# Patient Record
Sex: FEMALE | Race: WHITE | NOT HISPANIC OR LATINO | Employment: UNEMPLOYED | URBAN - METROPOLITAN AREA
[De-identification: names, ages, dates, MRNs, and addresses within clinical notes are randomized per-mention and may not be internally consistent; named-entity substitution may affect disease eponyms.]

---

## 2017-01-01 DIAGNOSIS — E03.9 ACQUIRED HYPOTHYROIDISM: Primary | ICD-10-CM

## 2017-01-01 RX ORDER — LEVOTHYROXINE SODIUM 75 UG/1
TABLET ORAL
Qty: 12 TABLET | Refills: 0 | Status: SHIPPED | OUTPATIENT
Start: 2017-01-01 | End: 2017-01-01 | Stop reason: SDUPTHER

## 2017-01-01 RX ORDER — LEVOTHYROXINE SODIUM 75 UG/1
TABLET ORAL
Qty: 12 TABLET | Refills: 0 | Status: ON HOLD | OUTPATIENT
Start: 2017-01-01 | End: 2018-01-01 | Stop reason: HOSPADM

## 2017-02-13 ENCOUNTER — OFFICE VISIT (OUTPATIENT)
Dept: URGENT CARE | Facility: CLINIC | Age: 57
End: 2017-02-13
Payer: MEDICARE

## 2017-02-13 VITALS
HEART RATE: 82 BPM | HEIGHT: 63 IN | SYSTOLIC BLOOD PRESSURE: 148 MMHG | OXYGEN SATURATION: 98 % | WEIGHT: 239.88 LBS | DIASTOLIC BLOOD PRESSURE: 100 MMHG | TEMPERATURE: 98 F | BODY MASS INDEX: 42.5 KG/M2

## 2017-02-13 DIAGNOSIS — R11.0 NAUSEA: ICD-10-CM

## 2017-02-13 DIAGNOSIS — B02.9 HERPES ZOSTER WITHOUT COMPLICATION: Primary | ICD-10-CM

## 2017-02-13 DIAGNOSIS — M54.89 OTHER BACK PAIN, UNSPECIFIED CHRONICITY: ICD-10-CM

## 2017-02-13 PROCEDURE — 96372 THER/PROPH/DIAG INJ SC/IM: CPT | Mod: PBBFAC,PO

## 2017-02-13 PROCEDURE — 99213 OFFICE O/P EST LOW 20 MIN: CPT | Mod: 25,S$PBB,, | Performed by: NURSE PRACTITIONER

## 2017-02-13 PROCEDURE — S0119 ONDANSETRON 4 MG: HCPCS | Mod: PBBFAC,PO

## 2017-02-13 PROCEDURE — 99214 OFFICE O/P EST MOD 30 MIN: CPT | Mod: PBBFAC,PO | Performed by: NURSE PRACTITIONER

## 2017-02-13 PROCEDURE — 99999 PR PBB SHADOW E&M-EST. PATIENT-LVL IV: CPT | Mod: PBBFAC,,, | Performed by: NURSE PRACTITIONER

## 2017-02-13 RX ORDER — ONDANSETRON 4 MG/1
8 TABLET, FILM COATED ORAL
Status: COMPLETED | OUTPATIENT
Start: 2017-02-13 | End: 2017-02-13

## 2017-02-13 RX ORDER — KETOROLAC TROMETHAMINE 30 MG/ML
60 INJECTION, SOLUTION INTRAMUSCULAR; INTRAVENOUS ONCE
Status: DISCONTINUED | OUTPATIENT
Start: 2017-02-13 | End: 2017-02-13

## 2017-02-13 RX ORDER — ACYCLOVIR 800 MG/1
800 TABLET ORAL
Qty: 35 TABLET | Refills: 0 | Status: ON HOLD | OUTPATIENT
Start: 2017-02-13 | End: 2018-01-01 | Stop reason: HOSPADM

## 2017-02-13 RX ORDER — KETOROLAC TROMETHAMINE 30 MG/ML
30 INJECTION, SOLUTION INTRAMUSCULAR; INTRAVENOUS
Status: COMPLETED | OUTPATIENT
Start: 2017-02-13 | End: 2017-02-13

## 2017-02-13 RX ORDER — ONDANSETRON 4 MG/1
4 TABLET, FILM COATED ORAL EVERY 6 HOURS PRN
Qty: 15 TABLET | Refills: 0 | Status: ON HOLD | OUTPATIENT
Start: 2017-02-13 | End: 2018-01-01 | Stop reason: HOSPADM

## 2017-02-13 RX ORDER — GABAPENTIN 300 MG/1
300 CAPSULE ORAL NIGHTLY
Qty: 30 CAPSULE | Refills: 0 | Status: ON HOLD | OUTPATIENT
Start: 2017-02-13 | End: 2018-01-01 | Stop reason: HOSPADM

## 2017-02-13 RX ADMIN — ONDANSETRON HYDROCHLORIDE 8 MG: 4 TABLET, FILM COATED ORAL at 04:02

## 2017-02-13 RX ADMIN — KETOROLAC TROMETHAMINE 30 MG: 30 INJECTION, SOLUTION INTRAMUSCULAR at 04:02

## 2017-02-13 NOTE — PATIENT INSTRUCTIONS
PLAN:   Toradol 30 mg IM now  Advise increase p.o. fluids--at least 64 ounces of water/juice & rest  Meds: Zofran, Neurontin, Acyclovir.  Cool compresses.  Practice good handwashing.  Tylenol or Ibuprofen for fever, headache and body aches.  See PCP or go to ER if symptoms worsen or fail to improve with treatment.

## 2017-02-13 NOTE — MR AVS SNAPSHOT
[facility]  [address]  [place] [address]  Phone: [phone]  Fax: [phone]                  Milli Montez   2017 12:00 PM   Office Visit    Description:  Female : 1960   Provider:  Bianca Rees NP   Department:  [facility]           Reason for Visit     Herpes Zoster           Diagnoses this Visit        Comments    Herpes zoster without complication    -  Primary     Other back pain, unspecified chronicity         Nausea                To Do List           Future Appointments        Provider Department Dept Phone    2017 10:00 AM Usha Chang MD [facility] [phone]      Goals (5 Years of Data)     None       These Medications        Disp Refills Start End    ondansetron (ZOFRAN) 4 MG tablet 15 tablet 0 2017     Take 1 tablet (4 mg total) by mouth every 6 (six) hours as needed for Nausea. - Oral    Pharmacy: [place], [place] - [address] Ph #: [phone]       gabapentin (NEURONTIN) 300 MG capsule 30 capsule 0 2017 3/15/2017    Take 1 capsule (300 mg total) by mouth every evening. - Oral    Pharmacy: [place], [place] - [id number] Ph #: [phone]       acyclovir (ZOVIRAX) 800 MG Tab 35 tablet 0 2017    Take 1 tablet (800 mg total) by mouth 5 (five) times daily. - Oral    Pharmacy: [place], [place] - [id number] Ph #: [phone]         [facility]     [facility] -  Assistance  Registered nurses in the [facility] provide clinical advisement, health education, appointment booking, and other advisory services.  Call for this free service at [phone].             Medications           Message regarding Medications     Verify the changes and/or additions to your medication regime listed below are the same as discussed with your clinician  today.  If any of these changes or additions are incorrect, please notify your healthcare provider.        START taking these NEW medications        Refills    ondansetron (ZOFRAN) 4 MG tablet 0    Sig: Take 1 tablet (4 mg total) by mouth every 6 (six) hours as needed for Nausea.    Class: Normal    Route: Oral    gabapentin (NEURONTIN) 300 MG capsule 0    Sig: Take 1 capsule (300 mg total) by mouth every evening.    Class: Normal    Route: Oral    acyclovir (ZOVIRAX) 800 MG Tab 0    Sig: Take 1 tablet (800 mg total) by mouth 5 (five) times daily.    Class: Normal    Route: Oral      These medications were administered today        Dose Freq    ketorolac injection 60 mg 60 mg Once    Sig: Inject 2 mLs (60 mg total) into the muscle once.    Class: Normal    Route: Intramuscular      STOP taking these medications     ondansetron (ZOFRAN-ODT) 8 MG TbDL Take 1 tablet (8 mg total) by mouth 3 (three) times daily as needed.           Verify that the below list of medications is an accurate representation of the medications you are currently taking.  If none reported, the list may be blank. If incorrect, please contact your healthcare provider. Carry this list with you in case of emergency.           Current Medications     aspirin (BUFFERIN) 325 MG Tab Take 325 mg by mouth once daily.    blood sugar diagnostic Strp check BS fastin and  2hr after biggest meal    blood-glucose meter (FREESTYLE SYSTEM KIT) kit Use as instructed    clopidogrel (PLAVIX) 75 mg tablet Take 75 mg by mouth.    enalapril (VASOTEC) 2.5 MG tablet Take 2.5 mg by mouth 2 (two) times daily.    furosemide (LASIX) 20 MG tablet Take 20 mg by mouth daily as needed.    glipiZIDE (GLUCOTROL) 5 MG tablet Take 1 tablet (5 mg total) by mouth 2 (two) times daily before meals.    lancets-blood glucose strips 30 gauge Cmpk 2 Devices by Misc.(Non-Drug; Combo Route) route 2 (two) times daily. check BS fastin and  2hr after biggest meal    levothyroxine (SYNTHROID) 75  "MCG tablet 1 tab po daily    nebivolol (BYSTOLIC) 5 MG Tab Take 5 mg by mouth.    ranolazine (RANEXA) 500 MG Tb12 Take 500 mg by mouth.    rosuvastatin (CRESTOR) 20 MG tablet Take 40 mg by mouth once daily.    acyclovir (ZOVIRAX) 800 MG Tab Take 1 tablet (800 mg total) by mouth 5 (five) times daily.    gabapentin (NEURONTIN) 300 MG capsule Take 1 capsule (300 mg total) by mouth every evening.    ondansetron (ZOFRAN) 4 MG tablet Take 1 tablet (4 mg total) by mouth every 6 (six) hours as needed for Nausea.           Clinical Reference Information           Your Vitals Were     BP Pulse Temp Height    148/100 (BP Location: Left arm, Patient Position: Sitting, BP Method: Manual) 82 97.8 °F (36.6 °C) (Tympanic) 5' 3" (1.6 m)    Weight SpO2 BMI    108.8 kg (239 lb 13.8 oz) 98% 42.49 kg/m2      Blood Pressure          Most Recent Value    BP  (!)  148/100      Allergies as of 2/13/2017     Penicillins      Immunizations Administered on Date of Encounter - 2/13/2017     None      Instructions    PLAN:   Toradol 60 mg IM now  Advise increase p.o. fluids--at least 64 ounces of water/juice & rest  Meds: Zofran, Neurontin, Acyclovir.  Cool compresses.  Practice good handwashing.  Tylenol or Ibuprofen for fever, headache and body aches.  See PCP or go to ER if symptoms worsen or fail to improve with treatment.           Language Assistance Services     ATTENTION: Language assistance services are available, free of charge. Please call [phone].      ATENCIÓN: Si lauren nichols, tiene a obregon disposición servicios gratuitos de asistencia lingüística. Llame al [phone].     JACQUIE Ý: N?u b?n nói Ti?ng Vi?t, có các d?ch v? h? tr? ngôn ng? mi?n phí dành cho b?n. G?i s? [phone].         [facility] complies with applicable Federal civil rights laws and does not discriminate on the basis of race, color, national origin, age, disability, or sex.        "

## 2017-02-13 NOTE — PROGRESS NOTES
CHIEF COMPLAINT/REASON FOR VISIT:  painful red rash on left back, flank & abdomen .    HISTORY OF PRESENT ILLNESS:  56  year-old female complains of painful red rash on left back, flank & abdomen  onset 2-3 days ago. Patient admits very painful to touch. Complains of nausea with pain medications. Patient requesting Zofran. Denies seeking emergency treatment.  Patient denies shortness of breath, congestion, fever, cough, urinary discomfort.       Past Medical History   Diagnosis Date    Allergy     Arthritis     Blood transfusion     CHF (congestive heart failure)     Heart murmur     History of loop recorder     Hyperlipidemia     Hypertension     Hypothyroidism 9/17/2013    Myocardial infarction     Prediabetes     Thyroid disease     TIA (transient ischemic attack)     Ulcer        Social History     Social History    Marital status:      Spouse name: N/A    Number of children: N/A    Years of education: N/A     Occupational History    Not on file.     Social History Main Topics    Smoking status: Never Smoker    Smokeless tobacco: Never Used    Alcohol use No    Drug use: No    Sexual activity: Yes     Partners: Male     Other Topics Concern    Not on file     Social History Narrative          Family History   Problem Relation Age of Onset    Heart disease Mother     Cancer Father     Heart disease Father        ROS:  GENERAL: No fever, chills, fatigability or weight loss.  SKIN: painful red rash on left back, flank & abdomen .  CHEST: Denies cyanosis, wheezing, cough and sputum production.  CARDIOVASCULAR: Denies chest pain, PND, orthopnea or reduced exercise tolerance.  ABDOMEN: Appetite fine. No weight loss. Denies diarrhea, abdominal pain,   MUSCULOSKELETAL: painful red rash on left back, flank & abdomen .  NEUROLOGIC: No history of seizures, paralysis, alteration of gait or coordination.  PSYCHIATRIC: Denies mood swings, depression or suicidal thoughts.    PE:   APPEARANCE:  Well nourished, well developed, in moderate distress.   SKIN: left flank, back & abdominal region with red grouped vesicles lesion  CHEST: no respiratory symptoms .  CARDIOVASCULAR: Heart rate regular    MUSCULOSKELETAL: Motor: 5/5 strength major flexors/extensors.  NEUROLOGIC: No sensory deficits. Gait & Posture: Normal gait and fine motion. No cerebellar signs.  MENTAL STATUS: Patient alert, oriented x 3 & conversant.    PLAN:   Toradol 30 mg IM now  Advise increase p.o. fluids--at least 64 ounces of water/juice & rest  Meds: Zofran, Neurontin, Acyclovir.  Cool compresses.  Practice good handwashing.  Tylenol or Ibuprofen for fever, headache and body aches.  See PCP or go to ER if symptoms worsen or fail to improve with treatment.      DIAGNOSIS:   Nausea  Back pain  Morbid Obesity  Hypertension  Herpes zoster/ shingles

## 2017-04-06 DIAGNOSIS — E03.9 ACQUIRED HYPOTHYROIDISM: Primary | ICD-10-CM

## 2017-04-06 RX ORDER — LEVOTHYROXINE SODIUM 75 UG/1
TABLET ORAL
Qty: 12 TABLET | Refills: 0 | Status: SHIPPED | OUTPATIENT
Start: 2017-04-06 | End: 2017-01-01 | Stop reason: SDUPTHER

## 2018-01-01 ENCOUNTER — TELEPHONE (OUTPATIENT)
Dept: FAMILY MEDICINE | Facility: CLINIC | Age: 58
End: 2018-01-01

## 2018-01-01 ENCOUNTER — HOSPITAL ENCOUNTER (INPATIENT)
Facility: HOSPITAL | Age: 58
LOS: 7 days | Discharge: ANOTHER HOSPITAL | DRG: 270 | End: 2018-05-26
Attending: EMERGENCY MEDICINE | Admitting: INTERNAL MEDICINE
Payer: MEDICARE

## 2018-01-01 ENCOUNTER — HOSPITAL ENCOUNTER (INPATIENT)
Facility: HOSPITAL | Age: 58
LOS: 2 days | DRG: 871 | End: 2018-05-28
Attending: INTERNAL MEDICINE | Admitting: INTERNAL MEDICINE
Payer: MEDICARE

## 2018-01-01 ENCOUNTER — HOSPITAL ENCOUNTER (INPATIENT)
Facility: HOSPITAL | Age: 58
LOS: 1 days | Discharge: HOME | DRG: 292 | End: 2018-01-15
Attending: EMERGENCY MEDICINE | Admitting: FAMILY MEDICINE
Payer: MEDICARE

## 2018-01-01 ENCOUNTER — HOSPITAL ENCOUNTER (INPATIENT)
Facility: HOSPITAL | Age: 58
LOS: 2 days | Discharge: HOME | DRG: 189 | End: 2018-04-21
Attending: EMERGENCY MEDICINE | Admitting: INTERNAL MEDICINE
Payer: MEDICARE

## 2018-01-01 VITALS
DIASTOLIC BLOOD PRESSURE: 72 MMHG | RESPIRATION RATE: 18 BRPM | SYSTOLIC BLOOD PRESSURE: 110 MMHG | HEART RATE: 80 BPM | OXYGEN SATURATION: 94 % | TEMPERATURE: 97 F | HEIGHT: 63 IN | WEIGHT: 227.31 LBS | BODY MASS INDEX: 40.28 KG/M2

## 2018-01-01 VITALS
HEART RATE: 68 BPM | DIASTOLIC BLOOD PRESSURE: 57 MMHG | HEIGHT: 63 IN | OXYGEN SATURATION: 95 % | SYSTOLIC BLOOD PRESSURE: 101 MMHG | WEIGHT: 229.94 LBS | BODY MASS INDEX: 40.74 KG/M2 | TEMPERATURE: 98 F | RESPIRATION RATE: 18 BRPM

## 2018-01-01 VITALS
HEART RATE: 87 BPM | HEIGHT: 66 IN | WEIGHT: 211.88 LBS | SYSTOLIC BLOOD PRESSURE: 110 MMHG | OXYGEN SATURATION: 98 % | TEMPERATURE: 98 F | DIASTOLIC BLOOD PRESSURE: 49 MMHG | BODY MASS INDEX: 34.05 KG/M2 | RESPIRATION RATE: 21 BRPM

## 2018-01-01 VITALS
TEMPERATURE: 97 F | WEIGHT: 216.5 LBS | SYSTOLIC BLOOD PRESSURE: 71 MMHG | OXYGEN SATURATION: 97 % | DIASTOLIC BLOOD PRESSURE: 51 MMHG | HEIGHT: 66 IN | BODY MASS INDEX: 34.79 KG/M2

## 2018-01-01 DIAGNOSIS — N18.3 ACUTE RENAL FAILURE SUPERIMPOSED ON STAGE 3 CHRONIC KIDNEY DISEASE, UNSPECIFIED ACUTE RENAL FAILURE TYPE: ICD-10-CM

## 2018-01-01 DIAGNOSIS — R94.30 EJECTION FRACTION < 50%: ICD-10-CM

## 2018-01-01 DIAGNOSIS — N18.30 STAGE 3 CHRONIC KIDNEY DISEASE DUE TO TYPE 1 DIABETES MELLITUS: ICD-10-CM

## 2018-01-01 DIAGNOSIS — E03.9 HYPOTHYROIDISM, UNSPECIFIED TYPE: Chronic | ICD-10-CM

## 2018-01-01 DIAGNOSIS — N17.2 ACUTE RENAL FAILURE WITH RENAL MEDULLARY NECROSIS SUPERIMPOSED ON STAGE 3 CHRONIC KIDNEY DISEASE: ICD-10-CM

## 2018-01-01 DIAGNOSIS — R57.0 CARDIOGENIC SHOCK: ICD-10-CM

## 2018-01-01 DIAGNOSIS — I25.10 CORONARY ARTERY DISEASE, ANGINA PRESENCE UNSPECIFIED, UNSPECIFIED VESSEL OR LESION TYPE, UNSPECIFIED WHETHER NATIVE OR TRANSPLANTED HEART: ICD-10-CM

## 2018-01-01 DIAGNOSIS — R94.31 ABNORMAL EKG: ICD-10-CM

## 2018-01-01 DIAGNOSIS — I50.9 ACUTE ON CHRONIC CONGESTIVE HEART FAILURE, UNSPECIFIED HEART FAILURE TYPE: Primary | ICD-10-CM

## 2018-01-01 DIAGNOSIS — E11.21 DIABETIC NEPHROPATHY ASSOCIATED WITH TYPE 2 DIABETES MELLITUS: ICD-10-CM

## 2018-01-01 DIAGNOSIS — R06.00 DYSPNEA: ICD-10-CM

## 2018-01-01 DIAGNOSIS — I50.9 CHF (CONGESTIVE HEART FAILURE): ICD-10-CM

## 2018-01-01 DIAGNOSIS — N18.30 ACUTE RENAL FAILURE WITH RENAL MEDULLARY NECROSIS SUPERIMPOSED ON STAGE 3 CHRONIC KIDNEY DISEASE: ICD-10-CM

## 2018-01-01 DIAGNOSIS — N17.9 AKI (ACUTE KIDNEY INJURY): ICD-10-CM

## 2018-01-01 DIAGNOSIS — I50.1 ACUTE PULMONARY EDEMA WITH CONGESTIVE HEART FAILURE: ICD-10-CM

## 2018-01-01 DIAGNOSIS — J96.00 ACUTE RESPIRATORY FAILURE: ICD-10-CM

## 2018-01-01 DIAGNOSIS — I10 ESSENTIAL HYPERTENSION: Chronic | ICD-10-CM

## 2018-01-01 DIAGNOSIS — E66.01 MORBID OBESITY WITH BMI OF 40.0-44.9, ADULT: Chronic | ICD-10-CM

## 2018-01-01 DIAGNOSIS — R07.9 CHEST PAIN: ICD-10-CM

## 2018-01-01 DIAGNOSIS — R06.02 SOB (SHORTNESS OF BREATH): ICD-10-CM

## 2018-01-01 DIAGNOSIS — I50.9 CONGESTIVE HEART FAILURE, UNSPECIFIED CONGESTIVE HEART FAILURE CHRONICITY, UNSPECIFIED CONGESTIVE HEART FAILURE TYPE: Primary | ICD-10-CM

## 2018-01-01 DIAGNOSIS — E11.65 TYPE 2 DIABETES MELLITUS WITH HYPERGLYCEMIA, WITHOUT LONG-TERM CURRENT USE OF INSULIN: Chronic | ICD-10-CM

## 2018-01-01 DIAGNOSIS — N17.9 ACUTE RENAL FAILURE SUPERIMPOSED ON STAGE 3 CHRONIC KIDNEY DISEASE, UNSPECIFIED ACUTE RENAL FAILURE TYPE: ICD-10-CM

## 2018-01-01 DIAGNOSIS — E87.1 HYPONATREMIA: ICD-10-CM

## 2018-01-01 DIAGNOSIS — I25.10 CORONARY ARTERY DISEASE INVOLVING NATIVE CORONARY ARTERY OF NATIVE HEART WITHOUT ANGINA PECTORIS: Chronic | ICD-10-CM

## 2018-01-01 DIAGNOSIS — I16.0 HYPERTENSIVE URGENCY: ICD-10-CM

## 2018-01-01 DIAGNOSIS — J96.02 ACUTE RESPIRATORY FAILURE WITH HYPERCAPNIA: Primary | ICD-10-CM

## 2018-01-01 DIAGNOSIS — J81.0 FLASH PULMONARY EDEMA: ICD-10-CM

## 2018-01-01 DIAGNOSIS — G45.3 AMAUROSIS FUGAX: ICD-10-CM

## 2018-01-01 DIAGNOSIS — E87.79 OTHER HYPERVOLEMIA: ICD-10-CM

## 2018-01-01 DIAGNOSIS — E78.5 HYPERLIPIDEMIA, UNSPECIFIED HYPERLIPIDEMIA TYPE: ICD-10-CM

## 2018-01-01 DIAGNOSIS — R06.00 DYSPNEA, UNSPECIFIED TYPE: ICD-10-CM

## 2018-01-01 DIAGNOSIS — I21.4 NSTEMI (NON-ST ELEVATED MYOCARDIAL INFARCTION): ICD-10-CM

## 2018-01-01 DIAGNOSIS — N17.9 ACUTE RENAL FAILURE SUPERIMPOSED ON STAGE 3 CHRONIC KIDNEY DISEASE, UNSPECIFIED ACUTE RENAL FAILURE TYPE: Primary | ICD-10-CM

## 2018-01-01 DIAGNOSIS — E87.20 LACTIC ACIDOSIS: ICD-10-CM

## 2018-01-01 DIAGNOSIS — I50.31 ACUTE CONGESTIVE HEART FAILURE WITH LEFT VENTRICULAR DIASTOLIC DYSFUNCTION: ICD-10-CM

## 2018-01-01 DIAGNOSIS — J96.01 ACUTE RESPIRATORY FAILURE WITH HYPOXIA: ICD-10-CM

## 2018-01-01 DIAGNOSIS — E10.22 STAGE 3 CHRONIC KIDNEY DISEASE DUE TO TYPE 1 DIABETES MELLITUS: ICD-10-CM

## 2018-01-01 DIAGNOSIS — R06.03 RESPIRATORY DISTRESS: ICD-10-CM

## 2018-01-01 DIAGNOSIS — I42.9 CARDIOMYOPATHY: ICD-10-CM

## 2018-01-01 DIAGNOSIS — I50.9 ACUTE ON CHRONIC CONGESTIVE HEART FAILURE, UNSPECIFIED CONGESTIVE HEART FAILURE TYPE: ICD-10-CM

## 2018-01-01 DIAGNOSIS — I50.9 CONGESTIVE HEART FAILURE: ICD-10-CM

## 2018-01-01 DIAGNOSIS — N18.3 ACUTE RENAL FAILURE SUPERIMPOSED ON STAGE 3 CHRONIC KIDNEY DISEASE, UNSPECIFIED ACUTE RENAL FAILURE TYPE: Primary | ICD-10-CM

## 2018-01-01 DIAGNOSIS — I48.91 ATRIAL FIBRILLATION: ICD-10-CM

## 2018-01-01 DIAGNOSIS — I50.43 ACUTE ON CHRONIC COMBINED SYSTOLIC AND DIASTOLIC HEART FAILURE: ICD-10-CM

## 2018-01-01 DIAGNOSIS — D72.829 LEUKOCYTOSIS, UNSPECIFIED TYPE: ICD-10-CM

## 2018-01-01 DIAGNOSIS — R06.02 SHORTNESS OF BREATH: ICD-10-CM

## 2018-01-01 DIAGNOSIS — E87.8 ELECTROLYTE IMBALANCE: ICD-10-CM

## 2018-01-01 LAB
ALBUMIN SERPL BCP-MCNC: 1.7 G/DL
ALBUMIN SERPL BCP-MCNC: 1.8 G/DL
ALBUMIN SERPL BCP-MCNC: 1.8 G/DL
ALBUMIN SERPL BCP-MCNC: 1.9 G/DL
ALBUMIN SERPL BCP-MCNC: 2 G/DL
ALBUMIN SERPL BCP-MCNC: 2.1 G/DL
ALBUMIN SERPL BCP-MCNC: 2.1 G/DL
ALBUMIN SERPL BCP-MCNC: 2.2 G/DL
ALBUMIN SERPL BCP-MCNC: 2.3 G/DL
ALBUMIN SERPL BCP-MCNC: 2.3 G/DL
ALBUMIN SERPL BCP-MCNC: 2.4 G/DL
ALBUMIN SERPL BCP-MCNC: 2.7 G/DL
ALBUMIN SERPL BCP-MCNC: 2.7 G/DL
ALBUMIN SERPL BCP-MCNC: 2.9 G/DL
ALBUMIN SERPL BCP-MCNC: 2.9 G/DL
ALBUMIN SERPL BCP-MCNC: 3 G/DL
ALBUMIN SERPL BCP-MCNC: 3.2 G/DL
ALBUMIN SERPL BCP-MCNC: 3.2 G/DL
ALBUMIN SERPL BCP-MCNC: 3.3 G/DL
ALLENS TEST: ABNORMAL
ALLENS TEST: NORMAL
ALP SERPL-CCNC: 101 U/L
ALP SERPL-CCNC: 104 U/L
ALP SERPL-CCNC: 112 U/L
ALP SERPL-CCNC: 119 U/L
ALP SERPL-CCNC: 124 U/L
ALP SERPL-CCNC: 129 U/L
ALP SERPL-CCNC: 143 U/L
ALP SERPL-CCNC: 146 U/L
ALP SERPL-CCNC: 147 U/L
ALP SERPL-CCNC: 149 U/L
ALP SERPL-CCNC: 162 U/L
ALP SERPL-CCNC: 164 U/L
ALP SERPL-CCNC: 87 U/L
ALP SERPL-CCNC: 87 U/L
ALP SERPL-CCNC: 98 U/L
ALT SERPL W/O P-5'-P-CCNC: 15 U/L
ALT SERPL W/O P-5'-P-CCNC: 17 U/L
ALT SERPL W/O P-5'-P-CCNC: 23 U/L
ALT SERPL W/O P-5'-P-CCNC: 25 U/L
ALT SERPL W/O P-5'-P-CCNC: 26 U/L
ALT SERPL W/O P-5'-P-CCNC: 26 U/L
ALT SERPL W/O P-5'-P-CCNC: 28 U/L
ALT SERPL W/O P-5'-P-CCNC: 30 U/L
ALT SERPL W/O P-5'-P-CCNC: 32 U/L
ALT SERPL W/O P-5'-P-CCNC: 43 U/L
ALT SERPL W/O P-5'-P-CCNC: 58 U/L
ALT SERPL W/O P-5'-P-CCNC: 62 U/L
ALT SERPL W/O P-5'-P-CCNC: 75 U/L
ALT SERPL W/O P-5'-P-CCNC: 81 U/L
ALT SERPL W/O P-5'-P-CCNC: 81 U/L
ANION GAP SERPL CALC-SCNC: 10 MMOL/L
ANION GAP SERPL CALC-SCNC: 11 MMOL/L
ANION GAP SERPL CALC-SCNC: 12 MMOL/L
ANION GAP SERPL CALC-SCNC: 12 MMOL/L
ANION GAP SERPL CALC-SCNC: 13 MMOL/L
ANION GAP SERPL CALC-SCNC: 14 MMOL/L
ANION GAP SERPL CALC-SCNC: 15 MMOL/L
ANION GAP SERPL CALC-SCNC: 16 MMOL/L
ANION GAP SERPL CALC-SCNC: 18 MMOL/L
ANION GAP SERPL CALC-SCNC: 18 MMOL/L
ANISOCYTOSIS BLD QL SMEAR: SLIGHT
AORTIC VALVE REGURGITATION: ABNORMAL
APTT BLDCRRT: 22.3 SEC
APTT BLDCRRT: 23.3 SEC
APTT BLDCRRT: 36.3 SEC
APTT BLDCRRT: 36.3 SEC
APTT BLDCRRT: 37.7 SEC
APTT BLDCRRT: 40 SEC
APTT BLDCRRT: 43.8 SEC
APTT BLDCRRT: 43.9 SEC
APTT BLDCRRT: 48.5 SEC
APTT BLDCRRT: 48.5 SEC
APTT BLDCRRT: 57.1 SEC
APTT BLDCRRT: 57.1 SEC
APTT BLDCRRT: 57.2 SEC
APTT BLDCRRT: 63 SEC
APTT BLDCRRT: 67.4 SEC
APTT BLDCRRT: 73.7 SEC
AST SERPL-CCNC: 109 U/L
AST SERPL-CCNC: 148 U/L
AST SERPL-CCNC: 26 U/L
AST SERPL-CCNC: 31 U/L
AST SERPL-CCNC: 317 U/L
AST SERPL-CCNC: 32 U/L
AST SERPL-CCNC: 35 U/L
AST SERPL-CCNC: 37 U/L
AST SERPL-CCNC: 44 U/L
AST SERPL-CCNC: 44 U/L
AST SERPL-CCNC: 45 U/L
AST SERPL-CCNC: 51 U/L
AST SERPL-CCNC: 517 U/L
AST SERPL-CCNC: 517 U/L
AST SERPL-CCNC: 67 U/L
BACTERIA #/AREA URNS HPF: ABNORMAL /HPF
BACTERIA #/AREA URNS HPF: NORMAL /HPF
BACTERIA BLD CULT: NORMAL
BACTERIA SPEC AEROBE CULT: NORMAL
BASOPHILS # BLD AUTO: 0.01 K/UL
BASOPHILS # BLD AUTO: 0.02 K/UL
BASOPHILS # BLD AUTO: 0.03 K/UL
BASOPHILS # BLD AUTO: 0.06 K/UL
BASOPHILS # BLD AUTO: 0.06 K/UL
BASOPHILS # BLD AUTO: ABNORMAL K/UL
BASOPHILS NFR BLD: 0 %
BASOPHILS NFR BLD: 0.1 %
BASOPHILS NFR BLD: 0.2 %
BASOPHILS NFR BLD: 0.3 %
BASOPHILS NFR BLD: 0.4 %
BASOPHILS NFR BLD: 0.4 %
BASOPHILS NFR BLD: 0.5 %
BASOPHILS NFR BLD: 0.5 %
BILIRUB SERPL-MCNC: 0.5 MG/DL
BILIRUB SERPL-MCNC: 0.5 MG/DL
BILIRUB SERPL-MCNC: 0.7 MG/DL
BILIRUB SERPL-MCNC: 0.8 MG/DL
BILIRUB SERPL-MCNC: 0.8 MG/DL
BILIRUB SERPL-MCNC: 1.4 MG/DL
BILIRUB SERPL-MCNC: 1.4 MG/DL
BILIRUB SERPL-MCNC: 1.6 MG/DL
BILIRUB SERPL-MCNC: 1.7 MG/DL
BILIRUB SERPL-MCNC: 1.9 MG/DL
BILIRUB SERPL-MCNC: 1.9 MG/DL
BILIRUB SERPL-MCNC: 2.1 MG/DL
BILIRUB SERPL-MCNC: 2.5 MG/DL
BILIRUB SERPL-MCNC: 2.6 MG/DL
BILIRUB UR QL STRIP: NEGATIVE
BNP SERPL-MCNC: 1841 PG/ML
BNP SERPL-MCNC: 200 PG/ML
BNP SERPL-MCNC: 2305 PG/ML
BNP SERPL-MCNC: 3240 PG/ML
BNP SERPL-MCNC: 510 PG/ML
BNP SERPL-MCNC: 828 PG/ML
BUN SERPL-MCNC: 15 MG/DL
BUN SERPL-MCNC: 17 MG/DL
BUN SERPL-MCNC: 20 MG/DL
BUN SERPL-MCNC: 23 MG/DL
BUN SERPL-MCNC: 24 MG/DL
BUN SERPL-MCNC: 25 MG/DL
BUN SERPL-MCNC: 25 MG/DL
BUN SERPL-MCNC: 28 MG/DL
BUN SERPL-MCNC: 31 MG/DL
BUN SERPL-MCNC: 35 MG/DL
BUN SERPL-MCNC: 35 MG/DL
BUN SERPL-MCNC: 37 MG/DL
BUN SERPL-MCNC: 40 MG/DL
BUN SERPL-MCNC: 53 MG/DL
BUN SERPL-MCNC: 60 MG/DL
BUN SERPL-MCNC: 63 MG/DL
BUN SERPL-MCNC: 63 MG/DL
BUN SERPL-MCNC: 64 MG/DL
BUN SERPL-MCNC: 68 MG/DL
BUN SERPL-MCNC: 69 MG/DL
BUN SERPL-MCNC: 69 MG/DL
BUN SERPL-MCNC: 76 MG/DL
BUN SERPL-MCNC: 76 MG/DL
BUN SERPL-MCNC: 77 MG/DL
BUN SERPL-MCNC: 79 MG/DL
BUN SERPL-MCNC: 79 MG/DL
BUN SERPL-MCNC: 81 MG/DL
BURR CELLS BLD QL SMEAR: ABNORMAL
CALCIUM SERPL-MCNC: 7.9 MG/DL
CALCIUM SERPL-MCNC: 7.9 MG/DL
CALCIUM SERPL-MCNC: 8 MG/DL
CALCIUM SERPL-MCNC: 8.1 MG/DL
CALCIUM SERPL-MCNC: 8.2 MG/DL
CALCIUM SERPL-MCNC: 8.3 MG/DL
CALCIUM SERPL-MCNC: 8.3 MG/DL
CALCIUM SERPL-MCNC: 8.4 MG/DL
CALCIUM SERPL-MCNC: 8.5 MG/DL
CALCIUM SERPL-MCNC: 8.6 MG/DL
CALCIUM SERPL-MCNC: 8.8 MG/DL
CALCIUM SERPL-MCNC: 8.9 MG/DL
CALCIUM SERPL-MCNC: 8.9 MG/DL
CALCIUM SERPL-MCNC: 9 MG/DL
CALCIUM SERPL-MCNC: 9 MG/DL
CALCIUM SERPL-MCNC: 9.1 MG/DL
CALCIUM SERPL-MCNC: 9.2 MG/DL
CALCIUM SERPL-MCNC: 9.5 MG/DL
CALCIUM SERPL-MCNC: 9.6 MG/DL
CALCIUM SERPL-MCNC: 9.9 MG/DL
CHLORIDE SERPL-SCNC: 102 MMOL/L
CHLORIDE SERPL-SCNC: 103 MMOL/L
CHLORIDE SERPL-SCNC: 104 MMOL/L
CHLORIDE SERPL-SCNC: 106 MMOL/L
CHLORIDE SERPL-SCNC: 107 MMOL/L
CHLORIDE SERPL-SCNC: 107 MMOL/L
CHLORIDE SERPL-SCNC: 80 MMOL/L
CHLORIDE SERPL-SCNC: 81 MMOL/L
CHLORIDE SERPL-SCNC: 81 MMOL/L
CHLORIDE SERPL-SCNC: 82 MMOL/L
CHLORIDE SERPL-SCNC: 83 MMOL/L
CHLORIDE SERPL-SCNC: 84 MMOL/L
CHLORIDE SERPL-SCNC: 84 MMOL/L
CHLORIDE SERPL-SCNC: 85 MMOL/L
CHLORIDE SERPL-SCNC: 86 MMOL/L
CHLORIDE SERPL-SCNC: 87 MMOL/L
CHLORIDE SERPL-SCNC: 87 MMOL/L
CHLORIDE SERPL-SCNC: 88 MMOL/L
CHLORIDE SERPL-SCNC: 89 MMOL/L
CHLORIDE SERPL-SCNC: 90 MMOL/L
CHLORIDE SERPL-SCNC: 98 MMOL/L
CHLORIDE SERPL-SCNC: 99 MMOL/L
CHOLEST SERPL-MCNC: 245 MG/DL
CHOLEST/HDLC SERPL: 6.8 {RATIO}
CK MB SERPL-MCNC: 0.6 NG/ML
CK MB SERPL-MCNC: 1.2 NG/ML
CK MB SERPL-MCNC: 3.9 NG/ML
CK MB SERPL-RTO: 1.1 %
CK MB SERPL-RTO: 1.6 %
CK MB SERPL-RTO: 2.4 %
CK SERPL-CCNC: 162 U/L
CK SERPL-CCNC: 162 U/L
CK SERPL-CCNC: 56 U/L
CK SERPL-CCNC: 56 U/L
CK SERPL-CCNC: 76 U/L
CK SERPL-CCNC: 76 U/L
CLARITY UR: CLEAR
CO2 SERPL-SCNC: 16 MMOL/L
CO2 SERPL-SCNC: 17 MMOL/L
CO2 SERPL-SCNC: 18 MMOL/L
CO2 SERPL-SCNC: 20 MMOL/L
CO2 SERPL-SCNC: 21 MMOL/L
CO2 SERPL-SCNC: 23 MMOL/L
CO2 SERPL-SCNC: 24 MMOL/L
CO2 SERPL-SCNC: 25 MMOL/L
CO2 SERPL-SCNC: 26 MMOL/L
CO2 SERPL-SCNC: 27 MMOL/L
CO2 SERPL-SCNC: 28 MMOL/L
CO2 SERPL-SCNC: 29 MMOL/L
CO2 SERPL-SCNC: 30 MMOL/L
CO2 SERPL-SCNC: 31 MMOL/L
CO2 SERPL-SCNC: 32 MMOL/L
CO2 SERPL-SCNC: 33 MMOL/L
CO2 SERPL-SCNC: 35 MMOL/L
COLOR UR: YELLOW
CORTIS SERPL-MCNC: 29.4 UG/DL
CREAT SERPL-MCNC: 1 MG/DL
CREAT SERPL-MCNC: 1.2 MG/DL
CREAT SERPL-MCNC: 1.4 MG/DL
CREAT SERPL-MCNC: 1.5 MG/DL
CREAT SERPL-MCNC: 1.6 MG/DL
CREAT SERPL-MCNC: 1.7 MG/DL
CREAT SERPL-MCNC: 1.8 MG/DL
CREAT SERPL-MCNC: 1.9 MG/DL
CREAT SERPL-MCNC: 1.9 MG/DL
CREAT SERPL-MCNC: 2 MG/DL
CREAT SERPL-MCNC: 2.1 MG/DL
CREAT SERPL-MCNC: 2.2 MG/DL
CREAT SERPL-MCNC: 2.3 MG/DL
CREAT SERPL-MCNC: 2.8 MG/DL
CREAT SERPL-MCNC: 2.8 MG/DL
CREAT SERPL-MCNC: 2.9 MG/DL
CREAT SERPL-MCNC: 3 MG/DL
CREAT SERPL-MCNC: 3.5 MG/DL
CREAT SERPL-MCNC: 3.9 MG/DL
CREAT SERPL-MCNC: 3.9 MG/DL
CREAT UR-MCNC: 156.2 MG/DL
D DIMER PPP IA.FEU-MCNC: 2.08 MG/L FEU
D DIMER PPP IA.FEU-MCNC: 3.17 MG/L FEU
DELSYS: ABNORMAL
DELSYS: NORMAL
DIASTOLIC DYSFUNCTION: YES
DIFFERENTIAL METHOD: ABNORMAL
EOSINOPHIL # BLD AUTO: 0 K/UL
EOSINOPHIL # BLD AUTO: 0.1 K/UL
EOSINOPHIL # BLD AUTO: 0.2 K/UL
EOSINOPHIL # BLD AUTO: 0.2 K/UL
EOSINOPHIL # BLD AUTO: 0.3 K/UL
EOSINOPHIL # BLD AUTO: 0.5 K/UL
EOSINOPHIL # BLD AUTO: ABNORMAL K/UL
EOSINOPHIL NFR BLD: 0.1 %
EOSINOPHIL NFR BLD: 0.2 %
EOSINOPHIL NFR BLD: 0.3 %
EOSINOPHIL NFR BLD: 0.4 %
EOSINOPHIL NFR BLD: 0.7 %
EOSINOPHIL NFR BLD: 1 %
EOSINOPHIL NFR BLD: 1.2 %
EOSINOPHIL NFR BLD: 1.4 %
EOSINOPHIL NFR BLD: 2 %
EOSINOPHIL NFR BLD: 3 %
EOSINOPHIL NFR BLD: 4.4 %
EP: 6
EP: 8
ERYTHROCYTE [DISTWIDTH] IN BLOOD BY AUTOMATED COUNT: 14.2 %
ERYTHROCYTE [DISTWIDTH] IN BLOOD BY AUTOMATED COUNT: 14.3 %
ERYTHROCYTE [DISTWIDTH] IN BLOOD BY AUTOMATED COUNT: 14.4 %
ERYTHROCYTE [DISTWIDTH] IN BLOOD BY AUTOMATED COUNT: 14.6 %
ERYTHROCYTE [DISTWIDTH] IN BLOOD BY AUTOMATED COUNT: 14.6 %
ERYTHROCYTE [DISTWIDTH] IN BLOOD BY AUTOMATED COUNT: 14.7 %
ERYTHROCYTE [DISTWIDTH] IN BLOOD BY AUTOMATED COUNT: 14.8 %
ERYTHROCYTE [DISTWIDTH] IN BLOOD BY AUTOMATED COUNT: 14.9 %
ERYTHROCYTE [DISTWIDTH] IN BLOOD BY AUTOMATED COUNT: 15 %
ERYTHROCYTE [DISTWIDTH] IN BLOOD BY AUTOMATED COUNT: 15 %
ERYTHROCYTE [DISTWIDTH] IN BLOOD BY AUTOMATED COUNT: 15.1 %
ERYTHROCYTE [DISTWIDTH] IN BLOOD BY AUTOMATED COUNT: 15.1 %
ERYTHROCYTE [DISTWIDTH] IN BLOOD BY AUTOMATED COUNT: 15.2 %
ERYTHROCYTE [DISTWIDTH] IN BLOOD BY AUTOMATED COUNT: 15.5 %
ERYTHROCYTE [DISTWIDTH] IN BLOOD BY AUTOMATED COUNT: 15.6 %
ERYTHROCYTE [SEDIMENTATION RATE] IN BLOOD BY WESTERGREN METHOD: 16 MM/H
ERYTHROCYTE [SEDIMENTATION RATE] IN BLOOD BY WESTERGREN METHOD: 20 MM/H
ERYTHROCYTE [SEDIMENTATION RATE] IN BLOOD BY WESTERGREN METHOD: 25 MM/H
ERYTHROCYTE [SEDIMENTATION RATE] IN BLOOD BY WESTERGREN METHOD: 26 MM/H
EST. GFR  (AFRICAN AMERICAN): 14.1 ML/MIN/1.73 M^2
EST. GFR  (AFRICAN AMERICAN): 14.1 ML/MIN/1.73 M^2
EST. GFR  (AFRICAN AMERICAN): 16.1 ML/MIN/1.73 M^2
EST. GFR  (AFRICAN AMERICAN): 19.4 ML/MIN/1.73 M^2
EST. GFR  (AFRICAN AMERICAN): 20 ML/MIN/1.73 M^2
EST. GFR  (AFRICAN AMERICAN): 20.2 ML/MIN/1.73 M^2
EST. GFR  (AFRICAN AMERICAN): 21 ML/MIN/1.73 M^2
EST. GFR  (AFRICAN AMERICAN): 21 ML/MIN/1.73 M^2
EST. GFR  (AFRICAN AMERICAN): 27 ML/MIN/1.73 M^2
EST. GFR  (AFRICAN AMERICAN): 28 ML/MIN/1.73 M^2
EST. GFR  (AFRICAN AMERICAN): 30 ML/MIN/1.73 M^2
EST. GFR  (AFRICAN AMERICAN): 32 ML/MIN/1.73 M^2
EST. GFR  (AFRICAN AMERICAN): 34 ML/MIN/1.73 M^2
EST. GFR  (AFRICAN AMERICAN): 34 ML/MIN/1.73 M^2
EST. GFR  (AFRICAN AMERICAN): 36 ML/MIN/1.73 M^2
EST. GFR  (AFRICAN AMERICAN): 39 ML/MIN/1.73 M^2
EST. GFR  (AFRICAN AMERICAN): 42 ML/MIN/1.73 M^2
EST. GFR  (AFRICAN AMERICAN): 45 ML/MIN/1.73 M^2
EST. GFR  (AFRICAN AMERICAN): 49 ML/MIN/1.73 M^2
EST. GFR  (AFRICAN AMERICAN): 59 ML/MIN/1.73 M^2
EST. GFR  (AFRICAN AMERICAN): >60 ML/MIN/1.73 M^2
EST. GFR  (NON AFRICAN AMERICAN): 12.3 ML/MIN/1.73 M^2
EST. GFR  (NON AFRICAN AMERICAN): 12.3 ML/MIN/1.73 M^2
EST. GFR  (NON AFRICAN AMERICAN): 14 ML/MIN/1.73 M^2
EST. GFR  (NON AFRICAN AMERICAN): 16.8 ML/MIN/1.73 M^2
EST. GFR  (NON AFRICAN AMERICAN): 17.5 ML/MIN/1.73 M^2
EST. GFR  (NON AFRICAN AMERICAN): 18 ML/MIN/1.73 M^2
EST. GFR  (NON AFRICAN AMERICAN): 23 ML/MIN/1.73 M^2
EST. GFR  (NON AFRICAN AMERICAN): 25 ML/MIN/1.73 M^2
EST. GFR  (NON AFRICAN AMERICAN): 26 ML/MIN/1.73 M^2
EST. GFR  (NON AFRICAN AMERICAN): 28 ML/MIN/1.73 M^2
EST. GFR  (NON AFRICAN AMERICAN): 29 ML/MIN/1.73 M^2
EST. GFR  (NON AFRICAN AMERICAN): 29 ML/MIN/1.73 M^2
EST. GFR  (NON AFRICAN AMERICAN): 31 ML/MIN/1.73 M^2
EST. GFR  (NON AFRICAN AMERICAN): 33 ML/MIN/1.73 M^2
EST. GFR  (NON AFRICAN AMERICAN): 36 ML/MIN/1.73 M^2
EST. GFR  (NON AFRICAN AMERICAN): 39 ML/MIN/1.73 M^2
EST. GFR  (NON AFRICAN AMERICAN): 42 ML/MIN/1.73 M^2
EST. GFR  (NON AFRICAN AMERICAN): 51 ML/MIN/1.73 M^2
EST. GFR  (NON AFRICAN AMERICAN): >60 ML/MIN/1.73 M^2
ESTIMATED AVG GLUCOSE: 134 MG/DL
ESTIMATED AVG GLUCOSE: 137 MG/DL
ESTIMATED AVG GLUCOSE: 137 MG/DL
ESTIMATED PA SYSTOLIC PRESSURE: 29.34
ESTIMATED PA SYSTOLIC PRESSURE: 33.18
ESTIMATED PA SYSTOLIC PRESSURE: 44.99
FACT X PPP CHRO-ACNC: 0.17 IU/ML
FACT X PPP CHRO-ACNC: 0.2 IU/ML
FACT X PPP CHRO-ACNC: 0.31 IU/ML
FACT X PPP CHRO-ACNC: 0.34 IU/ML
FACT X PPP CHRO-ACNC: 0.45 IU/ML
FACT X PPP CHRO-ACNC: 0.47 IU/ML
FACT X PPP CHRO-ACNC: 0.57 IU/ML
FACT X PPP CHRO-ACNC: 0.58 IU/ML
FACT X PPP CHRO-ACNC: 0.6 IU/ML
FACT X PPP CHRO-ACNC: 0.6 IU/ML
FACT X PPP CHRO-ACNC: 0.71 IU/ML
FIO2: 100
FIO2: 100
FIO2: 21
FIO2: 21
FIO2: 30
FIO2: 32
FIO2: 40
FIO2: 45
FIO2: 45
FIO2: 50
FIO2: 60
FIO2: 80
FLOW: 3
GIANT PLATELETS BLD QL SMEAR: PRESENT
GLUCOSE SERPL-MCNC: 123 MG/DL
GLUCOSE SERPL-MCNC: 131 MG/DL
GLUCOSE SERPL-MCNC: 163 MG/DL
GLUCOSE SERPL-MCNC: 170 MG/DL
GLUCOSE SERPL-MCNC: 179 MG/DL
GLUCOSE SERPL-MCNC: 180 MG/DL
GLUCOSE SERPL-MCNC: 180 MG/DL
GLUCOSE SERPL-MCNC: 194 MG/DL
GLUCOSE SERPL-MCNC: 197 MG/DL
GLUCOSE SERPL-MCNC: 197 MG/DL
GLUCOSE SERPL-MCNC: 202 MG/DL
GLUCOSE SERPL-MCNC: 204 MG/DL
GLUCOSE SERPL-MCNC: 204 MG/DL
GLUCOSE SERPL-MCNC: 206 MG/DL
GLUCOSE SERPL-MCNC: 210 MG/DL
GLUCOSE SERPL-MCNC: 210 MG/DL
GLUCOSE SERPL-MCNC: 219 MG/DL
GLUCOSE SERPL-MCNC: 219 MG/DL
GLUCOSE SERPL-MCNC: 222 MG/DL
GLUCOSE SERPL-MCNC: 225 MG/DL
GLUCOSE SERPL-MCNC: 231 MG/DL
GLUCOSE SERPL-MCNC: 238 MG/DL
GLUCOSE SERPL-MCNC: 246 MG/DL
GLUCOSE SERPL-MCNC: 248 MG/DL
GLUCOSE SERPL-MCNC: 311 MG/DL
GLUCOSE SERPL-MCNC: 311 MG/DL
GLUCOSE SERPL-MCNC: 328 MG/DL
GLUCOSE SERPL-MCNC: 356 MG/DL
GLUCOSE SERPL-MCNC: 419 MG/DL (ref 70–110)
GLUCOSE SERPL-MCNC: 433 MG/DL
GLUCOSE UR QL STRIP: ABNORMAL
GLUCOSE UR QL STRIP: NEGATIVE
GLUCOSE UR QL STRIP: NEGATIVE
GRAM STN SPEC: NORMAL
HBA1C MFR BLD HPLC: 6.3 %
HBA1C MFR BLD HPLC: 6.4 %
HBA1C MFR BLD HPLC: 6.4 %
HBV CORE AB SERPL QL IA: NEGATIVE
HBV SURFACE AG SERPL QL IA: NEGATIVE
HCO3 UR-SCNC: 17.9 MMOL/L (ref 24–28)
HCO3 UR-SCNC: 19.6 MMOL/L (ref 24–28)
HCO3 UR-SCNC: 20.1 MMOL/L (ref 24–28)
HCO3 UR-SCNC: 20.3 MMOL/L (ref 24–28)
HCO3 UR-SCNC: 20.3 MMOL/L (ref 24–28)
HCO3 UR-SCNC: 20.5 MMOL/L (ref 24–28)
HCO3 UR-SCNC: 21.3 MMOL/L (ref 24–28)
HCO3 UR-SCNC: 22.9 MMOL/L (ref 24–28)
HCO3 UR-SCNC: 23.5 MMOL/L (ref 24–28)
HCO3 UR-SCNC: 24.1 MMOL/L (ref 24–28)
HCO3 UR-SCNC: 24.3 MMOL/L (ref 24–28)
HCO3 UR-SCNC: 24.3 MMOL/L (ref 24–28)
HCO3 UR-SCNC: 25 MMOL/L (ref 24–28)
HCO3 UR-SCNC: 25.4 MMOL/L (ref 24–28)
HCO3 UR-SCNC: 25.4 MMOL/L (ref 24–28)
HCO3 UR-SCNC: 25.5 MMOL/L (ref 24–28)
HCO3 UR-SCNC: 26.1 MMOL/L (ref 24–28)
HCO3 UR-SCNC: 26.1 MMOL/L (ref 24–28)
HCO3 UR-SCNC: 26.2 MMOL/L (ref 24–28)
HCO3 UR-SCNC: 27.6 MMOL/L (ref 24–28)
HCO3 UR-SCNC: 28.3 MMOL/L (ref 24–28)
HCO3 UR-SCNC: 29.9 MMOL/L (ref 24–28)
HCO3 UR-SCNC: 31 MMOL/L (ref 24–28)
HCO3 UR-SCNC: 31.5 MMOL/L (ref 24–28)
HCO3 UR-SCNC: 33.1 MMOL/L (ref 24–28)
HCO3 UR-SCNC: 34 MMOL/L (ref 24–28)
HCO3 UR-SCNC: 37.9 MMOL/L (ref 24–28)
HCO3 UR-SCNC: 38.3 MMOL/L (ref 24–28)
HCT VFR BLD AUTO: 22.8 %
HCT VFR BLD AUTO: 23.5 %
HCT VFR BLD AUTO: 23.7 %
HCT VFR BLD AUTO: 24 %
HCT VFR BLD AUTO: 27.7 %
HCT VFR BLD AUTO: 29.4 %
HCT VFR BLD AUTO: 32.6 %
HCT VFR BLD AUTO: 32.9 %
HCT VFR BLD AUTO: 35.5 %
HCT VFR BLD AUTO: 35.9 %
HCT VFR BLD AUTO: 36.1 %
HCT VFR BLD AUTO: 37.3 %
HCT VFR BLD AUTO: 38 %
HCT VFR BLD AUTO: 38 %
HCT VFR BLD AUTO: 40.8 %
HCT VFR BLD AUTO: 40.8 %
HCT VFR BLD AUTO: 41 %
HCT VFR BLD AUTO: 42.6 %
HCT VFR BLD AUTO: 44.7 %
HCT VFR BLD CALC: 38 %PCV (ref 36–54)
HDLC SERPL-MCNC: 36 MG/DL
HDLC SERPL: 14.7 %
HGB BLD-MCNC: 11.1 G/DL
HGB BLD-MCNC: 11.5 G/DL
HGB BLD-MCNC: 11.7 G/DL
HGB BLD-MCNC: 12 G/DL
HGB BLD-MCNC: 12.2 G/DL
HGB BLD-MCNC: 12.4 G/DL
HGB BLD-MCNC: 13.1 G/DL
HGB BLD-MCNC: 13.5 G/DL
HGB BLD-MCNC: 13.8 G/DL
HGB BLD-MCNC: 13.9 G/DL
HGB BLD-MCNC: 14.9 G/DL
HGB BLD-MCNC: 7.5 G/DL
HGB BLD-MCNC: 7.7 G/DL
HGB BLD-MCNC: 7.8 G/DL
HGB BLD-MCNC: 7.9 G/DL
HGB BLD-MCNC: 9.4 G/DL
HGB BLD-MCNC: 9.9 G/DL
HGB UR QL STRIP: ABNORMAL
HGB UR QL STRIP: NEGATIVE
HGB UR QL STRIP: NEGATIVE
HYALINE CASTS #/AREA URNS LPF: 0 /LPF
HYALINE CASTS #/AREA URNS LPF: 20 /LPF
HYPOCHROMIA BLD QL SMEAR: ABNORMAL
IMM GRANULOCYTES # BLD AUTO: ABNORMAL K/UL
IMM GRANULOCYTES NFR BLD AUTO: ABNORMAL %
INR PPP: 1
INR PPP: 1.1
INR PPP: 1.1
IP: 12
IP: 24
KETONES UR QL STRIP: NEGATIVE
LACTATE SERPL-SCNC: 1.4 MMOL/L
LACTATE SERPL-SCNC: 1.5 MMOL/L
LACTATE SERPL-SCNC: 1.6 MMOL/L
LACTATE SERPL-SCNC: 1.9 MMOL/L
LACTATE SERPL-SCNC: 2 MMOL/L
LACTATE SERPL-SCNC: 2.3 MMOL/L
LACTATE SERPL-SCNC: 2.4 MMOL/L
LACTATE SERPL-SCNC: 2.5 MMOL/L
LACTATE SERPL-SCNC: 3.9 MMOL/L
LACTATE SERPL-SCNC: 5.1 MMOL/L
LACTATE SERPL-SCNC: 5.3 MMOL/L
LACTATE SERPL-SCNC: 5.9 MMOL/L
LDLC SERPL CALC-MCNC: 174.6 MG/DL
LEUKOCYTE ESTERASE UR QL STRIP: NEGATIVE
LYMPHOCYTES # BLD AUTO: 0.9 K/UL
LYMPHOCYTES # BLD AUTO: 1.2 K/UL
LYMPHOCYTES # BLD AUTO: 1.3 K/UL
LYMPHOCYTES # BLD AUTO: 1.5 K/UL
LYMPHOCYTES # BLD AUTO: 1.6 K/UL
LYMPHOCYTES # BLD AUTO: 1.8 K/UL
LYMPHOCYTES # BLD AUTO: 2 K/UL
LYMPHOCYTES # BLD AUTO: 2 K/UL
LYMPHOCYTES # BLD AUTO: 2.1 K/UL
LYMPHOCYTES # BLD AUTO: 2.2 K/UL
LYMPHOCYTES # BLD AUTO: 2.6 K/UL
LYMPHOCYTES # BLD AUTO: 2.6 K/UL
LYMPHOCYTES # BLD AUTO: 3.9 K/UL
LYMPHOCYTES # BLD AUTO: 5.6 K/UL
LYMPHOCYTES # BLD AUTO: 5.9 K/UL
LYMPHOCYTES # BLD AUTO: ABNORMAL K/UL
LYMPHOCYTES NFR BLD: 10.7 %
LYMPHOCYTES NFR BLD: 12 %
LYMPHOCYTES NFR BLD: 12.7 %
LYMPHOCYTES NFR BLD: 12.7 %
LYMPHOCYTES NFR BLD: 12.9 %
LYMPHOCYTES NFR BLD: 16.3 %
LYMPHOCYTES NFR BLD: 16.3 %
LYMPHOCYTES NFR BLD: 2 %
LYMPHOCYTES NFR BLD: 28.2 %
LYMPHOCYTES NFR BLD: 3 %
LYMPHOCYTES NFR BLD: 33.9 %
LYMPHOCYTES NFR BLD: 36 %
LYMPHOCYTES NFR BLD: 4 %
LYMPHOCYTES NFR BLD: 45.3 %
LYMPHOCYTES NFR BLD: 48.3 %
LYMPHOCYTES NFR BLD: 6.6 %
LYMPHOCYTES NFR BLD: 9.3 %
LYMPHOCYTES NFR BLD: 9.3 %
LYMPHOCYTES NFR BLD: 9.6 %
MAGNESIUM SERPL-MCNC: 1.8 MG/DL
MAGNESIUM SERPL-MCNC: 1.9 MG/DL
MAGNESIUM SERPL-MCNC: 1.9 MG/DL
MAGNESIUM SERPL-MCNC: 2 MG/DL
MAGNESIUM SERPL-MCNC: 2 MG/DL
MAGNESIUM SERPL-MCNC: 2.1 MG/DL
MAGNESIUM SERPL-MCNC: 2.2 MG/DL
MAGNESIUM SERPL-MCNC: 2.3 MG/DL
MAGNESIUM SERPL-MCNC: 2.3 MG/DL
MAGNESIUM SERPL-MCNC: 2.5 MG/DL
MAGNESIUM SERPL-MCNC: 2.6 MG/DL
MAGNESIUM SERPL-MCNC: 2.8 MG/DL
MCH RBC QN AUTO: 30.5 PG
MCH RBC QN AUTO: 30.6 PG
MCH RBC QN AUTO: 30.7 PG
MCH RBC QN AUTO: 30.7 PG
MCH RBC QN AUTO: 30.8 PG
MCH RBC QN AUTO: 30.9 PG
MCH RBC QN AUTO: 31 PG
MCH RBC QN AUTO: 31.1 PG
MCH RBC QN AUTO: 31.1 PG
MCH RBC QN AUTO: 31.2 PG
MCH RBC QN AUTO: 31.3 PG
MCH RBC QN AUTO: 31.3 PG
MCH RBC QN AUTO: 31.4 PG
MCH RBC QN AUTO: 31.5 PG
MCH RBC QN AUTO: 31.7 PG
MCH RBC QN AUTO: 31.9 PG
MCH RBC QN AUTO: 32.3 PG
MCHC RBC AUTO-ENTMCNC: 31.6 G/DL
MCHC RBC AUTO-ENTMCNC: 32.1 G/DL
MCHC RBC AUTO-ENTMCNC: 32.4 G/DL
MCHC RBC AUTO-ENTMCNC: 32.4 G/DL
MCHC RBC AUTO-ENTMCNC: 32.6 G/DL
MCHC RBC AUTO-ENTMCNC: 32.6 G/DL
MCHC RBC AUTO-ENTMCNC: 32.9 G/DL
MCHC RBC AUTO-ENTMCNC: 33.1 G/DL
MCHC RBC AUTO-ENTMCNC: 33.2 G/DL
MCHC RBC AUTO-ENTMCNC: 33.2 G/DL
MCHC RBC AUTO-ENTMCNC: 33.3 G/DL
MCHC RBC AUTO-ENTMCNC: 33.7 G/DL
MCHC RBC AUTO-ENTMCNC: 33.8 G/DL
MCHC RBC AUTO-ENTMCNC: 33.8 G/DL
MCHC RBC AUTO-ENTMCNC: 33.9 G/DL
MCHC RBC AUTO-ENTMCNC: 33.9 G/DL
MCHC RBC AUTO-ENTMCNC: 34 G/DL
MCHC RBC AUTO-ENTMCNC: 34 G/DL
MCHC RBC AUTO-ENTMCNC: 35 G/DL
MCV RBC AUTO: 89 FL
MCV RBC AUTO: 90 FL
MCV RBC AUTO: 91 FL
MCV RBC AUTO: 92 FL
MCV RBC AUTO: 93 FL
MCV RBC AUTO: 93 FL
MCV RBC AUTO: 94 FL
MCV RBC AUTO: 94 FL
MCV RBC AUTO: 95 FL
MCV RBC AUTO: 96 FL
MCV RBC AUTO: 96 FL
MCV RBC AUTO: 97 FL
MCV RBC AUTO: 98 FL
METAMYELOCYTES NFR BLD MANUAL: 1 %
METAMYELOCYTES NFR BLD MANUAL: 1 %
METAMYELOCYTES NFR BLD MANUAL: 2 %
MICROSCOPIC COMMENT: ABNORMAL
MICROSCOPIC COMMENT: NORMAL
MIN VOL: 10.6
MIN VOL: 11.4
MIN VOL: 11.8
MIN VOL: 13.6
MIN VOL: 8.2
MIN VOL: 8.2
MIN VOL: 9.46
MIN VOL: 9.61
MITRAL VALVE MOBILITY: ABNORMAL
MITRAL VALVE REGURGITATION: ABNORMAL
MODE: ABNORMAL
MODE: NORMAL
MONOCYTES # BLD AUTO: 0.3 K/UL
MONOCYTES # BLD AUTO: 0.3 K/UL
MONOCYTES # BLD AUTO: 0.4 K/UL
MONOCYTES # BLD AUTO: 0.5 K/UL
MONOCYTES # BLD AUTO: 0.6 K/UL
MONOCYTES # BLD AUTO: 0.7 K/UL
MONOCYTES # BLD AUTO: 0.8 K/UL
MONOCYTES # BLD AUTO: 0.9 K/UL
MONOCYTES # BLD AUTO: ABNORMAL K/UL
MONOCYTES NFR BLD: 0 %
MONOCYTES NFR BLD: 0 %
MONOCYTES NFR BLD: 1 %
MONOCYTES NFR BLD: 2.4 %
MONOCYTES NFR BLD: 2.7 %
MONOCYTES NFR BLD: 3.4 %
MONOCYTES NFR BLD: 3.7 %
MONOCYTES NFR BLD: 3.8 %
MONOCYTES NFR BLD: 4.1 %
MONOCYTES NFR BLD: 4.2 %
MONOCYTES NFR BLD: 4.4 %
MONOCYTES NFR BLD: 4.6 %
MONOCYTES NFR BLD: 5 %
MONOCYTES NFR BLD: 5.2 %
MONOCYTES NFR BLD: 5.4 %
MONOCYTES NFR BLD: 5.7 %
MONOCYTES NFR BLD: 6.1 %
MYELOCYTES NFR BLD MANUAL: 1 %
NEUTROPHILS # BLD AUTO: 10.5 K/UL
NEUTROPHILS # BLD AUTO: 11.3 K/UL
NEUTROPHILS # BLD AUTO: 11.5 K/UL
NEUTROPHILS # BLD AUTO: 11.7 K/UL
NEUTROPHILS # BLD AUTO: 12.3 K/UL
NEUTROPHILS # BLD AUTO: 12.3 K/UL
NEUTROPHILS # BLD AUTO: 12.7 K/UL
NEUTROPHILS # BLD AUTO: 13.9 K/UL
NEUTROPHILS # BLD AUTO: 14.2 K/UL
NEUTROPHILS # BLD AUTO: 14.2 K/UL
NEUTROPHILS # BLD AUTO: 3.1 K/UL
NEUTROPHILS # BLD AUTO: 4.9 K/UL
NEUTROPHILS # BLD AUTO: 5.6 K/UL
NEUTROPHILS # BLD AUTO: 5.7 K/UL
NEUTROPHILS # BLD AUTO: 7.1 K/UL
NEUTROPHILS NFR BLD: 45.7 %
NEUTROPHILS NFR BLD: 46.8 %
NEUTROPHILS NFR BLD: 54.9 %
NEUTROPHILS NFR BLD: 61 %
NEUTROPHILS NFR BLD: 65 %
NEUTROPHILS NFR BLD: 78 %
NEUTROPHILS NFR BLD: 78 %
NEUTROPHILS NFR BLD: 79 %
NEUTROPHILS NFR BLD: 80.6 %
NEUTROPHILS NFR BLD: 81 %
NEUTROPHILS NFR BLD: 81.4 %
NEUTROPHILS NFR BLD: 84 %
NEUTROPHILS NFR BLD: 85.4 %
NEUTROPHILS NFR BLD: 85.8 %
NEUTROPHILS NFR BLD: 86.6 %
NEUTROPHILS NFR BLD: 88.4 %
NEUTROPHILS NFR BLD: 89.2 %
NEUTROPHILS NFR BLD: 91 %
NEUTROPHILS NFR BLD: 92 %
NEUTS BAND NFR BLD MANUAL: 2 %
NEUTS BAND NFR BLD MANUAL: 3 %
NEUTS BAND NFR BLD MANUAL: 5 %
NEUTS BAND NFR BLD MANUAL: 5 %
NITRITE UR QL STRIP: NEGATIVE
NONHDLC SERPL-MCNC: 209 MG/DL
NRBC BLD-RTO: 0 /100 WBC
NRBC BLD-RTO: 1 /100 WBC
NRBC BLD-RTO: 1 /100 WBC
NRBC BLD-RTO: 2 /100 WBC
OVALOCYTES BLD QL SMEAR: ABNORMAL
PCO2 BLDA: 30.7 MMHG (ref 35–45)
PCO2 BLDA: 30.8 MMHG (ref 35–45)
PCO2 BLDA: 33.5 MMHG (ref 35–45)
PCO2 BLDA: 35.8 MMHG (ref 35–45)
PCO2 BLDA: 36.7 MMHG (ref 35–45)
PCO2 BLDA: 36.9 MMHG (ref 35–45)
PCO2 BLDA: 37 MMHG (ref 35–45)
PCO2 BLDA: 37.8 MMHG (ref 35–45)
PCO2 BLDA: 38.5 MMHG (ref 35–45)
PCO2 BLDA: 39.7 MMHG (ref 35–45)
PCO2 BLDA: 41.4 MMHG (ref 35–45)
PCO2 BLDA: 42.2 MMHG (ref 35–45)
PCO2 BLDA: 42.5 MMHG (ref 35–45)
PCO2 BLDA: 44.5 MMHG (ref 35–45)
PCO2 BLDA: 45.2 MMHG (ref 35–45)
PCO2 BLDA: 45.6 MMHG (ref 35–45)
PCO2 BLDA: 45.9 MMHG (ref 35–45)
PCO2 BLDA: 47 MMHG (ref 35–45)
PCO2 BLDA: 48.2 MMHG (ref 35–45)
PCO2 BLDA: 48.7 MMHG (ref 35–45)
PCO2 BLDA: 51.5 MMHG (ref 35–45)
PCO2 BLDA: 51.6 MMHG (ref 35–45)
PCO2 BLDA: 51.8 MMHG (ref 35–45)
PCO2 BLDA: 51.9 MMHG (ref 35–45)
PCO2 BLDA: 52.9 MMHG (ref 35–45)
PCO2 BLDA: 54.2 MMHG (ref 35–45)
PCO2 BLDA: 60.9 MMHG (ref 35–45)
PCO2 BLDA: 70.5 MMHG (ref 35–45)
PEEP: 10
PEEP: 22
PEEP: 5
PH SMN: 7.13 [PH] (ref 7.35–7.45)
PH SMN: 7.14 [PH] (ref 7.35–7.45)
PH SMN: 7.15 [PH] (ref 7.35–7.45)
PH SMN: 7.18 [PH] (ref 7.35–7.45)
PH SMN: 7.23 [PH] (ref 7.35–7.45)
PH SMN: 7.29 [PH] (ref 7.35–7.45)
PH SMN: 7.3 [PH] (ref 7.35–7.45)
PH SMN: 7.3 [PH] (ref 7.35–7.45)
PH SMN: 7.31 [PH] (ref 7.35–7.45)
PH SMN: 7.33 [PH] (ref 7.35–7.45)
PH SMN: 7.35 [PH] (ref 7.35–7.45)
PH SMN: 7.36 [PH] (ref 7.35–7.45)
PH SMN: 7.38 [PH] (ref 7.35–7.45)
PH SMN: 7.39 [PH] (ref 7.35–7.45)
PH SMN: 7.4 [PH] (ref 7.35–7.45)
PH SMN: 7.41 [PH] (ref 7.35–7.45)
PH SMN: 7.45 [PH] (ref 7.35–7.45)
PH SMN: 7.45 [PH] (ref 7.35–7.45)
PH SMN: 7.46 [PH] (ref 7.35–7.45)
PH SMN: 7.47 [PH] (ref 7.35–7.45)
PH SMN: 7.5 [PH] (ref 7.35–7.45)
PH SMN: 7.5 [PH] (ref 7.35–7.45)
PH SMN: 7.54 [PH] (ref 7.35–7.45)
PH SMN: 7.54 [PH] (ref 7.35–7.45)
PH SMN: 7.61 [PH] (ref 7.35–7.45)
PH SMN: 7.62 [PH] (ref 7.35–7.45)
PH UR STRIP: 6 [PH] (ref 5–8)
PH UR STRIP: 6 [PH] (ref 5–8)
PH UR STRIP: 7 [PH] (ref 5–8)
PHOSPHATE SERPL-MCNC: 3 MG/DL
PHOSPHATE SERPL-MCNC: 3.9 MG/DL
PHOSPHATE SERPL-MCNC: 4.4 MG/DL
PHOSPHATE SERPL-MCNC: 5 MG/DL
PHOSPHATE SERPL-MCNC: 5.3 MG/DL
PHOSPHATE SERPL-MCNC: 5.6 MG/DL
PHOSPHATE SERPL-MCNC: 5.8 MG/DL
PHOSPHATE SERPL-MCNC: 6 MG/DL
PHOSPHATE SERPL-MCNC: 6.2 MG/DL
PHOSPHATE SERPL-MCNC: 6.5 MG/DL
PHOSPHATE SERPL-MCNC: 6.8 MG/DL
PHOSPHATE SERPL-MCNC: 7.8 MG/DL
PHOSPHATE SERPL-MCNC: 8.3 MG/DL
PHOSPHATE SERPL-MCNC: 8.3 MG/DL
PIP: 19
PIP: 20
PIP: 20
PIP: 22
PIP: 34
PIP: 38
PIP: 42
PIP: 45
PLATELET # BLD AUTO: 224 K/UL
PLATELET # BLD AUTO: 235 K/UL
PLATELET # BLD AUTO: 264 K/UL
PLATELET # BLD AUTO: 267 K/UL
PLATELET # BLD AUTO: 267 K/UL
PLATELET # BLD AUTO: 272 K/UL
PLATELET # BLD AUTO: 273 K/UL
PLATELET # BLD AUTO: 280 K/UL
PLATELET # BLD AUTO: 280 K/UL
PLATELET # BLD AUTO: 286 K/UL
PLATELET # BLD AUTO: 290 K/UL
PLATELET # BLD AUTO: 304 K/UL
PLATELET # BLD AUTO: 310 K/UL
PLATELET # BLD AUTO: 311 K/UL
PLATELET # BLD AUTO: 312 K/UL
PLATELET # BLD AUTO: 312 K/UL
PLATELET # BLD AUTO: 318 K/UL
PLATELET # BLD AUTO: 325 K/UL
PLATELET # BLD AUTO: 329 K/UL
PLATELET # BLD AUTO: 334 K/UL
PLATELET BLD QL SMEAR: ABNORMAL
PMV BLD AUTO: 10 FL
PMV BLD AUTO: 10.1 FL
PMV BLD AUTO: 10.2 FL
PMV BLD AUTO: 10.6 FL
PMV BLD AUTO: 10.7 FL
PMV BLD AUTO: 10.7 FL
PMV BLD AUTO: 10.8 FL
PMV BLD AUTO: 11.2 FL
PMV BLD AUTO: 9.7 FL
PMV BLD AUTO: 9.8 FL
PMV BLD AUTO: 9.8 FL
PMV BLD AUTO: 9.9 FL
PO2 BLDA: 109 MMHG (ref 80–100)
PO2 BLDA: 116 MMHG (ref 80–100)
PO2 BLDA: 119 MMHG (ref 80–100)
PO2 BLDA: 125 MMHG (ref 80–100)
PO2 BLDA: 127 MMHG (ref 80–100)
PO2 BLDA: 134 MMHG (ref 80–100)
PO2 BLDA: 161 MMHG (ref 80–100)
PO2 BLDA: 27 MMHG (ref 40–60)
PO2 BLDA: 28 MMHG (ref 40–60)
PO2 BLDA: 29 MMHG (ref 40–60)
PO2 BLDA: 30 MMHG (ref 40–60)
PO2 BLDA: 31 MMHG (ref 40–60)
PO2 BLDA: 33 MMHG (ref 80–100)
PO2 BLDA: 34 MMHG (ref 40–60)
PO2 BLDA: 347 MMHG (ref 80–100)
PO2 BLDA: 35 MMHG (ref 40–60)
PO2 BLDA: 36 MMHG (ref 40–60)
PO2 BLDA: 392 MMHG (ref 80–100)
PO2 BLDA: 525 MMHG (ref 80–100)
PO2 BLDA: 57 MMHG (ref 80–100)
PO2 BLDA: 67 MMHG (ref 80–100)
PO2 BLDA: 69 MMHG (ref 80–100)
PO2 BLDA: 69 MMHG (ref 80–100)
PO2 BLDA: 72 MMHG (ref 80–100)
PO2 BLDA: 72 MMHG (ref 80–100)
PO2 BLDA: 81 MMHG (ref 80–100)
PO2 BLDA: 82 MMHG (ref 80–100)
PO2 BLDA: 83 MMHG (ref 80–100)
POC BE: -1 MMOL/L
POC BE: -11 MMOL/L
POC BE: -2 MMOL/L
POC BE: -5 MMOL/L
POC BE: -6 MMOL/L
POC BE: -6 MMOL/L
POC BE: -9 MMOL/L
POC BE: -9 MMOL/L
POC BE: 0 MMOL/L
POC BE: 1 MMOL/L
POC BE: 1 MMOL/L
POC BE: 10 MMOL/L
POC BE: 10 MMOL/L
POC BE: 12 MMOL/L
POC BE: 14 MMOL/L
POC BE: 16 MMOL/L
POC BE: 2 MMOL/L
POC BE: 4 MMOL/L
POC BE: 5 MMOL/L
POC BE: 5 MMOL/L
POC BE: 9 MMOL/L
POC IONIZED CALCIUM: 1.18 MMOL/L (ref 1.06–1.42)
POC SATURATED O2: 100 % (ref 95–100)
POC SATURATED O2: 45 % (ref 95–100)
POC SATURATED O2: 46 % (ref 95–100)
POC SATURATED O2: 51 % (ref 95–100)
POC SATURATED O2: 53 % (ref 95–100)
POC SATURATED O2: 53 % (ref 95–100)
POC SATURATED O2: 56 % (ref 95–100)
POC SATURATED O2: 56 % (ref 95–100)
POC SATURATED O2: 57 % (ref 95–100)
POC SATURATED O2: 58 % (ref 95–100)
POC SATURATED O2: 92 % (ref 95–100)
POC SATURATED O2: 92 % (ref 95–100)
POC SATURATED O2: 93 % (ref 95–100)
POC SATURATED O2: 93 % (ref 95–100)
POC SATURATED O2: 94 % (ref 95–100)
POC SATURATED O2: 96 % (ref 95–100)
POC SATURATED O2: 97 % (ref 95–100)
POC SATURATED O2: 98 % (ref 95–100)
POC SATURATED O2: 98 % (ref 95–100)
POC SATURATED O2: 99 % (ref 95–100)
POC TCO2: 20 MMOL/L (ref 24–29)
POC TCO2: 21 MMOL/L (ref 23–27)
POC TCO2: 22 MMOL/L (ref 24–29)
POC TCO2: 24 MMOL/L (ref 24–29)
POC TCO2: 26 MMOL/L (ref 23–27)
POC TCO2: 26 MMOL/L (ref 24–29)
POC TCO2: 27 MMOL/L (ref 23–27)
POC TCO2: 27 MMOL/L (ref 23–27)
POC TCO2: 27 MMOL/L (ref 24–29)
POC TCO2: 28 MMOL/L (ref 24–29)
POCT GLUCOSE: 106 MG/DL (ref 70–110)
POCT GLUCOSE: 126 MG/DL (ref 70–110)
POCT GLUCOSE: 127 MG/DL (ref 70–110)
POCT GLUCOSE: 134 MG/DL (ref 70–110)
POCT GLUCOSE: 141 MG/DL (ref 70–110)
POCT GLUCOSE: 154 MG/DL (ref 70–110)
POCT GLUCOSE: 158 MG/DL (ref 70–110)
POCT GLUCOSE: 161 MG/DL (ref 70–110)
POCT GLUCOSE: 171 MG/DL (ref 70–110)
POCT GLUCOSE: 171 MG/DL (ref 70–110)
POCT GLUCOSE: 173 MG/DL (ref 70–110)
POCT GLUCOSE: 173 MG/DL (ref 70–110)
POCT GLUCOSE: 175 MG/DL (ref 70–110)
POCT GLUCOSE: 178 MG/DL (ref 70–110)
POCT GLUCOSE: 180 MG/DL (ref 70–110)
POCT GLUCOSE: 181 MG/DL (ref 70–110)
POCT GLUCOSE: 182 MG/DL (ref 70–110)
POCT GLUCOSE: 183 MG/DL (ref 70–110)
POCT GLUCOSE: 184 MG/DL (ref 70–110)
POCT GLUCOSE: 184 MG/DL (ref 70–110)
POCT GLUCOSE: 187 MG/DL (ref 70–110)
POCT GLUCOSE: 188 MG/DL (ref 70–110)
POCT GLUCOSE: 188 MG/DL (ref 70–110)
POCT GLUCOSE: 189 MG/DL (ref 70–110)
POCT GLUCOSE: 190 MG/DL (ref 70–110)
POCT GLUCOSE: 192 MG/DL (ref 70–110)
POCT GLUCOSE: 193 MG/DL (ref 70–110)
POCT GLUCOSE: 193 MG/DL (ref 70–110)
POCT GLUCOSE: 194 MG/DL (ref 70–110)
POCT GLUCOSE: 194 MG/DL (ref 70–110)
POCT GLUCOSE: 195 MG/DL (ref 70–110)
POCT GLUCOSE: 198 MG/DL (ref 70–110)
POCT GLUCOSE: 201 MG/DL (ref 70–110)
POCT GLUCOSE: 202 MG/DL (ref 70–110)
POCT GLUCOSE: 203 MG/DL (ref 70–110)
POCT GLUCOSE: 204 MG/DL (ref 70–110)
POCT GLUCOSE: 207 MG/DL (ref 70–110)
POCT GLUCOSE: 210 MG/DL (ref 70–110)
POCT GLUCOSE: 214 MG/DL (ref 70–110)
POCT GLUCOSE: 215 MG/DL (ref 70–110)
POCT GLUCOSE: 215 MG/DL (ref 70–110)
POCT GLUCOSE: 216 MG/DL (ref 70–110)
POCT GLUCOSE: 216 MG/DL (ref 70–110)
POCT GLUCOSE: 217 MG/DL (ref 70–110)
POCT GLUCOSE: 218 MG/DL (ref 70–110)
POCT GLUCOSE: 221 MG/DL (ref 70–110)
POCT GLUCOSE: 237 MG/DL (ref 70–110)
POCT GLUCOSE: 237 MG/DL (ref 70–110)
POCT GLUCOSE: 241 MG/DL (ref 70–110)
POCT GLUCOSE: 245 MG/DL (ref 70–110)
POCT GLUCOSE: 253 MG/DL (ref 70–110)
POCT GLUCOSE: 253 MG/DL (ref 70–110)
POCT GLUCOSE: 257 MG/DL (ref 70–110)
POCT GLUCOSE: 287 MG/DL (ref 70–110)
POCT GLUCOSE: 310 MG/DL (ref 70–110)
POCT GLUCOSE: 330 MG/DL (ref 70–110)
POIKILOCYTOSIS BLD QL SMEAR: SLIGHT
POLYCHROMASIA BLD QL SMEAR: ABNORMAL
POTASSIUM BLD-SCNC: 4.3 MMOL/L (ref 3.5–5.1)
POTASSIUM SERPL-SCNC: 3.2 MMOL/L
POTASSIUM SERPL-SCNC: 3.2 MMOL/L
POTASSIUM SERPL-SCNC: 3.3 MMOL/L
POTASSIUM SERPL-SCNC: 3.3 MMOL/L
POTASSIUM SERPL-SCNC: 3.4 MMOL/L
POTASSIUM SERPL-SCNC: 3.5 MMOL/L
POTASSIUM SERPL-SCNC: 3.5 MMOL/L
POTASSIUM SERPL-SCNC: 3.7 MMOL/L
POTASSIUM SERPL-SCNC: 3.7 MMOL/L
POTASSIUM SERPL-SCNC: 3.8 MMOL/L
POTASSIUM SERPL-SCNC: 4 MMOL/L
POTASSIUM SERPL-SCNC: 4.1 MMOL/L
POTASSIUM SERPL-SCNC: 4.1 MMOL/L
POTASSIUM SERPL-SCNC: 4.2 MMOL/L
POTASSIUM SERPL-SCNC: 4.3 MMOL/L
POTASSIUM SERPL-SCNC: 4.5 MMOL/L
POTASSIUM SERPL-SCNC: 5.1 MMOL/L
PROCALCITONIN SERPL IA-MCNC: 0.02 NG/ML
PROCALCITONIN SERPL IA-MCNC: 0.04 NG/ML
PROCALCITONIN SERPL IA-MCNC: 1.96 NG/ML
PROT SERPL-MCNC: 6.7 G/DL
PROT SERPL-MCNC: 6.7 G/DL
PROT SERPL-MCNC: 6.9 G/DL
PROT SERPL-MCNC: 7.1 G/DL
PROT SERPL-MCNC: 7.5 G/DL
PROT SERPL-MCNC: 7.6 G/DL
PROT SERPL-MCNC: 7.7 G/DL
PROT SERPL-MCNC: 7.8 G/DL
PROT SERPL-MCNC: 7.8 G/DL
PROT SERPL-MCNC: 7.9 G/DL
PROT SERPL-MCNC: 7.9 G/DL
PROT SERPL-MCNC: 8.1 G/DL
PROT SERPL-MCNC: 8.2 G/DL
PROT SERPL-MCNC: 8.5 G/DL
PROT UR QL STRIP: ABNORMAL
PROT UR QL STRIP: ABNORMAL
PROT UR QL STRIP: NEGATIVE
PROT UR-MCNC: 23 MG/DL
PROT/CREAT RATIO, UR: 0.15
PROTHROMBIN TIME: 10.3 SEC
PROTHROMBIN TIME: 10.4 SEC
PROTHROMBIN TIME: 10.5 SEC
PROTHROMBIN TIME: 10.6 SEC
PROTHROMBIN TIME: 11.4 SEC
PROTHROMBIN TIME: 11.7 SEC
RBC # BLD AUTO: 2.43 M/UL
RBC # BLD AUTO: 2.48 M/UL
RBC # BLD AUTO: 2.54 M/UL
RBC # BLD AUTO: 2.59 M/UL
RBC # BLD AUTO: 3.07 M/UL
RBC # BLD AUTO: 3.22 M/UL
RBC # BLD AUTO: 3.54 M/UL
RBC # BLD AUTO: 3.68 M/UL
RBC # BLD AUTO: 3.79 M/UL
RBC # BLD AUTO: 3.8 M/UL
RBC # BLD AUTO: 3.92 M/UL
RBC # BLD AUTO: 3.98 M/UL
RBC # BLD AUTO: 4 M/UL
RBC # BLD AUTO: 4 M/UL
RBC # BLD AUTO: 4.19 M/UL
RBC # BLD AUTO: 4.28 M/UL
RBC # BLD AUTO: 4.31 M/UL
RBC # BLD AUTO: 4.44 M/UL
RBC # BLD AUTO: 4.67 M/UL
RBC #/AREA URNS HPF: 0 /HPF (ref 0–4)
RBC #/AREA URNS HPF: 1 /HPF (ref 0–4)
RETIRED EF AND QEF - SEE NOTES: 40 (ref 55–65)
RETIRED EF AND QEF - SEE NOTES: 45 (ref 55–65)
RETIRED EF AND QEF - SEE NOTES: 60 (ref 55–65)
SAMPLE: ABNORMAL
SAMPLE: NORMAL
SITE: ABNORMAL
SITE: NORMAL
SODIUM BLD-SCNC: 140 MMOL/L (ref 136–145)
SODIUM SERPL-SCNC: 119 MMOL/L
SODIUM SERPL-SCNC: 120 MMOL/L
SODIUM SERPL-SCNC: 121 MMOL/L
SODIUM SERPL-SCNC: 122 MMOL/L
SODIUM SERPL-SCNC: 122 MMOL/L
SODIUM SERPL-SCNC: 123 MMOL/L
SODIUM SERPL-SCNC: 126 MMOL/L
SODIUM SERPL-SCNC: 126 MMOL/L
SODIUM SERPL-SCNC: 128 MMOL/L
SODIUM SERPL-SCNC: 129 MMOL/L
SODIUM SERPL-SCNC: 130 MMOL/L
SODIUM SERPL-SCNC: 132 MMOL/L
SODIUM SERPL-SCNC: 135 MMOL/L
SODIUM SERPL-SCNC: 137 MMOL/L
SODIUM SERPL-SCNC: 138 MMOL/L
SODIUM SERPL-SCNC: 139 MMOL/L
SODIUM SERPL-SCNC: 140 MMOL/L
SP GR UR STRIP: 1.01 (ref 1–1.03)
SP GR UR STRIP: 1.02 (ref 1–1.03)
SP GR UR STRIP: >=1.03 (ref 1–1.03)
SP02: 100
SP02: 92
SP02: 94
SP02: 94
SP02: 95
SP02: 98
SPHEROCYTES BLD QL SMEAR: ABNORMAL
SPHEROCYTES BLD QL SMEAR: ABNORMAL
SPONT RATE: 30
SQUAMOUS #/AREA URNS HPF: 1 /HPF
SQUAMOUS #/AREA URNS HPF: 8 /HPF
T4 FREE SERPL-MCNC: 0.83 NG/DL
T4 SERPL-MCNC: 3.3 UG/DL
TRICUSPID VALVE REGURGITATION: ABNORMAL
TRIGL SERPL-MCNC: 172 MG/DL
TROPONIN I SERPL DL<=0.01 NG/ML-MCNC: 0.01 NG/ML
TROPONIN I SERPL DL<=0.01 NG/ML-MCNC: 0.02 NG/ML
TROPONIN I SERPL DL<=0.01 NG/ML-MCNC: 0.32 NG/ML
TROPONIN I SERPL DL<=0.01 NG/ML-MCNC: 0.35 NG/ML
TROPONIN I SERPL DL<=0.01 NG/ML-MCNC: 0.65 NG/ML
TROPONIN I SERPL DL<=0.01 NG/ML-MCNC: 1.76 NG/ML
TROPONIN I SERPL DL<=0.01 NG/ML-MCNC: 20.1 NG/ML
TROPONIN I SERPL DL<=0.01 NG/ML-MCNC: 46.54 NG/ML
TROPONIN I SERPL DL<=0.01 NG/ML-MCNC: 49.01 NG/ML
TROPONIN I SERPL DL<=0.01 NG/ML-MCNC: <0.006 NG/ML
TROPONIN I SERPL DL<=0.01 NG/ML-MCNC: >50 NG/ML
TSH SERPL DL<=0.005 MIU/L-ACNC: 0.49 UIU/ML
TSH SERPL DL<=0.005 MIU/L-ACNC: 7.43 UIU/ML
URN SPEC COLLECT METH UR: ABNORMAL
URN SPEC COLLECT METH UR: ABNORMAL
URN SPEC COLLECT METH UR: NORMAL
UROBILINOGEN UR STRIP-ACNC: ABNORMAL EU/DL
UROBILINOGEN UR STRIP-ACNC: NEGATIVE EU/DL
UROBILINOGEN UR STRIP-ACNC: NEGATIVE EU/DL
VANCOMYCIN SERPL-MCNC: 15.1 UG/ML
VANCOMYCIN SERPL-MCNC: 16.7 UG/ML
VANCOMYCIN SERPL-MCNC: 24.7 UG/ML
VT: 370
VT: 380
VT: 400
VT: 420
VT: 420
VT: 450
VT: 480
VT: 480
VT: 500
VT: 520
WBC # BLD AUTO: 11.58 K/UL
WBC # BLD AUTO: 12.22 K/UL
WBC # BLD AUTO: 12.27 K/UL
WBC # BLD AUTO: 12.33 K/UL
WBC # BLD AUTO: 13.27 K/UL
WBC # BLD AUTO: 13.27 K/UL
WBC # BLD AUTO: 13.97 K/UL
WBC # BLD AUTO: 15.34 K/UL
WBC # BLD AUTO: 15.67 K/UL
WBC # BLD AUTO: 15.78 K/UL
WBC # BLD AUTO: 15.78 K/UL
WBC # BLD AUTO: 15.9 K/UL
WBC # BLD AUTO: 16.32 K/UL
WBC # BLD AUTO: 16.54 K/UL
WBC # BLD AUTO: 16.99 K/UL
WBC # BLD AUTO: 17.54 K/UL
WBC # BLD AUTO: 26.56 K/UL
WBC # BLD AUTO: 5.66 K/UL
WBC # BLD AUTO: 7.46 K/UL
WBC #/AREA URNS HPF: 2 /HPF (ref 0–5)
WBC #/AREA URNS HPF: 2 /HPF (ref 0–5)

## 2018-01-01 PROCEDURE — 80048 BASIC METABOLIC PNL TOTAL CA: CPT

## 2018-01-01 PROCEDURE — 85025 COMPLETE CBC W/AUTO DIFF WBC: CPT | Mod: 91

## 2018-01-01 PROCEDURE — 94003 VENT MGMT INPAT SUBQ DAY: CPT

## 2018-01-01 PROCEDURE — 25000003 PHARM REV CODE 250: Performed by: STUDENT IN AN ORGANIZED HEALTH CARE EDUCATION/TRAINING PROGRAM

## 2018-01-01 PROCEDURE — 99222 1ST HOSP IP/OBS MODERATE 55: CPT | Mod: ,,, | Performed by: INTERNAL MEDICINE

## 2018-01-01 PROCEDURE — 83880 ASSAY OF NATRIURETIC PEPTIDE: CPT

## 2018-01-01 PROCEDURE — 25000003 PHARM REV CODE 250

## 2018-01-01 PROCEDURE — 80053 COMPREHEN METABOLIC PANEL: CPT

## 2018-01-01 PROCEDURE — 90945 DIALYSIS ONE EVALUATION: CPT

## 2018-01-01 PROCEDURE — 99900035 HC TECH TIME PER 15 MIN (STAT)

## 2018-01-01 PROCEDURE — 36600 WITHDRAWAL OF ARTERIAL BLOOD: CPT

## 2018-01-01 PROCEDURE — 25000003 PHARM REV CODE 250: Performed by: INTERNAL MEDICINE

## 2018-01-01 PROCEDURE — 63600175 PHARM REV CODE 636 W HCPCS: Performed by: INTERNAL MEDICINE

## 2018-01-01 PROCEDURE — A4216 STERILE WATER/SALINE, 10 ML: HCPCS | Performed by: STUDENT IN AN ORGANIZED HEALTH CARE EDUCATION/TRAINING PROGRAM

## 2018-01-01 PROCEDURE — 82803 BLOOD GASES ANY COMBINATION: CPT

## 2018-01-01 PROCEDURE — 85610 PROTHROMBIN TIME: CPT

## 2018-01-01 PROCEDURE — 99291 CRITICAL CARE FIRST HOUR: CPT | Mod: ,,, | Performed by: NURSE PRACTITIONER

## 2018-01-01 PROCEDURE — 27000221 HC OXYGEN, UP TO 24 HOURS

## 2018-01-01 PROCEDURE — 84484 ASSAY OF TROPONIN QUANT: CPT | Mod: 91

## 2018-01-01 PROCEDURE — 84156 ASSAY OF PROTEIN URINE: CPT

## 2018-01-01 PROCEDURE — 80202 ASSAY OF VANCOMYCIN: CPT

## 2018-01-01 PROCEDURE — 93010 ELECTROCARDIOGRAM REPORT: CPT | Mod: ,,, | Performed by: INTERNAL MEDICINE

## 2018-01-01 PROCEDURE — 85025 COMPLETE CBC W/AUTO DIFF WBC: CPT

## 2018-01-01 PROCEDURE — 85730 THROMBOPLASTIN TIME PARTIAL: CPT | Mod: 91

## 2018-01-01 PROCEDURE — 25000003 PHARM REV CODE 250: Performed by: NURSE PRACTITIONER

## 2018-01-01 PROCEDURE — 20000000 HC ICU ROOM

## 2018-01-01 PROCEDURE — 97803 MED NUTRITION INDIV SUBSEQ: CPT

## 2018-01-01 PROCEDURE — 25000003 PHARM REV CODE 250: Performed by: FAMILY MEDICINE

## 2018-01-01 PROCEDURE — B548ZZA ULTRASONOGRAPHY OF SUPERIOR VENA CAVA, GUIDANCE: ICD-10-PCS | Performed by: INTERNAL MEDICINE

## 2018-01-01 PROCEDURE — 87340 HEPATITIS B SURFACE AG IA: CPT

## 2018-01-01 PROCEDURE — 87205 SMEAR GRAM STAIN: CPT

## 2018-01-01 PROCEDURE — 51702 INSERT TEMP BLADDER CATH: CPT

## 2018-01-01 PROCEDURE — C9113 INJ PANTOPRAZOLE SODIUM, VIA: HCPCS | Performed by: INTERNAL MEDICINE

## 2018-01-01 PROCEDURE — 94002 VENT MGMT INPAT INIT DAY: CPT

## 2018-01-01 PROCEDURE — 80053 COMPREHEN METABOLIC PANEL: CPT | Mod: 91

## 2018-01-01 PROCEDURE — 84132 ASSAY OF SERUM POTASSIUM: CPT

## 2018-01-01 PROCEDURE — 82553 CREATINE MB FRACTION: CPT

## 2018-01-01 PROCEDURE — 25000003 PHARM REV CODE 250: Performed by: EMERGENCY MEDICINE

## 2018-01-01 PROCEDURE — 63600175 PHARM REV CODE 636 W HCPCS

## 2018-01-01 PROCEDURE — 63600175 PHARM REV CODE 636 W HCPCS: Performed by: EMERGENCY MEDICINE

## 2018-01-01 PROCEDURE — 99291 CRITICAL CARE FIRST HOUR: CPT | Mod: ,,, | Performed by: INTERNAL MEDICINE

## 2018-01-01 PROCEDURE — 94760 N-INVAS EAR/PLS OXIMETRY 1: CPT

## 2018-01-01 PROCEDURE — 94660 CPAP INITIATION&MGMT: CPT

## 2018-01-01 PROCEDURE — 85520 HEPARIN ASSAY: CPT

## 2018-01-01 PROCEDURE — 5A1945Z RESPIRATORY VENTILATION, 24-96 CONSECUTIVE HOURS: ICD-10-PCS | Performed by: INTERNAL MEDICINE

## 2018-01-01 PROCEDURE — 89220 SPUTUM SPECIMEN COLLECTION: CPT

## 2018-01-01 PROCEDURE — 84145 PROCALCITONIN (PCT): CPT

## 2018-01-01 PROCEDURE — 99223 1ST HOSP IP/OBS HIGH 75: CPT | Mod: GC,,, | Performed by: INTERNAL MEDICINE

## 2018-01-01 PROCEDURE — 86706 HEP B SURFACE ANTIBODY: CPT

## 2018-01-01 PROCEDURE — 27000202 HC IABP, ADD'L DAY

## 2018-01-01 PROCEDURE — 83036 HEMOGLOBIN GLYCOSYLATED A1C: CPT

## 2018-01-01 PROCEDURE — 85014 HEMATOCRIT: CPT

## 2018-01-01 PROCEDURE — 36415 COLL VENOUS BLD VENIPUNCTURE: CPT

## 2018-01-01 PROCEDURE — 99223 1ST HOSP IP/OBS HIGH 75: CPT | Mod: ,,, | Performed by: NURSE PRACTITIONER

## 2018-01-01 PROCEDURE — 80069 RENAL FUNCTION PANEL: CPT | Mod: 91

## 2018-01-01 PROCEDURE — 99291 CRITICAL CARE FIRST HOUR: CPT | Mod: 25

## 2018-01-01 PROCEDURE — S0030 INJECTION, METRONIDAZOLE: HCPCS | Performed by: INTERNAL MEDICINE

## 2018-01-01 PROCEDURE — 83605 ASSAY OF LACTIC ACID: CPT

## 2018-01-01 PROCEDURE — 93005 ELECTROCARDIOGRAM TRACING: CPT

## 2018-01-01 PROCEDURE — 99233 SBSQ HOSP IP/OBS HIGH 50: CPT | Mod: ,,, | Performed by: INTERNAL MEDICINE

## 2018-01-01 PROCEDURE — 84165 PROTEIN E-PHORESIS SERUM: CPT

## 2018-01-01 PROCEDURE — 85730 THROMBOPLASTIN TIME PARTIAL: CPT

## 2018-01-01 PROCEDURE — 63600175 PHARM REV CODE 636 W HCPCS: Performed by: NURSE PRACTITIONER

## 2018-01-01 PROCEDURE — 27000190 HC CPAP FULL FACE MASK W/VALVE

## 2018-01-01 PROCEDURE — 83735 ASSAY OF MAGNESIUM: CPT

## 2018-01-01 PROCEDURE — 85027 COMPLETE CBC AUTOMATED: CPT

## 2018-01-01 PROCEDURE — 99291 CRITICAL CARE FIRST HOUR: CPT | Mod: GC,,, | Performed by: INTERNAL MEDICINE

## 2018-01-01 PROCEDURE — 87040 BLOOD CULTURE FOR BACTERIA: CPT | Mod: 59

## 2018-01-01 PROCEDURE — 84443 ASSAY THYROID STIM HORMONE: CPT

## 2018-01-01 PROCEDURE — 99291 CRITICAL CARE FIRST HOUR: CPT | Mod: 25,,, | Performed by: NURSE PRACTITIONER

## 2018-01-01 PROCEDURE — 97802 MEDICAL NUTRITION INDIV IN: CPT

## 2018-01-01 PROCEDURE — C8923 2D TTE W OR W/O FOL W/CON,CO: HCPCS

## 2018-01-01 PROCEDURE — S0073 INJECTION, AZTREONAM, 500 MG: HCPCS | Performed by: STUDENT IN AN ORGANIZED HEALTH CARE EDUCATION/TRAINING PROGRAM

## 2018-01-01 PROCEDURE — 96375 TX/PRO/DX INJ NEW DRUG ADDON: CPT

## 2018-01-01 PROCEDURE — 63600175 PHARM REV CODE 636 W HCPCS: Performed by: STUDENT IN AN ORGANIZED HEALTH CARE EDUCATION/TRAINING PROGRAM

## 2018-01-01 PROCEDURE — A4216 STERILE WATER/SALINE, 10 ML: HCPCS | Performed by: FAMILY MEDICINE

## 2018-01-01 PROCEDURE — 33967 INSERT I-AORT PERCUT DEVICE: CPT | Mod: ,,, | Performed by: INTERNAL MEDICINE

## 2018-01-01 PROCEDURE — 25000003 PHARM REV CODE 250: Performed by: PHYSICIAN ASSISTANT

## 2018-01-01 PROCEDURE — 84484 ASSAY OF TROPONIN QUANT: CPT

## 2018-01-01 PROCEDURE — 99291 CRITICAL CARE FIRST HOUR: CPT | Mod: 25,,, | Performed by: INTERNAL MEDICINE

## 2018-01-01 PROCEDURE — 80069 RENAL FUNCTION PANEL: CPT

## 2018-01-01 PROCEDURE — 96372 THER/PROPH/DIAG INJ SC/IM: CPT | Mod: RT

## 2018-01-01 PROCEDURE — 81000 URINALYSIS NONAUTO W/SCOPE: CPT

## 2018-01-01 PROCEDURE — 82550 ASSAY OF CK (CPK): CPT

## 2018-01-01 PROCEDURE — 93325 DOPPLER ECHO COLOR FLOW MAPG: CPT

## 2018-01-01 PROCEDURE — 83605 ASSAY OF LACTIC ACID: CPT | Mod: 91

## 2018-01-01 PROCEDURE — 84100 ASSAY OF PHOSPHORUS: CPT

## 2018-01-01 PROCEDURE — 85007 BL SMEAR W/DIFF WBC COUNT: CPT

## 2018-01-01 PROCEDURE — 93307 TTE W/O DOPPLER COMPLETE: CPT | Mod: 26,,, | Performed by: INTERNAL MEDICINE

## 2018-01-01 PROCEDURE — 36556 INSERT NON-TUNNEL CV CATH: CPT | Mod: 59,,, | Performed by: INTERNAL MEDICINE

## 2018-01-01 PROCEDURE — 93306 TTE W/DOPPLER COMPLETE: CPT

## 2018-01-01 PROCEDURE — 87070 CULTURE OTHR SPECIMN AEROBIC: CPT

## 2018-01-01 PROCEDURE — 25500020 PHARM REV CODE 255: Performed by: EMERGENCY MEDICINE

## 2018-01-01 PROCEDURE — 99285 EMERGENCY DEPT VISIT HI MDM: CPT

## 2018-01-01 PROCEDURE — 87147 CULTURE TYPE IMMUNOLOGIC: CPT

## 2018-01-01 PROCEDURE — 96365 THER/PROPH/DIAG IV INF INIT: CPT

## 2018-01-01 PROCEDURE — 84165 PROTEIN E-PHORESIS SERUM: CPT | Mod: 26,,, | Performed by: PATHOLOGY

## 2018-01-01 PROCEDURE — 37799 UNLISTED PX VASCULAR SURGERY: CPT

## 2018-01-01 PROCEDURE — 63600175 PHARM REV CODE 636 W HCPCS: Performed by: SURGERY

## 2018-01-01 PROCEDURE — S0073 INJECTION, AZTREONAM, 500 MG: HCPCS | Performed by: INTERNAL MEDICINE

## 2018-01-01 PROCEDURE — 21400001 HC TELEMETRY ROOM

## 2018-01-01 PROCEDURE — 63600175 PHARM REV CODE 636 W HCPCS: Performed by: FAMILY MEDICINE

## 2018-01-01 PROCEDURE — 83735 ASSAY OF MAGNESIUM: CPT | Mod: 91

## 2018-01-01 PROCEDURE — 90935 HEMODIALYSIS ONE EVALUATION: CPT

## 2018-01-01 PROCEDURE — 82330 ASSAY OF CALCIUM: CPT

## 2018-01-01 PROCEDURE — 02HV33Z INSERTION OF INFUSION DEVICE INTO SUPERIOR VENA CAVA, PERCUTANEOUS APPROACH: ICD-10-PCS | Performed by: INTERNAL MEDICINE

## 2018-01-01 PROCEDURE — 96374 THER/PROPH/DIAG INJ IV PUSH: CPT

## 2018-01-01 PROCEDURE — 31500 INSERT EMERGENCY AIRWAY: CPT | Mod: ,,, | Performed by: INTERNAL MEDICINE

## 2018-01-01 PROCEDURE — 84436 ASSAY OF TOTAL THYROXINE: CPT

## 2018-01-01 PROCEDURE — 85520 HEPARIN ASSAY: CPT | Mod: 91

## 2018-01-01 PROCEDURE — 87040 BLOOD CULTURE FOR BACTERIA: CPT

## 2018-01-01 PROCEDURE — 99152 MOD SED SAME PHYS/QHP 5/>YRS: CPT

## 2018-01-01 PROCEDURE — 85610 PROTHROMBIN TIME: CPT | Mod: 91

## 2018-01-01 PROCEDURE — 81003 URINALYSIS AUTO W/O SCOPE: CPT

## 2018-01-01 PROCEDURE — 93306 TTE W/DOPPLER COMPLETE: CPT | Mod: 26,,, | Performed by: INTERNAL MEDICINE

## 2018-01-01 PROCEDURE — 93010 ELECTROCARDIOGRAM REPORT: CPT | Mod: 76,,, | Performed by: INTERNAL MEDICINE

## 2018-01-01 PROCEDURE — 93321 DOPPLER ECHO F-UP/LMTD STD: CPT | Mod: 26,,, | Performed by: INTERNAL MEDICINE

## 2018-01-01 PROCEDURE — 94640 AIRWAY INHALATION TREATMENT: CPT

## 2018-01-01 PROCEDURE — 94761 N-INVAS EAR/PLS OXIMETRY MLT: CPT

## 2018-01-01 PROCEDURE — 36620 INSERTION CATHETER ARTERY: CPT | Mod: ,,, | Performed by: INTERNAL MEDICINE

## 2018-01-01 PROCEDURE — 96376 TX/PRO/DX INJ SAME DRUG ADON: CPT

## 2018-01-01 PROCEDURE — 82533 TOTAL CORTISOL: CPT

## 2018-01-01 PROCEDURE — C8929 TTE W OR WO FOL WCON,DOPPLER: HCPCS

## 2018-01-01 PROCEDURE — A4216 STERILE WATER/SALINE, 10 ML: HCPCS

## 2018-01-01 PROCEDURE — 80048 BASIC METABOLIC PNL TOTAL CA: CPT | Mod: 91

## 2018-01-01 PROCEDURE — 96375 TX/PRO/DX INJ NEW DRUG ADDON: CPT | Mod: 59

## 2018-01-01 PROCEDURE — 85379 FIBRIN DEGRADATION QUANT: CPT

## 2018-01-01 PROCEDURE — 93308 TTE F-UP OR LMTD: CPT | Mod: 26,,, | Performed by: INTERNAL MEDICINE

## 2018-01-01 PROCEDURE — 33967 INSERT I-AORT PERCUT DEVICE: CPT

## 2018-01-01 PROCEDURE — 86038 ANTINUCLEAR ANTIBODIES: CPT

## 2018-01-01 PROCEDURE — 99223 1ST HOSP IP/OBS HIGH 75: CPT | Mod: ,,, | Performed by: INTERNAL MEDICINE

## 2018-01-01 PROCEDURE — 27200966 HC CLOSED SUCTION SYSTEM

## 2018-01-01 PROCEDURE — 25000242 PHARM REV CODE 250 ALT 637 W/ HCPCS: Performed by: EMERGENCY MEDICINE

## 2018-01-01 PROCEDURE — 27100094 HC IABP, SET-UP

## 2018-01-01 PROCEDURE — 36620 INSERTION CATHETER ARTERY: CPT

## 2018-01-01 PROCEDURE — 80061 LIPID PANEL: CPT

## 2018-01-01 PROCEDURE — 99285 EMERGENCY DEPT VISIT HI MDM: CPT | Mod: 25

## 2018-01-01 PROCEDURE — 36556 INSERT NON-TUNNEL CV CATH: CPT

## 2018-01-01 PROCEDURE — 99232 SBSQ HOSP IP/OBS MODERATE 35: CPT | Mod: ,,, | Performed by: INTERNAL MEDICINE

## 2018-01-01 PROCEDURE — 0BH17EZ INSERTION OF ENDOTRACHEAL AIRWAY INTO TRACHEA, VIA NATURAL OR ARTIFICIAL OPENING: ICD-10-PCS | Performed by: INTERNAL MEDICINE

## 2018-01-01 PROCEDURE — 99900026 HC AIRWAY MAINTENANCE (STAT)

## 2018-01-01 PROCEDURE — 86704 HEP B CORE ANTIBODY TOTAL: CPT

## 2018-01-01 PROCEDURE — 85007 BL SMEAR W/DIFF WBC COUNT: CPT | Mod: 91

## 2018-01-01 PROCEDURE — 84439 ASSAY OF FREE THYROXINE: CPT

## 2018-01-01 PROCEDURE — 99292 CRITICAL CARE ADDL 30 MIN: CPT | Mod: 25,,, | Performed by: INTERNAL MEDICINE

## 2018-01-01 PROCEDURE — 5A02210 ASSISTANCE WITH CARDIAC OUTPUT USING BALLOON PUMP, CONTINUOUS: ICD-10-PCS | Performed by: INTERNAL MEDICINE

## 2018-01-01 PROCEDURE — 99152 MOD SED SAME PHYS/QHP 5/>YRS: CPT | Mod: ,,, | Performed by: INTERNAL MEDICINE

## 2018-01-01 PROCEDURE — 82962 GLUCOSE BLOOD TEST: CPT

## 2018-01-01 PROCEDURE — 84295 ASSAY OF SERUM SODIUM: CPT

## 2018-01-01 RX ORDER — HEPARIN SODIUM 10000 [USP'U]/100ML
12 INJECTION, SOLUTION INTRAVENOUS CONTINUOUS
Status: DISCONTINUED | OUTPATIENT
Start: 2018-01-01 | End: 2018-01-01

## 2018-01-01 RX ORDER — FUROSEMIDE 40 MG/1
40 TABLET ORAL DAILY
Qty: 15 TABLET | Refills: 0 | Status: ON HOLD | OUTPATIENT
Start: 2018-01-01 | End: 2018-01-01 | Stop reason: HOSPADM

## 2018-01-01 RX ORDER — GLUCAGON 1 MG
1 KIT INJECTION
Status: DISCONTINUED | OUTPATIENT
Start: 2018-01-01 | End: 2018-01-01 | Stop reason: HOSPADM

## 2018-01-01 RX ORDER — POTASSIUM CHLORIDE 20 MEQ/15ML
40 SOLUTION ORAL ONCE
Status: COMPLETED | OUTPATIENT
Start: 2018-01-01 | End: 2018-01-01

## 2018-01-01 RX ORDER — FUROSEMIDE 10 MG/ML
60 INJECTION INTRAMUSCULAR; INTRAVENOUS 2 TIMES DAILY
Status: DISCONTINUED | OUTPATIENT
Start: 2018-01-01 | End: 2018-01-01 | Stop reason: HOSPADM

## 2018-01-01 RX ORDER — FAMOTIDINE 20 MG/1
20 TABLET, FILM COATED ORAL DAILY
Status: DISCONTINUED | OUTPATIENT
Start: 2018-01-01 | End: 2018-01-01

## 2018-01-01 RX ORDER — FUROSEMIDE 10 MG/ML
80 INJECTION INTRAMUSCULAR; INTRAVENOUS
Status: COMPLETED | OUTPATIENT
Start: 2018-01-01 | End: 2018-01-01

## 2018-01-01 RX ORDER — POTASSIUM CHLORIDE 20 MEQ/1
20 TABLET, EXTENDED RELEASE ORAL DAILY
Qty: 20 TABLET | Refills: 0 | Status: SHIPPED | OUTPATIENT
Start: 2018-01-01 | End: 2018-01-01

## 2018-01-01 RX ORDER — ENALAPRIL MALEATE 2.5 MG/1
2.5 TABLET ORAL 2 TIMES DAILY
Status: DISCONTINUED | OUTPATIENT
Start: 2018-01-01 | End: 2018-01-01

## 2018-01-01 RX ORDER — ONDANSETRON 2 MG/ML
4 INJECTION INTRAMUSCULAR; INTRAVENOUS EVERY 6 HOURS PRN
Status: DISCONTINUED | OUTPATIENT
Start: 2018-01-01 | End: 2018-01-01

## 2018-01-01 RX ORDER — SODIUM CHLORIDE 9 MG/ML
INJECTION, SOLUTION INTRAVENOUS CONTINUOUS
Status: DISCONTINUED | OUTPATIENT
Start: 2018-01-01 | End: 2018-01-01

## 2018-01-01 RX ORDER — NOREPINEPHRINE BITARTRATE/D5W 4MG/250ML
0.1 PLASTIC BAG, INJECTION (ML) INTRAVENOUS CONTINUOUS
Status: DISCONTINUED | OUTPATIENT
Start: 2018-01-01 | End: 2018-01-01

## 2018-01-01 RX ORDER — HEPARIN SODIUM 1000 [USP'U]/ML
4000 INJECTION, SOLUTION INTRAVENOUS; SUBCUTANEOUS ONCE
Status: DISCONTINUED | OUTPATIENT
Start: 2018-01-01 | End: 2018-01-01

## 2018-01-01 RX ORDER — INSULIN ASPART 100 [IU]/ML
0-5 INJECTION, SOLUTION INTRAVENOUS; SUBCUTANEOUS EVERY 6 HOURS PRN
Status: DISCONTINUED | OUTPATIENT
Start: 2018-01-01 | End: 2018-01-01

## 2018-01-01 RX ORDER — SODIUM CHLORIDE 9 MG/ML
INJECTION, SOLUTION INTRAVENOUS ONCE
Status: DISCONTINUED | OUTPATIENT
Start: 2018-01-01 | End: 2018-01-01 | Stop reason: HOSPADM

## 2018-01-01 RX ORDER — ETOMIDATE 2 MG/ML
20 INJECTION INTRAVENOUS ONCE
Status: DISCONTINUED | OUTPATIENT
Start: 2018-01-01 | End: 2018-01-01 | Stop reason: ALTCHOICE

## 2018-01-01 RX ORDER — AZTREONAM 1 G/1
1000 INJECTION, POWDER, LYOPHILIZED, FOR SOLUTION INTRAMUSCULAR; INTRAVENOUS
Status: DISCONTINUED | OUTPATIENT
Start: 2018-01-01 | End: 2018-01-01

## 2018-01-01 RX ORDER — ENOXAPARIN SODIUM 100 MG/ML
40 INJECTION SUBCUTANEOUS EVERY 24 HOURS
Status: DISCONTINUED | OUTPATIENT
Start: 2018-01-01 | End: 2018-01-01

## 2018-01-01 RX ORDER — FENTANYL CITRATE 50 UG/ML
INJECTION, SOLUTION INTRAMUSCULAR; INTRAVENOUS
Status: COMPLETED
Start: 2018-01-01 | End: 2018-01-01

## 2018-01-01 RX ORDER — CARVEDILOL 12.5 MG/1
12.5 TABLET ORAL 2 TIMES DAILY WITH MEALS
Status: ON HOLD | COMMUNITY
End: 2018-01-01 | Stop reason: HOSPADM

## 2018-01-01 RX ORDER — NEBIVOLOL 5 MG/1
5 TABLET ORAL DAILY
Status: DISCONTINUED | OUTPATIENT
Start: 2018-01-01 | End: 2018-01-01 | Stop reason: HOSPADM

## 2018-01-01 RX ORDER — NOREPINEPHRINE BITARTRATE/D5W 8 MG/250ML
0.1 PLASTIC BAG, INJECTION (ML) INTRAVENOUS CONTINUOUS
Status: DISCONTINUED | OUTPATIENT
Start: 2018-01-01 | End: 2018-01-01

## 2018-01-01 RX ORDER — PROPOFOL 10 MG/ML
5 INJECTION, EMULSION INTRAVENOUS CONTINUOUS
Status: DISCONTINUED | OUTPATIENT
Start: 2018-01-01 | End: 2018-01-01

## 2018-01-01 RX ORDER — IPRATROPIUM BROMIDE AND ALBUTEROL SULFATE 2.5; .5 MG/3ML; MG/3ML
3 SOLUTION RESPIRATORY (INHALATION)
Status: COMPLETED | OUTPATIENT
Start: 2018-01-01 | End: 2018-01-01

## 2018-01-01 RX ORDER — FUROSEMIDE 10 MG/ML
60 INJECTION INTRAMUSCULAR; INTRAVENOUS
Status: COMPLETED | OUTPATIENT
Start: 2018-01-01 | End: 2018-01-01

## 2018-01-01 RX ORDER — LORAZEPAM 2 MG/ML
INJECTION INTRAMUSCULAR
Status: COMPLETED
Start: 2018-01-01 | End: 2018-01-01

## 2018-01-01 RX ORDER — CLOPIDOGREL BISULFATE 75 MG/1
75 TABLET ORAL DAILY
Status: DISCONTINUED | OUTPATIENT
Start: 2018-01-01 | End: 2018-01-01 | Stop reason: HOSPADM

## 2018-01-01 RX ORDER — FUROSEMIDE 20 MG/1
40 TABLET ORAL DAILY
Qty: 30 TABLET | Refills: 1 | Status: ON HOLD | OUTPATIENT
Start: 2018-01-01 | End: 2018-01-01 | Stop reason: HOSPADM

## 2018-01-01 RX ORDER — HALOPERIDOL 5 MG/ML
1 INJECTION INTRAMUSCULAR EVERY 4 HOURS PRN
Status: DISCONTINUED | OUTPATIENT
Start: 2018-01-01 | End: 2018-01-01

## 2018-01-01 RX ORDER — POTASSIUM CHLORIDE 750 MG/1
10 TABLET, EXTENDED RELEASE ORAL DAILY
Status: DISCONTINUED | OUTPATIENT
Start: 2018-01-01 | End: 2018-01-01 | Stop reason: HOSPADM

## 2018-01-01 RX ORDER — FENTANYL CITRATE-0.9 % NACL/PF 10 MCG/ML
PLASTIC BAG, INJECTION (ML) INTRAVENOUS CONTINUOUS
Status: DISCONTINUED | OUTPATIENT
Start: 2018-01-01 | End: 2018-01-01

## 2018-01-01 RX ORDER — METOLAZONE 5 MG/1
5 TABLET ORAL ONCE
Status: COMPLETED | OUTPATIENT
Start: 2018-01-01 | End: 2018-01-01

## 2018-01-01 RX ORDER — ROCURONIUM BROMIDE 10 MG/ML
50 INJECTION, SOLUTION INTRAVENOUS ONCE
Status: COMPLETED | OUTPATIENT
Start: 2018-01-01 | End: 2018-01-01

## 2018-01-01 RX ORDER — SODIUM BICARBONATE 42 MG/ML
50 INJECTION, SOLUTION INTRAVENOUS ONCE
Status: DISCONTINUED | OUTPATIENT
Start: 2018-01-01 | End: 2018-01-01

## 2018-01-01 RX ORDER — MORPHINE SULFATE 2 MG/ML
1 INJECTION, SOLUTION INTRAMUSCULAR; INTRAVENOUS EVERY 10 MIN PRN
Status: DISCONTINUED | OUTPATIENT
Start: 2018-01-01 | End: 2018-01-01

## 2018-01-01 RX ORDER — POTASSIUM CHLORIDE 14.9 MG/ML
20 INJECTION INTRAVENOUS ONCE
Status: COMPLETED | OUTPATIENT
Start: 2018-01-01 | End: 2018-01-01

## 2018-01-01 RX ORDER — GLUCAGON 1 MG
1 KIT INJECTION
Status: DISCONTINUED | OUTPATIENT
Start: 2018-01-01 | End: 2018-01-01

## 2018-01-01 RX ORDER — DOBUTAMINE HYDROCHLORIDE 400 MG/100ML
5 INJECTION, SOLUTION INTRAVENOUS CONTINUOUS
Status: DISCONTINUED | OUTPATIENT
Start: 2018-01-01 | End: 2018-01-01

## 2018-01-01 RX ORDER — INSULIN ASPART 100 [IU]/ML
1-10 INJECTION, SOLUTION INTRAVENOUS; SUBCUTANEOUS EVERY 6 HOURS PRN
Status: DISCONTINUED | OUTPATIENT
Start: 2018-01-01 | End: 2018-01-01

## 2018-01-01 RX ORDER — CLOPIDOGREL BISULFATE 75 MG/1
75 TABLET ORAL DAILY
Status: DISCONTINUED | OUTPATIENT
Start: 2018-01-01 | End: 2018-01-01

## 2018-01-01 RX ORDER — FENTANYL CITRAT/DEXTROSE 5%/PF 100 MCG/10
PATIENT CONTROLLED ANALGESIA SYRINGE INTRAVENOUS CONTINUOUS
Status: DISCONTINUED | OUTPATIENT
Start: 2018-01-01 | End: 2018-01-01

## 2018-01-01 RX ORDER — ONDANSETRON 2 MG/ML
8 INJECTION INTRAMUSCULAR; INTRAVENOUS EVERY 8 HOURS PRN
Status: DISCONTINUED | OUTPATIENT
Start: 2018-01-01 | End: 2018-01-01

## 2018-01-01 RX ORDER — BISACODYL 10 MG
10 SUPPOSITORY, RECTAL RECTAL DAILY PRN
Status: DISCONTINUED | OUTPATIENT
Start: 2018-01-01 | End: 2018-01-01 | Stop reason: HOSPADM

## 2018-01-01 RX ORDER — POTASSIUM CHLORIDE 20 MEQ/15ML
40 SOLUTION ORAL 2 TIMES DAILY
Status: DISCONTINUED | OUTPATIENT
Start: 2018-01-01 | End: 2018-01-01

## 2018-01-01 RX ORDER — VASOPRESSIN 20 [USP'U]/ML
INJECTION, SOLUTION INTRAMUSCULAR; SUBCUTANEOUS
Status: COMPLETED
Start: 2018-01-01 | End: 2018-01-01

## 2018-01-01 RX ORDER — MAGNESIUM SULFATE 1 G/100ML
1 INJECTION INTRAVENOUS ONCE
Status: COMPLETED | OUTPATIENT
Start: 2018-01-01 | End: 2018-01-01

## 2018-01-01 RX ORDER — METRONIDAZOLE 500 MG/100ML
500 INJECTION, SOLUTION INTRAVENOUS
Status: DISCONTINUED | OUTPATIENT
Start: 2018-01-01 | End: 2018-01-01

## 2018-01-01 RX ORDER — POTASSIUM CHLORIDE 20 MEQ/1
40 TABLET, EXTENDED RELEASE ORAL 2 TIMES DAILY
Status: DISCONTINUED | OUTPATIENT
Start: 2018-01-01 | End: 2018-01-01

## 2018-01-01 RX ORDER — HEPARIN SODIUM 1000 [USP'U]/ML
2000 INJECTION, SOLUTION INTRAVENOUS; SUBCUTANEOUS
Status: DISCONTINUED | OUTPATIENT
Start: 2018-01-01 | End: 2018-01-01 | Stop reason: HOSPADM

## 2018-01-01 RX ORDER — 3% SODIUM CHLORIDE 3 G/100ML
100 INJECTION, SOLUTION INTRAVENOUS CONTINUOUS
Status: DISCONTINUED | OUTPATIENT
Start: 2018-01-01 | End: 2018-01-01

## 2018-01-01 RX ORDER — IBUPROFEN 200 MG
24 TABLET ORAL
Status: DISCONTINUED | OUTPATIENT
Start: 2018-01-01 | End: 2018-01-01 | Stop reason: HOSPADM

## 2018-01-01 RX ORDER — INSULIN ASPART 100 [IU]/ML
0-5 INJECTION, SOLUTION INTRAVENOUS; SUBCUTANEOUS EVERY 6 HOURS PRN
Status: DISCONTINUED | OUTPATIENT
Start: 2018-01-01 | End: 2018-01-01 | Stop reason: HOSPADM

## 2018-01-01 RX ORDER — INSULIN ASPART 100 [IU]/ML
1-10 INJECTION, SOLUTION INTRAVENOUS; SUBCUTANEOUS
Status: DISCONTINUED | OUTPATIENT
Start: 2018-01-01 | End: 2018-01-01 | Stop reason: HOSPADM

## 2018-01-01 RX ORDER — NITROGLYCERIN 0.4 MG/1
0.4 TABLET SUBLINGUAL EVERY 5 MIN PRN
Status: DISCONTINUED | OUTPATIENT
Start: 2018-01-01 | End: 2018-01-01 | Stop reason: HOSPADM

## 2018-01-01 RX ORDER — CHLORHEXIDINE GLUCONATE ORAL RINSE 1.2 MG/ML
15 SOLUTION DENTAL 2 TIMES DAILY
Status: DISCONTINUED | OUTPATIENT
Start: 2018-01-01 | End: 2018-01-01 | Stop reason: HOSPADM

## 2018-01-01 RX ORDER — ENALAPRIL MALEATE 2.5 MG/1
2.5 TABLET ORAL 2 TIMES DAILY
Status: DISCONTINUED | OUTPATIENT
Start: 2018-01-01 | End: 2018-01-01 | Stop reason: HOSPADM

## 2018-01-01 RX ORDER — ROSUVASTATIN CALCIUM 10 MG/1
40 TABLET, COATED ORAL DAILY
Status: DISCONTINUED | OUTPATIENT
Start: 2018-01-01 | End: 2018-01-01 | Stop reason: HOSPADM

## 2018-01-01 RX ORDER — SODIUM CHLORIDE 0.9 % (FLUSH) 0.9 %
3 SYRINGE (ML) INJECTION EVERY 8 HOURS
Status: DISCONTINUED | OUTPATIENT
Start: 2018-01-01 | End: 2018-01-01

## 2018-01-01 RX ORDER — CARVEDILOL 12.5 MG/1
12.5 TABLET ORAL 2 TIMES DAILY WITH MEALS
Status: DISCONTINUED | OUTPATIENT
Start: 2018-01-01 | End: 2018-01-01 | Stop reason: HOSPADM

## 2018-01-01 RX ORDER — HEPARIN SODIUM,PORCINE/D5W 25000/250
12 INTRAVENOUS SOLUTION INTRAVENOUS CONTINUOUS
Status: DISCONTINUED | OUTPATIENT
Start: 2018-01-01 | End: 2018-01-01 | Stop reason: HOSPADM

## 2018-01-01 RX ORDER — BACLOFEN 10 MG/1
10 TABLET ORAL ONCE
Status: COMPLETED | OUTPATIENT
Start: 2018-01-01 | End: 2018-01-01

## 2018-01-01 RX ORDER — NAPROXEN SODIUM 220 MG/1
81 TABLET, FILM COATED ORAL DAILY
Status: DISCONTINUED | OUTPATIENT
Start: 2018-01-01 | End: 2018-01-01 | Stop reason: HOSPADM

## 2018-01-01 RX ORDER — MAGNESIUM SULFATE HEPTAHYDRATE 40 MG/ML
2 INJECTION, SOLUTION INTRAVENOUS
Status: ACTIVE | OUTPATIENT
Start: 2018-01-01 | End: 2018-01-01

## 2018-01-01 RX ORDER — FUROSEMIDE 10 MG/ML
80 INJECTION INTRAMUSCULAR; INTRAVENOUS ONCE
Status: COMPLETED | OUTPATIENT
Start: 2018-01-01 | End: 2018-01-01

## 2018-01-01 RX ORDER — FAMOTIDINE 20 MG/1
20 TABLET, FILM COATED ORAL 2 TIMES DAILY
Status: DISCONTINUED | OUTPATIENT
Start: 2018-01-01 | End: 2018-01-01 | Stop reason: HOSPADM

## 2018-01-01 RX ORDER — ROSUVASTATIN CALCIUM 10 MG/1
40 TABLET, COATED ORAL DAILY
Status: DISCONTINUED | OUTPATIENT
Start: 2018-01-01 | End: 2018-01-01

## 2018-01-01 RX ORDER — VANCOMYCIN HYDROCHLORIDE
15 ONCE
Status: COMPLETED | OUTPATIENT
Start: 2018-01-01 | End: 2018-01-01

## 2018-01-01 RX ORDER — HEPARIN SODIUM,PORCINE/D5W 25000/250
16 INTRAVENOUS SOLUTION INTRAVENOUS CONTINUOUS
Status: DISCONTINUED | OUTPATIENT
Start: 2018-01-01 | End: 2018-01-01

## 2018-01-01 RX ORDER — MORPHINE SULFATE 4 MG/ML
2 INJECTION, SOLUTION INTRAMUSCULAR; INTRAVENOUS
Status: COMPLETED | OUTPATIENT
Start: 2018-01-01 | End: 2018-01-01

## 2018-01-01 RX ORDER — AMLODIPINE BESYLATE 5 MG/1
5 TABLET ORAL DAILY
Status: ON HOLD | COMMUNITY
End: 2018-01-01 | Stop reason: HOSPADM

## 2018-01-01 RX ORDER — ASPIRIN 81 MG/1
81 TABLET ORAL DAILY
Status: DISCONTINUED | OUTPATIENT
Start: 2018-01-01 | End: 2018-01-01

## 2018-01-01 RX ORDER — SODIUM CHLORIDE 9 MG/ML
INJECTION, SOLUTION INTRAVENOUS
Status: DISCONTINUED | OUTPATIENT
Start: 2018-01-01 | End: 2018-01-01 | Stop reason: HOSPADM

## 2018-01-01 RX ORDER — ACETAMINOPHEN 325 MG/1
650 TABLET ORAL EVERY 6 HOURS PRN
Status: DISCONTINUED | OUTPATIENT
Start: 2018-01-01 | End: 2018-01-01 | Stop reason: HOSPADM

## 2018-01-01 RX ORDER — LORAZEPAM 2 MG/ML
2 INJECTION INTRAMUSCULAR
Status: DISCONTINUED | OUTPATIENT
Start: 2018-01-01 | End: 2018-01-01

## 2018-01-01 RX ORDER — SODIUM CHLORIDE 0.9 % (FLUSH) 0.9 %
3 SYRINGE (ML) INJECTION EVERY 8 HOURS
Status: DISCONTINUED | OUTPATIENT
Start: 2018-01-01 | End: 2018-01-01 | Stop reason: HOSPADM

## 2018-01-01 RX ORDER — GABAPENTIN 300 MG/1
600 CAPSULE ORAL 2 TIMES DAILY
Status: DISCONTINUED | OUTPATIENT
Start: 2018-01-01 | End: 2018-01-01 | Stop reason: HOSPADM

## 2018-01-01 RX ORDER — CHLORHEXIDINE GLUCONATE ORAL RINSE 1.2 MG/ML
15 SOLUTION DENTAL 2 TIMES DAILY
Status: DISCONTINUED | OUTPATIENT
Start: 2018-01-01 | End: 2018-01-01

## 2018-01-01 RX ORDER — LEVOTHYROXINE SODIUM 75 UG/1
75 TABLET ORAL DAILY
Status: DISCONTINUED | OUTPATIENT
Start: 2018-01-01 | End: 2018-01-01 | Stop reason: HOSPADM

## 2018-01-01 RX ORDER — METOPROLOL TARTRATE 1 MG/ML
5 INJECTION, SOLUTION INTRAVENOUS
Status: COMPLETED | OUTPATIENT
Start: 2018-01-01 | End: 2018-01-01

## 2018-01-01 RX ORDER — ROSUVASTATIN CALCIUM 40 MG/1
40 TABLET, COATED ORAL DAILY
Status: DISCONTINUED | OUTPATIENT
Start: 2018-01-01 | End: 2018-01-01

## 2018-01-01 RX ORDER — FENTANYL CITRAT/DEXTROSE 5%/PF 100 MCG/10
25 PATIENT CONTROLLED ANALGESIA SYRINGE INTRAVENOUS CONTINUOUS
Status: DISCONTINUED | OUTPATIENT
Start: 2018-01-01 | End: 2018-01-01 | Stop reason: HOSPADM

## 2018-01-01 RX ORDER — ONDANSETRON 8 MG/1
8 TABLET, ORALLY DISINTEGRATING ORAL EVERY 8 HOURS PRN
Status: DISCONTINUED | OUTPATIENT
Start: 2018-01-01 | End: 2018-01-01 | Stop reason: HOSPADM

## 2018-01-01 RX ORDER — ACETAMINOPHEN 325 MG/1
650 TABLET ORAL EVERY 4 HOURS PRN
Status: DISCONTINUED | OUTPATIENT
Start: 2018-01-01 | End: 2018-01-01 | Stop reason: HOSPADM

## 2018-01-01 RX ORDER — FUROSEMIDE 10 MG/ML
40 INJECTION INTRAMUSCULAR; INTRAVENOUS
Status: COMPLETED | OUTPATIENT
Start: 2018-01-01 | End: 2018-01-01

## 2018-01-01 RX ORDER — HYDROCODONE BITARTRATE AND ACETAMINOPHEN 500; 5 MG/1; MG/1
TABLET ORAL CONTINUOUS
Status: DISCONTINUED | OUTPATIENT
Start: 2018-01-01 | End: 2018-01-01

## 2018-01-01 RX ORDER — GLYCOPYRROLATE 1 MG/5ML
1 SOLUTION ORAL 3 TIMES DAILY PRN
Status: DISCONTINUED | OUTPATIENT
Start: 2018-01-01 | End: 2018-01-01

## 2018-01-01 RX ORDER — IBUPROFEN 200 MG
16 TABLET ORAL
Status: DISCONTINUED | OUTPATIENT
Start: 2018-01-01 | End: 2018-01-01 | Stop reason: HOSPADM

## 2018-01-01 RX ORDER — ONDANSETRON 2 MG/ML
4 INJECTION INTRAMUSCULAR; INTRAVENOUS EVERY 8 HOURS PRN
Status: DISCONTINUED | OUTPATIENT
Start: 2018-01-01 | End: 2018-01-01 | Stop reason: HOSPADM

## 2018-01-01 RX ORDER — RANOLAZINE 500 MG/1
500 TABLET, EXTENDED RELEASE ORAL 2 TIMES DAILY
Status: DISCONTINUED | OUTPATIENT
Start: 2018-01-01 | End: 2018-01-01 | Stop reason: HOSPADM

## 2018-01-01 RX ORDER — AMLODIPINE BESYLATE 5 MG/1
5 TABLET ORAL DAILY
Status: DISCONTINUED | OUTPATIENT
Start: 2018-01-01 | End: 2018-01-01 | Stop reason: HOSPADM

## 2018-01-01 RX ORDER — ENOXAPARIN SODIUM 100 MG/ML
40 INJECTION SUBCUTANEOUS EVERY 24 HOURS
Status: DISCONTINUED | OUTPATIENT
Start: 2018-01-01 | End: 2018-01-01 | Stop reason: HOSPADM

## 2018-01-01 RX ORDER — AZTREONAM 1 G/1
1000 INJECTION, POWDER, LYOPHILIZED, FOR SOLUTION INTRAMUSCULAR; INTRAVENOUS
Status: DISCONTINUED | OUTPATIENT
Start: 2018-01-01 | End: 2018-01-01 | Stop reason: HOSPADM

## 2018-01-01 RX ORDER — POTASSIUM CHLORIDE 20 MEQ/15ML
20 SOLUTION ORAL ONCE
Status: DISCONTINUED | OUTPATIENT
Start: 2018-01-01 | End: 2018-01-01

## 2018-01-01 RX ORDER — FUROSEMIDE 10 MG/ML
60 INJECTION INTRAMUSCULAR; INTRAVENOUS 2 TIMES DAILY
Status: DISCONTINUED | OUTPATIENT
Start: 2018-01-01 | End: 2018-01-01

## 2018-01-01 RX ORDER — LORAZEPAM 1 MG/1
1 TABLET ORAL EVERY 30 MIN PRN
Status: DISCONTINUED | OUTPATIENT
Start: 2018-01-01 | End: 2018-01-01

## 2018-01-01 RX ORDER — MAGNESIUM SULFATE HEPTAHYDRATE 40 MG/ML
2 INJECTION, SOLUTION INTRAVENOUS
Status: DISCONTINUED | OUTPATIENT
Start: 2018-01-01 | End: 2018-01-01

## 2018-01-01 RX ORDER — DOCUSATE SODIUM 100 MG/1
100 CAPSULE, LIQUID FILLED ORAL DAILY
Status: DISCONTINUED | OUTPATIENT
Start: 2018-01-01 | End: 2018-01-01 | Stop reason: HOSPADM

## 2018-01-01 RX ORDER — POTASSIUM CHLORIDE 20 MEQ/15ML
20 SOLUTION ORAL ONCE
Status: COMPLETED | OUTPATIENT
Start: 2018-01-01 | End: 2018-01-01

## 2018-01-01 RX ORDER — ZOLPIDEM TARTRATE 5 MG/1
5 TABLET ORAL NIGHTLY PRN
Status: DISCONTINUED | OUTPATIENT
Start: 2018-01-01 | End: 2018-01-01 | Stop reason: HOSPADM

## 2018-01-01 RX ORDER — CARVEDILOL 3.12 MG/1
3.12 TABLET ORAL DAILY
Status: DISCONTINUED | OUTPATIENT
Start: 2018-01-01 | End: 2018-01-01

## 2018-01-01 RX ORDER — ORPHENADRINE CITRATE 30 MG/ML
30 INJECTION INTRAMUSCULAR; INTRAVENOUS
Status: COMPLETED | OUTPATIENT
Start: 2018-01-01 | End: 2018-01-01

## 2018-01-01 RX ORDER — CARVEDILOL 6.25 MG/1
6.25 TABLET ORAL 2 TIMES DAILY WITH MEALS
Status: DISCONTINUED | OUTPATIENT
Start: 2018-01-01 | End: 2018-01-01

## 2018-01-01 RX ORDER — DOBUTAMINE HYDROCHLORIDE 400 MG/100ML
3 INJECTION, SOLUTION INTRAVENOUS CONTINUOUS
Status: DISCONTINUED | OUTPATIENT
Start: 2018-01-01 | End: 2018-01-01 | Stop reason: HOSPADM

## 2018-01-01 RX ORDER — MIDAZOLAM HYDROCHLORIDE 1 MG/ML
1 INJECTION INTRAMUSCULAR; INTRAVENOUS ONCE
Status: COMPLETED | OUTPATIENT
Start: 2018-01-01 | End: 2018-01-01

## 2018-01-01 RX ORDER — HYDRALAZINE HYDROCHLORIDE 20 MG/ML
10 INJECTION INTRAMUSCULAR; INTRAVENOUS EVERY 6 HOURS PRN
Status: DISCONTINUED | OUTPATIENT
Start: 2018-01-01 | End: 2018-01-01 | Stop reason: HOSPADM

## 2018-01-01 RX ORDER — NOREPINEPHRINE BITARTRATE 1 MG/ML
INJECTION, SOLUTION INTRAVENOUS
Status: DISPENSED
Start: 2018-01-01 | End: 2018-01-01

## 2018-01-01 RX ORDER — HEPARIN SODIUM,PORCINE/D5W 25000/250
12 INTRAVENOUS SOLUTION INTRAVENOUS CONTINUOUS
Status: DISCONTINUED | OUTPATIENT
Start: 2018-01-01 | End: 2018-01-01

## 2018-01-01 RX ORDER — FUROSEMIDE 10 MG/ML
40 INJECTION INTRAMUSCULAR; INTRAVENOUS ONCE
Status: COMPLETED | OUTPATIENT
Start: 2018-01-01 | End: 2018-01-01

## 2018-01-01 RX ORDER — HYDROCODONE BITARTRATE AND ACETAMINOPHEN 5; 325 MG/1; MG/1
1 TABLET ORAL EVERY 4 HOURS PRN
Status: DISCONTINUED | OUTPATIENT
Start: 2018-01-01 | End: 2018-01-01 | Stop reason: HOSPADM

## 2018-01-01 RX ORDER — RAMELTEON 8 MG/1
8 TABLET ORAL NIGHTLY PRN
Status: DISCONTINUED | OUTPATIENT
Start: 2018-01-01 | End: 2018-01-01 | Stop reason: HOSPADM

## 2018-01-01 RX ORDER — POLYETHYLENE GLYCOL 3350 17 G/17G
17 POWDER, FOR SOLUTION ORAL DAILY
Status: DISCONTINUED | OUTPATIENT
Start: 2018-01-01 | End: 2018-01-01 | Stop reason: HOSPADM

## 2018-01-01 RX ORDER — POTASSIUM CHLORIDE 20 MEQ/1
40 TABLET, EXTENDED RELEASE ORAL EVERY 4 HOURS
Status: DISCONTINUED | OUTPATIENT
Start: 2018-01-01 | End: 2018-01-01 | Stop reason: HOSPADM

## 2018-01-01 RX ORDER — MIDAZOLAM HYDROCHLORIDE 1 MG/ML
INJECTION INTRAMUSCULAR; INTRAVENOUS
Status: COMPLETED
Start: 2018-01-01 | End: 2018-01-01

## 2018-01-01 RX ORDER — ROSUVASTATIN CALCIUM 10 MG/1
10 TABLET, COATED ORAL DAILY
Status: DISCONTINUED | OUTPATIENT
Start: 2018-01-01 | End: 2018-01-01

## 2018-01-01 RX ORDER — HYDROCODONE BITARTRATE AND ACETAMINOPHEN 5; 325 MG/1; MG/1
1 TABLET ORAL EVERY 6 HOURS PRN
Status: DISCONTINUED | OUTPATIENT
Start: 2018-01-01 | End: 2018-01-01 | Stop reason: HOSPADM

## 2018-01-01 RX ORDER — FUROSEMIDE 10 MG/ML
INJECTION INTRAMUSCULAR; INTRAVENOUS
Status: DISPENSED
Start: 2018-01-01 | End: 2018-01-01

## 2018-01-01 RX ORDER — FUROSEMIDE 10 MG/ML
40 INJECTION INTRAMUSCULAR; INTRAVENOUS 2 TIMES DAILY
Status: DISCONTINUED | OUTPATIENT
Start: 2018-01-01 | End: 2018-01-01 | Stop reason: HOSPADM

## 2018-01-01 RX ORDER — LEVOTHYROXINE SODIUM 75 UG/1
75 TABLET ORAL
Status: DISCONTINUED | OUTPATIENT
Start: 2018-01-01 | End: 2018-01-01

## 2018-01-01 RX ORDER — ONDANSETRON 2 MG/ML
4 INJECTION INTRAMUSCULAR; INTRAVENOUS EVERY 6 HOURS PRN
Status: DISCONTINUED | OUTPATIENT
Start: 2018-01-01 | End: 2018-01-01 | Stop reason: HOSPADM

## 2018-01-01 RX ORDER — LEVOTHYROXINE SODIUM 75 UG/1
75 TABLET ORAL
Status: DISCONTINUED | OUTPATIENT
Start: 2018-01-01 | End: 2018-01-01 | Stop reason: HOSPADM

## 2018-01-01 RX ORDER — ROSUVASTATIN CALCIUM 10 MG/1
10 TABLET, COATED ORAL NIGHTLY
Status: DISCONTINUED | OUTPATIENT
Start: 2018-01-01 | End: 2018-01-01 | Stop reason: HOSPADM

## 2018-01-01 RX ADMIN — FUROSEMIDE 80 MG: 10 INJECTION, SOLUTION INTRAMUSCULAR; INTRAVENOUS at 03:01

## 2018-01-01 RX ADMIN — AMIODARONE HYDROCHLORIDE 0.5 MG/MIN: 1.8 INJECTION, SOLUTION INTRAVENOUS at 12:05

## 2018-01-01 RX ADMIN — INSULIN ASPART 4 UNITS: 100 INJECTION, SOLUTION INTRAVENOUS; SUBCUTANEOUS at 05:05

## 2018-01-01 RX ADMIN — FUROSEMIDE 40 MG: 10 INJECTION, SOLUTION INTRAMUSCULAR; INTRAVENOUS at 08:01

## 2018-01-01 RX ADMIN — PANTOPRAZOLE SODIUM 40 MG: 40 INJECTION, POWDER, FOR SOLUTION INTRAVENOUS at 09:05

## 2018-01-01 RX ADMIN — INSULIN ASPART 1 UNITS: 100 INJECTION, SOLUTION INTRAVENOUS; SUBCUTANEOUS at 11:05

## 2018-01-01 RX ADMIN — NITROGLYCERIN 1 INCH: 20 OINTMENT TOPICAL at 09:04

## 2018-01-01 RX ADMIN — ONDANSETRON 4 MG: 2 INJECTION INTRAMUSCULAR; INTRAVENOUS at 12:01

## 2018-01-01 RX ADMIN — MIDAZOLAM HYDROCHLORIDE 1 MG: 1 INJECTION INTRAMUSCULAR; INTRAVENOUS at 09:05

## 2018-01-01 RX ADMIN — NOREPINEPHRINE BITARTRATE 0.6 MCG/KG/MIN: 1 INJECTION, SOLUTION, CONCENTRATE INTRAVENOUS at 11:05

## 2018-01-01 RX ADMIN — CHLORHEXIDINE GLUCONATE 15 ML: 1.2 RINSE ORAL at 09:05

## 2018-01-01 RX ADMIN — ENALAPRIL MALEATE 2.5 MG: 2.5 TABLET ORAL at 09:04

## 2018-01-01 RX ADMIN — NOREPINEPHRINE BITARTRATE 0.14 MCG/KG/MIN: 1 INJECTION INTRAVENOUS at 10:05

## 2018-01-01 RX ADMIN — TICAGRELOR 90 MG: 90 TABLET ORAL at 08:05

## 2018-01-01 RX ADMIN — ENOXAPARIN SODIUM 40 MG: 100 INJECTION SUBCUTANEOUS at 06:04

## 2018-01-01 RX ADMIN — ROSUVASTATIN CALCIUM 10 MG: 10 TABLET, FILM COATED ORAL at 09:05

## 2018-01-01 RX ADMIN — INSULIN ASPART 2 UNITS: 100 INJECTION, SOLUTION INTRAVENOUS; SUBCUTANEOUS at 07:05

## 2018-01-01 RX ADMIN — INSULIN ASPART 2 UNITS: 100 INJECTION, SOLUTION INTRAVENOUS; SUBCUTANEOUS at 04:05

## 2018-01-01 RX ADMIN — FUROSEMIDE 10 MG/HR: 10 INJECTION, SOLUTION INTRAMUSCULAR; INTRAVENOUS at 01:05

## 2018-01-01 RX ADMIN — FUROSEMIDE 40 MG: 10 INJECTION, SOLUTION INTRAMUSCULAR; INTRAVENOUS at 05:01

## 2018-01-01 RX ADMIN — HEPARIN SODIUM 14 UNITS/KG/HR: 10000 INJECTION, SOLUTION INTRAVENOUS at 04:05

## 2018-01-01 RX ADMIN — FENTANYL CITRATE 25 MCG: 50 INJECTION, SOLUTION INTRAMUSCULAR; INTRAVENOUS at 08:05

## 2018-01-01 RX ADMIN — VASOPRESSIN 0.04 UNITS/MIN: 20 INJECTION INTRAVENOUS at 07:05

## 2018-01-01 RX ADMIN — DOBUTAMINE IN DEXTROSE 3 MCG/KG/MIN: 200 INJECTION, SOLUTION INTRAVENOUS at 02:05

## 2018-01-01 RX ADMIN — ACETAZOLAMIDE 250 MG: 500 INJECTION, POWDER, LYOPHILIZED, FOR SOLUTION INTRAVENOUS at 05:05

## 2018-01-01 RX ADMIN — BACLOFEN 10 MG: 10 TABLET ORAL at 05:01

## 2018-01-01 RX ADMIN — INSULIN DETEMIR 15 UNITS: 100 INJECTION, SOLUTION SUBCUTANEOUS at 08:05

## 2018-01-01 RX ADMIN — Medication 100 MCG/HR: at 01:05

## 2018-01-01 RX ADMIN — CHLORHEXIDINE GLUCONATE 15 ML: 1.2 RINSE ORAL at 08:05

## 2018-01-01 RX ADMIN — POTASSIUM CHLORIDE 40 MEQ: 1500 TABLET, EXTENDED RELEASE ORAL at 08:04

## 2018-01-01 RX ADMIN — ASPIRIN 81 MG 81 MG: 81 TABLET ORAL at 09:05

## 2018-01-01 RX ADMIN — IPRATROPIUM BROMIDE AND ALBUTEROL SULFATE 3 ML: .5; 3 SOLUTION RESPIRATORY (INHALATION) at 01:05

## 2018-01-01 RX ADMIN — LORAZEPAM 0.5 MG: 2 INJECTION INTRAMUSCULAR; INTRAVENOUS at 02:05

## 2018-01-01 RX ADMIN — NOREPINEPHRINE BITARTRATE 0.86 MCG/KG/MIN: 1 INJECTION, SOLUTION, CONCENTRATE INTRAVENOUS at 05:05

## 2018-01-01 RX ADMIN — VASOPRESSIN 20 UNITS: 20 INJECTION INTRAVENOUS at 07:05

## 2018-01-01 RX ADMIN — Medication 0.18 MCG/KG/MIN: at 09:05

## 2018-01-01 RX ADMIN — SODIUM CHLORIDE, PRESERVATIVE FREE 3 ML: 5 INJECTION INTRAVENOUS at 10:01

## 2018-01-01 RX ADMIN — ACETAZOLAMIDE 250 MG: 500 INJECTION, POWDER, LYOPHILIZED, FOR SOLUTION INTRAVENOUS at 11:05

## 2018-01-01 RX ADMIN — AMLODIPINE BESYLATE 5 MG: 5 TABLET ORAL at 08:04

## 2018-01-01 RX ADMIN — ENOXAPARIN SODIUM 40 MG: 100 INJECTION SUBCUTANEOUS at 05:04

## 2018-01-01 RX ADMIN — FUROSEMIDE 2.5 MG/HR: 10 INJECTION, SOLUTION INTRAMUSCULAR; INTRAVENOUS at 10:05

## 2018-01-01 RX ADMIN — MIDAZOLAM HYDROCHLORIDE 1 MG: 1 INJECTION, SOLUTION INTRAMUSCULAR; INTRAVENOUS at 09:05

## 2018-01-01 RX ADMIN — TICAGRELOR 90 MG: 90 TABLET ORAL at 09:05

## 2018-01-01 RX ADMIN — FUROSEMIDE 40 MG: 10 INJECTION, SOLUTION INTRAMUSCULAR; INTRAVENOUS at 12:01

## 2018-01-01 RX ADMIN — POTASSIUM CHLORIDE 20 MEQ: 20 SOLUTION ORAL at 04:05

## 2018-01-01 RX ADMIN — CLOPIDOGREL BISULFATE 75 MG: 75 TABLET ORAL at 08:04

## 2018-01-01 RX ADMIN — FUROSEMIDE 5 MG/HR: 10 INJECTION, SOLUTION INTRAMUSCULAR; INTRAVENOUS at 12:05

## 2018-01-01 RX ADMIN — NOREPINEPHRINE BITARTRATE 1.2 MCG/KG/MIN: 1 INJECTION INTRAVENOUS at 09:05

## 2018-01-01 RX ADMIN — LEVOTHYROXINE SODIUM 75 MCG: 75 TABLET ORAL at 05:05

## 2018-01-01 RX ADMIN — FAMOTIDINE 20 MG: 20 TABLET ORAL at 09:04

## 2018-01-01 RX ADMIN — INSULIN ASPART 2 UNITS: 100 INJECTION, SOLUTION INTRAVENOUS; SUBCUTANEOUS at 11:05

## 2018-01-01 RX ADMIN — NOREPINEPHRINE BITARTRATE 0.48 MCG/KG/MIN: 1 INJECTION INTRAVENOUS at 06:05

## 2018-01-01 RX ADMIN — METOLAZONE 5 MG: 5 TABLET ORAL at 03:05

## 2018-01-01 RX ADMIN — NOREPINEPHRINE BITARTRATE 1.2 MCG/KG/MIN: 1 INJECTION INTRAVENOUS at 10:05

## 2018-01-01 RX ADMIN — INSULIN ASPART 2 UNITS: 100 INJECTION, SOLUTION INTRAVENOUS; SUBCUTANEOUS at 12:05

## 2018-01-01 RX ADMIN — Medication 0.16 MCG/KG/MIN: at 06:05

## 2018-01-01 RX ADMIN — NEBIVOLOL HYDROCHLORIDE 5 MG: 5 TABLET ORAL at 12:01

## 2018-01-01 RX ADMIN — Medication 0.12 MCG/KG/MIN: at 01:05

## 2018-01-01 RX ADMIN — ROSUVASTATIN CALCIUM 40 MG: 10 TABLET, FILM COATED ORAL at 08:04

## 2018-01-01 RX ADMIN — FUROSEMIDE 40 MG: 10 INJECTION, SOLUTION INTRAMUSCULAR; INTRAVENOUS at 10:05

## 2018-01-01 RX ADMIN — LEVOTHYROXINE SODIUM 75 MCG: 75 TABLET ORAL at 06:01

## 2018-01-01 RX ADMIN — INSULIN ASPART 4 UNITS: 100 INJECTION, SOLUTION INTRAVENOUS; SUBCUTANEOUS at 09:05

## 2018-01-01 RX ADMIN — METOLAZONE 5 MG: 5 TABLET ORAL at 01:05

## 2018-01-01 RX ADMIN — ASPIRIN 81 MG 81 MG: 81 TABLET ORAL at 08:05

## 2018-01-01 RX ADMIN — AMIODARONE HYDROCHLORIDE 0.5 MG/MIN: 1.8 INJECTION, SOLUTION INTRAVENOUS at 05:05

## 2018-01-01 RX ADMIN — IOHEXOL 100 ML: 350 INJECTION, SOLUTION INTRAVENOUS at 10:04

## 2018-01-01 RX ADMIN — Medication 0.08 MCG/KG/MIN: at 11:05

## 2018-01-01 RX ADMIN — INSULIN ASPART 1 UNITS: 100 INJECTION, SOLUTION INTRAVENOUS; SUBCUTANEOUS at 08:01

## 2018-01-01 RX ADMIN — ASPIRIN 81 MG: 81 TABLET, COATED ORAL at 09:05

## 2018-01-01 RX ADMIN — AZTREONAM 1000 MG: 1 INJECTION, POWDER, LYOPHILIZED, FOR SOLUTION INTRAMUSCULAR; INTRAVENOUS at 08:05

## 2018-01-01 RX ADMIN — HEPARIN SODIUM 14 UNITS/KG/HR: 10000 INJECTION, SOLUTION INTRAVENOUS at 06:05

## 2018-01-01 RX ADMIN — GABAPENTIN 600 MG: 300 CAPSULE ORAL at 08:01

## 2018-01-01 RX ADMIN — INSULIN ASPART 8 UNITS: 100 INJECTION, SOLUTION INTRAVENOUS; SUBCUTANEOUS at 08:05

## 2018-01-01 RX ADMIN — AZTREONAM 1000 MG: 1 INJECTION, POWDER, LYOPHILIZED, FOR SOLUTION INTRAMUSCULAR; INTRAVENOUS at 11:05

## 2018-01-01 RX ADMIN — INSULIN ASPART 2 UNITS: 100 INJECTION, SOLUTION INTRAVENOUS; SUBCUTANEOUS at 05:05

## 2018-01-01 RX ADMIN — SODIUM CHLORIDE: 0.9 INJECTION, SOLUTION INTRAVENOUS at 08:05

## 2018-01-01 RX ADMIN — PROPOFOL 5 MCG/KG/MIN: 10 INJECTION, EMULSION INTRAVENOUS at 07:05

## 2018-01-01 RX ADMIN — POTASSIUM CHLORIDE 40 MEQ: 20 SOLUTION ORAL at 09:05

## 2018-01-01 RX ADMIN — HEPARIN SODIUM 12 UNITS/KG/HR: 10000 INJECTION, SOLUTION INTRAVENOUS at 11:05

## 2018-01-01 RX ADMIN — INSULIN ASPART 4 UNITS: 100 INJECTION, SOLUTION INTRAVENOUS; SUBCUTANEOUS at 08:05

## 2018-01-01 RX ADMIN — ONDANSETRON 4 MG: 2 INJECTION INTRAMUSCULAR; INTRAVENOUS at 07:05

## 2018-01-01 RX ADMIN — FUROSEMIDE 60 MG: 10 INJECTION, SOLUTION INTRAVENOUS at 08:04

## 2018-01-01 RX ADMIN — LEVOTHYROXINE SODIUM 75 MCG: 75 TABLET ORAL at 05:04

## 2018-01-01 RX ADMIN — Medication 0.08 MCG/KG/MIN: at 12:05

## 2018-01-01 RX ADMIN — INSULIN ASPART 3 UNITS: 100 INJECTION, SOLUTION INTRAVENOUS; SUBCUTANEOUS at 11:05

## 2018-01-01 RX ADMIN — NOREPINEPHRINE BITARTRATE 0.62 MCG/KG/MIN: 1 INJECTION INTRAVENOUS at 07:05

## 2018-01-01 RX ADMIN — RANOLAZINE 500 MG: 500 TABLET, FILM COATED, EXTENDED RELEASE ORAL at 09:01

## 2018-01-01 RX ADMIN — Medication 0.04 MCG/KG/MIN: at 03:05

## 2018-01-01 RX ADMIN — ACETAZOLAMIDE 250 MG: 500 INJECTION, POWDER, LYOPHILIZED, FOR SOLUTION INTRAVENOUS at 09:05

## 2018-01-01 RX ADMIN — ASPIRIN 81 MG: 81 TABLET, COATED ORAL at 08:05

## 2018-01-01 RX ADMIN — LEVOTHYROXINE SODIUM 75 MCG: 75 TABLET ORAL at 05:01

## 2018-01-01 RX ADMIN — POLYETHYLENE GLYCOL (3350) 17 G: 17 POWDER, FOR SOLUTION ORAL at 09:05

## 2018-01-01 RX ADMIN — NITROGLYCERIN 1 INCH: 20 OINTMENT TOPICAL at 05:04

## 2018-01-01 RX ADMIN — NITROGLYCERIN 1 INCH: 20 OINTMENT TOPICAL at 02:04

## 2018-01-01 RX ADMIN — SODIUM CHLORIDE 1000 MG: 900 INJECTION, SOLUTION INTRAVENOUS at 03:05

## 2018-01-01 RX ADMIN — SODIUM CHLORIDE 5 MG/HR: 900 INJECTION, SOLUTION INTRAVENOUS at 10:05

## 2018-01-01 RX ADMIN — ENOXAPARIN SODIUM 40 MG: 100 INJECTION SUBCUTANEOUS at 05:01

## 2018-01-01 RX ADMIN — INSULIN ASPART 2 UNITS: 100 INJECTION, SOLUTION INTRAVENOUS; SUBCUTANEOUS at 06:05

## 2018-01-01 RX ADMIN — RANOLAZINE 500 MG: 500 TABLET, FILM COATED, EXTENDED RELEASE ORAL at 08:01

## 2018-01-01 RX ADMIN — ROSUVASTATIN CALCIUM 10 MG: 10 TABLET, FILM COATED ORAL at 08:05

## 2018-01-01 RX ADMIN — POTASSIUM CHLORIDE 10 MEQ: 750 TABLET, EXTENDED RELEASE ORAL at 08:01

## 2018-01-01 RX ADMIN — HEPARIN SODIUM 9.99 UNITS/KG/HR: 10000 INJECTION, SOLUTION INTRAVENOUS at 02:05

## 2018-01-01 RX ADMIN — CARVEDILOL 12.5 MG: 12.5 TABLET, FILM COATED ORAL at 08:04

## 2018-01-01 RX ADMIN — MAGNESIUM SULFATE IN DEXTROSE 1 G: 10 INJECTION, SOLUTION INTRAVENOUS at 09:05

## 2018-01-01 RX ADMIN — SODIUM CHLORIDE, PRESERVATIVE FREE 3 ML: 5 INJECTION INTRAVENOUS at 02:01

## 2018-01-01 RX ADMIN — Medication 0.16 MCG/KG/MIN: at 04:05

## 2018-01-01 RX ADMIN — Medication 175 MCG/HR: at 05:05

## 2018-01-01 RX ADMIN — HEPARIN SODIUM 14 UNITS/KG/HR: 10000 INJECTION, SOLUTION INTRAVENOUS at 02:05

## 2018-01-01 RX ADMIN — NOREPINEPHRINE BITARTRATE 0.79 MCG/KG/MIN: 1 INJECTION, SOLUTION, CONCENTRATE INTRAVENOUS at 05:05

## 2018-01-01 RX ADMIN — ACETAMINOPHEN 650 MG: 325 TABLET ORAL at 02:04

## 2018-01-01 RX ADMIN — AZTREONAM 1000 MG: 1 INJECTION, POWDER, LYOPHILIZED, FOR SOLUTION INTRAMUSCULAR; INTRAVENOUS at 06:05

## 2018-01-01 RX ADMIN — Medication 0.02 MCG/KG/MIN: at 07:05

## 2018-01-01 RX ADMIN — PANTOPRAZOLE SODIUM 40 MG: 40 INJECTION, POWDER, FOR SOLUTION INTRAVENOUS at 10:05

## 2018-01-01 RX ADMIN — POTASSIUM CHLORIDE 40 MEQ: 20 SOLUTION ORAL at 03:05

## 2018-01-01 RX ADMIN — PANTOPRAZOLE SODIUM 40 MG: 40 INJECTION, POWDER, FOR SOLUTION INTRAVENOUS at 08:05

## 2018-01-01 RX ADMIN — NOREPINEPHRINE BITARTRATE 0.5 MCG/KG/MIN: 1 INJECTION, SOLUTION, CONCENTRATE INTRAVENOUS at 11:05

## 2018-01-01 RX ADMIN — Medication 0.1 MCG/KG/MIN: at 08:05

## 2018-01-01 RX ADMIN — INSULIN ASPART 4 UNITS: 100 INJECTION, SOLUTION INTRAVENOUS; SUBCUTANEOUS at 05:01

## 2018-01-01 RX ADMIN — CLOPIDOGREL BISULFATE 75 MG: 75 TABLET ORAL at 08:01

## 2018-01-01 RX ADMIN — ROSUVASTATIN CALCIUM 40 MG: 10 TABLET, FILM COATED ORAL at 08:01

## 2018-01-01 RX ADMIN — ENALAPRIL MALEATE 2.5 MG: 2.5 TABLET ORAL at 08:04

## 2018-01-01 RX ADMIN — POLYETHYLENE GLYCOL (3350) 17 G: 17 POWDER, FOR SOLUTION ORAL at 08:05

## 2018-01-01 RX ADMIN — DOCUSATE SODIUM 100 MG: 100 CAPSULE, LIQUID FILLED ORAL at 10:05

## 2018-01-01 RX ADMIN — AMIODARONE HYDROCHLORIDE 0.5 MG/MIN: 1.8 INJECTION, SOLUTION INTRAVENOUS at 11:05

## 2018-01-01 RX ADMIN — NITROGLYCERIN 1 INCH: 20 OINTMENT TOPICAL at 05:05

## 2018-01-01 RX ADMIN — INSULIN ASPART 4 UNITS: 100 INJECTION, SOLUTION INTRAVENOUS; SUBCUTANEOUS at 11:05

## 2018-01-01 RX ADMIN — AMIODARONE HYDROCHLORIDE 0.5 MG/MIN: 1.8 INJECTION, SOLUTION INTRAVENOUS at 02:05

## 2018-01-01 RX ADMIN — ORPHENADRINE CITRATE 30 MG: 30 INJECTION INTRAMUSCULAR; INTRAVENOUS at 05:01

## 2018-01-01 RX ADMIN — VASOPRESSIN 0.04 UNITS/MIN: 20 INJECTION INTRAVENOUS at 10:05

## 2018-01-01 RX ADMIN — LEVOTHYROXINE SODIUM 75 MCG: 75 TABLET ORAL at 06:05

## 2018-01-01 RX ADMIN — DOBUTAMINE IN DEXTROSE 2 MCG/KG/MIN: 200 INJECTION, SOLUTION INTRAVENOUS at 10:05

## 2018-01-01 RX ADMIN — FUROSEMIDE 40 MG: 10 INJECTION, SOLUTION INTRAMUSCULAR; INTRAVENOUS at 01:05

## 2018-01-01 RX ADMIN — THERA TABS 2 TABLET: TAB at 08:01

## 2018-01-01 RX ADMIN — INSULIN ASPART 4 UNITS: 100 INJECTION, SOLUTION INTRAVENOUS; SUBCUTANEOUS at 02:05

## 2018-01-01 RX ADMIN — POTASSIUM CHLORIDE 20 MEQ: 20 SOLUTION ORAL at 10:05

## 2018-01-01 RX ADMIN — ACETAMINOPHEN 650 MG: 325 TABLET ORAL at 08:04

## 2018-01-01 RX ADMIN — POTASSIUM CHLORIDE 20 MEQ: 200 INJECTION, SOLUTION INTRAVENOUS at 08:05

## 2018-01-01 RX ADMIN — GABAPENTIN 600 MG: 300 CAPSULE ORAL at 09:01

## 2018-01-01 RX ADMIN — AZTREONAM 1000 MG: 1 INJECTION, POWDER, LYOPHILIZED, FOR SOLUTION INTRAMUSCULAR; INTRAVENOUS at 04:05

## 2018-01-01 RX ADMIN — MORPHINE SULFATE 2 MG: 4 INJECTION INTRAVENOUS at 03:05

## 2018-01-01 RX ADMIN — HEPARIN SODIUM 10 UNITS/KG/HR: 10000 INJECTION, SOLUTION INTRAVENOUS at 12:05

## 2018-01-01 RX ADMIN — ACETAMINOPHEN 650 MG: 325 TABLET ORAL at 09:04

## 2018-01-01 RX ADMIN — CHLOROTHIAZIDE SODIUM 250 MG: 500 INJECTION, POWDER, LYOPHILIZED, FOR SOLUTION INTRAVENOUS at 12:05

## 2018-01-01 RX ADMIN — FAMOTIDINE 20 MG: 20 TABLET ORAL at 08:04

## 2018-01-01 RX ADMIN — MAGNESIUM SULFATE 1 G: 1 INJECTION INTRAVENOUS at 09:05

## 2018-01-01 RX ADMIN — Medication 50 MCG/HR: at 03:05

## 2018-01-01 RX ADMIN — NITROGLYCERIN 1 INCH: 20 OINTMENT TOPICAL at 11:05

## 2018-01-01 RX ADMIN — INSULIN DETEMIR 15 UNITS: 100 INJECTION, SOLUTION SUBCUTANEOUS at 03:05

## 2018-01-01 RX ADMIN — Medication 0.14 MCG/KG/MIN: at 07:05

## 2018-01-01 RX ADMIN — ACETAMINOPHEN 650 MG: 325 TABLET ORAL at 05:04

## 2018-01-01 RX ADMIN — LORAZEPAM 0.5 MG: 2 INJECTION INTRAMUSCULAR; INTRAVENOUS at 01:05

## 2018-01-01 RX ADMIN — POLYETHYLENE GLYCOL (3350) 17 G: 17 POWDER, FOR SOLUTION ORAL at 10:05

## 2018-01-01 RX ADMIN — Medication 0.1 MCG/KG/MIN: at 05:05

## 2018-01-01 RX ADMIN — FUROSEMIDE 80 MG: 10 INJECTION, SOLUTION INTRAMUSCULAR; INTRAVENOUS at 01:05

## 2018-01-01 RX ADMIN — HEPARIN SODIUM 10 UNITS/KG/HR: 10000 INJECTION, SOLUTION INTRAVENOUS at 01:05

## 2018-01-01 RX ADMIN — AZTREONAM 1000 MG: 1 INJECTION, POWDER, LYOPHILIZED, FOR SOLUTION INTRAMUSCULAR; INTRAVENOUS at 10:05

## 2018-01-01 RX ADMIN — AZITHROMYCIN MONOHYDRATE 500 MG: 500 INJECTION, POWDER, LYOPHILIZED, FOR SOLUTION INTRAVENOUS at 09:04

## 2018-01-01 RX ADMIN — FUROSEMIDE 60 MG: 10 INJECTION, SOLUTION INTRAVENOUS at 06:04

## 2018-01-01 RX ADMIN — INSULIN ASPART 2 UNITS: 100 INJECTION, SOLUTION INTRAVENOUS; SUBCUTANEOUS at 08:05

## 2018-01-01 RX ADMIN — INSULIN ASPART 4 UNITS: 100 INJECTION, SOLUTION INTRAVENOUS; SUBCUTANEOUS at 11:01

## 2018-01-01 RX ADMIN — INSULIN DETEMIR 15 UNITS: 100 INJECTION, SOLUTION SUBCUTANEOUS at 09:05

## 2018-01-01 RX ADMIN — POTASSIUM CHLORIDE 10 MEQ: 750 TABLET, EXTENDED RELEASE ORAL at 09:01

## 2018-01-01 RX ADMIN — VANCOMYCIN HYDROCHLORIDE 1500 MG: 5 INJECTION, POWDER, LYOPHILIZED, FOR SOLUTION INTRAVENOUS at 05:05

## 2018-01-01 RX ADMIN — AZTREONAM 1000 MG: 1 INJECTION, POWDER, LYOPHILIZED, FOR SOLUTION INTRAMUSCULAR; INTRAVENOUS at 02:05

## 2018-01-01 RX ADMIN — INSULIN ASPART 6 UNITS: 100 INJECTION, SOLUTION INTRAVENOUS; SUBCUTANEOUS at 11:05

## 2018-01-01 RX ADMIN — CARVEDILOL 6.25 MG: 6.25 TABLET, FILM COATED ORAL at 05:05

## 2018-01-01 RX ADMIN — NEBIVOLOL HYDROCHLORIDE 5 MG: 5 TABLET ORAL at 08:01

## 2018-01-01 RX ADMIN — SODIUM CHLORIDE, PRESERVATIVE FREE 3 ML: 5 INJECTION INTRAVENOUS at 05:01

## 2018-01-01 RX ADMIN — NOREPINEPHRINE BITARTRATE 0.86 MCG/KG/MIN: 1 INJECTION, SOLUTION, CONCENTRATE INTRAVENOUS at 12:05

## 2018-01-01 RX ADMIN — VANCOMYCIN HYDROCHLORIDE 1250 MG: 1 INJECTION, POWDER, LYOPHILIZED, FOR SOLUTION INTRAVENOUS at 01:05

## 2018-01-01 RX ADMIN — LORAZEPAM 4 MG: 2 INJECTION INTRAMUSCULAR at 04:05

## 2018-01-01 RX ADMIN — LORAZEPAM 2 MG: 2 INJECTION INTRAMUSCULAR; INTRAVENOUS at 03:05

## 2018-01-01 RX ADMIN — AMIODARONE HYDROCHLORIDE 1 MG/MIN: 1.8 INJECTION, SOLUTION INTRAVENOUS at 08:05

## 2018-01-01 RX ADMIN — NOREPINEPHRINE BITARTRATE 0.66 MCG/KG/MIN: 1 INJECTION INTRAVENOUS at 08:05

## 2018-01-01 RX ADMIN — FUROSEMIDE 60 MG: 10 INJECTION, SOLUTION INTRAMUSCULAR; INTRAVENOUS at 08:05

## 2018-01-01 RX ADMIN — DOCUSATE SODIUM 100 MG: 100 CAPSULE, LIQUID FILLED ORAL at 08:05

## 2018-01-01 RX ADMIN — CARVEDILOL 12.5 MG: 12.5 TABLET, FILM COATED ORAL at 06:04

## 2018-01-01 RX ADMIN — Medication 200 MCG/HR: at 05:05

## 2018-01-01 RX ADMIN — Medication 75 MCG/HR: at 11:05

## 2018-01-01 RX ADMIN — NITROGLYCERIN 1 INCH: 20 OINTMENT TOPICAL at 04:04

## 2018-01-01 RX ADMIN — Medication 250 MCG/HR: at 01:05

## 2018-01-01 RX ADMIN — ROCURONIUM BROMIDE 50 MG: 10 INJECTION, SOLUTION INTRAVENOUS at 04:05

## 2018-01-01 RX ADMIN — AMIODARONE HYDROCHLORIDE 150 MG: 1.5 INJECTION, SOLUTION INTRAVENOUS at 08:05

## 2018-01-01 RX ADMIN — ROSUVASTATIN CALCIUM 40 MG: 10 TABLET, FILM COATED ORAL at 09:01

## 2018-01-01 RX ADMIN — NOREPINEPHRINE BITARTRATE 1.04 MCG/KG/MIN: 1 INJECTION INTRAVENOUS at 02:05

## 2018-01-01 RX ADMIN — INSULIN ASPART 6 UNITS: 100 INJECTION, SOLUTION INTRAVENOUS; SUBCUTANEOUS at 01:05

## 2018-01-01 RX ADMIN — SODIUM CHLORIDE, PRESERVATIVE FREE 3 ML: 5 INJECTION INTRAVENOUS at 05:05

## 2018-01-01 RX ADMIN — SODIUM CHLORIDE: 0.9 INJECTION, SOLUTION INTRAVENOUS at 09:05

## 2018-01-01 RX ADMIN — SODIUM CHLORIDE, PRESERVATIVE FREE 3 ML: 5 INJECTION INTRAVENOUS at 09:05

## 2018-01-01 RX ADMIN — DOBUTAMINE IN DEXTROSE 3 MCG/KG/MIN: 200 INJECTION, SOLUTION INTRAVENOUS at 05:05

## 2018-01-01 RX ADMIN — HEPARIN SODIUM 2000 UNITS: 1000 INJECTION, SOLUTION INTRAVENOUS; SUBCUTANEOUS at 12:05

## 2018-01-01 RX ADMIN — FUROSEMIDE 10 MG/HR: 10 INJECTION, SOLUTION INTRAMUSCULAR; INTRAVENOUS at 11:05

## 2018-01-01 RX ADMIN — INSULIN ASPART 4 UNITS: 100 INJECTION, SOLUTION INTRAVENOUS; SUBCUTANEOUS at 04:05

## 2018-01-01 RX ADMIN — Medication 0.2 MCG/KG/MIN: at 03:05

## 2018-01-01 RX ADMIN — METOPROLOL TARTRATE 2.5 MG: 5 INJECTION, SOLUTION INTRAVENOUS at 08:04

## 2018-01-01 RX ADMIN — SODIUM CHLORIDE 100 ML: 3 INJECTION, SOLUTION INTRAVENOUS at 11:05

## 2018-01-01 RX ADMIN — FUROSEMIDE 80 MG: 10 INJECTION, SOLUTION INTRAMUSCULAR; INTRAVENOUS at 07:04

## 2018-01-01 RX ADMIN — FUROSEMIDE 60 MG: 10 INJECTION, SOLUTION INTRAVENOUS at 01:05

## 2018-01-01 RX ADMIN — HYDROCODONE BITARTRATE AND ACETAMINOPHEN 1 TABLET: 5; 325 TABLET ORAL at 12:01

## 2018-01-01 RX ADMIN — ROSUVASTATIN CALCIUM 40 MG: 10 TABLET, FILM COATED ORAL at 09:05

## 2018-01-01 RX ADMIN — Medication 2500 MCG: at 11:05

## 2018-01-01 RX ADMIN — PROPOFOL 10 MCG/KG/MIN: 10 INJECTION, EMULSION INTRAVENOUS at 08:05

## 2018-01-01 RX ADMIN — Medication 0.08 MCG/KG/MIN: at 01:05

## 2018-01-01 RX ADMIN — PROMETHAZINE HYDROCHLORIDE 25 MG: 25 INJECTION INTRAMUSCULAR; INTRAVENOUS at 01:05

## 2018-01-01 RX ADMIN — DOBUTAMINE IN DEXTROSE 3 MCG/KG/MIN: 200 INJECTION, SOLUTION INTRAVENOUS at 11:05

## 2018-01-01 RX ADMIN — ONDANSETRON 4 MG: 2 INJECTION INTRAMUSCULAR; INTRAVENOUS at 05:01

## 2018-01-01 RX ADMIN — Medication 25 MCG/HR: at 08:05

## 2018-01-01 RX ADMIN — AMIODARONE HYDROCHLORIDE 0.5 MG/MIN: 1.8 INJECTION, SOLUTION INTRAVENOUS at 07:05

## 2018-01-01 RX ADMIN — FUROSEMIDE 80 MG: 10 INJECTION, SOLUTION INTRAMUSCULAR; INTRAVENOUS at 07:05

## 2018-01-01 RX ADMIN — HEPARIN SODIUM 14 UNITS/KG/HR: 10000 INJECTION, SOLUTION INTRAVENOUS at 11:05

## 2018-01-01 RX ADMIN — POTASSIUM CHLORIDE 40 MEQ: 20 SOLUTION ORAL at 06:05

## 2018-01-01 RX ADMIN — POTASSIUM CHLORIDE 40 MEQ: 20 SOLUTION ORAL at 01:05

## 2018-01-01 RX ADMIN — Medication 100 MCG/HR: at 06:05

## 2018-01-01 RX ADMIN — INSULIN ASPART 2 UNITS: 100 INJECTION, SOLUTION INTRAVENOUS; SUBCUTANEOUS at 05:01

## 2018-01-01 RX ADMIN — FUROSEMIDE 20 MG/HR: 10 INJECTION, SOLUTION INTRAMUSCULAR; INTRAVENOUS at 11:05

## 2018-01-01 RX ADMIN — CARVEDILOL 12.5 MG: 12.5 TABLET, FILM COATED ORAL at 05:04

## 2018-01-01 RX ADMIN — INSULIN ASPART 4 UNITS: 100 INJECTION, SOLUTION INTRAVENOUS; SUBCUTANEOUS at 07:05

## 2018-01-01 RX ADMIN — METRONIDAZOLE 500 MG: 500 INJECTION, SOLUTION INTRAVENOUS at 09:05

## 2018-01-01 RX ADMIN — HYDROCODONE BITARTRATE AND ACETAMINOPHEN 1 TABLET: 5; 325 TABLET ORAL at 05:01

## 2018-01-01 RX ADMIN — HEPARIN SODIUM 12 UNITS/KG/HR: 10000 INJECTION, SOLUTION INTRAVENOUS at 09:05

## 2018-01-01 RX ADMIN — FUROSEMIDE 20 MG/HR: 10 INJECTION, SOLUTION INTRAMUSCULAR; INTRAVENOUS at 03:05

## 2018-01-01 RX ADMIN — CLOPIDOGREL BISULFATE 75 MG: 75 TABLET ORAL at 09:01

## 2018-01-01 RX ADMIN — HEPARIN SODIUM 12 UNITS/KG/HR: 10000 INJECTION, SOLUTION INTRAVENOUS at 08:05

## 2018-01-14 PROBLEM — R79.89 ELEVATED LACTIC ACID LEVEL: Status: ACTIVE | Noted: 2018-01-01

## 2018-01-14 PROBLEM — I50.9 CONGESTIVE HEART FAILURE: Status: ACTIVE | Noted: 2018-01-01

## 2018-01-14 NOTE — ED NOTES
Pt sitting upright in bed, aaox4, in no acute distress. Pt states her breathing is better and she is no longer short of breath, but states her leg cramps are still bad and rates them 7/10. Dr Boone aware of leg cramping. No further orders at this time. Pt remains on O2 4L NC with continuous cardiac, bp, and spo2 monitoring. Pt aware of plan to admit for fluid overload. Pt also aware she was to be admitted to ICU for continuous bipap and that she will be downgraded to a telemetry bed if she is able to remain off of the bipap. Pt with no other complaint at this time. Family remains at bedside. Will continue to monitor. Bed in low position, side rails up, call light in reach.

## 2018-01-14 NOTE — ED NOTES
Dr suaer at bedside assessing pt and family updated. Order to give bystolic and lasix. Ok to give norco for leg pain.

## 2018-01-14 NOTE — HPI
Mrs. Montez is a 56 yo WF with known history of CAD, HTN and CHF unknown EF who presented here tonight complaining of sudden onset of SOB. She says she did notice some mild dyspnea earlier in the day but wasn't bad. By the time she attempted to go to bed she was dyspnea with orthopnea. Upon presentation she was in respiratory distress. She denies any chest pain and states she has been compliant with her diet and medications.

## 2018-01-14 NOTE — ED NOTES
Pt screaming, stating her legs are cramping very badly. Dr Still to order norflex IM. Pt requesting zofran prior to norco admin, stating pain medication makes her vomit.

## 2018-01-14 NOTE — ED NOTES
Pt reports leg cramps have worsened to 8/10 and is requesting something else for pain. Pt continuously moving legs and daughter at bedside rubbing them. Pt states she feels like she would be able to breath better if her legs were not hurting so bad. Hospital medicine paged at this time.

## 2018-01-14 NOTE — H&P
[facility]  History & Physical    Patient Name: Milli Montez  MRN: [id number]  Admission Date: 2018  Attending Physician: No att. providers found   Primary Care Provider: Marito Amato MD         Patient information was obtained from patient, spouse/SO, past medical records and ER records.     Subjective:     Principal Problem:Congestive heart failure    Chief Complaint:   Chief Complaint   Patient presents with    Shortness of Breath     sudden onset of sob about 1 hr pta; arrived on cpap via [facility]        HPI: Mrs. Montez is a 58 yo WF with known history of CAD, HTN and CHF unknown EF who presented here tonight complaining of sudden onset of SOB. She says she did notice some mild dyspnea earlier in the day but wasn't bad. By the time she attempted to go to bed she was dyspnea with orthopnea. Upon presentation she was in respiratory distress. She denies any chest pain and states she has been compliant with her diet and medications.    Past Medical History:   Diagnosis Date    Allergy     Arthritis     Blood transfusion     CHF (congestive heart failure)     Diabetes mellitus     Heart murmur     History of loop recorder     Hyperlipidemia     Hypertension     Hypothyroidism 2013    Myocardial infarction     Prediabetes     Thyroid disease     TIA (transient ischemic attack)     Ulcer        Past Surgical History:   Procedure Laterality Date    CAROTID STENT Right      SECTION      CORONARY STENT PLACEMENT      TUBAL LIGATION         Review of patient's allergies indicates:   Allergen Reactions    Penicillins Swelling       No current facility-administered medications on file prior to encounter.      Current Outpatient Prescriptions on File Prior to Encounter   Medication Sig    blood sugar diagnostic Strp check BS fastin and  2hr after biggest meal    blood-glucose meter (FREESTYLE SYSTEM KIT) kit Use as instructed    clopidogrel (PLAVIX)  75 mg tablet Take 75 mg by mouth.    enalapril (VASOTEC) 2.5 MG tablet Take 2.5 mg by mouth 2 (two) times daily.    furosemide (LASIX) 20 MG tablet Take 20 mg by mouth daily as needed.    glipiZIDE (GLUCOTROL) 5 MG tablet Take 1 tablet (5 mg total) by mouth 2 (two) times daily before meals.    lancets-blood glucose strips 30 gauge Cmpk 2 Devices by Misc.(Non-Drug; Combo Route) route 2 (two) times daily. check BS fastin and  2hr after biggest meal    levothyroxine (SYNTHROID) 75 MCG tablet TAKE 1 TABLET BY MOUTH DAILY    nebivolol (BYSTOLIC) 5 MG Tab Take 5 mg by mouth.    ranolazine (RANEXA) 500 MG Tb12 Take 500 mg by mouth.    rosuvastatin (CRESTOR) 20 MG tablet Take 40 mg by mouth once daily.    acyclovir (ZOVIRAX) 800 MG Tab Take 1 tablet (800 mg total) by mouth 5 (five) times daily.    gabapentin (NEURONTIN) 300 MG capsule Take 1 capsule (300 mg total) by mouth every evening.    ondansetron (ZOFRAN) 4 MG tablet Take 1 tablet (4 mg total) by mouth every 6 (six) hours as needed for Nausea.     Family History     Problem Relation (Age of Onset)    Cancer Father    Heart disease Mother, Father        Social History Main Topics    Smoking status: Never Smoker    Smokeless tobacco: Never Used    Alcohol use No    Drug use: No    Sexual activity: Yes     Partners: Male     Review of Systems   Constitutional: Negative.    HENT: Negative.    Eyes: Negative.    Respiratory: Positive for chest tightness, shortness of breath and wheezing.    Cardiovascular: Positive for leg swelling.   Gastrointestinal: Negative.    Endocrine: Negative.    Genitourinary: Negative.    Musculoskeletal: Negative.    Skin: Negative.    Allergic/Immunologic: Negative.    Neurological: Negative.    Hematological: Negative.    Psychiatric/Behavioral: Negative.      Objective:     Vital Signs (Most Recent):  Temp: 97.7 °F (36.5 °C) (01/14/18 0507)  Pulse: 101 (01/14/18 0631)  Resp: (!) 22 (01/14/18 0631)  BP: 136/75 (01/14/18  0631)  SpO2: 97 % (01/14/18 0631) Vital Signs (24h Range):  Temp:  [97.6 °F (36.4 °C)-97.7 °F (36.5 °C)] 97.7 °F (36.5 °C)  Pulse:  [101-133] 101  Resp:  [22-46] 22  SpO2:  [94 %-100 %] 97 %  BP: (133-155)/() 136/75     Weight: 104.3 kg (229 lb 15 oz)  Body mass index is 40.73 kg/m².    Physical Exam   Constitutional: She is oriented to person, place, and time. She appears well-developed and well-nourished.   HENT:   Head: Normocephalic and atraumatic.   Right Ear: External ear normal.   Left Ear: External ear normal.   Nose: Nose normal.   Mouth/Throat: Oropharynx is clear and moist.   Eyes: Conjunctivae and EOM are normal. Pupils are equal, round, and reactive to light.   Neck: Normal range of motion. Neck supple. JVD present.   Cardiovascular: Normal rate, regular rhythm, normal heart sounds and intact distal pulses.  Exam reveals no gallop and no friction rub.    No murmur heard.  Pulmonary/Chest: She has rales.   Abdominal: Soft. Bowel sounds are normal.   Musculoskeletal: She exhibits edema.   Neurological: She is alert and oriented to person, place, and time.   Skin: Skin is warm. Capillary refill takes less than 2 seconds.   Psychiatric: She has a normal mood and affect.   Nursing note and vitals reviewed.        CRANIAL NERVES     CN III, IV, VI   Pupils are equal, round, and reactive to light.  Extraocular motions are normal.        Significant Labs:   Recent Lab Results       01/14/18  0605 01/14/18  0533 01/14/18  0407 01/14/18  0330 01/14/18  0320      Allens Test    Pass      Anion Gap 14    18(H)     Appearance, UA   Clear       Bacteria, UA   Moderate(A)       Baso #     0.03     Basophil%     0.3     Bilirubin (UA)   Negative       BNP     200  Comment:  Values of less than 100 pg/ml are consistent with non-CHF populations.(H)     Site    RR      BUN, Bld 25(H)    23(H)     Calcium 8.5(L)    9.0     Chloride 104    103     CO2 20(L)    16(L)     Color, UA   Yellow       CPK     76          76   "   CPK MB     1.2     Creatinine 1.4    1.6(H)     DelSys    CPAP/BiPAP      Differential Method     Automated     eGFR if  49(A)    42(A)     eGFR if non  42  Comment:  Calculation used to obtain the estimated glomerular filtration  rate (eGFR) is the CKD-EPI equation.   (A)    36  Comment:  Calculation used to obtain the estimated glomerular filtration  rate (eGFR) is the CKD-EPI equation.   (A)     Eos #     0.2     Eosinophil%     1.4     EP    8      FiO2    100      Glucose 231(H)    328(H)     Glucose, UA   1+(A)       Gran #     7.1     Gran%     61.0     Hematocrit     44.7     Hemoglobin     14.9     Hyaline Casts, UA   20(A)       Coumadin Monitoring INR     1.0  Comment:  Coumadin Therapy:  2.0 - 3.0 for INR for all indicators except mechanical heart valves  and antiphospholipid syndromes which should use 2.5 - 3.5.       IP    24      Ketones, UA   Negative       Lactate, Aneesh     5.3  Comment:  Falsely low lactic acid results can be found in samples   containing >=13.0 mg/dL total bilirubin and/or >=3.5 mg/dL   direct bilirubin.  Lactic acid critical result(s) called and verbal readback obtained   from Fay Todd RN., 01/14/2018 04:27  (HH)     Leukocytes, UA   Negative       Lymph #     3.9     Lymph%     33.9     Magnesium  2.6        MB%     1.6  Comment:  To be positive, the MB% must be greater than 5% AND the CK-MB  greater than 6.5 ng/mL. Values not in the reference interval,   but not qualifying as positive, should be considered "trace".       MCH     31.9(H)     MCHC     33.3     MCV     96     Microscopic Comment   SEE COMMENT  Comment:  Other formed elements not mentioned in the report are not   present in the microscopic examination.          Mode    BiPAP      Mono #     0.4     Mono%     3.4(L)     MPV     9.9     Nitrite, UA   Negative       Occult Blood UA   Trace(A)       pH, UA   6.0       Phosphorus  4.4        Platelets     318     POC BE    -5  "     POC HCO3    21.3(L)      POC PCO2    44.5      POC PH    7.289(LL)      POC PO2    392(H)      POC SATURATED O2    100      Potassium 4.3    3.8     Protein, UA   1+  Comment:  Recommend a 24 hour urine protein or a urine   protein/creatinine ratio if globulin induced proteinuria is  clinically suspected.  (A)       Protime     10.4     Rate    25      RBC     4.67     RBC, UA   1       RDW     14.6(H)     Sample    ARTERIAL      Sodium 138    137     Specific Gravity, UA   1.020       Specimen UA   Urine, Catheterized       Squam Epithel, UA   1       Troponin I     0.023  Comment:  The reference interval for Troponin I represents the 99th percentile   cutoff   for our facility and is consistent with 3rd generation assay   performance.       Urobilinogen, UA   Negative       WBC, UA   2       WBC     11.58                     Significant Imaging: bilateral interstitial edema  Imaging Results          X-Ray Chest 1 View (In process)                   Assessment/Plan:     * Congestive heart failure    Known history of CHF but unknown systolic vs diastolic  -admit for IV diuresing, rule out acute ACS  -check echo  -continue her BB, statin, Plavix,ACEi  -currently on Bipap will attempt to wean to nasal cannula          Unspecified essential hypertension      Resume her home BP medications        Type II or unspecified type diabetes mellitus without mention of complication, not stated as uncontrolled      Start SSI        Elevated lactic acid level    Likely secondary to ineffective circulating volume as don't feel she is septic            VTE Risk Mitigation         Ordered     enoxaparin injection 40 mg  Daily     Route:  Subcutaneous        01/14/18 0503     High Risk of VTE  Once      01/14/18 0503             Su Boone MD  Department of Hospital Medicine   [facility]

## 2018-01-14 NOTE — ED NOTES
Pt's sbp has been in . I spoke with brisa cartwright np concerning morning meds: bystolic, enalapril and lasix ( last dose of lasix 80 mg was at 0330 this morning). brisa ordered to hold meds for a sbp < 110 and hr <60. Hospital medicine will reassess pt this morning.

## 2018-01-14 NOTE — ED NOTES
Spoke with Dr Schaeffer, [facility] medicine. Notified him of pt's leg cramping and updated him on respiratory status. Dr Schaeffer states he will put in gabapentin and a multivitamin and will downgrade pt from ICU.

## 2018-01-14 NOTE — PROGRESS NOTES
Pt brought in per ambulance on cpap; pt in respiratory distress; placed pt on our bipap machine; rt to follow.

## 2018-01-14 NOTE — PROGRESS NOTES
Pt seen and examined, chart, CXR reviewed. Pt admitted with sudden onset acute CHF associated with Lactic Acidosis likely sec to salt and water indiscretion. Treated with IV lasix and she has put out > 1200 cc urine and feels better, lactate also improving. She has been off bipap to NC and has been downgraded to tele.

## 2018-01-14 NOTE — ED NOTES
Pt continues to have leg cramping. Right left leg worse than left. I reported to adam mitchell who will see pt soon.

## 2018-01-14 NOTE — ASSESSMENT & PLAN NOTE
Known history of CHF but unknown systolic vs diastolic  -admit for IV diuresing, rule out acute ACS  -check echo  -continue her BB, statin, Plavix,ACEi  -currently on Bipap will attempt to wean to nasal cannula

## 2018-01-14 NOTE — SUBJECTIVE & OBJECTIVE
Past Medical History:   Diagnosis Date    Allergy     Arthritis     Blood transfusion     CHF (congestive heart failure)     Diabetes mellitus     Heart murmur     History of loop recorder     Hyperlipidemia     Hypertension     Hypothyroidism 2013    Myocardial infarction     Prediabetes     Thyroid disease     TIA (transient ischemic attack)     Ulcer        Past Surgical History:   Procedure Laterality Date    CAROTID STENT Right      SECTION      CORONARY STENT PLACEMENT      TUBAL LIGATION         Review of patient's allergies indicates:   Allergen Reactions    Penicillins Swelling       No current facility-administered medications on file prior to encounter.      Current Outpatient Prescriptions on File Prior to Encounter   Medication Sig    blood sugar diagnostic Strp check BS fastin and  2hr after biggest meal    blood-glucose meter (FREESTYLE SYSTEM KIT) kit Use as instructed    clopidogrel (PLAVIX) 75 mg tablet Take 75 mg by mouth.    enalapril (VASOTEC) 2.5 MG tablet Take 2.5 mg by mouth 2 (two) times daily.    furosemide (LASIX) 20 MG tablet Take 20 mg by mouth daily as needed.    glipiZIDE (GLUCOTROL) 5 MG tablet Take 1 tablet (5 mg total) by mouth 2 (two) times daily before meals.    lancets-blood glucose strips 30 gauge Cmpk 2 Devices by Misc.(Non-Drug; Combo Route) route 2 (two) times daily. check BS fastin and  2hr after biggest meal    levothyroxine (SYNTHROID) 75 MCG tablet TAKE 1 TABLET BY MOUTH DAILY    nebivolol (BYSTOLIC) 5 MG Tab Take 5 mg by mouth.    ranolazine (RANEXA) 500 MG Tb12 Take 500 mg by mouth.    rosuvastatin (CRESTOR) 20 MG tablet Take 40 mg by mouth once daily.    acyclovir (ZOVIRAX) 800 MG Tab Take 1 tablet (800 mg total) by mouth 5 (five) times daily.    gabapentin (NEURONTIN) 300 MG capsule Take 1 capsule (300 mg total) by mouth every evening.    ondansetron (ZOFRAN) 4 MG tablet Take 1 tablet (4 mg total) by mouth every 6 (six)  hours as needed for Nausea.     Family History     Problem Relation (Age of Onset)    Cancer Father    Heart disease Mother, Father        Social History Main Topics    Smoking status: Never Smoker    Smokeless tobacco: Never Used    Alcohol use No    Drug use: No    Sexual activity: Yes     Partners: Male     Review of Systems   Constitutional: Negative.    HENT: Negative.    Eyes: Negative.    Respiratory: Positive for chest tightness, shortness of breath and wheezing.    Cardiovascular: Positive for leg swelling.   Gastrointestinal: Negative.    Endocrine: Negative.    Genitourinary: Negative.    Musculoskeletal: Negative.    Skin: Negative.    Allergic/Immunologic: Negative.    Neurological: Negative.    Hematological: Negative.    Psychiatric/Behavioral: Negative.      Objective:     Vital Signs (Most Recent):  Temp: 97.7 °F (36.5 °C) (01/14/18 0507)  Pulse: 101 (01/14/18 0631)  Resp: (!) 22 (01/14/18 0631)  BP: 136/75 (01/14/18 0631)  SpO2: 97 % (01/14/18 0631) Vital Signs (24h Range):  Temp:  [97.6 °F (36.4 °C)-97.7 °F (36.5 °C)] 97.7 °F (36.5 °C)  Pulse:  [101-133] 101  Resp:  [22-46] 22  SpO2:  [94 %-100 %] 97 %  BP: (133-155)/() 136/75     Weight: 104.3 kg (229 lb 15 oz)  Body mass index is 40.73 kg/m².    Physical Exam   Constitutional: She is oriented to person, place, and time. She appears well-developed and well-nourished.   HENT:   Head: Normocephalic and atraumatic.   Right Ear: External ear normal.   Left Ear: External ear normal.   Nose: Nose normal.   Mouth/Throat: Oropharynx is clear and moist.   Eyes: Conjunctivae and EOM are normal. Pupils are equal, round, and reactive to light.   Neck: Normal range of motion. Neck supple. JVD present.   Cardiovascular: Normal rate, regular rhythm, normal heart sounds and intact distal pulses.  Exam reveals no gallop and no friction rub.    No murmur heard.  Pulmonary/Chest: She has rales.   Abdominal: Soft. Bowel sounds are normal.    Musculoskeletal: She exhibits edema.   Neurological: She is alert and oriented to person, place, and time.   Skin: Skin is warm. Capillary refill takes less than 2 seconds.   Psychiatric: She has a normal mood and affect.   Nursing note and vitals reviewed.        CRANIAL NERVES     CN III, IV, VI   Pupils are equal, round, and reactive to light.  Extraocular motions are normal.        Significant Labs:   Recent Lab Results       01/14/18  0605 01/14/18  0533 01/14/18  0407 01/14/18  0330 01/14/18  0320      Allens Test    Pass      Anion Gap 14    18(H)     Appearance, UA   Clear       Bacteria, UA   Moderate(A)       Baso #     0.03     Basophil%     0.3     Bilirubin (UA)   Negative       BNP     200  Comment:  Values of less than 100 pg/ml are consistent with non-CHF populations.(H)     Site    RR      BUN, Bld 25(H)    23(H)     Calcium 8.5(L)    9.0     Chloride 104    103     CO2 20(L)    16(L)     Color, UA   Yellow       CPK     76          76     CPK MB     1.2     Creatinine 1.4    1.6(H)     DelSys    CPAP/BiPAP      Differential Method     Automated     eGFR if  49(A)    42(A)     eGFR if non  42  Comment:  Calculation used to obtain the estimated glomerular filtration  rate (eGFR) is the CKD-EPI equation.   (A)    36  Comment:  Calculation used to obtain the estimated glomerular filtration  rate (eGFR) is the CKD-EPI equation.   (A)     Eos #     0.2     Eosinophil%     1.4     EP    8      FiO2    100      Glucose 231(H)    328(H)     Glucose, UA   1+(A)       Gran #     7.1     Gran%     61.0     Hematocrit     44.7     Hemoglobin     14.9     Hyaline Casts, UA   20(A)       Coumadin Monitoring INR     1.0  Comment:  Coumadin Therapy:  2.0 - 3.0 for INR for all indicators except mechanical heart valves  and antiphospholipid syndromes which should use 2.5 - 3.5.       IP    24      Ketones, UA   Negative       Lactate, Aneesh     5.3  Comment:  Falsely low lactic acid  "results can be found in samples   containing >=13.0 mg/dL total bilirubin and/or >=3.5 mg/dL   direct bilirubin.  Lactic acid critical result(s) called and verbal readback obtained   from Fay Todd RN., 01/14/2018 04:27  (HH)     Leukocytes, UA   Negative       Lymph #     3.9     Lymph%     33.9     Magnesium  2.6        MB%     1.6  Comment:  To be positive, the MB% must be greater than 5% AND the CK-MB  greater than 6.5 ng/mL. Values not in the reference interval,   but not qualifying as positive, should be considered "trace".       MCH     31.9(H)     MCHC     33.3     MCV     96     Microscopic Comment   SEE COMMENT  Comment:  Other formed elements not mentioned in the report are not   present in the microscopic examination.          Mode    BiPAP      Mono #     0.4     Mono%     3.4(L)     MPV     9.9     Nitrite, UA   Negative       Occult Blood UA   Trace(A)       pH, UA   6.0       Phosphorus  4.4        Platelets     318     POC BE    -5      POC HCO3    21.3(L)      POC PCO2    44.5      POC PH    7.289(LL)      POC PO2    392(H)      POC SATURATED O2    100      Potassium 4.3    3.8     Protein, UA   1+  Comment:  Recommend a 24 hour urine protein or a urine   protein/creatinine ratio if globulin induced proteinuria is  clinically suspected.  (A)       Protime     10.4     Rate    25      RBC     4.67     RBC, UA   1       RDW     14.6(H)     Sample    ARTERIAL      Sodium 138    137     Specific Gravity, UA   1.020       Specimen UA   Urine, Catheterized       Squam Epithel, UA   1       Troponin I     0.023  Comment:  The reference interval for Troponin I represents the 99th percentile   cutoff   for our facility and is consistent with 3rd generation assay   performance.       Urobilinogen, UA   Negative       WBC, UA   2       WBC     11.58                     Significant Imaging: bilateral interstitial edema  Imaging Results          X-Ray Chest 1 View (In process)                 "

## 2018-01-14 NOTE — ED PROVIDER NOTES
SCRIBE #1 NOTE: I, Ricky Sharma, am scribing for, and in the presence of, Colby Still MD. I have scribed the entire note.      History      Chief Complaint   Patient presents with    Shortness of Breath     sudden onset of sob about 1 hr pta; arrived on cpap via [facility]       Review of patient's allergies indicates:   Allergen Reactions    Penicillins Swelling        HPI   HPI    2018, 3:17 AM   History obtained from the patient      History of Present Illness: Milli Montez is a 57 y.o. female patient with previous hx of MI who presents to the Emergency Department for SOB which onset gradually three hours pta. Symptoms are constant and moderate in severity. No mitigating or exacerbating factors reported. No associated sxs reported. Patient denies any fever, chills, n/v, chest tightness, cough, CP, HA, recent travel, long car trips, light-headedness, dizziness, and all other sxs at this time. No prior Tx reported. Pt reports being on fluid pill but could not verify which one due to BiPap machine. No further complaints or concerns at this time.         Arrival mode: EMS    PCP: Marito Amato MD       Past Medical History:  Past Medical History:   Diagnosis Date    Allergy     Arthritis     Blood transfusion     CHF (congestive heart failure)     Diabetes mellitus     Heart murmur     History of loop recorder     Hyperlipidemia     Hypertension     Hypothyroidism 2013    Myocardial infarction     Prediabetes     Thyroid disease     TIA (transient ischemic attack)     Ulcer        Past Surgical History:  Past Surgical History:   Procedure Laterality Date    CAROTID STENT Right      SECTION      CORONARY STENT PLACEMENT      TUBAL LIGATION           Family History:  Family History   Problem Relation Age of Onset    Heart disease Mother     Cancer Father     Heart disease Father        Social History:  Social History     Social History Main Topics    Smoking status:  Never Smoker    Smokeless tobacco: Never Used    Alcohol use No    Drug use: No    Sexual activity: Yes     Partners: Male       ROS   Review of Systems   Constitutional: Negative for chills and fever.        (-) Recent travel/ Long car trips   HENT: Negative for sore throat.    Respiratory: Positive for shortness of breath. Negative for cough and chest tightness.    Cardiovascular: Negative for chest pain.   Gastrointestinal: Negative for nausea and vomiting.   Genitourinary: Negative for dysuria.   Musculoskeletal: Negative for back pain.        (-) BLE pain   Skin: Negative for rash.   Neurological: Negative for dizziness, weakness, light-headedness, numbness and headaches.   Hematological: Does not bruise/bleed easily.   All other systems reviewed and are negative.    Physical Exam      Initial Vitals [01/14/18 0311]   BP Pulse Resp Temp SpO2   (!) 137/100 (!) 133 (!) 46 -- (!) 94 %      MAP       112.33          Physical Exam  Nursing Notes and Vital Signs Reviewed.  Constitutional: Patient is in no acute distress. Well-developed and well-nourished. Anxious  Head: Atraumatic. Normocephalic.  Eyes: PERRL. EOM intact. Conjunctivae are not pale. No scleral icterus.  ENT: Mucous membranes are moist. Oropharynx is clear and symmetric.    Neck: Supple. Full ROM. No lymphadenopathy.  Cardiovascular: Tachcardic. Regular rhythm. No murmurs, rubs, or gallops. Distal pulses are 2+ and symmetric.  Pulmonary/Chest: Severe respiratory distress. Crackles to auscultation bilaterally. No wheezing or rales.  Abdominal: Soft and non-distended.  There is no tenderness.  No rebound, guarding, or rigidity. Good bowel sounds.  Musculoskeletal: Moves all extremities. No obvious deformities. No edema. No calf tenderness.  Skin: Warm, dry, and cool to touch.   Neurological:  Alert, awake, and appropriate.  Normal speech.  No acute focal neurological deficits are appreciated.  Psychiatric: Normal affect. Good eye contact. Appropriate  "in content.    ED Course    Critical Care  Date/Time: 1/14/2018 4:43 AM  Performed by: XI MCHUGH  Authorized by: XI MCHUGH   Direct patient critical care time: 10 minutes  Additional history critical care time: 6 minutes  Ordering / reviewing critical care time: 6 minutes  Documentation critical care time: 6 minutes  Consulting other physicians critical care time: 6 minutes  Consult with family critical care time: 6 minutes  Total critical care time (exclusive of procedural time) : 40 minutes  Critical care time was exclusive of separately billable procedures and treating other patients and teaching time.  Critical care was necessary to treat or prevent imminent or life-threatening deterioration of the following conditions: respiratory failure.  Critical care was time spent personally by me on the following activities: blood draw for specimens, development of treatment plan with patient or surrogate, discussions with consultants, interpretation of cardiac output measurements, evaluation of patient's response to treatment, examination of patient, obtaining history from patient or surrogate, ordering and performing treatments and interventions, ordering and review of laboratory studies, ordering and review of radiographic studies, pulse oximetry, re-evaluation of patient's condition, review of old charts and vascular access procedures.  Subsequent provider of critical care: I assumed direction of critical care for this patient from another provider of my specialty.        ED Vital Signs:  Vitals:    01/14/18 0311 01/14/18 0332 01/14/18 0347 01/14/18 0402   BP: (!) 137/100 (!) 146/87 (!) 148/93 (!) 144/92   Pulse: (!) 133 (!) 120 (!) 119 (!) 114   Resp: (!) 46 (!) 39 (!) 30 (!) 29   Temp: 97.6 °F (36.4 °C)      TempSrc: Axillary      SpO2: (!) 94% 99% 99% 99%   Weight: 104.3 kg (229 lb 15 oz)      Height: 5' 3" (1.6 m)       01/14/18 0417 01/14/18 0432   BP: (!) 140/95 (!) 142/95   Pulse: (!) 112 " 109   Resp: (!) 29 (!) 41   Temp:     TempSrc:     SpO2: 98% 100%   Weight:     Height:         Abnormal Lab Results:  Labs Reviewed   CBC W/ AUTO DIFFERENTIAL - Abnormal; Notable for the following:        Result Value    MCH 31.9 (*)     RDW 14.6 (*)     Mono% 3.4 (*)     All other components within normal limits   B-TYPE NATRIURETIC PEPTIDE - Abnormal; Notable for the following:      (*)     All other components within normal limits   BASIC METABOLIC PANEL - Abnormal; Notable for the following:     CO2 16 (*)     Glucose 328 (*)     BUN, Bld 23 (*)     Creatinine 1.6 (*)     Anion Gap 18 (*)     eGFR if  42 (*)     eGFR if non  36 (*)     All other components within normal limits   LACTIC ACID, PLASMA - Abnormal; Notable for the following:     Lactate (Lactic Acid) 5.3 (*)     All other components within normal limits    Narrative:     Lactic acid critical result(s) called and verbal readback obtained   from Fay Todd RN., 01/14/2018 04:27   ISTAT PROCEDURE - Abnormal; Notable for the following:     POC PH 7.289 (*)     POC PO2 392 (*)     POC HCO3 21.3 (*)     All other components within normal limits   CULTURE, BLOOD   CULTURE, BLOOD   CK   CK-MB   CK-MB   PROTIME-INR   TROPONIN I   URINALYSIS   URINALYSIS MICROSCOPIC        All Lab Results:  Results for orders placed or performed during the hospital encounter of 01/14/18   CBC auto differential   Result Value Ref Range    WBC 11.58 3.90 - 12.70 K/uL    RBC 4.67 4.00 - 5.40 M/uL    Hemoglobin 14.9 12.0 - 16.0 g/dL    Hematocrit 44.7 37.0 - 48.5 %    MCV 96 82 - 98 fL    MCH 31.9 (H) 27.0 - 31.0 pg    MCHC 33.3 32.0 - 36.0 g/dL    RDW 14.6 (H) 11.5 - 14.5 %    Platelets 318 150 - 350 K/uL    MPV 9.9 9.2 - 12.9 fL    Gran # 7.1 1.8 - 7.7 K/uL    Lymph # 3.9 1.0 - 4.8 K/uL    Mono # 0.4 0.3 - 1.0 K/uL    Eos # 0.2 0.0 - 0.5 K/uL    Baso # 0.03 0.00 - 0.20 K/uL    Gran% 61.0 38.0 - 73.0 %    Lymph% 33.9 18.0 - 48.0 %     Mono% 3.4 (L) 4.0 - 15.0 %    Eosinophil% 1.4 0.0 - 8.0 %    Basophil% 0.3 0.0 - 1.9 %    Differential Method Automated    Brain natriuretic peptide   Result Value Ref Range     (H) 0 - 99 pg/mL   CK   Result Value Ref Range    CPK 76 20 - 180 U/L   CK-MB   Result Value Ref Range    CPK 76 20 - 180 U/L    CPK MB 1.2 0.1 - 6.5 ng/mL    MB% 1.6 0.0 - 5.0 %   Basic metabolic panel   Result Value Ref Range    Sodium 137 136 - 145 mmol/L    Potassium 3.8 3.5 - 5.1 mmol/L    Chloride 103 95 - 110 mmol/L    CO2 16 (L) 23 - 29 mmol/L    Glucose 328 (H) 70 - 110 mg/dL    BUN, Bld 23 (H) 6 - 20 mg/dL    Creatinine 1.6 (H) 0.5 - 1.4 mg/dL    Calcium 9.0 8.7 - 10.5 mg/dL    Anion Gap 18 (H) 8 - 16 mmol/L    eGFR if African American 42 (A) >60 mL/min/1.73 m^2    eGFR if non African American 36 (A) >60 mL/min/1.73 m^2   Protime-INR   Result Value Ref Range    Prothrombin Time 10.4 9.0 - 12.5 sec    INR 1.0 0.8 - 1.2   Troponin I   Result Value Ref Range    Troponin I 0.023 0.000 - 0.026 ng/mL   Lactic acid, plasma   Result Value Ref Range    Lactate (Lactic Acid) 5.3 (HH) 0.5 - 2.2 mmol/L   ISTAT PROCEDURE   Result Value Ref Range    POC PH 7.289 (LL) 7.35 - 7.45    POC PCO2 44.5 35 - 45 mmHg    POC PO2 392 (H) 80 - 100 mmHg    POC HCO3 21.3 (L) 24 - 28 mmol/L    POC BE -5 -2 to 2 mmol/L    POC SATURATED O2 100 95 - 100 %    Rate 25     Sample ARTERIAL     Site RR     Allens Test Pass     DelSys CPAP/BiPAP     Mode BiPAP     FiO2 100     IP 24     EP 8        Imaging Results:  Per ED physician, pt's CXR results diffuse pulmonary edema, heart size if nl, sternal wiring is noted, and bony structures are nl.        The EKG was ordered, reviewed, and independently interpreted by the ED provider.  Interpretation time: 0313  Rate: 132 BPM  Rhythm: sinus tachycardia  Interpretation: Rightward axis. Nonspecific intraventricular block. No STEMI.         The Emergency Provider reviewed the vital signs and test results, which are  outlined above.    ED Discussion     3:26 AM: Re-evaluated pt. Pt is resting comfortably.  Pt states she is feeling much better while on BiPAP.     4:19 AM: Re-evaluated pt. Pt is resting comfortably and is in moderate distress. Fluid resuscitation was not performed due to fluid overload and PE which is the main cause for pt's respiratory distress. Spoke with pt's family to get further information of pt's hx. Via family pt has a hx of MI and HTN. Pt's family stated pt started having SOB at 0000. Pt's cardiologist are Dr. Turpin at [facility].  D/w pt all pertinent results. D/w pt any concerns expressed at this time. Answered all questions. Pt expresses understanding at this time.      4:36 AM: Discussed case with Cabrera Porter NP ([facility]). Dr. Boone agrees with current care and management of pt and accepts admission.   Admitting Service: [facility]   Admitting Physician: Dr. Boone  Admit to: ICU    4:36 AM: Re-evaluated pt. I have discussed test results, shared treatment plan, and the need for admission with patient and family at bedside. Pt and family express understanding at this time and agree with all information. All questions answered. Pt and family have no further questions or concerns at this time. Pt is ready for admit.      ED Medication(s):  Medications   furosemide injection 80 mg (80 mg Intravenous Given 1/14/18 0328)       New Prescriptions    No medications on file             Medical Decision Making    Medical Decision Making:   Clinical Tests:   Lab Tests: Ordered and Reviewed  Radiological Study: Ordered and Reviewed  Medical Tests: Ordered and Reviewed           Scribe Attestation:   Scribe #1: I performed the above scribed service and the documentation accurately describes the services I performed. I attest to the accuracy of the note.    Attending:   Physician Attestation Statement for Scribe #1: I, Colby Still MD, personally performed the services described in this  documentation, as scribed by Ricky Sharma, in my presence, and it is both accurate and complete.          Clinical Impression       ICD-10-CM ICD-9-CM   1. SOB (shortness of breath) R06.02 786.05       Disposition:   Disposition: Admitted  Condition: Critical         Colby Still MD  01/14/18 0625

## 2018-01-15 PROBLEM — R79.89 ELEVATED TROPONIN: Status: ACTIVE | Noted: 2018-01-01

## 2018-01-15 PROBLEM — I50.31 ACUTE CONGESTIVE HEART FAILURE WITH LEFT VENTRICULAR DIASTOLIC DYSFUNCTION: Status: ACTIVE | Noted: 2018-01-01

## 2018-01-15 NOTE — SIGNIFICANT EVENT
Dr. Mitchell notified of elevated troponin and D-dimer. He will assess patient and decide on intervention.

## 2018-01-15 NOTE — SUBJECTIVE & OBJECTIVE
Past Medical History:   Diagnosis Date    Allergy     Arthritis     Blood transfusion     CHF (congestive heart failure)     Diabetes mellitus     Heart murmur     History of loop recorder     Hyperlipidemia     Hypertension     Hypothyroidism 2013    Myocardial infarction     Prediabetes     Thyroid disease     TIA (transient ischemic attack)     Ulcer        Past Surgical History:   Procedure Laterality Date    CAROTID STENT Right      SECTION      CORONARY STENT PLACEMENT      TUBAL LIGATION         Review of patient's allergies indicates:   Allergen Reactions    Penicillins Swelling       No current facility-administered medications on file prior to encounter.      Current Outpatient Prescriptions on File Prior to Encounter   Medication Sig    blood sugar diagnostic Strp check BS fastin and  2hr after biggest meal    blood-glucose meter (FREESTYLE SYSTEM KIT) kit Use as instructed    clopidogrel (PLAVIX) 75 mg tablet Take 75 mg by mouth.    enalapril (VASOTEC) 2.5 MG tablet Take 2.5 mg by mouth 2 (two) times daily.    glipiZIDE (GLUCOTROL) 5 MG tablet Take 1 tablet (5 mg total) by mouth 2 (two) times daily before meals.    lancets-blood glucose strips 30 gauge Cmpk 2 Devices by Misc.(Non-Drug; Combo Route) route 2 (two) times daily. check BS fastin and  2hr after biggest meal    levothyroxine (SYNTHROID) 75 MCG tablet TAKE 1 TABLET BY MOUTH DAILY    nebivolol (BYSTOLIC) 5 MG Tab Take 5 mg by mouth.    ranolazine (RANEXA) 500 MG Tb12 Take 500 mg by mouth.    rosuvastatin (CRESTOR) 20 MG tablet Take 40 mg by mouth once daily.    [DISCONTINUED] furosemide (LASIX) 20 MG tablet Take 20 mg by mouth daily as needed.    acyclovir (ZOVIRAX) 800 MG Tab Take 1 tablet (800 mg total) by mouth 5 (five) times daily.    gabapentin (NEURONTIN) 300 MG capsule Take 1 capsule (300 mg total) by mouth every evening.    ondansetron (ZOFRAN) 4 MG tablet Take 1 tablet (4 mg total) by mouth  every 6 (six) hours as needed for Nausea.     Family History     Problem Relation (Age of Onset)    Cancer Father    Heart disease Mother, Father        Social History Main Topics    Smoking status: Never Smoker    Smokeless tobacco: Never Used    Alcohol use No    Drug use: No    Sexual activity: Yes     Partners: Male     Review of Systems   Constitution: Negative for diaphoresis, weakness, malaise/fatigue, weight gain and weight loss.   HENT: Negative for congestion and nosebleeds.    Cardiovascular: Positive for orthopnea and paroxysmal nocturnal dyspnea. Negative for chest pain, claudication, cyanosis, dyspnea on exertion, irregular heartbeat, leg swelling, near-syncope, palpitations and syncope.   Respiratory: Positive for cough and shortness of breath. Negative for hemoptysis, sleep disturbances due to breathing, snoring, sputum production and wheezing.    Hematologic/Lymphatic: Negative for bleeding problem. Does not bruise/bleed easily.   Skin: Negative for rash.   Musculoskeletal: Negative for arthritis, back pain, falls, joint pain, muscle cramps and muscle weakness.   Gastrointestinal: Negative for abdominal pain, constipation, diarrhea, heartburn, hematemesis, hematochezia, melena and nausea.   Genitourinary: Negative for dysuria, hematuria and nocturia.   Neurological: Negative for excessive daytime sleepiness, dizziness, headaches, light-headedness, loss of balance, numbness and vertigo.     Objective:     Vital Signs (Most Recent):  Temp: 98 °F (36.7 °C) (01/15/18 1201)  Pulse: 68 (01/15/18 1201)  Resp: 18 (01/15/18 1201)  BP: (!) 101/57 (01/15/18 1201)  SpO2: 95 % (01/15/18 1201) Vital Signs (24h Range):  Temp:  [97.4 °F (36.3 °C)-98.4 °F (36.9 °C)] 98 °F (36.7 °C)  Pulse:  [60-78] 68  Resp:  [18-20] 18  SpO2:  [94 %-98 %] 95 %  BP: ()/(57-82) 101/57     Weight: 104.3 kg (229 lb 15 oz)  Body mass index is 40.73 kg/m².    SpO2: 95 %  O2 Device (Oxygen Therapy): nasal  cannula      Intake/Output Summary (Last 24 hours) at 01/15/18 1340  Last data filed at 01/15/18 0334   Gross per 24 hour   Intake                0 ml   Output              825 ml   Net             -825 ml       Lines/Drains/Airways     Drain                 Urethral Catheter 01/14/18 0345 Latex 16 Fr. 1 day          Peripheral Intravenous Line                 Peripheral IV - Single Lumen 01/14/18 0320 Right Antecubital 1 day         Peripheral IV - Single Lumen 01/14/18 0321 Left Antecubital 1 day         Peripheral IV - Single Lumen 01/14/18 Left Hand 1 day                Physical Exam   Constitutional: She is oriented to person, place, and time. She appears well-developed and well-nourished.   Neck: Neck supple. No JVD present.   Cardiovascular: Normal rate, regular rhythm, normal heart sounds and normal pulses.  Exam reveals no friction rub.    No murmur heard.  Pulses:       Carotid pulses are on the right side with bruit, and on the left side with bruit.  Pulmonary/Chest: Effort normal and breath sounds normal. No respiratory distress. She has no wheezes. She has no rales.   Sternal scar   Abdominal: Soft. Bowel sounds are normal. She exhibits no distension.   Musculoskeletal: She exhibits no edema or tenderness.   Neurological: She is alert and oriented to person, place, and time.   Skin: Skin is warm and dry. No rash noted.   Psychiatric: She has a normal mood and affect. Her behavior is normal.   Nursing note and vitals reviewed.      Significant Labs:   ABG:   Recent Labs  Lab 01/14/18  0330   PH 7.289*   PCO2 44.5   HCO3 21.3*   POCSATURATED 100   BE -5   , Blood Culture:   Recent Labs  Lab 01/14/18  0320 01/14/18  0321   LABBLOO No Growth to date  No Growth to date No Growth to date  No Growth to date   , BMP:   Recent Labs  Lab 01/14/18  0320 01/14/18  0533 01/14/18  0605 01/15/18  1013   *  --  231* 204*     --  138 135*   K 3.8  --  4.3 4.0     --  104 99   CO2 16*  --  20* 21*    BUN 23*  --  25* 28*   CREATININE 1.6*  --  1.4 1.4   CALCIUM 9.0  --  8.5* 8.8   MG  --  2.6  --   --    , CMP   Recent Labs  Lab 01/14/18  0320 01/14/18  0605 01/15/18  1013    138 135*   K 3.8 4.3 4.0    104 99   CO2 16* 20* 21*   * 231* 204*   BUN 23* 25* 28*   CREATININE 1.6* 1.4 1.4   CALCIUM 9.0 8.5* 8.8   ANIONGAP 18* 14 15   ESTGFRAFRICA 42* 49* 49*   EGFRNONAA 36* 42* 42*   , CBC   Recent Labs  Lab 01/14/18  0320   WBC 11.58   HGB 14.9   HCT 44.7      , INR   Recent Labs  Lab 01/14/18  0320   INR 1.0   , Lipid Panel No results for input(s): CHOL, HDL, LDLCALC, TRIG, CHOLHDL in the last 48 hours.,   Pathology Results  (Last 10 years)    None      , Troponin   Recent Labs  Lab 01/14/18  0320 01/14/18  0840 01/15/18  1013   TROPONINI 0.023 0.653* 0.315*    and All pertinent lab results from the last 24 hours have been reviewed.    Significant Imaging: Echocardiogram:   2D echo with color flow doppler:   Results for orders placed or performed during the hospital encounter of 01/14/18   2D echo with color flow doppler   Result Value Ref Range    EF 60 55 - 65    Mitral Valve Regurgitation MILD TO MODERATE     Diastolic Dysfunction Yes (A)     and X-Ray: CXR: X-Ray Chest 1 View (CXR):   Results for orders placed or performed during the hospital encounter of 01/14/18   X-Ray Chest 1 View    Narrative    Exam: Portable chest radiograph    Clinical History:   .  Shortness of breath    Comparison: Chest CT, 02/04/2016    Findings:.     Very limited exam due to underpenetration and portable AP technique and large patient body habitus. There appears to be groundglass opacities throughout both lungs.    Impression     See above            Electronically signed by: KYLAH SEVILLA MD  Date:     01/14/18  Time:    08:54

## 2018-01-15 NOTE — ASSESSMENT & PLAN NOTE
Admitted with acute on chronic diastolic HF  Echo shows DD with preserved EF of 60%    CXR- mild congestion  Diuresing well with IV Lasix-continue current dose of Lasix  Counseled on low sodium diet

## 2018-01-15 NOTE — DISCHARGE SUMMARY
[facility] Hospital Medicine  Discharge Summary      Patient Name: Milli Montez  MRN: [id number]  Admission Date: 1/14/2018  Hospital Length of Stay: 1 days  Discharge Date and Time:  01/15/2018 4:06 PM  Attending Physician: Neal Mitchell MD   Discharging Provider: Alexsandra Sloan NP  Primary Care Provider: Marito Amato MD      HPI:   Mrs. Montez is a 58 yo WF with known history of CAD, HTN and CHF unknown EF who presented here tonight complaining of sudden onset of SOB. She says she did notice some mild dyspnea earlier in the day but wasn't bad. By the time she attempted to go to bed she was dyspnea with orthopnea. Upon presentation she was in respiratory distress. She denies any chest pain and states she has been compliant with her diet and medications.    * No surgery found *      Hospital Course:   Milli Montez is a 57 year old female who was admitted for decompensated diastolic heart failure. Patient presented to with worsening dyspnea. CXR showed mild cardiomegaly with vascular congestion. She was noted with elevated troponin that was felt to be related to demand ischemia from heart failure. She responded well to IV Lasix. Echocardiogram revealed preserved EF of 60% with diastolic dysfunction. She was counseled on sodium restriction and blood pressure control.She was asked to follow up with primary Cardiologist in one week. Patient seen and examined on date of discharge and deemed suitable.        Consults:   Consults         Status Ordering Provider     Inpatient consult to Cardiology  Once     Provider:  Kashif Morris MD    Completed NEAL MITCHELL          No new Assessment & Plan notes have been filed under this hospital service since the last note was generated.  Service: Hospital Medicine    Final Active Diagnoses:    Diagnosis Date Noted POA    PRINCIPAL PROBLEM:  Acute congestive heart failure with left ventricular diastolic dysfunction [I50.31] 01/14/2018 Yes    Elevated  troponin [R74.8] 01/15/2018 Unknown    Elevated lactic acid level [R79.89] 01/14/2018 Yes      Problems Resolved During this Admission:    Diagnosis Date Noted Date Resolved POA       Discharged Condition: stable    Disposition: Home or Self Care    Follow Up:  Follow-up Information     Marito Amato MD In 3 days.    Specialty:  Family Medicine  Contact information:  [address]  [address]  [facility] [id number] [phone]                 Patient Instructions:   No discharge procedures on file.    Significant Diagnostic Studies: Labs:   CMP   Recent Labs  Lab 01/14/18  0320 01/14/18  0605 01/15/18  1013    138 135*   K 3.8 4.3 4.0    104 99   CO2 16* 20* 21*   * 231* 204*   BUN 23* 25* 28*   CREATININE 1.6* 1.4 1.4   CALCIUM 9.0 8.5* 8.8   ANIONGAP 18* 14 15   ESTGFRAFRICA 42* 49* 49*   EGFRNONAA 36* 42* 42*    and CBC   Recent Labs  Lab 01/14/18  0320   WBC 11.58   HGB 14.9   HCT 44.7          Pending Diagnostic Studies:     Procedure Component Value Units Date/Time    CTA Chest Non Coronary [[id number]]     Order Status:  Sent Lab Status:  No result          Medications:  Reconciled Home Medications:   Current Discharge Medication List      CONTINUE these medications which have CHANGED    Details   furosemide (LASIX) 20 MG tablet Take 2 tablets (40 mg total) by mouth once daily.  Qty: 30 tablet, Refills: 1         CONTINUE these medications which have NOT CHANGED    Details   blood sugar diagnostic Strp check BS fastin and  2hr after biggest meal  Qty: 180 strip, Refills: 0      blood-glucose meter (FREESTYLE SYSTEM KIT) kit Use as instructed  Qty: 1 each, Refills: 0      clopidogrel (PLAVIX) 75 mg tablet Take 75 mg by mouth.      enalapril (VASOTEC) 2.5 MG tablet Take 2.5 mg by mouth 2 (two) times daily.      glipiZIDE (GLUCOTROL) 5 MG tablet Take 1 tablet (5 mg total) by mouth 2 (two) times daily before meals.  Qty: 60 tablet, Refills: 11      lancets-blood glucose strips 30  gauge Cmpk 2 Devices by Misc.(Non-Drug; Combo Route) route 2 (two) times daily. check BS fastin and  2hr after biggest meal  Qty: 180 each, Refills: 0      levothyroxine (SYNTHROID) 75 MCG tablet TAKE 1 TABLET BY MOUTH DAILY  Qty: 12 tablet, Refills: 0      nebivolol (BYSTOLIC) 5 MG Tab Take 5 mg by mouth.      ranolazine (RANEXA) 500 MG Tb12 Take 500 mg by mouth.      rosuvastatin (CRESTOR) 20 MG tablet Take 40 mg by mouth once daily.      acyclovir (ZOVIRAX) 800 MG Tab Take 1 tablet (800 mg total) by mouth 5 (five) times daily.  Qty: 35 tablet, Refills: 0    Associated Diagnoses: Herpes zoster without complication; Other back pain, unspecified chronicity      gabapentin (NEURONTIN) 300 MG capsule Take 1 capsule (300 mg total) by mouth every evening.  Qty: 30 capsule, Refills: 0    Associated Diagnoses: Herpes zoster without complication; Other back pain, unspecified chronicity      ondansetron (ZOFRAN) 4 MG tablet Take 1 tablet (4 mg total) by mouth every 6 (six) hours as needed for Nausea.  Qty: 15 tablet, Refills: 0    Associated Diagnoses: Herpes zoster without complication; Other back pain, unspecified chronicity; Nausea             Indwelling Lines/Drains at time of discharge:   Lines/Drains/Airways     Drain                 Urethral Catheter 01/14/18 0345 Latex 16 Fr. 1 day                Time spent on the discharge of patient: >35 minutes  Patient was seen and examined on the date of discharge and determined to be suitable for discharge.      Alexsandra Sloan NP  Department of Hospital Medicine  [facility]

## 2018-01-15 NOTE — NURSING
Patient assessed for diabetes educational needs following chart review  Brief interaction as she is very groggy  She reports was diagnosed 6 months ago  She has a home glucose monitor and checks 2 times daily with averages 167  She is consistent with taking her diabetes meds  She does not identify any diabetes educational needs at this time  Literature provided

## 2018-01-15 NOTE — HOSPITAL COURSE
Milli Montez is a 57 year old female who was admitted for decompensated diastolic heart failure. Patient presented to with worsening dyspnea. CXR showed mild cardiomegaly with vascular congestion. She was noted with elevated troponin that was felt to be related to demand ischemia from heart failure. She responded well to IV Lasix. Echocardiogram revealed preserved EF of 60% with diastolic dysfunction. She was counseled on sodium restriction and blood pressure control.She was asked to follow up with primary Cardiologist in one week. Patient seen and examined on date of discharge and deemed suitable.

## 2018-01-15 NOTE — CONSULTS
[facility]  Cardiology  Consult Note    Patient Name: Milli Montez  MRN: [id number]  Admission Date: 2018  Hospital Length of Stay: 1 days  Code Status: Full Code   Attending Provider: Neal Mitchell MD   Consulting Provider: GUSTAVO Lin  Primary Care Physician: Marito Amato MD  Principal Problem:Congestive heart failure    Patient information was obtained from patient and ER records.     Inpatient consult to Cardiology  Consult performed by: JAMEE WILKINS  Consult ordered by: NEAL MITCHELL  Reason for consult: elevated troponin         Subjective:     Chief Complaint:  SOB     HPI:   Mrs. Montez is a 56 yo WF with known history of CAD, CABG x 3, coronary stenting x 8. Last coronary stenting in  with Dr. Wooten at [facility] in [place]. History of right carotid stenting,  HTN and CHF. Patient presented to the ED with sudden onset of SOB.  Also complained of what sounds to be orthopnea and PND. Upon presentation to the ED,  she was in respiratory distress. She denies any chest pain and states she has been compliant with her diet and medications. ED workup revealed BNP of 200, CXR shows mild venous congestion. Troponin 0.023, 0.653, 0.315. EKG shows intermittent LBBB. LBBB noted on EKG from 2016.  Echo shows DD with preserved EF of 50%.     Past Medical History:   Diagnosis Date    Allergy     Arthritis     Blood transfusion     CHF (congestive heart failure)     Diabetes mellitus     Heart murmur     History of loop recorder     Hyperlipidemia     Hypertension     Hypothyroidism 2013    Myocardial infarction     Prediabetes     Thyroid disease     TIA (transient ischemic attack)     Ulcer        Past Surgical History:   Procedure Laterality Date    CAROTID STENT Right      SECTION      CORONARY STENT PLACEMENT      TUBAL LIGATION         Review of patient's allergies indicates:   Allergen Reactions    Penicillins Swelling       No current  facility-administered medications on file prior to encounter.      Current Outpatient Prescriptions on File Prior to Encounter   Medication Sig    blood sugar diagnostic Strp check BS fastin and  2hr after biggest meal    blood-glucose meter (FREESTYLE SYSTEM KIT) kit Use as instructed    clopidogrel (PLAVIX) 75 mg tablet Take 75 mg by mouth.    enalapril (VASOTEC) 2.5 MG tablet Take 2.5 mg by mouth 2 (two) times daily.    glipiZIDE (GLUCOTROL) 5 MG tablet Take 1 tablet (5 mg total) by mouth 2 (two) times daily before meals.    lancets-blood glucose strips 30 gauge Cmpk 2 Devices by Misc.(Non-Drug; Combo Route) route 2 (two) times daily. check BS fastin and  2hr after biggest meal    levothyroxine (SYNTHROID) 75 MCG tablet TAKE 1 TABLET BY MOUTH DAILY    nebivolol (BYSTOLIC) 5 MG Tab Take 5 mg by mouth.    ranolazine (RANEXA) 500 MG Tb12 Take 500 mg by mouth.    rosuvastatin (CRESTOR) 20 MG tablet Take 40 mg by mouth once daily.    [DISCONTINUED] furosemide (LASIX) 20 MG tablet Take 20 mg by mouth daily as needed.    acyclovir (ZOVIRAX) 800 MG Tab Take 1 tablet (800 mg total) by mouth 5 (five) times daily.    gabapentin (NEURONTIN) 300 MG capsule Take 1 capsule (300 mg total) by mouth every evening.    ondansetron (ZOFRAN) 4 MG tablet Take 1 tablet (4 mg total) by mouth every 6 (six) hours as needed for Nausea.     Family History     Problem Relation (Age of Onset)    Cancer Father    Heart disease Mother, Father        Social History Main Topics    Smoking status: Never Smoker    Smokeless tobacco: Never Used    Alcohol use No    Drug use: No    Sexual activity: Yes     Partners: Male     Review of Systems   Constitution: Negative for diaphoresis, weakness, malaise/fatigue, weight gain and weight loss.   HENT: Negative for congestion and nosebleeds.    Cardiovascular: Positive for orthopnea and paroxysmal nocturnal dyspnea. Negative for chest pain, claudication, cyanosis, dyspnea on exertion,  irregular heartbeat, leg swelling, near-syncope, palpitations and syncope.   Respiratory: Positive for cough and shortness of breath. Negative for hemoptysis, sleep disturbances due to breathing, snoring, sputum production and wheezing.    Hematologic/Lymphatic: Negative for bleeding problem. Does not bruise/bleed easily.   Skin: Negative for rash.   Musculoskeletal: Negative for arthritis, back pain, falls, joint pain, muscle cramps and muscle weakness.   Gastrointestinal: Negative for abdominal pain, constipation, diarrhea, heartburn, hematemesis, hematochezia, melena and nausea.   Genitourinary: Negative for dysuria, hematuria and nocturia.   Neurological: Negative for excessive daytime sleepiness, dizziness, headaches, light-headedness, loss of balance, numbness and vertigo.     Objective:     Vital Signs (Most Recent):  Temp: 98 °F (36.7 °C) (01/15/18 1201)  Pulse: 68 (01/15/18 1201)  Resp: 18 (01/15/18 1201)  BP: (!) 101/57 (01/15/18 1201)  SpO2: 95 % (01/15/18 1201) Vital Signs (24h Range):  Temp:  [97.4 °F (36.3 °C)-98.4 °F (36.9 °C)] 98 °F (36.7 °C)  Pulse:  [60-78] 68  Resp:  [18-20] 18  SpO2:  [94 %-98 %] 95 %  BP: ()/(57-82) 101/57     Weight: 104.3 kg (229 lb 15 oz)  Body mass index is 40.73 kg/m².    SpO2: 95 %  O2 Device (Oxygen Therapy): nasal cannula      Intake/Output Summary (Last 24 hours) at 01/15/18 1340  Last data filed at 01/15/18 0334   Gross per 24 hour   Intake                0 ml   Output              825 ml   Net             -825 ml       Lines/Drains/Airways     Drain                 Urethral Catheter 01/14/18 0345 Latex 16 Fr. 1 day          Peripheral Intravenous Line                 Peripheral IV - Single Lumen 01/14/18 0320 Right Antecubital 1 day         Peripheral IV - Single Lumen 01/14/18 0321 Left Antecubital 1 day         Peripheral IV - Single Lumen 01/14/18 Left Hand 1 day                Physical Exam   Constitutional: She is oriented to person, place, and time. She  appears well-developed and well-nourished.   Neck: Neck supple. No JVD present.   Cardiovascular: Normal rate, regular rhythm, normal heart sounds and normal pulses.  Exam reveals no friction rub.    No murmur heard.  Pulses:       Carotid pulses are on the right side with bruit, and on the left side with bruit.  Pulmonary/Chest: Effort normal and breath sounds normal. No respiratory distress. She has no wheezes. She has no rales.   Sternal scar   Abdominal: Soft. Bowel sounds are normal. She exhibits no distension.   Musculoskeletal: She exhibits no edema or tenderness.   Neurological: She is alert and oriented to person, place, and time.   Skin: Skin is warm and dry. No rash noted.   Psychiatric: She has a normal mood and affect. Her behavior is normal.   Nursing note and vitals reviewed.      Significant Labs:   ABG:   Recent Labs  Lab 01/14/18  0330   PH 7.289*   PCO2 44.5   HCO3 21.3*   POCSATURATED 100   BE -5   , Blood Culture:   Recent Labs  Lab 01/14/18  0320 01/14/18  0321   LABBLOO No Growth to date  No Growth to date No Growth to date  No Growth to date   , BMP:   Recent Labs  Lab 01/14/18  0320 01/14/18  0533 01/14/18  0605 01/15/18  1013   *  --  231* 204*     --  138 135*   K 3.8  --  4.3 4.0     --  104 99   CO2 16*  --  20* 21*   BUN 23*  --  25* 28*   CREATININE 1.6*  --  1.4 1.4   CALCIUM 9.0  --  8.5* 8.8   MG  --  2.6  --   --    , CMP   Recent Labs  Lab 01/14/18  0320 01/14/18  0605 01/15/18  1013    138 135*   K 3.8 4.3 4.0    104 99   CO2 16* 20* 21*   * 231* 204*   BUN 23* 25* 28*   CREATININE 1.6* 1.4 1.4   CALCIUM 9.0 8.5* 8.8   ANIONGAP 18* 14 15   ESTGFRAFRICA 42* 49* 49*   EGFRNONAA 36* 42* 42*   , CBC   Recent Labs  Lab 01/14/18  0320   WBC 11.58   HGB 14.9   HCT 44.7      , INR   Recent Labs  Lab 01/14/18  0320   INR 1.0   , Lipid Panel No results for input(s): CHOL, HDL, LDLCALC, TRIG, CHOLHDL in the last 48 hours.,   Pathology Results   (Last 10 years)    None      , Troponin   Recent Labs  Lab 01/14/18  0320 01/14/18  0840 01/15/18  1013   TROPONINI 0.023 0.653* 0.315*    and All pertinent lab results from the last 24 hours have been reviewed.    Significant Imaging: Echocardiogram:   2D echo with color flow doppler:   Results for orders placed or performed during the hospital encounter of 01/14/18   2D echo with color flow doppler   Result Value Ref Range    EF 60 55 - 65    Mitral Valve Regurgitation MILD TO MODERATE     Diastolic Dysfunction Yes (A)     and X-Ray: CXR: X-Ray Chest 1 View (CXR):   Results for orders placed or performed during the hospital encounter of 01/14/18   X-Ray Chest 1 View    Narrative    Exam: Portable chest radiograph    Clinical History:   .  Shortness of breath    Comparison: Chest CT, 02/04/2016    Findings:.     Very limited exam due to underpenetration and portable AP technique and large patient body habitus. There appears to be groundglass opacities throughout both lungs.    Impression     See above            Electronically signed by: KYLAH SEVILLA MD  Date:     01/14/18  Time:    08:54      Assessment and Plan:     * Congestive heart failure    Admitted with acute on chronic diastolic HF  Echo shows DD with preserved EF of 60%    CXR- mild congestion  Diuresing well with IV Lasix-continue current dose of Lasix  Counseled on low sodium diet           Elevated troponin    Elevated troponin likely secondary to demand ischemia. Patient admitted with decompensated diastolic HF that has responded well to IV Lasix   Patient denies any chest pain. Does have history of CAD, multiple coronary stents and CABG x3  Has intermittent LBBB on EKG . Also noted on EKG from 2016  Recommend continue Plavix, statin, BB and Ranexa  Patient would like further recommendations from her outside Cardiologist, Dr. Wooten  She will need to follow up in 1-2 weeks               VTE Risk Mitigation         Ordered     enoxaparin  injection 40 mg  Daily     Route:  Subcutaneous        01/14/18 0509     Medium Risk of VTE  Once      01/14/18 1301        Chart reviewed. Patient examined by Dr. Boone and agrees with plan that has been outlined.     Thank you for your consult. I will follow-up with patient. Please contact us if you have any additional questions.    GUSTAVO Lin  Cardiology   [facility]

## 2018-01-15 NOTE — ASSESSMENT & PLAN NOTE
Elevated troponin likely secondary to demand ischemia. Patient admitted with decompensated diastolic HF that has responded well to IV Lasix   Patient denies any chest pain. Does have history of CAD, multiple coronary stents and CABG x3  Has intermittent LBBB on EKG . Also noted on EKG from 2016  Recommend continue Plavix, statin, BB and Ranexa  Patient would like further recommendations from her outside Cardiologist, Dr. Wooten  She will need to follow up in 1-2 weeks

## 2018-01-15 NOTE — HPI
Mrs. Montez is a 56 yo WF with known history of CAD, CABG x 3, coronary stenting x 8. Last coronary stenting in Sept 201 with Dr. Wooten at [facility] in [place]. History of right carotid stenting,  HTN and CHF. Patient presented to the ED with sudden onset of SOB.  Also complained of what sounds to be orthopnea and PND. Upon presentation to the ED,  she was in respiratory distress. She denies any chest pain and states she has been compliant with her diet and medications. ED workup revealed BNP of 200, CXR shows mild venous congestion. Troponin 0.023, 0.653, 0.315. EKG shows intermittent LBBB. LBBB noted on EKG from 2016.  Echo shows DD with preserved EF of 50%.

## 2018-04-19 PROBLEM — E87.20 LACTIC ACIDOSIS: Status: ACTIVE | Noted: 2018-01-01

## 2018-04-19 PROBLEM — J96.02 ACUTE RESPIRATORY FAILURE WITH HYPERCAPNIA: Status: ACTIVE | Noted: 2018-01-01

## 2018-04-19 PROBLEM — I50.1 ACUTE PULMONARY EDEMA WITH CONGESTIVE HEART FAILURE: Status: ACTIVE | Noted: 2018-01-01

## 2018-04-19 NOTE — ED NOTES
18 F pham cath placed per ERT using sterile technique; approximately 10 ml of clear yellow urine obtained for lab specimen; no other urine noted in tubing or  bag at this time; pt tolerated well.

## 2018-04-19 NOTE — PLAN OF CARE
Problem: Patient Care Overview  Goal: Plan of Care Review  Outcome: Ongoing (interventions implemented as appropriate)  Received pt from ED, VSS, O2 saturation is 98% on room air. Patient in no distress, VSS. Daughter called to bedside, update given, POC discussed.

## 2018-04-19 NOTE — PROGRESS NOTES
Pt arrived to ER with EMS CPAP mask in place. Pt immediately placed on AVAPS machine at bedside. ABG obtained. See flow sheets 0745 for further documentation

## 2018-04-19 NOTE — SUBJECTIVE & OBJECTIVE
Past Medical History:   Diagnosis Date    Allergy     Arthritis     Blood transfusion     CAD (coronary artery disease)     CHF (congestive heart failure)     Diabetes mellitus     Heart murmur     History of loop recorder     Hyperlipidemia     Hypertension     Hypothyroidism 2013    TIA (transient ischemic attack)     Ulcer        Past Surgical History:   Procedure Laterality Date    CARDIAC SURGERY      CAROTID STENT Right      SECTION      CORONARY STENT PLACEMENT      TUBAL LIGATION         Review of patient's allergies indicates:   Allergen Reactions    Penicillins Swelling       Family History     Problem Relation (Age of Onset)    Cancer Father    Heart disease Mother, Father        Social History Main Topics    Smoking status: Never Smoker    Smokeless tobacco: Never Used    Alcohol use No    Drug use: No    Sexual activity: Yes     Partners: Male         Review of Systems   Constitutional: Negative for appetite change, chills, diaphoresis, fatigue and fever.   HENT: Positive for dental problem (tooth ache). Negative for congestion.    Respiratory: Negative for apnea, cough, shortness of breath (resolved) and wheezing.    Cardiovascular: Negative for chest pain and leg swelling.   Gastrointestinal: Negative for abdominal pain, diarrhea, nausea and vomiting.   Endocrine: Negative for polydipsia.   Genitourinary: Negative for difficulty urinating.   Musculoskeletal: Negative for myalgias.   Skin: Negative for color change and wound.   Allergic/Immunologic: Negative for immunocompromised state.   Neurological: Negative for dizziness, syncope and weakness.   Hematological: Does not bruise/bleed easily.   Psychiatric/Behavioral: Negative for agitation, confusion and hallucinations. The patient is not nervous/anxious.      Objective:     Vital Signs (Most Recent):  Temp: 97.8 °F (36.6 °C) (18 1603)  Pulse: 79 (18 1647)  Resp: (!) 34 (18 1647)  BP: 110/60  (04/19/18 1647)  SpO2: 100 % (04/19/18 1647) Vital Signs (24h Range):  Temp:  [96.3 °F (35.7 °C)-97.8 °F (36.6 °C)] 97.8 °F (36.6 °C)  Pulse:  [] 79  Resp:  [19-44] 34  SpO2:  [99 %-100 %] 100 %  BP: (100-165)/() 110/60     Weight: 105.7 kg (233 lb)  Body mass index is 41.27 kg/m².      Intake/Output Summary (Last 24 hours) at 04/19/18 1723  Last data filed at 04/19/18 1123   Gross per 24 hour   Intake                0 ml   Output             1900 ml   Net            -1900 ml       Physical Exam   Constitutional: She is oriented to person, place, and time. Vital signs are normal. She appears well-developed and well-nourished. She is cooperative.  Non-toxic appearance. She does not have a sickly appearance. She does not appear ill. No distress. She is not intubated.   HENT:   Head: Normocephalic and atraumatic.   Mouth/Throat: Oropharynx is clear and moist and mucous membranes are normal.   Eyes: EOM and lids are normal. Pupils are equal, round, and reactive to light.   Neck: Trachea normal and normal range of motion. Carotid bruit is not present.   Obese    Cardiovascular: Normal rate, regular rhythm, normal heart sounds and normal pulses.    Pulses:       Radial pulses are 2+ on the right side, and 2+ on the left side.        Dorsalis pedis pulses are 2+ on the right side, and 2+ on the left side.   Pulmonary/Chest: Effort normal. No accessory muscle usage. She is not intubated. No respiratory distress. She has decreased breath sounds.   Abdominal: Soft. Bowel sounds are normal. She exhibits no distension. There is no tenderness.   Obese    Genitourinary:   Genitourinary Comments: Carlin in place   Musculoskeletal: Normal range of motion.        Right foot: There is no deformity.        Left foot: There is no deformity.   Trace pedal edema   Lymphadenopathy:     She has no cervical adenopathy.   Neurological: She is alert and oriented to person, place, and time.   Skin: Skin is warm, dry and intact.  Capillary refill takes less than 2 seconds. No rash noted. No cyanosis.   Psychiatric: She has a normal mood and affect. Her speech is normal and behavior is normal. Judgment and thought content normal. Cognition and memory are normal.       Vents:  Oxygen Concentration (%): 32 (04/19/18 1535)    Lines/Drains/Airways     Drain                 Urethral Catheter 04/19/18 0826 16 Fr. less than 1 day          Peripheral Intravenous Line                 Peripheral IV - Single Lumen 04/19/18 0821 Right Antecubital less than 1 day         Peripheral IV - Single Lumen 04/19/18 Left Antecubital less than 1 day                Significant Labs:    CBC/Anemia Profile:    Recent Labs  Lab 04/19/18  0744 04/19/18  0746   WBC 12.22  --    HGB 13.1  --    HCT 40.8 38     --    MCV 97  --    RDW 14.6*  --         Chemistries:    Recent Labs  Lab 04/19/18  0744      K 4.5      CO2 18*   BUN 15   CREATININE 1.4   CALCIUM 8.3*   ALBUMIN 3.3*   PROT 7.8   BILITOT 0.7   ALKPHOS 101   ALT 26   AST 31       ABGs:   Recent Labs  Lab 04/19/18  0852   PH 7.354   PCO2 47.0*   HCO3 26.2   POCSATURATED 98   BE 1     Lactic Acid:   Recent Labs  Lab 04/19/18  0744 04/19/18  1034   LACTATE 5.9* 1.9     Urine Studies:   Recent Labs  Lab 04/19/18  0746   COLORU Yellow   APPEARANCEUA Clear   PHUR 7.0   SPECGRAV >=1.030*   PROTEINUA 2+*   GLUCUA Negative   KETONESU Negative   BILIRUBINUA Negative   OCCULTUA Negative   NITRITE Negative   UROBILINOGEN 4.0-6.0*   LEUKOCYTESUR Negative   RBCUA 0   WBCUA 2   BACTERIA None   SQUAMEPITHEL 8   HYALINECASTS 0     All pertinent labs within the past 24 hours have been reviewed.    Significant Imaging:   CT: I have reviewed all pertinent results/findings within the past 24 hours and my personal findings are:  CTA chest: No PE.  mod pulm edema bilat

## 2018-04-19 NOTE — H&P
[facility] Hospital Medicine  History & Physical    Patient Name: Milli Montez  MRN: [id number]  Admission Date: 4/19/2018  Attending Physician: Ayaz Frost,*   Primary Care Provider: Marito Amato MD         Patient information was obtained from patient, past medical records and ER records.     Subjective:     Principal Problem:Acute respiratory failure with hypercapnia    Chief Complaint:   Chief Complaint   Patient presents with    Respiratory Distress     pt at home when she became increasingly SOB after trip to bathroom; family called 911, pt received , Nitro paste x 1 inch to left chest wall; Nitro spray x 5 for elevated BP, placed on CPAP per [facility]; pt in respiratory distress upon arrival to ER; placed on Bipap on arrival to ER        HPI: Milli Montez is a 57 year old female with hypertension, hyperlipidemia, CAD and diastolic dysfunction who presented to the ED with complaints of SOB. She reports that symptoms started abruptly this morning upon waking up. She was unable to catch her breath. There was associated diaphoresis and thick yellow spit up. She denies chest pain, fever and cough. The patient was placed on CPAP by EMS and Bipap upon arrival in the ED. Of note, the patient was treated here for acute on chronic diastolic heart failure from 1/14/18 to 1/15/18. She reports that following discharge, she had a stent placed at [facility] with blood pressure medication changes. Upon arrival in the ED, she was reportedly hypertensive with systolic blood pressures greater than 200 as well as hypercapnic with a pCO2 of 70.5 and pH of 7.141. CTA of the chest was significant for pulmonary edema with small bilateral pleural effusions. Other lab abnormalities include a lactic acid of 5.9, a glucose of 433 and a BNP of 510. Her urinalysis, procalcitonin and troponin were essentially negative. Code status was discussed with the patient. She is considering being a DNR, but is a full code  at this time. Her daughter, Katherine Lewis, is her surrogate medical decision maker.     Past Medical History:   Diagnosis Date    Allergy     Arthritis     Blood transfusion     CAD (coronary artery disease)     CHF (congestive heart failure)     Diabetes mellitus     Heart murmur     History of loop recorder     Hyperlipidemia     Hypertension     Hypothyroidism 2013    TIA (transient ischemic attack)     Ulcer        Past Surgical History:   Procedure Laterality Date    CARDIAC SURGERY      CAROTID STENT Right      SECTION      CORONARY STENT PLACEMENT      TUBAL LIGATION         Review of patient's allergies indicates:   Allergen Reactions    Penicillins Swelling       No current facility-administered medications on file prior to encounter.      Current Outpatient Prescriptions on File Prior to Encounter   Medication Sig    clopidogrel (PLAVIX) 75 mg tablet Take 75 mg by mouth.    enalapril (VASOTEC) 2.5 MG tablet Take 2.5 mg by mouth 2 (two) times daily.    furosemide (LASIX) 20 MG tablet Take 2 tablets (40 mg total) by mouth once daily.    levothyroxine (SYNTHROID) 75 MCG tablet TAKE 1 TABLET BY MOUTH DAILY    ranolazine (RANEXA) 500 MG Tb12 Take 500 mg by mouth.    rosuvastatin (CRESTOR) 20 MG tablet Take 40 mg by mouth once daily.    acyclovir (ZOVIRAX) 800 MG Tab Take 1 tablet (800 mg total) by mouth 5 (five) times daily.    blood sugar diagnostic Strp check BS fastin and  2hr after biggest meal    blood-glucose meter (FREESTYLE SYSTEM KIT) kit Use as instructed    gabapentin (NEURONTIN) 300 MG capsule Take 1 capsule (300 mg total) by mouth every evening.    glipiZIDE (GLUCOTROL) 5 MG tablet Take 1 tablet (5 mg total) by mouth 2 (two) times daily before meals.    lancets-blood glucose strips 30 gauge Cmpk 2 Devices by Misc.(Non-Drug; Combo Route) route 2 (two) times daily. check BS fastin and  2hr after biggest meal    ondansetron (ZOFRAN) 4 MG tablet Take 1  tablet (4 mg total) by mouth every 6 (six) hours as needed for Nausea.    [DISCONTINUED] nebivolol (BYSTOLIC) 5 MG Tab Take 5 mg by mouth.     Family History     Problem Relation (Age of Onset)    Cancer Father    Heart disease Mother, Father        Social History Main Topics    Smoking status: Never Smoker    Smokeless tobacco: Never Used    Alcohol use No    Drug use: No    Sexual activity: Yes     Partners: Male     Review of Systems   Constitutional: Positive for diaphoresis. Negative for appetite change, chills, fatigue and fever.   HENT: Negative for congestion, ear pain, mouth sores, sore throat and trouble swallowing.    Eyes: Negative for pain and visual disturbance.   Respiratory: Positive for shortness of breath. Negative for cough and chest tightness.    Cardiovascular: Negative for chest pain, palpitations and leg swelling.   Gastrointestinal: Negative for abdominal pain, constipation and nausea.   Endocrine: Negative for cold intolerance, heat intolerance, polydipsia and polyuria.   Genitourinary: Negative for dysuria, frequency and hematuria.   Musculoskeletal: Negative for arthralgias, back pain, myalgias and neck pain.   Skin: Negative for pallor, rash and wound.   Allergic/Immunologic: Negative for environmental allergies and immunocompromised state.   Neurological: Negative for dizziness, seizures, syncope, weakness, numbness and headaches.   Hematological: Negative for adenopathy. Does not bruise/bleed easily.   Psychiatric/Behavioral: Negative for agitation, confusion and sleep disturbance.     Objective:     Vital Signs (Most Recent):  Temp: 96.3 °F (35.7 °C) (04/19/18 0812)  Pulse: 94 (04/19/18 1047)  Resp: (!) 27 (04/19/18 1047)  BP: 100/70 (04/19/18 1047)  SpO2: 100 % (04/19/18 1047) Vital Signs (24h Range):  Temp:  [96.3 °F (35.7 °C)] 96.3 °F (35.7 °C)  Pulse:  [] 94  Resp:  [27-44] 27  SpO2:  [99 %-100 %] 100 %  BP: (100-165)/() 100/70     Weight: 105.7 kg (233 lb)  Body  mass index is 41.27 kg/m².    Physical Exam   Constitutional: She is oriented to person, place, and time. She appears well-developed and well-nourished. No distress.   HENT:   Head: Normocephalic and atraumatic.   Eyes: Conjunctivae are normal.   PERRL   Neck: Neck supple. No JVD present.   Cardiovascular: Normal rate, regular rhythm and normal heart sounds.    Pulmonary/Chest: Effort normal. Tachypnea noted. She has decreased breath sounds in the right lower field and the left lower field. She has wheezes (occasional, bilateral).   Abdominal: Soft. Bowel sounds are normal. She exhibits no distension. There is no tenderness.   Musculoskeletal: Normal range of motion. She exhibits edema (+1 bilateral lower extremity ).   Neurological: She is alert and oriented to person, place, and time.   Skin: Skin is warm and dry.   Psychiatric: She has a normal mood and affect. Her behavior is normal. Thought content normal.   Nursing note and vitals reviewed.          Significant Labs:   CBC:   Recent Labs  Lab 04/19/18  0744 04/19/18  0746   WBC 12.22  --    HGB 13.1  --    HCT 40.8 38     --      CMP:   Recent Labs  Lab 04/19/18  0744      K 4.5      CO2 18*   *   BUN 15   CREATININE 1.4   CALCIUM 8.3*   PROT 7.8   ALBUMIN 3.3*   BILITOT 0.7   ALKPHOS 101   AST 31   ALT 26   ANIONGAP 14   EGFRNONAA 42*     All pertinent labs within the past 24 hours have been reviewed.    Significant Imaging: I have reviewed all pertinent imaging results/findings within the past 24 hours.   Imaging Results          CTA Chest Non-Coronary (PE Study) (Final result)  Result time 04/19/18 10:30:30    Final result by Forest Hendrickson Jr., MD (04/19/18 10:30:30)                 Impression:         1.  No pulmonary emboli are detected.    2.  Pulmonary edema.  Cardiomegaly.  Pleural effusions.    All CT scans at this facility use dose modulation, iterative reconstruction, and/or weight based dosing when appropriate to  reduce radiation dose to as low as reasonably achievable.      Electronically signed by: FLAKO HENDRICKSON MD  Date:     04/19/18  Time:    10:30              Narrative:    EXAM:   CTA CHEST NON CORONARY    CLINICAL HISTORY:  SOB       COMPARISON: None    TECHNIQUE:  Post contrast axial images were obtained. Multiplanar thick Slab MIP reconstruction images were obtained and stored.  100 mL Omnipaque 350 was administered.    FINDINGS:    Angiogram findings:  Good  opacification of the pulmonary arterial system. No pulmonary emboli.    Pulmonary findings: Cardiomegaly.  Bilateral pulmonary parenchymal changes consistent with pulmonary edema.  Small bilateral pleural effusions greater on the left.  CABG changes.                             X-Ray Chest AP Portable (Final result)  Result time 04/19/18 08:14:56    Final result by Flako Hendrickson Jr., MD (04/19/18 08:14:56)                 Impression:          Cardiomegaly.  Probable pulmonary edema and tiny pleural effusions      Electronically signed by: FLAKO HENDRICKSON MD  Date:     04/19/18  Time:    08:14              Narrative:    EXAM:   XR CHEST AP PORTABLE    CLINICAL HISTORY:  CHF       COMPARISON:  01/14/2018    FINDINGS:   Heart size is probably enlarged.  CABG changes.  Heart size is similar to before.. Diffuse interstitial pulmonary markings likely some developing pulmonary edema.  Probable tiny bilateral pleural effusions.  No dense focal alveolar infiltrates..                                Assessment/Plan:     * Acute respiratory failure with hypercapnia    -Due to acute on chronic diastolic heart failure with a component of obesity hypoventilation syndrome.   -Continue treatment of underlying causes.   -Inhaled medications Bipap and oxygen as needed.           Acute congestive heart failure with left ventricular diastolic dysfunction    -Possible component of flash pulmonary edema associated with hypertension given the sudden onset of symptoms and  significantly elevated blood pressures reported in the ED on arrival   -IV diuresis.   -Continue Coreg and Enalapril.   -Daily weight as well as strict intake and output.   -Repeat 2D echo as patient had a cardiac event subsequent to the echo on file.              Lactic acidosis    -Infection not suspected at this time.   -Pneumonia ruled out based on negative CT of chest.   -Most likely associated with intravascular volume depletion.   -Trend.           Hyperlipidemia    Continue statin.           Essential hypertension    Continue Coreg, Norvasc and enalapril.           Hypothyroidism    Continue levothyroxine.           Type 2 diabetes mellitus with circulatory disorder    -Hold glimepiride.   -NISS.   -Check hemoglobin A1C.   -Hemoglobin A1C was 6.4 on 1/14/18.           Morbid obesity with BMI of 40.0-44.9, adult    -Patient counseled on weight loss.   -Nutrition consult.             VTE Risk Mitigation         Ordered     enoxaparin injection 40 mg  Daily      04/19/18 0930     IP VTE HIGH RISK PATIENT  Once      04/19/18 0930             SAVITA Martell  Department of Hospital Medicine   [facility]

## 2018-04-19 NOTE — SUBJECTIVE & OBJECTIVE
Past Medical History:   Diagnosis Date    Allergy     Arthritis     Blood transfusion     CAD (coronary artery disease)     CHF (congestive heart failure)     Diabetes mellitus     Heart murmur     History of loop recorder     Hyperlipidemia     Hypertension     Hypothyroidism 2013    TIA (transient ischemic attack)     Ulcer        Past Surgical History:   Procedure Laterality Date    CARDIAC SURGERY      CAROTID STENT Right      SECTION      CORONARY STENT PLACEMENT      TUBAL LIGATION         Review of patient's allergies indicates:   Allergen Reactions    Penicillins Swelling       No current facility-administered medications on file prior to encounter.      Current Outpatient Prescriptions on File Prior to Encounter   Medication Sig    clopidogrel (PLAVIX) 75 mg tablet Take 75 mg by mouth.    enalapril (VASOTEC) 2.5 MG tablet Take 2.5 mg by mouth 2 (two) times daily.    furosemide (LASIX) 20 MG tablet Take 2 tablets (40 mg total) by mouth once daily.    levothyroxine (SYNTHROID) 75 MCG tablet TAKE 1 TABLET BY MOUTH DAILY    ranolazine (RANEXA) 500 MG Tb12 Take 500 mg by mouth.    rosuvastatin (CRESTOR) 20 MG tablet Take 40 mg by mouth once daily.    acyclovir (ZOVIRAX) 800 MG Tab Take 1 tablet (800 mg total) by mouth 5 (five) times daily.    blood sugar diagnostic Strp check BS fastin and  2hr after biggest meal    blood-glucose meter (FREESTYLE SYSTEM KIT) kit Use as instructed    gabapentin (NEURONTIN) 300 MG capsule Take 1 capsule (300 mg total) by mouth every evening.    glipiZIDE (GLUCOTROL) 5 MG tablet Take 1 tablet (5 mg total) by mouth 2 (two) times daily before meals.    lancets-blood glucose strips 30 gauge Cmpk 2 Devices by Misc.(Non-Drug; Combo Route) route 2 (two) times daily. check BS fastin and  2hr after biggest meal    ondansetron (ZOFRAN) 4 MG tablet Take 1 tablet (4 mg total) by mouth every 6 (six) hours as needed for Nausea.    [DISCONTINUED]  nebivolol (BYSTOLIC) 5 MG Tab Take 5 mg by mouth.     Family History     Problem Relation (Age of Onset)    Cancer Father    Heart disease Mother, Father        Social History Main Topics    Smoking status: Never Smoker    Smokeless tobacco: Never Used    Alcohol use No    Drug use: No    Sexual activity: Yes     Partners: Male     Review of Systems   Constitutional: Positive for diaphoresis. Negative for appetite change, chills, fatigue and fever.   HENT: Negative for congestion, ear pain, mouth sores, sore throat and trouble swallowing.    Eyes: Negative for pain and visual disturbance.   Respiratory: Positive for shortness of breath. Negative for cough and chest tightness.    Cardiovascular: Negative for chest pain, palpitations and leg swelling.   Gastrointestinal: Negative for abdominal pain, constipation and nausea.   Endocrine: Negative for cold intolerance, heat intolerance, polydipsia and polyuria.   Genitourinary: Negative for dysuria, frequency and hematuria.   Musculoskeletal: Negative for arthralgias, back pain, myalgias and neck pain.   Skin: Negative for pallor, rash and wound.   Allergic/Immunologic: Negative for environmental allergies and immunocompromised state.   Neurological: Negative for dizziness, seizures, syncope, weakness, numbness and headaches.   Hematological: Negative for adenopathy. Does not bruise/bleed easily.   Psychiatric/Behavioral: Negative for agitation, confusion and sleep disturbance.     Objective:     Vital Signs (Most Recent):  Temp: 96.3 °F (35.7 °C) (04/19/18 0812)  Pulse: 94 (04/19/18 1047)  Resp: (!) 27 (04/19/18 1047)  BP: 100/70 (04/19/18 1047)  SpO2: 100 % (04/19/18 1047) Vital Signs (24h Range):  Temp:  [96.3 °F (35.7 °C)] 96.3 °F (35.7 °C)  Pulse:  [] 94  Resp:  [27-44] 27  SpO2:  [99 %-100 %] 100 %  BP: (100-165)/() 100/70     Weight: 105.7 kg (233 lb)  Body mass index is 41.27 kg/m².    Physical Exam   Constitutional: She is oriented to person,  place, and time. She appears well-developed and well-nourished. No distress.   HENT:   Head: Normocephalic and atraumatic.   Eyes: Conjunctivae are normal.   PERRL   Neck: Neck supple. No JVD present.   Cardiovascular: Normal rate, regular rhythm and normal heart sounds.    Pulmonary/Chest: Effort normal. Tachypnea noted. She has decreased breath sounds in the right lower field and the left lower field. She has wheezes (occasional, bilateral).   Abdominal: Soft. Bowel sounds are normal. She exhibits no distension. There is no tenderness.   Musculoskeletal: Normal range of motion. She exhibits edema (+1 bilateral lower extremity ).   Neurological: She is alert and oriented to person, place, and time.   Skin: Skin is warm and dry.   Psychiatric: She has a normal mood and affect. Her behavior is normal. Thought content normal.   Nursing note and vitals reviewed.          Significant Labs:   CBC:   Recent Labs  Lab 04/19/18  0744 04/19/18  0746   WBC 12.22  --    HGB 13.1  --    HCT 40.8 38     --      CMP:   Recent Labs  Lab 04/19/18  0744      K 4.5      CO2 18*   *   BUN 15   CREATININE 1.4   CALCIUM 8.3*   PROT 7.8   ALBUMIN 3.3*   BILITOT 0.7   ALKPHOS 101   AST 31   ALT 26   ANIONGAP 14   EGFRNONAA 42*     All pertinent labs within the past 24 hours have been reviewed.    Significant Imaging: I have reviewed all pertinent imaging results/findings within the past 24 hours.   Imaging Results          CTA Chest Non-Coronary (PE Study) (Final result)  Result time 04/19/18 10:30:30    Final result by Forest Hendrickson Jr., MD (04/19/18 10:30:30)                 Impression:         1.  No pulmonary emboli are detected.    2.  Pulmonary edema.  Cardiomegaly.  Pleural effusions.    All CT scans at this facility use dose modulation, iterative reconstruction, and/or weight based dosing when appropriate to reduce radiation dose to as low as reasonably achievable.      Electronically signed by: FOREST  DIGNA OLIVO  Date:     04/19/18  Time:    10:30              Narrative:    EXAM:   CTA CHEST NON CORONARY    CLINICAL HISTORY:  SOB       COMPARISON: None    TECHNIQUE:  Post contrast axial images were obtained. Multiplanar thick Slab MIP reconstruction images were obtained and stored.  100 mL Omnipaque 350 was administered.    FINDINGS:    Angiogram findings:  Good  opacification of the pulmonary arterial system. No pulmonary emboli.    Pulmonary findings: Cardiomegaly.  Bilateral pulmonary parenchymal changes consistent with pulmonary edema.  Small bilateral pleural effusions greater on the left.  CABG changes.                             X-Ray Chest AP Portable (Final result)  Result time 04/19/18 08:14:56    Final result by Forest Hendrickson Jr., MD (04/19/18 08:14:56)                 Impression:          Cardiomegaly.  Probable pulmonary edema and tiny pleural effusions      Electronically signed by: FOREST HENDRICKSON MD  Date:     04/19/18  Time:    08:14              Narrative:    EXAM:   XR CHEST AP PORTABLE    CLINICAL HISTORY:  CHF       COMPARISON:  01/14/2018    FINDINGS:   Heart size is probably enlarged.  CABG changes.  Heart size is similar to before.. Diffuse interstitial pulmonary markings likely some developing pulmonary edema.  Probable tiny bilateral pleural effusions.  No dense focal alveolar infiltrates..

## 2018-04-19 NOTE — PROGRESS NOTES
Bipap placed on stby. Pt placed on 5 L NC for trial. Per RN verbal order.  No pt distress noted at this time.

## 2018-04-19 NOTE — ASSESSMENT & PLAN NOTE
-Infection not suspected at this time.   -Pneumonia ruled out based on negative CT of chest.   -Most likely associated with intravascular volume depletion.   -Trend.

## 2018-04-19 NOTE — HPI
Milli Montez is a 57 year old female with hypertension, hyperlipidemia, CAD and diastolic dysfunction who presented to the ED with complaints of SOB. She reports that symptoms started abruptly this morning upon waking up. She was unable to catch her breath. There was associated diaphoresis and thick yellow spit up. She denies chest pain, fever and cough. The patient was placed on CPAP by EMS and Bipap upon arrival in the ED. Of note, the patient was treated here for acute on chronic diastolic heart failure from 1/14/18 to 1/15/18. She reports that following discharge, she had a stent placed at [facility] with blood pressure medication changes. Upon arrival in the ED, she was reportedly hypertensive with systolic blood pressures greater than 200 as well as hypercapnic with a pCO2 of 70.5 and pH of 7.141. CTA of the chest was significant for pulmonary edema with small bilateral pleural effusions. Other lab abnormalities include a lactic acid of 5.9, a glucose of 433 and a BNP of 510. Her urinalysis, procalcitonin and troponin were essentially negative. Code status was discussed with the patient. She is considering being a DNR, but is a full code at this time. Her daughter, Katherine Lewis, is her surrogate medical decision maker.

## 2018-04-19 NOTE — ED PROVIDER NOTES
SCRIBE #1 NOTE: I, Ryan Cain, am scribing for, and in the presence of, Ayaz Frost MD. I have scribed the entire note.      History      Chief Complaint   Patient presents with    Respiratory Distress     pt at home when she became increasingly SOB after trip to bathroom; family called 911, pt received , Nitro paste x 1 inch to left chest wall; Nitro spray x 5 for elevated BP, placed on CPAP per [facility]; pt in respiratory distress upon arrival to ER; placed on Bipap on arrival to ER       Review of patient's allergies indicates:   Allergen Reactions    Penicillins Swelling        HPI   HPI    2018, 7:46 AM   History obtained from the patient and [facility]      History of Present Illness limited due to pt respiratory distress: Milli Montez is a 57 y.o. female patient with a PMHx of CHF, MI, who presents to the Emergency Department for SOB which onset suddenly 45 minutes PTA. Sxs are constant and severe in severity. There are no mitigating or exacerbating factors noted.  Pt denies any fever, choking, chest pain, LOC, numbness, arm pain, and all other sxs at this time. Prior tx includes 5 sprays of nitro, 1 in. NTG paste, and 325 mg ASA en route. Pt arrives to ED on BiPAP. No further complaints or concerns at this time.       Arrival mode: Personal vehicle     PCP: Marito Amato MD       Past Medical History:  Past Medical History:   Diagnosis Date    Allergy     Arthritis     Blood transfusion     CHF (congestive heart failure)     Diabetes mellitus     Heart murmur     History of loop recorder     Hyperlipidemia     Hypertension     Hypothyroidism 2013    Myocardial infarction     Prediabetes     Thyroid disease     TIA (transient ischemic attack)     Ulcer        Past Surgical History:  Past Surgical History:   Procedure Laterality Date    CARDIAC SURGERY      CAROTID STENT Right      SECTION      CORONARY STENT PLACEMENT      TUBAL LIGATION            Family History:  Family History   Problem Relation Age of Onset    Heart disease Mother     Cancer Father     Heart disease Father        Social History:  Social History     Social History Main Topics    Smoking status: Never Smoker    Smokeless tobacco: Never Used    Alcohol use No    Drug use: No    Sexual activity: Yes     Partners: Male       ROS   Review of Systems   Constitutional: Negative for fever.   HENT: Negative for sore throat.    Respiratory: Positive for shortness of breath.    Cardiovascular: Negative for chest pain.   Gastrointestinal: Negative for diarrhea, nausea and vomiting.   Genitourinary: Negative for dysuria.   Musculoskeletal: Negative for back pain.   Skin: Negative for rash.   Neurological: Negative for weakness.   Hematological: Does not bruise/bleed easily.     Physical Exam         Initial Vitals   BP Pulse Resp Temp SpO2   04/19/18 0742 04/19/18 0742 04/19/18 0742 04/19/18 0812 04/19/18 0742   (!) 157/88 (!) 124 (!) 36 96.3 °F (35.7 °C) 100 %      MAP       04/19/18 0742       111           Physical Exam  Nursing Notes and Vital Signs Reviewed.  Constitutional: Patient is in severe distress. Well-developed and well-nourished.  Head: Atraumatic. Normocephalic.  Eyes: PERRL. EOM intact. Conjunctivae are not pale. No scleral icterus.  ENT: Mucous membranes are moist. Oropharynx is clear and symmetric.    Neck: Supple. Full ROM. No lymphadenopathy.  Cardiovascular: Tachycardic. Regular rhythm. No murmurs, rubs, or gallops. Distal pulses are 2+ and symmetric.  Pulmonary/Chest: Severe respiratory distress. Tachypnea. Coarse breath sounds. No wheezing or rales.  Abdominal: Soft and non-distended.  There is no tenderness.  No rebound, guarding, or rigidity. Good bowel sounds.  Genitourinary: No CVA tenderness  Musculoskeletal: Moves all extremities. No obvious deformities. No edema. No calf tenderness.  Skin: Warm and dry.  Neurological:  Awake. No acute focal neurological  deficits are appreciated.  Psychiatric: Good eye contact. Appropriate in content.    ED Course    Critical Care  Date/Time: 4/19/2018 9:01 AM  Performed by: REY ZAYAS  Authorized by: REY ZAYAS   Direct patient critical care time: 10 minutes  Additional history critical care time: 5 minutes  Ordering / reviewing critical care time: 10 minutes  Documentation critical care time: 10 minutes  Consulting other physicians critical care time: 5 minutes  Consult with family critical care time: 5 minutes  Total critical care time (exclusive of procedural time) : 45 minutes  Critical care time was exclusive of separately billable procedures and treating other patients and teaching time.  Critical care was necessary to treat or prevent imminent or life-threatening deterioration of the following conditions: respiratory failure.  Critical care was time spent personally by me on the following activities: blood draw for specimens, development of treatment plan with patient or surrogate, discussions with consultants, interpretation of cardiac output measurements, evaluation of patient's response to treatment, examination of patient, obtaining history from patient or surrogate, ordering and performing treatments and interventions, ordering and review of laboratory studies, pulse oximetry, ordering and review of radiographic studies, re-evaluation of patient's condition, ventilator management, vascular access procedures and review of old charts.        ED Vital Signs:  Vitals:    04/19/18 0742 04/19/18 0745 04/19/18 0757 04/19/18 0812   BP: (!) 157/88  (!) 165/105    Pulse: (!) 124  (!) 121    Resp: (!) 36  (!) 39    Temp:    96.3 °F (35.7 °C)   TempSrc: Axillary   Axillary   SpO2: 100% 100% 100%    Weight: 105.7 kg (233 lb)       04/19/18 0815 04/19/18 0824 04/19/18 0850 04/19/18 0858   BP: 131/86 119/77 124/78    Pulse: (!) 119 89 91    Resp: (!) 44 (!) 39 (!) 43    Temp:       TempSrc:       SpO2: 100%  100% 100% 100%   Weight:        04/19/18 0901   BP: 127/74   Pulse: 93   Resp: (!) 35   Temp:    TempSrc:    SpO2: 99%   Weight:        Abnormal Lab Results:  Labs Reviewed   CBC W/ AUTO DIFFERENTIAL - Abnormal; Notable for the following:        Result Value    MCH 31.3 (*)     RDW 14.6 (*)     Lymph # 5.9 (*)     Lymph% 48.3 (*)     Mono% 2.4 (*)     All other components within normal limits   COMPREHENSIVE METABOLIC PANEL - Abnormal; Notable for the following:     CO2 18 (*)     Glucose 433 (*)     Calcium 8.3 (*)     Albumin 3.3 (*)     eGFR if  49 (*)     eGFR if non  42 (*)     All other components within normal limits   B-TYPE NATRIURETIC PEPTIDE - Abnormal; Notable for the following:      (*)     All other components within normal limits   LACTIC ACID, PLASMA - Abnormal; Notable for the following:     Lactate (Lactic Acid) 5.9 (*)     All other components within normal limits    Narrative:        LACTIC ACID  critical result(s) called and verbal readback   obtained from FREDA VELA RN, 04/19/2018 08:15   URINALYSIS - Abnormal; Notable for the following:     Specific Gravity, UA >=1.030 (*)     Protein, UA 2+ (*)     Urobilinogen, UA 4.0-6.0 (*)     All other components within normal limits   ISTAT PROCEDURE - Abnormal; Notable for the following:     POC PH 7.141 (*)     POC PCO2 70.5 (*)     POC PO2 347 (*)     POC Glucose 419 (*)     All other components within normal limits   POCT GLUCOSE - Abnormal; Notable for the following:     POCT Glucose 287 (*)     All other components within normal limits   ISTAT PROCEDURE - Abnormal; Notable for the following:     POC PCO2 47.0 (*)     POC PO2 116 (*)     All other components within normal limits   CULTURE, BLOOD   CULTURE, BLOOD   TROPONIN I   PROTIME-INR   PROCALCITONIN   URINALYSIS MICROSCOPIC   POCT GLUCOSE MONITORING CONTINUOUS        All Lab Results:  Results for orders placed or performed during the hospital encounter  of 04/19/18   CBC auto differential   Result Value Ref Range    WBC 12.22 3.90 - 12.70 K/uL    RBC 4.19 4.00 - 5.40 M/uL    Hemoglobin 13.1 12.0 - 16.0 g/dL    Hematocrit 40.8 37.0 - 48.5 %    MCV 97 82 - 98 fL    MCH 31.3 (H) 27.0 - 31.0 pg    MCHC 32.1 32.0 - 36.0 g/dL    RDW 14.6 (H) 11.5 - 14.5 %    Platelets 325 150 - 350 K/uL    MPV 10.0 9.2 - 12.9 fL    Gran # (ANC) 5.7 1.8 - 7.7 K/uL    Lymph # 5.9 (H) 1.0 - 4.8 K/uL    Mono # 0.3 0.3 - 1.0 K/uL    Eos # 0.3 0.0 - 0.5 K/uL    Baso # 0.06 0.00 - 0.20 K/uL    Gran% 46.8 38.0 - 73.0 %    Lymph% 48.3 (H) 18.0 - 48.0 %    Mono% 2.4 (L) 4.0 - 15.0 %    Eosinophil% 2.0 0.0 - 8.0 %    Basophil% 0.5 0.0 - 1.9 %    Differential Method Automated    Comprehensive metabolic panel   Result Value Ref Range    Sodium 139 136 - 145 mmol/L    Potassium 4.5 3.5 - 5.1 mmol/L    Chloride 107 95 - 110 mmol/L    CO2 18 (L) 23 - 29 mmol/L    Glucose 433 (H) 70 - 110 mg/dL    BUN, Bld 15 6 - 20 mg/dL    Creatinine 1.4 0.5 - 1.4 mg/dL    Calcium 8.3 (L) 8.7 - 10.5 mg/dL    Total Protein 7.8 6.0 - 8.4 g/dL    Albumin 3.3 (L) 3.5 - 5.2 g/dL    Total Bilirubin 0.7 0.1 - 1.0 mg/dL    Alkaline Phosphatase 101 55 - 135 U/L    AST 31 10 - 40 U/L    ALT 26 10 - 44 U/L    Anion Gap 14 8 - 16 mmol/L    eGFR if African American 49 (A) >60 mL/min/1.73 m^2    eGFR if non African American 42 (A) >60 mL/min/1.73 m^2   Troponin I   Result Value Ref Range    Troponin I <0.006 0.000 - 0.026 ng/mL   Brain natriuretic peptide   Result Value Ref Range     (H) 0 - 99 pg/mL   Protime-INR   Result Value Ref Range    Prothrombin Time 10.5 9.0 - 12.5 sec    INR 1.0 0.8 - 1.2   Lactic acid, plasma   Result Value Ref Range    Lactate (Lactic Acid) 5.9 (HH) 0.5 - 2.2 mmol/L   Procalcitonin   Result Value Ref Range    Procalcitonin 0.02 <0.25 ng/mL   Urinalysis   Result Value Ref Range    Specimen UA Urine, Clean Catch     Color, UA Yellow Yellow, Straw, Gini    Appearance, UA Clear Clear    pH, UA 7.0  5.0 - 8.0    Specific Gravity, UA >=1.030 (A) 1.005 - 1.030    Protein, UA 2+ (A) Negative    Glucose, UA Negative Negative    Ketones, UA Negative Negative    Bilirubin (UA) Negative Negative    Occult Blood UA Negative Negative    Nitrite, UA Negative Negative    Urobilinogen, UA 4.0-6.0 (A) <2.0 EU/dL    Leukocytes, UA Negative Negative   Urinalysis Microscopic   Result Value Ref Range    RBC, UA 0 0 - 4 /hpf    WBC, UA 2 0 - 5 /hpf    Bacteria, UA None None-Occ /hpf    Squam Epithel, UA 8 /hpf    Hyaline Casts, UA 0 0-1/lpf /lpf    Microscopic Comment SEE COMMENT    ISTAT PROCEDURE   Result Value Ref Range    POC PH 7.141 (LL) 7.35 - 7.45    POC PCO2 70.5 (HH) 35 - 45 mmHg    POC PO2 347 (H) 80 - 100 mmHg    POC HCO3 24.1 24 - 28 mmol/L    POC BE -5 -2 to 2 mmol/L    POC SATURATED O2 100 95 - 100 %    POC Glucose 419 (H) 70 - 110 mg/dL    POC Sodium 140 136 - 145 mmol/L    POC Potassium 4.3 3.5 - 5.1 mmol/L    POC Ionized Calcium 1.18 1.06 - 1.42 mmol/L    POC Hematocrit 38 36 - 54 %PCV    Sample ARTERIAL     Site RR     Allens Test Pass     DelSys CPAP/BiPAP     Mode CPAP    POCT glucose   Result Value Ref Range    POCT Glucose 287 (H) 70 - 110 mg/dL   ISTAT PROCEDURE   Result Value Ref Range    POC PH 7.354 7.35 - 7.45    POC PCO2 47.0 (H) 35 - 45 mmHg    POC PO2 116 (H) 80 - 100 mmHg    POC HCO3 26.2 24 - 28 mmol/L    POC BE 1 -2 to 2 mmol/L    POC SATURATED O2 98 95 - 100 %    Rate 16     Sample ARTERIAL     Site RR     Allens Test Pass     DelSys Adult Vent     Mode AVAPS     Vt 500     FiO2 60          Imaging Results:  Imaging Results          X-Ray Chest AP Portable (Final result)  Result time 04/19/18 08:14:56    Final result by Forest Hendrickson Jr., MD (04/19/18 08:14:56)                 Impression:          Cardiomegaly.  Probable pulmonary edema and tiny pleural effusions      Electronically signed by: FOREST HENDRICKSON MD  Date:     04/19/18  Time:    08:14              Narrative:    EXAM:   XR CHEST  AP PORTABLE    CLINICAL HISTORY:  CHF       COMPARISON:  01/14/2018    FINDINGS:   Heart size is probably enlarged.  CABG changes.  Heart size is similar to before.. Diffuse interstitial pulmonary markings likely some developing pulmonary edema.  Probable tiny bilateral pleural effusions.  No dense focal alveolar infiltrates..                                      The EKG was ordered, reviewed, and independently interpreted by the ED provider.  Interpretation time: 7:46  Rate: 129 BPM  Rhythm: sinus tachycardia with frequent PVC  Interpretation: Nonspecific intraventricular block. No STEMI.    The Emergency Provider reviewed the vital signs and test results, which are outlined above.    ED Discussion     9:01 AM: Discussed case with SAVITA Leavitt ([facility]). Greta agrees with current care and management of pt and accepts admission.   Admitting Service: [facility] medicine   Admitting Physician: Dr. Lombardi  Admit to: ICU      9:02 AM: Re-evaluated pt. I have discussed test results, shared treatment plan, and the need for admission with patient and family at bedside. Pt and family express understanding at this time and agree with all information. All questions answered. Pt and family have no further questions or concerns at this time. Pt is ready for admit.    ED Medication(s):  Medications   nitroGLYCERIN 2% TD oint ointment 1 inch (1 inch Topical (Top) Not Given 4/19/18 5871)   insulin regular injection 5 Units (not administered)   furosemide injection 80 mg (80 mg Intravenous Given 4/19/18 0887)   metoprolol injection 5 mg (2.5 mg Intravenous Given 4/19/18 3910)       New Prescriptions    No medications on file             Medical Decision Making    Medical Decision Making:   Clinical Tests:   Lab Tests: Reviewed and Ordered  Radiological Study: Reviewed and Ordered  Medical Tests: Reviewed and Ordered           Scribe Attestation:   Scribe #1: I performed the above scribed service and the  documentation accurately describes the services I performed. I attest to the accuracy of the note.    Attending:   Physician Attestation Statement for Scribe #1: I, Ayaz Frost MD, personally performed the services described in this documentation, as scribed by Ryan Cain, in my presence, and it is both accurate and complete.          Clinical Impression       ICD-10-CM ICD-9-CM   1. Acute respiratory failure with hypercapnia J96.02 518.81   2. Shortness of breath R06.02 786.05       Disposition:   Disposition: Admitted  Condition: Critical         Ayaz Frost MD  04/23/18 0000

## 2018-04-19 NOTE — HPI
"Ms Montez is a obese 58 yo WF with a PMH of HTN, HLD, DM, CHF and CAD.  She awakened this AM with acute onset SOB constant of mod severity "unable to catch her breath".  Also had diaphoresis with yellow sputum production.  EMS called and placed on NPPV and presented to [facility] ED this AM.  Recent hospitalization here in January 2018 for acute on chronic diastolic heart failure.  In ED SBP > 200, PaCO2 70.5 w/ pH 7.14.  CTA chest and CXR done no PE but significant pulm edema.  Given IV diuretic in ED and clinically improved able to come off NPPV and tolerated NC O2.  Patient claims she follows with Dr. Wooten for Cards at [facility].  She has been out of her DM meds since February 2018 and she has Lasix but only takes it when she needs it but does not weigh herself.  Admitted to ICU.  "

## 2018-04-19 NOTE — ED NOTES
Spoke with hospital medicine. Made NP aware of pt being off of Bipapp. Instructed to wait and give night time doses of PO medications.

## 2018-04-19 NOTE — ED NOTES
Respiratory and RN at bedside pt taken off Bipapp and placed on 5L [place]. Pt sats remain at 100% and pt is breathing with easy and comfort. Pt given some water to sip on at this time. Call light placed within reach and pt verbalized understanding of calling RN if she begins to feel short of breath.   ED MD aware.

## 2018-04-19 NOTE — ASSESSMENT & PLAN NOTE
-Due to acute on chronic diastolic heart failure with a component of obesity hypoventilation syndrome.   -Continue treatment of underlying causes.   -Inhaled medications Bipap and oxygen as needed.

## 2018-04-19 NOTE — ED NOTES
"Pt has decreased WOB, states "i feel better, but not fully back to baseline" pt was noticed to use shorter sentences when speaking to family. Removed from venti mask and replaced on bipap per RT  "

## 2018-04-19 NOTE — ASSESSMENT & PLAN NOTE
-Possible component of flash pulmonary edema associated with hypertension given the sudden onset of symptoms and significantly elevated blood pressures reported in the ED on arrival   -IV diuresis.   -Continue Coreg and Enalapril.   -Daily weight as well as strict intake and output.   -Repeat 2D echo as patient had a cardiac event subsequent to the echo on file.

## 2018-04-19 NOTE — PROGRESS NOTES
"Pt transported to CT via 55% venti mask. Pt tolerated well. Awake and alert loc x 3 . Pt stated upon return she is back to breathing like " she normally does at home." denies sob.  Pt family came to bedside during conversation RT noted pt appeared in be mildly labored with talking. Pt educated about placing mask back on for a while longer to give her longer to rest. Pt rhonchi/ cs BS have improved but still remain. Pt acknowledges and understands. Pt now on continued AVAPS settings. No distress noted at this time.   "

## 2018-04-19 NOTE — ED NOTES
"Pt sitting on stretcher with BIPAP in place, SR up x 2, bed in low/locked position, call bell w/in reach, allergy and fall bands placed on pt's right wrist, family have been updated on pt's POC, family states that pt's cardiologist is Dr Wooten at [place]. Pt currently resting with eyes closed, RR between 28-35 on BIPAP, respiratory status has improved since arrival to ER; pt shakes head "yes" when asked if she feels somewhat better since arriving to ER. Pt continues to diurese at this time. Report given to [facility].   "

## 2018-04-19 NOTE — NURSING
On Admission assessment patient admits to not following her prescribed medical regimen. States she has been out of her diabetic meds since February and not followed up with MD to address this, also does not keep MD appts, also poor dentation and claims doesn't go to dentist due to no dental insurance. Also states she does not follow appropriate diabetic diet or cardiac diet, yet states she has all the appropriate educational materials for those diets.

## 2018-04-19 NOTE — CONSULTS
"[facility]  Critical Care Medicine  Consult Note    Patient Name: Milli Montez  MRN: [id number]  Admission Date: 2018  Hospital Length of Stay: 0 days  Code Status: Full Code  Attending Physician: Sin Lombardi MD   Primary Care Provider: Marito Amato MD   Principal Problem: Acute congestive heart failure with left ventricular diastolic dysfunction      Subjective:     HPI:  Ms Montez is a obese 58 yo WF with a PMH of HTN, HLD, DM, CHF and CAD.  She awakened this AM with acute onset SOB constant of mod severity "unable to catch her breath".  Also had diaphoresis with yellow sputum production.  EMS called and placed on NPPV and presented to [facility] ED this AM.  Recent hospitalization here in 2018 for acute on chronic diastolic heart failure.  In ED SBP > 200, PaCO2 70.5 w/ pH 7.14.  CTA chest and CXR done no PE but significant pulm edema.  Given IV diuretic in ED and clinically improved able to come off NPPV and tolerated NC O2.  Patient claims she follows with Dr. Wooten for Cards at [facility].  She has been out of her DM meds since 2018 and she has Lasix but only takes it when she needs it but does not weigh herself.  Admitted to ICU.    Hospital/ICU Course:   - Admitted to ICU on RA in no distress and VSS fully Awake and alert.      Past Medical History:   Diagnosis Date    Allergy     Arthritis     Blood transfusion     CAD (coronary artery disease)     CHF (congestive heart failure)     Diabetes mellitus     Heart murmur     History of loop recorder     Hyperlipidemia     Hypertension     Hypothyroidism 2013    TIA (transient ischemic attack)     Ulcer        Past Surgical History:   Procedure Laterality Date    CARDIAC SURGERY      CAROTID STENT Right      SECTION      CORONARY STENT PLACEMENT      TUBAL LIGATION         Review of patient's allergies indicates:   Allergen Reactions    Penicillins Swelling       Family " History     Problem Relation (Age of Onset)    Cancer Father    Heart disease Mother, Father        Social History Main Topics    Smoking status: Never Smoker    Smokeless tobacco: Never Used    Alcohol use No    Drug use: No    Sexual activity: Yes     Partners: Male         Review of Systems   Constitutional: Negative for appetite change, chills, diaphoresis, fatigue and fever.   HENT: Positive for dental problem (tooth ache). Negative for congestion.    Respiratory: Negative for apnea, cough, shortness of breath (resolved) and wheezing.    Cardiovascular: Negative for chest pain and leg swelling.   Gastrointestinal: Negative for abdominal pain, diarrhea, nausea and vomiting.   Endocrine: Negative for polydipsia.   Genitourinary: Negative for difficulty urinating.   Musculoskeletal: Negative for myalgias.   Skin: Negative for color change and wound.   Allergic/Immunologic: Negative for immunocompromised state.   Neurological: Negative for dizziness, syncope and weakness.   Hematological: Does not bruise/bleed easily.   Psychiatric/Behavioral: Negative for agitation, confusion and hallucinations. The patient is not nervous/anxious.      Objective:     Vital Signs (Most Recent):  Temp: 97.8 °F (36.6 °C) (04/19/18 1603)  Pulse: 79 (04/19/18 1647)  Resp: (!) 34 (04/19/18 1647)  BP: 110/60 (04/19/18 1647)  SpO2: 100 % (04/19/18 1647) Vital Signs (24h Range):  Temp:  [96.3 °F (35.7 °C)-97.8 °F (36.6 °C)] 97.8 °F (36.6 °C)  Pulse:  [] 79  Resp:  [19-44] 34  SpO2:  [99 %-100 %] 100 %  BP: (100-165)/() 110/60     Weight: 105.7 kg (233 lb)  Body mass index is 41.27 kg/m².      Intake/Output Summary (Last 24 hours) at 04/19/18 1723  Last data filed at 04/19/18 1123   Gross per 24 hour   Intake                0 ml   Output             1900 ml   Net            -1900 ml       Physical Exam   Constitutional: She is oriented to person, place, and time. Vital signs are normal. She appears well-developed and  well-nourished. She is cooperative.  Non-toxic appearance. She does not have a sickly appearance. She does not appear ill. No distress. She is not intubated.   HENT:   Head: Normocephalic and atraumatic.   Mouth/Throat: Oropharynx is clear and moist and mucous membranes are normal.   Eyes: EOM and lids are normal. Pupils are equal, round, and reactive to light.   Neck: Trachea normal and normal range of motion. Carotid bruit is not present.   Obese    Cardiovascular: Normal rate, regular rhythm, normal heart sounds and normal pulses.    Pulses:       Radial pulses are 2+ on the right side, and 2+ on the left side.        Dorsalis pedis pulses are 2+ on the right side, and 2+ on the left side.   Pulmonary/Chest: Effort normal. No accessory muscle usage. She is not intubated. No respiratory distress. She has decreased breath sounds.   Abdominal: Soft. Bowel sounds are normal. She exhibits no distension. There is no tenderness.   Obese    Genitourinary:   Genitourinary Comments: Carlin in place   Musculoskeletal: Normal range of motion.        Right foot: There is no deformity.        Left foot: There is no deformity.   Trace pedal edema   Lymphadenopathy:     She has no cervical adenopathy.   Neurological: She is alert and oriented to person, place, and time.   Skin: Skin is warm, dry and intact. Capillary refill takes less than 2 seconds. No rash noted. No cyanosis.   Psychiatric: She has a normal mood and affect. Her speech is normal and behavior is normal. Judgment and thought content normal. Cognition and memory are normal.       Vents:  Oxygen Concentration (%): 32 (04/19/18 1535)    Lines/Drains/Airways     Drain                 Urethral Catheter 04/19/18 0826 16 Fr. less than 1 day          Peripheral Intravenous Line                 Peripheral IV - Single Lumen 04/19/18 0821 Right Antecubital less than 1 day         Peripheral IV - Single Lumen 04/19/18 Left Antecubital less than 1 day                 Significant Labs:    CBC/Anemia Profile:    Recent Labs  Lab 04/19/18  0744 04/19/18  0746   WBC 12.22  --    HGB 13.1  --    HCT 40.8 38     --    MCV 97  --    RDW 14.6*  --         Chemistries:    Recent Labs  Lab 04/19/18  0744      K 4.5      CO2 18*   BUN 15   CREATININE 1.4   CALCIUM 8.3*   ALBUMIN 3.3*   PROT 7.8   BILITOT 0.7   ALKPHOS 101   ALT 26   AST 31       ABGs:   Recent Labs  Lab 04/19/18  0852   PH 7.354   PCO2 47.0*   HCO3 26.2   POCSATURATED 98   BE 1     Lactic Acid:   Recent Labs  Lab 04/19/18  0744 04/19/18  1034   LACTATE 5.9* 1.9     Urine Studies:   Recent Labs  Lab 04/19/18  0746   COLORU Yellow   APPEARANCEUA Clear   PHUR 7.0   SPECGRAV >=1.030*   PROTEINUA 2+*   GLUCUA Negative   KETONESU Negative   BILIRUBINUA Negative   OCCULTUA Negative   NITRITE Negative   UROBILINOGEN 4.0-6.0*   LEUKOCYTESUR Negative   RBCUA 0   WBCUA 2   BACTERIA None   SQUAMEPITHEL 8   HYALINECASTS 0     All pertinent labs within the past 24 hours have been reviewed.    Significant Imaging:   CT: I have reviewed all pertinent results/findings within the past 24 hours and my personal findings are:  CTA chest: No PE.  mod pulm edema bilat    Assessment/Plan:     Pulmonary   Acute respiratory failure with hypercapnia    Resolved.  O2 on standby        Cardiac/Vascular   * Acute congestive heart failure with left ventricular diastolic dysfunction    Cont Lasix IV, Coreg and Enalapril  Daily weights and accurate I/Os        CAD (coronary artery disease)    Cont Plavix, Coreg and statin  Hx CABG and stents  ICU cardiac monitoring        Acute pulmonary edema with congestive heart failure    Clinically improved with IV diuresis  Repeat CXR in AM        Endocrine   Hypothyroidism    Cont Levothyroxine        Morbid obesity with BMI of 40.0-44.9, adult    Encourage weight loss        Type 2 diabetes mellitus with hyperglycemia    SSI           Preventive Measures and Monitoring:   Stress Ulcer:  Pepcid  Nutrition: DM Cardiac diet  Glucose control: SSI  Bowel prophylaxis: PRN Dulcolax  DVT prophylaxis: LMWH/SCDs  Hx CAD on B-Blocker: Coreg  Head of Bed/Reposition: Elevate HOB and turn Q1-2 hours   Early Mobility: OOB as tolerated  Carlin Day: #1  Code Status: Full  Pneumonia Vaccine: UTD    Counseling/Consultation:I have discussed the care of this patient in detail with the bedside nursing staff and Dr. Phillips and Dr. Lombardi    Currently does not meet ICU criteria and clinically improved.  Recommend transfer out of ICU and will likely be able to discharge in AM.       Thank you for your consult. I will sign off. Please contact us if you have any additional questions.     Elian Fulton NP  Critical Care Medicine  [facility]     I have seen the patient, reviewed the Nurse Practitioner's history and physical, assessment and plan. I have personally interviewed and examined the patient at bedside and: agree with the findings.  I have reviewed the chosen technique,  indications, medical necessity and agree with planned procedure . I was available at the time the procedure was performed.      BS decreased   +pedal edema  + Carlin   CXR pulm edema       Pulm edema acute     IV Lasix 60 mg q 12 hrs         Marquez Booth MD  Pulmonary Medicine

## 2018-04-19 NOTE — ED NOTES
Pt brought to CT with RT and myself on portable cardiac monitor. Placed on venti mask with bipap on standby in CT room per RT.

## 2018-04-20 PROBLEM — J96.02 ACUTE RESPIRATORY FAILURE WITH HYPERCAPNIA: Status: RESOLVED | Noted: 2018-01-01 | Resolved: 2018-01-01

## 2018-04-20 PROBLEM — J96.02 ACUTE RESPIRATORY FAILURE WITH HYPERCAPNIA: Status: ACTIVE | Noted: 2018-01-01

## 2018-04-20 PROBLEM — E87.20 LACTIC ACIDOSIS: Status: RESOLVED | Noted: 2018-01-01 | Resolved: 2018-01-01

## 2018-04-20 NOTE — PLAN OF CARE
Problem: Patient Care Overview  Goal: Plan of Care Review  Outcome: Ongoing (interventions implemented as appropriate)  Patient denies any shortness of breath during the night.  Urine output has been adequate, pt has been afebrile.  Tmax is 98.2 temporal. Education done on the importance of daily monitoring of her weight with her having CHF. See MAR for medication doses and times of administration.

## 2018-04-20 NOTE — PLAN OF CARE
Problem: Patient Care Overview  Goal: Plan of Care Review  Outcome: Ongoing (interventions implemented as appropriate)  Pt remains free of falls and free of injury. Blood glucose monitored as directed. Pt indicates that she feels like she is doing well and that she will be ready for discharge by tomorrow.  Pt's only complaint thus far has been a tooth ache that she verbalized she cannot yet afford to get fixed.   Bed in low position, side rails up x2, call light in reach.

## 2018-04-20 NOTE — ASSESSMENT & PLAN NOTE
Contributing Nutrition Diagnosis  Obesity    Related to (etiology):   Excess calorie intake     Signs and Symptoms (as evidenced by):   BMI 39      Interventions/Recommendations (treatment strategy):  1. Continue calorie controlled diet  2. Diet education materials to be provided with discharge instructions  3. Will attempt verbal instruction again at follow up    Nutrition Diagnosis Status:   New

## 2018-04-20 NOTE — PROVIDER TRANSFER
[facility] -   Transfer of Care Note      Patient Name: Milli Montez  MRN: [id number]  Admission Date: 4/19/2018  Hospital Length of Stay: 1 days  Transfer Date: 4/20/2018  Attending Physician: Sin Lombardi MD   Primary Care Provider: Marito Amato MD  Reason for Admission: CHF Exacerbation    HPI:   Milli Montez is a 57 year old female with hypertension, hyperlipidemia, CAD and diastolic dysfunction who presented to the ED with complaints of SOB. She reports that symptoms started abruptly this morning upon waking up. She was unable to catch her breath. There was associated diaphoresis and thick yellow spit up. She denies chest pain, fever and cough. The patient was placed on CPAP by EMS and Bipap upon arrival in the ED. Of note, the patient was treated here for acute on chronic diastolic heart failure from 1/14/18 to 1/15/18. She reports that following discharge, she had a stent placed at [place] with blood pressure medication changes. Upon arrival in the ED, she was reportedly hypertensive with systolic blood pressures greater than 200 as well as hypercapnic with a pCO2 of 70.5 and pH of 7.141. CTA of the chest was significant for pulmonary edema with small bilateral pleural effusions. Other lab abnormalities include a lactic acid of 5.9, a glucose of 433 and a BNP of 510. Her urinalysis, procalcitonin and troponin were essentially negative. Code status was discussed with the patient. She is considering being a DNR, but is a full code at this time. Her daughter, Katherine Lewis, is her surrogate medical decision maker.     Hospital Course:   4/20  Patient with significant improvement overnight. Off BiPAP and comfortable. Patient denies cp/ sob/v/n. Discussed with cc team. Patient to transfer to floor    Review of Systems   Constitutional: Positive for malaise/fatigue. Negative for weight loss.        Morbidly Obese   HENT: Negative.    Eyes: Negative.    Respiratory: Positive for shortness of  breath.    Cardiovascular: Negative for chest pain, palpitations and orthopnea.   Gastrointestinal: Negative.  Negative for abdominal pain and nausea.   Genitourinary: Negative.    Musculoskeletal: Negative.    Skin: Negative.    Neurological: Positive for weakness. Negative for dizziness, tremors and speech change.   Psychiatric/Behavioral: Negative.    All 12 systems otherwise negative.   Review of Systems   Constitutional: Positive for malaise/fatigue. Negative for weight loss.        Morbidly Obese   HENT: Negative.    Eyes: Negative.    Respiratory: Positive for shortness of breath.    Cardiovascular: Negative for chest pain, palpitations and orthopnea.   Gastrointestinal: Negative.  Negative for abdominal pain and nausea.   Genitourinary: Negative.    Musculoskeletal: Negative.    Skin: Negative.    Neurological: Positive for weakness. Negative for dizziness, tremors and speech change.   Psychiatric/Behavioral: Negative.    All other systems reviewed and are negative.      Physical Exam   Constitutional: She is oriented to person, place, and time. She appears well-developed and well-nourished.   Morbidly Obese   HENT:   Head: Normocephalic and atraumatic.   Eyes: EOM are normal. Pupils are equal, round, and reactive to light.   Neck: Normal range of motion. Neck supple. No JVD present.   Cardiovascular: Normal rate, regular rhythm and intact distal pulses.    Murmur heard.  3/6 AMANDA   Pulmonary/Chest: Effort normal and breath sounds normal. No respiratory distress. She has no wheezes.   Abdominal: Soft. Bowel sounds are normal. She exhibits no distension.   Musculoskeletal: Normal range of motion. She exhibits edema. She exhibits no tenderness.   Neurological: She is alert and oriented to person, place, and time. She has normal reflexes. No cranial nerve deficit.   Skin: Skin is warm and dry. Capillary refill takes 2 to 3 seconds. No erythema.   Psychiatric: She has a normal mood and affect. Her behavior is  normal. Judgment and thought content normal.   Nursing note and vitals reviewed.      * Acute congestive heart failure with left ventricular diastolic dysfunction    -Possible component of flash pulmonary edema associated with hypertension given the sudden onset of symptoms and significantly elevated blood pressures reported in the ED on arrival   -IV diuresis.   -Continue Coreg and Enalapril.   -Daily weight as well as strict intake and output.   -Repeat 2D echo as patient had a cardiac event subsequent to the echo on file.      4/20  Improved  Plan for diuresis  O2  Discussed compliance.  Diet  Nutrition            CAD (coronary artery disease)    Plavix  BB          Hyperlipidemia    Continue statin.           Morbid obesity with BMI of 40.0-44.9, adult    -Patient counseled on weight loss.   -Nutrition consult.           Essential hypertension    Continue Coreg, Norvasc and enalapril.           Hypothyroidism    Continue levothyroxine.           Type 2 diabetes mellitus with hyperglycemia    -Hold glimepiride.   -NISS.   -Check hemoglobin A1C.   -Hemoglobin A1C was 6.4 on 1/14/18.     4/20  Discussed compliance  Controlled  NISS  Accuchecks              Consults         Status Ordering Provider     Inpatient consult to Internal Medicine  Once     Provider:  (Not yet assigned)    Acknowledged REY ZAYAS     Inpatient consult to Registered Dietitian/Nutritionist  Once     Provider:  (Not yet assigned)    Acknowledged ANA SEAMAN        * No surgery found *    Diet Orders          Diet diabetic [facility]; 1500 Calorie; Cardiac (Low Na/Chol): Diabetic starting at 04/19 1735        Activity Orders          None        Pending Diagnostic Studies:     None          35 mins cc time  Sin Lombardi MD  [facility]

## 2018-04-20 NOTE — ASSESSMENT & PLAN NOTE
-Hold glimepiride.   -NISS.   -Check hemoglobin A1C.   -Hemoglobin A1C was 6.4 on 1/14/18.     4/20  Discussed compliance  Controlled  NISS  Accuchecks

## 2018-04-20 NOTE — HOSPITAL COURSE
Patient admitted for acute respiratory failure with hypercapnia secondary to acute congestive heart failure with left ventricular diastolic dysfunction. Pt. on BiPap and placed in ICU. 2d echo revealed EF 40% and DD. IV Lasix. Patient with significant improvement overnight (4/19/18). Off BiPAP and comfortable. Patient diuresed well overnight. Patient downgraded to telemetry and transferred to floor. Symptoms improved. Patient ready for discharge. Home meds reconciled. Patient to follow-up with PCP in 3 days for hospital follow-up. Patient also to follow-up with Cardiology in 1 week. Patient seen and examined on the date of discharge and found suitable for discharge.

## 2018-04-20 NOTE — PROGRESS NOTES
Pharmacist Renal Dose Adjustment Note    Milli Montez is a 57 y.o. female being treated with the medication famotidine.     Patient Data:  Vital Signs (Most Recent):  Temp: 98.1 °F (36.7 °C) (04/20/18 0700)  Pulse: 70 (04/20/18 0700)  Resp: 20 (04/20/18 0700)  BP: 117/62 (04/20/18 0700)  SpO2: 95 % (04/20/18 0700) Vital Signs (72h Range):  Temp:  [96.3 °F (35.7 °C)-98.5 °F (36.9 °C)]   Pulse:  []   Resp:  [15-44]   BP: ()/()   SpO2:  [93 %-100 %]      Recent Labs     Lab 04/19/18  0744 04/20/18  0327   CREATININE 1.4 1.0     Serum creatinine: 1 mg/dL 04/20/18 0327  Estimated creatinine clearance: 72.4 mL/min    Medication dose will be changed to 20 mg by mouth twice daily.     Pharmacist's Name: Liana Schmidt  Pharmacist's Extension: [phone]

## 2018-04-20 NOTE — NURSING
Chart reviewed for diabetes  Patient was seen by this nurse on previous admit  Please consult if any diabetes educational needs are identified

## 2018-04-20 NOTE — CONSULTS
[facility] -   Adult Nutrition  Consult Note    SUMMARY     Recommendations    Recommendation/Intervention: 1. Continue current diet order.    2. Will attempt education again at follow up.    Reason for Assessment    Reason for Assessment: consult (diet education)  General Information Comments: Patient with hx of CHF and DM not following diet at home. Unable to provide education today, Patient with another care provider. Will attempt again at next scehduled follow up. Written materials attached for print at discharge.  Nutrition Discharge Planning: Home on Diabetic Cardiac Diet   Hx:   Past Medical History:   Diagnosis Date    Allergy     Arthritis     Blood transfusion     CAD (coronary artery disease)     CHF (congestive heart failure)     Diabetes mellitus     Heart murmur     History of loop recorder     Hyperlipidemia     Hypertension     Hypothyroidism 9/17/2013    TIA (transient ischemic attack)     Ulcer      Dx:  1. Acute respiratory failure with hypercapnia    2. Shortness of breath    3. Hypertensive urgency    4. Acute on chronic congestive heart failure, unspecified congestive heart failure type    5. Hyperlipidemia, unspecified hyperlipidemia type    6. Coronary artery disease, angina presence unspecified, unspecified vessel or lesion type, unspecified whether native or transplanted heart    7. CHF (congestive heart failure)    8. Acute congestive heart failure with left ventricular diastolic dysfunction    9. Acute pulmonary edema with congestive heart failure    10. Coronary artery disease involving native coronary artery of native heart without angina pectoris    11. Hypothyroidism, unspecified type    12. Morbid obesity with BMI of 40.0-44.9, adult    13. Type 2 diabetes mellitus with hyperglycemia, without long-term current use of insulin          Nutrition Risk Screen    Nutrition Risk Screen: no indicators present    Nutrition/Diet History    Do you have any  "cultural, spiritual, Buddhist conflicts, given your current situation?: none    Anthropometrics    Temp: 98.6 °F (37 °C)  Height: 5' 3" (160 cm)  Height (inches): 63 in  Weight Method: Bed Scale  Weight: 101.6 kg (223 lb 15.8 oz)  Weight (lb): 223.99 lb  Ideal Body Weight (IBW), Female: 115 lb  % Ideal Body Weight, Female (lb): 194.77 lb  BMI (Calculated): 39.8  BMI Grade: 35 - 39.9 - obesity - grade II       Lab/Procedures/Meds    Pertinent Labs Reviewed: reviewed  Pertinent Medications Reviewed: reviewed  BMP  Lab Results   Component Value Date     04/20/2018    K 3.5 04/20/2018     04/20/2018    CO2 26 04/20/2018    BUN 17 04/20/2018    CREATININE 1.0 04/20/2018    CALCIUM 8.8 04/20/2018    ANIONGAP 12 04/20/2018    ESTGFRAFRICA >60 04/20/2018    EGFRNONAA >60 04/20/2018       Recent Labs  Lab 04/20/18  1148   POCTGLUCOSE 154*     Lab Results   Component Value Date    HGBA1C 6.3 (H) 04/19/2018     Lab Results   Component Value Date    ALBUMIN 3.3 (L) 04/19/2018       Physical Findings/Assessment    Overall Physical Appearance: obese  Oral/Mouth Cavity: poor dentition    Estimated/Assessed Needs    Weight Used For Calorie Calculations: 101.6 kg (223 lb 15.8 oz)  Energy Calorie Requirements (kcal): 1600 calories     Protein Requirements: 80g  Weight Used For Protein Calculations: 101.6 kg (223 lb 15.8 oz)        RDA Method (mL): 1600         Nutrition Prescription Ordered    Current Diet Order: 1500 calorie Diabetic Cardiac      Nutrition Risk    Level of Risk/Frequency of Follow-up: low     Assessment and Plan    Morbid obesity with BMI of 40.0-44.9, adult    Contributing Nutrition Diagnosis  Obesity    Related to (etiology):   Excess calorie intake     Signs and Symptoms (as evidenced by):   BMI 39      Interventions/Recommendations (treatment strategy):  1. Continue calorie controlled diet  2. Diet education materials to be provided with discharge instructions  3. Will attempt verbal instruction " again at follow up    Nutrition Diagnosis Status:   New               Monitor and Evaluation    Food and Nutrient Intake: food and beverage intake  Food and Nutrient Adminstration: diet order  Knowledge/Beliefs/Attitudes: beliefs and attitudes, food and nutrition knowledge/skill  Anthropometric Measurements: weight     Nutrition Follow-Up    RD Follow-up?: Yes

## 2018-04-20 NOTE — ASSESSMENT & PLAN NOTE
-Possible component of flash pulmonary edema associated with hypertension given the sudden onset of symptoms and significantly elevated blood pressures reported in the ED on arrival   -IV diuresis.   -Continue Coreg and Enalapril.   -Daily weight as well as strict intake and output.   -Repeat 2D echo as patient had a cardiac event subsequent to the echo on file.      4/20  Improved  Plan for diuresis  O2  Discussed compliance.  Diet  Nutrition

## 2018-04-20 NOTE — PLAN OF CARE
CM met with patient at the bedside to assess for discharge needs.  Patient lives at home with her  and is independent with all ADL's.  She does not anticipate any discharge needs at this time.  CM provided a transitional care folder, information on advanced directives, information on pharmacy bedside delivery, and discharge planning begins on admission with contact information for any needs/questions.    D/C Plan:  Home with no needs     04/20/18 1405   Discharge Assessment   Assessment Type Discharge Planning Assessment   Confirmed/corrected address and phone number on facesheet? Yes   Assessment information obtained from? Patient;Medical Record   Expected Length of Stay (days) (TBD)   Communicated expected length of stay with patient/caregiver yes   Prior to hospitilization cognitive status: Alert/Oriented   Prior to hospitalization functional status: Independent   Current cognitive status: Alert/Oriented   Current Functional Status: Independent   Facility Arrived From: home   Lives With spouse   Able to Return to Prior Arrangements yes   Is patient able to care for self after discharge? Yes   Who are your caregiver(s) and their phone number(s)? Patient is independent.  Katherine Montez, daughter [phone]   Patient's perception of discharge disposition home or selfcare   Readmission Within The Last 30 Days no previous admission in last 30 days   Patient currently being followed by outpatient case management? No   Patient currently receives any other outside agency services? No   Equipment Currently Used at Home none   Do you have any problems affording any of your prescribed medications? No   Is the patient taking medications as prescribed? yes   Does the patient have transportation home? Yes   Transportation Available family or friend will provide;car   Dialysis Name and Scheduled days NA   Does the patient receive services at the [facility]? No   Discharge Plan A Home with family   Discharge Plan B  Home with family   Patient/Family In Agreement With Plan yes

## 2018-04-21 NOTE — PROGRESS NOTES
Instructed pt on low sodium diet and monitoring weight every morning at the same time. Instructed to call md with wt gain more thatn 2-3lbs in 1 day or 5-6 in a week. Will monitor. Pt wanting to home. Lungs wnl. Denies sob

## 2018-04-21 NOTE — PLAN OF CARE
Problem: Patient Care Overview  Goal: Plan of Care Review  Outcome: Ongoing (interventions implemented as appropriate)  No acute events overnight. Pt states she feels better. Denies any CP, SOB. Cbg WNL. NSR on the monitor. VSS. Chart reviewed. Will monitor.

## 2018-04-21 NOTE — DISCHARGE SUMMARY
[facility] Hospital Medicine  Discharge Summary      Patient Name: Milli Montez  MRN: [id number]  Admission Date: 4/19/2018  Hospital Length of Stay: 2 days  Discharge Date and Time:  04/21/2018 12:10 PM  Attending Physician: Sin Lombardi MD   Discharging Provider: Linda Lopez NP  Primary Care Provider: Marito Amato MD      HPI:   Milli Montez is a 57 year old female with hypertension, hyperlipidemia, CAD and diastolic dysfunction who presented to the ED with complaints of SOB. She reports that symptoms started abruptly this morning upon waking up. She was unable to catch her breath. There was associated diaphoresis and thick yellow spit up. She denies chest pain, fever and cough. The patient was placed on CPAP by EMS and Bipap upon arrival in the ED. Of note, the patient was treated here for acute on chronic diastolic heart failure from 1/14/18 to 1/15/18. She reports that following discharge, she had a stent placed at [facility] with blood pressure medication changes. Upon arrival in the ED, she was reportedly hypertensive with systolic blood pressures greater than 200 as well as hypercapnic with a pCO2 of 70.5 and pH of 7.141. CTA of the chest was significant for pulmonary edema with small bilateral pleural effusions. Other lab abnormalities include a lactic acid of 5.9, a glucose of 433 and a BNP of 510. Her urinalysis, procalcitonin and troponin were essentially negative. Code status was discussed with the patient. She is considering being a DNR, but is a full code at this time. Her daughter, Katherine Lewis, is her surrogate medical decision maker.     * No surgery found *      Hospital Course:   Patient admitted for acute respiratory failure with hypercapnia secondary to acute congestive heart failure with left ventricular diastolic dysfunction. Pt. on BiPap and placed in ICU. 2d echo revealed EF 40% and DD. IV Lasix. Patient with significant improvement overnight (4/19/18). Off  BiPAP and comfortable. Patient diuresed well overnight. Patient downgraded to telemetry and transferred to floor. Symptoms improved. Patient ready for discharge. Home meds reconciled. Patient to follow-up with PCP in 3 days for hospital follow-up. Patient also to follow-up with Cardiology in 1 week. Patient seen and examined on the date of discharge and found suitable for discharge.      Consults:   Consults         Status Ordering Provider     Inpatient consult to Internal Medicine  Once     Provider:  (Not yet assigned)    Acknowledged REY ZAYAS     Inpatient consult to Registered Dietitian/Nutritionist  Once     Provider:  (Not yet assigned)    Completed ANA SEAMAN          No new Assessment & Plan notes have been filed under this hospital service since the last note was generated.  Service: Hospital Medicine    Final Active Diagnoses:    Diagnosis Date Noted POA    PRINCIPAL PROBLEM:  Acute congestive heart failure with left ventricular diastolic dysfunction [I50.31] 01/14/2018 Yes    Acute pulmonary edema with congestive heart failure [I50.1] 04/19/2018 Yes    Hyperlipidemia [E78.5]  Yes    CAD (coronary artery disease) [I25.10]  Yes     Chronic    Morbid obesity with BMI of 40.0-44.9, adult [E66.01, Z68.41] 11/23/2016 Not Applicable     Chronic    Hypothyroidism [E03.9] 09/17/2013 Yes     Chronic    Essential hypertension [I10] 09/17/2013 Yes    Type 2 diabetes mellitus with hyperglycemia [E11.65] 08/23/2013 Yes     Chronic      Problems Resolved During this Admission:    Diagnosis Date Noted Date Resolved POA    Acute respiratory failure with hypercapnia [J96.02] 04/20/2018 04/20/2018 Yes    Acute respiratory failure with hypercapnia [J96.02] 04/19/2018 04/20/2018 Yes    Lactic acidosis [E87.2] 04/19/2018 04/20/2018 Yes       Discharged Condition: stable    Disposition: Home or Self Care    Follow Up:  Follow-up Information     Marito Amato MD In 3 days.    Specialty:  Family  Medicine  Why:  hospital follow-up   Contact information:  [address] [facility]  [address]  [place] [address] [phone]             Rogelio Matthews MD In 1 week.    Specialties:  Cardiology, Cardiovascular Disease  Why:  hospital follow-up  Contact information:  [address] [facility] [address] [phone]                 Patient Instructions:     Diet Cardiac     Diet diabetic     Activity as tolerated     Notify your health care provider if you experience any of the following:  difficulty breathing or increased cough     Notify your health care provider if you experience any of the following:  increased confusion or weakness     Notify your health care provider if you experience any of the following:  persistent dizziness, light-headedness, or visual disturbances         Significant Diagnostic Studies: Labs:   BMP:   Recent Labs  Lab 04/20/18 0327 04/21/18  0523   * 131*    138   K 3.5 3.2*    98   CO2 26 29   BUN 17 25*   CREATININE 1.0 1.2   CALCIUM 8.8 8.9   MG 1.9 2.0   , CMP   Recent Labs  Lab 04/20/18 0327 04/21/18  0523    138   K 3.5 3.2*    98   CO2 26 29   * 131*   BUN 17 25*   CREATININE 1.0 1.2   CALCIUM 8.8 8.9   ANIONGAP 12 11   ESTGFRAFRICA >60 59*   EGFRNONAA >60 51*   , CBC   Recent Labs  Lab 04/20/18 0327 04/21/18  0523   WBC 7.46 5.66   HGB 11.7* 12.4   HCT 36.1* 37.3    235    and All labs within the past 24 hours have been reviewed    Pending Diagnostic Studies:     None         Medications:  Reconciled Home Medications:      Medication List      START taking these medications    potassium chloride SA 20 MEQ tablet  Commonly known as:  K-DUR,KLOR-CON  Take 1 tablet (20 mEq total) by mouth once daily.        CHANGE how you take these medications    furosemide 40 MG tablet  Commonly known as:  LASIX  Take 1 tablet (40 mg total) by mouth once daily.  What changed:  medication strength        CONTINUE taking these medications    amLODIPine 5  MG tablet  Commonly known as:  NORVASC  Take 5 mg by mouth once daily.     blood sugar diagnostic Strp  check BS fastin and  2hr after biggest meal     blood-glucose meter kit  Commonly known as:  FREESTYLE SYSTEM KIT  Use as instructed     carvedilol 12.5 MG tablet  Commonly known as:  COREG  Take 12.5 mg by mouth 2 (two) times daily with meals.     enalapril 5 MG tablet  Commonly known as:  VASOTEC  Take 5 mg by mouth 2 (two) times daily.     lancets-blood glucose strips 30 gauge Cmpk  2 Devices by Misc.(Non-Drug; Combo Route) route 2 (two) times daily. check BS fastin and  2hr after biggest meal     levothyroxine 75 MCG tablet  Commonly known as:  SYNTHROID  TAKE 1 TABLET BY MOUTH DAILY     ondansetron 4 MG tablet  Commonly known as:  ZOFRAN  Take 1 tablet (4 mg total) by mouth every 6 (six) hours as needed for Nausea.     PLAVIX 75 mg tablet  Generic drug:  clopidogrel  Take 75 mg by mouth.     rosuvastatin 20 MG tablet  Commonly known as:  CRESTOR  Take 40 mg by mouth once daily.        STOP taking these medications    acyclovir 800 MG Tab  Commonly known as:  ZOVIRAX     gabapentin 300 MG capsule  Commonly known as:  NEURONTIN     glipiZIDE 5 MG tablet  Commonly known as:  GLUCOTROL            Indwelling Lines/Drains at time of discharge:   Lines/Drains/Airways          No matching active lines, drains, or airways          Time spent on the discharge of patient: >45 minutes  Patient was seen and examined on the date of discharge and determined to be suitable for discharge.         Linda Lopez NP  Department of Hospital Medicine  [facility]

## 2018-04-21 NOTE — PROGRESS NOTES
Discharge instructions given to pt. Verbalized understanding. Iv removed x 2. Iv caths intact. Tolerated. Tele  Removed. No distress noted. Awaiting family for transport. Will call when ride to get pt.

## 2018-05-19 PROBLEM — I21.4 NSTEMI (NON-ST ELEVATED MYOCARDIAL INFARCTION): Status: ACTIVE | Noted: 2018-01-01

## 2018-05-19 PROBLEM — N17.9 AKI (ACUTE KIDNEY INJURY): Status: ACTIVE | Noted: 2018-01-01

## 2018-05-19 PROBLEM — J96.00 ACUTE RESPIRATORY FAILURE: Status: ACTIVE | Noted: 2018-01-01

## 2018-05-19 PROBLEM — E63.9 INADEQUATE DIETARY ENERGY INTAKE: Status: ACTIVE | Noted: 2018-01-01

## 2018-05-19 PROBLEM — I50.9 ACUTE ON CHRONIC CONGESTIVE HEART FAILURE: Status: ACTIVE | Noted: 2018-01-01

## 2018-05-19 PROBLEM — I50.43 ACUTE ON CHRONIC COMBINED SYSTOLIC AND DIASTOLIC HEART FAILURE: Status: ACTIVE | Noted: 2018-01-01

## 2018-05-19 PROBLEM — E87.20 LACTIC ACIDOSIS: Status: ACTIVE | Noted: 2018-01-01

## 2018-05-19 PROBLEM — I50.33 ACUTE ON CHRONIC DIASTOLIC CONGESTIVE HEART FAILURE: Status: ACTIVE | Noted: 2018-01-01

## 2018-05-19 NOTE — PROGRESS NOTES
Dr. Morris and SAVITA Alcantar at bedside.  Notified that heparin drip is running.  Orders to give brilinta as ordered.

## 2018-05-19 NOTE — ED NOTES
Upon family request, release of information faxed to [employer] for release of cardiology records.

## 2018-05-19 NOTE — ASSESSMENT & PLAN NOTE
- Recent DEWAYNE of LM due to critical in-stent stenosis on 5/2/18 per Dr. Wooten ([facility]).  - No angina.  Initial troponin negative, EKG unrevealing.  Follow serial results.  - Continue ASA, Brilinta, BB, and high-intensity statin.  - Cardiology consult in AM.

## 2018-05-19 NOTE — PLAN OF CARE
Problem: Patient Care Overview  Goal: Plan of Care Review  Outcome: Ongoing (interventions implemented as appropriate)  Patient currently resting quietly in bed. VS currently stable. Patient NSR on monitor at this time. Patient currently receiving oxygen via AVAPS. Patient has no complaints of shortness of breathe at this time. Patient encouraged to turn in bed at least every 2 hours to avoid skin breakdown to back side. Patient turns with one assist. No sign of wound development noted. Plan of care updated with patient and family. There are no further questions after updated on plan of care at this time. Will continue to monitor.

## 2018-05-19 NOTE — HPI
HPI obtained from chart as patient was on AVAPS    Ms. Montez is a 57 year old female patient with a PMHx of CAD s/p CABG, s/p PTCA of LM stent on 2/6/18, s/p repeat PTCA/DEWAYNE of LM in-stent restenosis on 5/2/18, chronic diastolic CHF, hyperlipidemia, HTN, DM type II, and TIA who presented to [facility] ED yesterday with a chief complaint of progressively worsening SOB over the past few days. Associated symptoms included orthopnea, PND, BLE edema, and palpitations. Patient denied any associated chest pain, near syncope, syncope, fever, or chills. While in ED, patient became progressively more dyspneic and tachypneic and was subsequently placed on Avaps. Initial workup in ED revealed BNP of 828, troponin of 0.006, creatinine of 1.5, lactic acidosis (%), and hypoxia with hypercapnia. Patient subsequently admitted to ICU for further evaluation and treatment. Patient seen and examined today while in ICU. Remains dyspneic on AVAPs. Complains of some back discomfort. Repeat troponin resulted at 1.762. Echo and repeat ABG pending.

## 2018-05-19 NOTE — SUBJECTIVE & OBJECTIVE
Past Medical History:   Diagnosis Date    Allergy     Arthritis     Blood transfusion     CAD (coronary artery disease)     CHF (congestive heart failure)     Diabetes mellitus     Heart murmur     History of loop recorder     Hyperlipidemia     Hypertension     Hypothyroidism 2013    TIA (transient ischemic attack)     Ulcer        Past Surgical History:   Procedure Laterality Date    CARDIAC SURGERY      CAROTID STENT Right      SECTION      CORONARY STENT PLACEMENT      TUBAL LIGATION         Review of patient's allergies indicates:   Allergen Reactions    Penicillins Swelling       No current facility-administered medications on file prior to encounter.      Current Outpatient Prescriptions on File Prior to Encounter   Medication Sig    amLODIPine (NORVASC) 5 MG tablet Take 5 mg by mouth once daily.    blood sugar diagnostic Strp check BS fastin and  2hr after biggest meal    blood-glucose meter (FREESTYLE SYSTEM KIT) kit Use as instructed    carvedilol (COREG) 12.5 MG tablet Take 12.5 mg by mouth 2 (two) times daily with meals.    clopidogrel (PLAVIX) 75 mg tablet Take 75 mg by mouth.    enalapril (VASOTEC) 5 MG tablet Take 5 mg by mouth 2 (two) times daily.     furosemide (LASIX) 40 MG tablet Take 1 tablet (40 mg total) by mouth once daily.    lancets-blood glucose strips 30 gauge Cmpk 2 Devices by Misc.(Non-Drug; Combo Route) route 2 (two) times daily. check BS fastin and  2hr after biggest meal    levothyroxine (SYNTHROID) 75 MCG tablet TAKE 1 TABLET BY MOUTH DAILY    ondansetron (ZOFRAN) 4 MG tablet Take 1 tablet (4 mg total) by mouth every 6 (six) hours as needed for Nausea.    rosuvastatin (CRESTOR) 20 MG tablet Take 40 mg by mouth once daily.     Family History     Problem Relation (Age of Onset)    Cancer Father    Heart disease Mother, Father        Social History Main Topics    Smoking status: Never Smoker    Smokeless tobacco: Never Used    Alcohol use No     Drug use: No    Sexual activity: Yes     Partners: Male     Review of Systems   Constitutional: Negative for activity change, chills, diaphoresis, fatigue, fever and unexpected weight change.   HENT: Negative for congestion, postnasal drip, rhinorrhea, sinus pressure, sore throat and trouble swallowing.    Eyes: Negative for photophobia and visual disturbance.   Respiratory: Positive for shortness of breath. Negative for apnea, cough, chest tightness and wheezing.         Orthopnea, PND.   Cardiovascular: Positive for palpitations and leg swelling. Negative for chest pain.   Gastrointestinal: Negative for abdominal distention, abdominal pain, blood in stool, constipation, diarrhea, nausea and vomiting.   Endocrine: Negative for polydipsia, polyphagia and polyuria.   Genitourinary: Negative for difficulty urinating, dysuria, frequency, hematuria and urgency.   Musculoskeletal: Negative for arthralgias, back pain, gait problem, joint swelling and myalgias.   Skin: Negative for pallor, rash and wound.   Neurological: Negative for dizziness, seizures, syncope, speech difficulty, weakness, light-headedness, numbness and headaches.   Psychiatric/Behavioral: Negative for confusion. The patient is not nervous/anxious.    All other systems reviewed and are negative.    Objective:     Vital Signs (Most Recent):  Temp: 98 °F (36.7 °C) (05/19/18 0232)  Pulse: 104 (05/19/18 0331)  Resp: (!) 33 (05/19/18 0331)  BP: 121/65 (05/19/18 0331)  SpO2: 99 % (05/19/18 0331) Vital Signs (24h Range):  Temp:  [97.7 °F (36.5 °C)-98 °F (36.7 °C)] 98 °F (36.7 °C)  Pulse:  [104-139] 104  Resp:  [30-42] 33  SpO2:  [99 %-100 %] 99 %  BP: (111-138)/(62-88) 121/65     Weight: 104 kg (229 lb 6 oz)  Body mass index is 37.02 kg/m².    Physical Exam   Constitutional: She is oriented to person, place, and time. She appears well-developed and well-nourished. No distress.   HENT:   Head: Normocephalic and atraumatic.   Eyes: Conjunctivae are normal.    PERRL; EOM intact.   Neck: Normal range of motion. Neck supple. JVD present.   Cardiovascular: Regular rhythm, S1 normal, S2 normal and intact distal pulses.   No extrasystoles are present. Tachycardia present.  Exam reveals no gallop and no friction rub.    Murmur heard.   Systolic murmur is present   Pulses:       Radial pulses are 2+ on the right side, and 2+ on the left side.        Dorsalis pedis pulses are 2+ on the right side, and 2+ on the left side.        Posterior tibial pulses are 2+ on the right side, and 2+ on the left side.   Pulmonary/Chest: No accessory muscle usage. Tachypnea noted. She is in respiratory distress (mild). She has no wheezes. She has no rhonchi. She has rales in the right lower field and the left lower field.   Abdominal: Soft. Bowel sounds are normal. She exhibits no distension. There is no tenderness. There is no rebound, no guarding and no CVA tenderness.   Musculoskeletal: Normal range of motion. She exhibits edema (BLE). She exhibits no tenderness or deformity.   Neurological: She is alert and oriented to person, place, and time. No cranial nerve deficit or sensory deficit.   Skin: Skin is warm, dry and intact. Capillary refill takes 2 to 3 seconds. No rash noted. She is not diaphoretic. No cyanosis or erythema.   Psychiatric: She has a normal mood and affect. Her speech is normal and behavior is normal. Cognition and memory are normal.   Nursing note and vitals reviewed.          Significant Labs:   Results for orders placed or performed during the hospital encounter of 05/19/18   Brain natriuretic peptide   Result Value Ref Range     (H) 0 - 99 pg/mL   CBC auto differential   Result Value Ref Range    WBC 12.33 3.90 - 12.70 K/uL    RBC 4.44 4.00 - 5.40 M/uL    Hemoglobin 13.8 12.0 - 16.0 g/dL    Hematocrit 42.6 37.0 - 48.5 %    MCV 96 82 - 98 fL    MCH 31.1 (H) 27.0 - 31.0 pg    MCHC 32.4 32.0 - 36.0 g/dL    RDW 14.7 (H) 11.5 - 14.5 %    Platelets 329 150 - 350 K/uL     MPV 9.9 9.2 - 12.9 fL    Gran # (ANC) 5.6 1.8 - 7.7 K/uL    Lymph # 5.6 (H) 1.0 - 4.8 K/uL    Mono # 0.5 0.3 - 1.0 K/uL    Eos # 0.5 0.0 - 0.5 K/uL    Baso # 0.06 0.00 - 0.20 K/uL    Gran% 45.7 38.0 - 73.0 %    Lymph% 45.3 18.0 - 48.0 %    Mono% 4.1 4.0 - 15.0 %    Eosinophil% 4.4 0.0 - 8.0 %    Basophil% 0.5 0.0 - 1.9 %    Differential Method Automated    CK-MB   Result Value Ref Range    CPK 56 20 - 180 U/L    CPK MB 0.6 0.1 - 6.5 ng/mL    MB% 1.1 0.0 - 5.0 %   Comprehensive metabolic panel   Result Value Ref Range    Sodium 138 136 - 145 mmol/L    Potassium 4.5 3.5 - 5.1 mmol/L    Chloride 106 95 - 110 mmol/L    CO2 17 (L) 23 - 29 mmol/L    Glucose 356 (H) 70 - 110 mg/dL    BUN, Bld 20 6 - 20 mg/dL    Creatinine 1.5 (H) 0.5 - 1.4 mg/dL    Calcium 8.8 8.7 - 10.5 mg/dL    Total Protein 7.9 6.0 - 8.4 g/dL    Albumin 3.2 (L) 3.5 - 5.2 g/dL    Total Bilirubin 0.5 0.1 - 1.0 mg/dL    Alkaline Phosphatase 104 55 - 135 U/L    AST 26 10 - 40 U/L    ALT 26 10 - 44 U/L    Anion Gap 15 8 - 16 mmol/L    eGFR if African American 45 (A) >60 mL/min/1.73 m^2    eGFR if non African American 39 (A) >60 mL/min/1.73 m^2   CK   Result Value Ref Range    CPK 56 20 - 180 U/L   Troponin I   Result Value Ref Range    Troponin I 0.006 0.000 - 0.026 ng/mL   Lactic acid, plasma   Result Value Ref Range    Lactate (Lactic Acid) 5.1 (HH) 0.5 - 2.2 mmol/L   APTT   Result Value Ref Range    aPTT 22.3 21.0 - 32.0 sec   Protime-INR   Result Value Ref Range    Prothrombin Time 10.6 9.0 - 12.5 sec    INR 1.0 0.8 - 1.2   Magnesium   Result Value Ref Range    Magnesium 2.2 1.6 - 2.6 mg/dL   Phosphorus   Result Value Ref Range    Phosphorus 5.3 (H) 2.7 - 4.5 mg/dL   TSH   Result Value Ref Range    TSH 7.432 (H) 0.400 - 4.000 uIU/mL   Urinalysis   Result Value Ref Range    Specimen UA Urine, Catheterized     Color, UA Yellow Yellow, Straw, Gini    Appearance, UA Clear Clear    pH, UA 6.0 5.0 - 8.0    Specific Gravity, UA 1.010 1.005 - 1.030     Protein, UA Negative Negative    Glucose, UA Negative Negative    Ketones, UA Negative Negative    Bilirubin (UA) Negative Negative    Occult Blood UA Negative Negative    Nitrite, UA Negative Negative    Urobilinogen, UA Negative <2.0 EU/dL    Leukocytes, UA Negative Negative   Procalcitonin   Result Value Ref Range    Procalcitonin 0.04 <0.25 ng/mL   T4, free   Result Value Ref Range    Free T4 0.83 0.71 - 1.51 ng/dL   ISTAT PROCEDURE   Result Value Ref Range    POC PH 7.303 (L) 7.35 - 7.45    POC PCO2 41.4 35 - 45 mmHg    POC PO2 525 (H) 80 - 100 mmHg    POC HCO3 20.5 (L) 24 - 28 mmol/L    POC BE -6 -2 to 2 mmol/L    POC SATURATED O2 100 95 - 100 %    Rate 25     Sample ARTERIAL     Site RR     Allens Test Pass     DelSys Adult Vent     Mode AVAPS     Vt 520     FiO2 100       All pertinent labs within the past 24 hours have been reviewed.    Significant Imaging:   Imaging Results          X-Ray Chest 1 View (Preliminary result)  Result time 05/19/18 01:53:27    ED Interpretation by Ayaz Frost MD (05/19/18 01:53:27, [facility], Emergency Medicine)    Cardiomegaly Vascular Congestion                             I have reviewed all pertinent imaging results/findings within the past 24 hours.     EKG: (personally reviewed)  Atrial tachycardia, LBBB.  No acute ischemic ST-T changes from previous tracings.

## 2018-05-19 NOTE — ED NOTES
Dr Frost aware pt uncomfortable and anxious. No further orders from Dr Frost at this time. Dr Patel paged.

## 2018-05-19 NOTE — ASSESSMENT & PLAN NOTE
-Patient with significant history of CAD s/p recent PTCA/DEWAYNE of LM in-stent re-stenosis  -Initial troponin 0.006, repeat 1.762  -Continue to trend  -Continue current medical management-ASA, Coreg, statin, Brilinta  -Continue nitropaste  -Continue heparin gtt  -Check 2D echo  -Will need LHC once respiratory status is more stable

## 2018-05-19 NOTE — ASSESSMENT & PLAN NOTE
Renal: KEN. Due to prerenal azotemia due to renal hypoperfusion from poor cardiac function and MI.  CKD stage 3 at [facility]  K normal  Acid base

## 2018-05-19 NOTE — PROGRESS NOTES
Pts heart rate up to the mid 120s, RR in the mid 40s.  Pt states she feels very hot.  Dr. Jackson notified.  Dr. Jackson at bedside.  Orders received.

## 2018-05-19 NOTE — PROCEDURES
"Milli Montez is a 57 y.o. female patient.    Temp: 98.1 °F (36.7 °C) (05/19/18 0700)  Pulse: (!) 125 (05/19/18 1539)  Resp: (!) 27 (05/19/18 1539)  BP: (!) 116/96 (05/19/18 1430)  SpO2: 98 % (05/19/18 1539)  Weight: 103.2 kg (227 lb 9.6 oz) (rd reviewed) (05/19/18 1103)  Height: 5' 6" (167.6 cm) (05/19/18 1107)       Central Line  Date/Time: 5/19/2018 4:34 PM  Location procedure was performed: [facility] INTENSIVE CARE UNIT  Performed by: MARQUEZ SUAREZ  [facility]  Pre-operative Diagnosis: RESP FAILURE  Post-operative diagnosis: SAME  Consent Done: Yes  Time out: Immediately prior to procedure a "time out" was called to verify the correct patient, procedure, equipment, support staff and site/side marked as required.  Indications: vascular access  Anesthesia: see MAR for details  Description of findings: With ultrasound guidance right IJ was visualized   Preparation: skin prepped with ChloraPrep  Skin prep agent dried: skin prep agent completely dried prior to procedure  Sterile barriers: all five maximum sterile barriers used - cap, mask, sterile gown, sterile gloves, and large sterile sheet  Hand hygiene: hand hygiene performed prior to central venous catheter insertion  Location details: right internal jugular  Catheter type: triple lumen  Ultrasound guidance: yes  Number of attempts: 1  Assessment: placement verified by x-ray,  successful placement and no pneumothorax on x-ray  Technical procedures used: Seldinger with ultrasound  Complications: none  Estimated blood loss (mL): 2  Specimens: No  Implants: No  Post-procedure: line sutured,  sterile dressing applied and blood return through all ports  Complications: No          Marquez Suarez  5/19/2018  "

## 2018-05-19 NOTE — ASSESSMENT & PLAN NOTE
-Secondary to volume overload/acute on chronic decompensated diastolic CHF  -Continue IV Lasix  -Remains dyspneic on AVAPS, repeat ABG pending

## 2018-05-19 NOTE — CONSULTS
[facility] -   Critical Care Medicine  Consult Note    Patient Name: Milli Montez  MRN: [id number]  Admission Date: 2018  Hospital Length of Stay: 0 days  Code Status: Full Code  Attending Physician: Joseph Jackson MD   Primary Care Provider: Marito Amato MD   Principal Problem: Acute respiratory failure      Subjective:     HPI:  57-year-old female patient with history of CHF, coronary artery disease status post stent placement by ambulance to the hospital for worsening shortnessof breath for a few days and the respiratory distress.  She was started on noninvasive ventilation in the ER and transferred to the ICU.      Hospital/ICU Course:  Continue to be on noninvasive ventilation.ABGs and chest x-ray reviewed.afebrile.   Remains in distress tachypneic respiratory rate in the 30s.receiving IV Lasix.  An on IV heparin drip.    Past Medical History:   Diagnosis Date    Allergy     Arthritis     Blood transfusion     CAD (coronary artery disease)     CHF (congestive heart failure)     Diabetes mellitus     Heart murmur     History of loop recorder     Hyperlipidemia     Hypertension     Hypothyroidism 2013    TIA (transient ischemic attack)     Ulcer        Past Surgical History:   Procedure Laterality Date    CARDIAC SURGERY      CAROTID STENT Right      SECTION      CORONARY STENT PLACEMENT      TUBAL LIGATION         Review of patient's allergies indicates:   Allergen Reactions    Penicillins Swelling       Family History     Problem Relation (Age of Onset)    Cancer Father    Heart disease Mother, Father        Social History Main Topics    Smoking status: Never Smoker    Smokeless tobacco: Never Used    Alcohol use No    Drug use: No    Sexual activity: Yes     Partners: Male         Review of Systems   Unable to perform ROS: Acuity of condition   dependent on noninvasive ventilation mask right now.  Objective:     Vital Signs (Most Recent):  Temp:  98.1 °F (36.7 °C) (05/19/18 0700)  Pulse: 105 (05/19/18 1030)  Resp: (!) 27 (05/19/18 1030)  BP: 92/72 (05/19/18 1030)  SpO2: 98 % (05/19/18 1030) Vital Signs (24h Range):  Temp:  [97.7 °F (36.5 °C)-98.6 °F (37 °C)] 98.1 °F (36.7 °C)  Pulse:  [] 105  Resp:  [20-42] 27  SpO2:  [97 %-100 %] 98 %  BP: ()/(62-90) 92/72     Weight: 103.2 kg (227 lb 9.6 oz)  Body mass index is 36.74 kg/m².      Intake/Output Summary (Last 24 hours) at 05/19/18 1051  Last data filed at 05/19/18 0908   Gross per 24 hour   Intake               89 ml   Output              210 ml   Net             -121 ml       Physical Exam   Constitutional: She appears well-developed and well-nourished.   HENT:   Head: Atraumatic.   NIV mask   Eyes: EOM are normal.   Cardiovascular: Regular rhythm.    K cardiac at 105 (minute   Pulmonary/Chest: She has rales.   Decreased breath sounds bilaterally.  Crackles.  Tachypneic in the 30s respiratory rate.   Abdominal: Soft.   Genitourinary:   Genitourinary Comments: Carlin    Musculoskeletal: She exhibits edema.   Neurological: She is alert.   Skin: Skin is warm.   Psychiatric:   Appears anxious     Vents:  Oxygen Concentration (%): 30 (05/19/18 0908)    Lines/Drains/Airways     Drain                 Urethral Catheter 05/19/18 0115 Latex 16 Fr. less than 1 day          Peripheral Intravenous Line                 Peripheral IV - Single Lumen 05/19/18 0109 Right Antecubital less than 1 day         Peripheral IV - Single Lumen 05/19/18 Left Antecubital less than 1 day                Significant Labs:    CBC/Anemia Profile:    Recent Labs  Lab 05/19/18  0111 05/19/18  0504 05/19/18  0824   WBC 12.33 13.27* 13.27*   HGB 13.8 13.9 13.5   HCT 42.6 41.0 40.8    290 280  280   MCV 96 95 95   RDW 14.7* 14.4 14.8*        Chemistries:    Recent Labs  Lab 05/19/18  0111 05/19/18  0504    138   K 4.5 5.1    107   CO2 17* 21*   BUN 20 24*   CREATININE 1.5* 1.7*   CALCIUM 8.8 8.6*   ALBUMIN 3.2* 3.2*    PROT 7.9 7.6   BILITOT 0.5 0.5   ALKPHOS 104 101   ALT 26 32   AST 26 51*   MG 2.2  --    PHOS 5.3*  --      Significant Imaging:   CXR: I have reviewed all pertinent results/findings within the past 24 hours and my personal findings are:  Significant bilateral pulmonary congestion.      Echo April 2018:    CONCLUSIONS     1 - No wall motion abnormalities.     2 - Mildly depressed left ventricular systolic function (EF 45-50%).     3 - Impaired LV relaxation, normal LAP (grade 1 diastolic dysfunction).     4 - Normal right ventricular systolic function .     5 - The estimated PA systolic pressure is 29 mmHg.     6 - Moderate mitral regurgitation.     7 - Intermediate central venous pressure.        Assessment/Plan:     Pulmonary   * Acute respiratory failure    O2 keep sat above 90%.  Continue AVAPs for now.  Check venous lower extremity ultrasound.        Cardiac/Vascular   NSTEMI (non-ST elevated myocardial infarction)    Cardiac monitoring.  Aspirin , statin IV heparin and beta blockers, Ticagrelor        Acute on chronic combined systolic and diastolic heart failure    Strict I's and O's.  Keep Carlin.  IV Lasix 40 mg every 12 hours.  Repeat BNP.  Repeat chest x-ray in a.m.        Renal/   KEN (acute kidney injury)    Strict I's and O's.  Monitor BMP.            Critical Care Daily Checklist:    A: Awake: RASS Goal/Actual Goal:    Actual: Donato Agitation Sedation Scale (RASS): Alert and calm   B: Spontaneous Breathing Trial Performed?     C: SAT & SBT Coordinated?  Not applicable                      D: Delirium: CAM-ICU Overall CAM-ICU: Negative   E: Early Mobility Performed? Bedrest for now   F: Feeding Goal:    Status:     Current Diet Order   Procedures    Diet NPO Except for: Sips with Medication     Order Specific Question:   Except for     Answer:   Sips with Medication      AS: Analgesia/Sedation Avoid oversedation   T: Thromboembolic Prophylaxis On IV heparin   H: HOB > 300 Yes   U: Stress Ulcer  Prophylaxis (if needed) Not applicable   G: Glucose Control Fingerstick as per ICU protocol   B: Bowel Function     I: Indwelling Catheter (Lines & Carlin) Necessity Keep Carlin   D: De-escalation of Antimicrobials/Pharmacotherapies No need for antibiotic    Plan for the day/ETD Y    Code Status:  Family/Goals of Care: Full Code  Y     Patient became more tachypneic in the afternoon.  I intubated her and the rest and placed a right sided the triple-lumen catheter.  And the patient also was started on sedation with fentanyl drip.  We'll contact cardiology regarding troponin increasing to 20.    Critical Care Time: 35 minutes  Critical secondary to Patient has a condition that poses threat to life and bodily function: Severe Respiratory Distress/ACUTE KIDNEY INJURY     Critical care was time spent personally by me on the following activities: development of treatment plan with patient or surrogate and bedside caregivers, discussions with consultants, evaluation of patient's response to treatment, examination of patient, ordering and performing treatments and interventions, ordering and review of laboratory studies, ordering and review of radiographic studies, pulse oximetry, re-evaluation of patient's condition. This critical care time did not overlap with that of any other provider or involve time for any procedures.    Thank you for your consult. I will follow-up with patient. Please contact us if you have any additional questions.     Marquez Booth MD  Critical Care Medicine  [facility] -

## 2018-05-19 NOTE — ASSESSMENT & PLAN NOTE
Related to (etiology):   Inability to consume adequate intake    Signs and Symptoms (as evidenced by):   NPO, current oxygen therapy     Interventions/Recommendations (treatment strategy):  Cardiac, Diabetic Diet  Site RD to monitor & educate    Nutrition Diagnosis Status:   New

## 2018-05-19 NOTE — ASSESSMENT & PLAN NOTE
Strict I's and O's.  Keep Carlin.  IV Lasix 40 mg every 12 hours.  Repeat BNP.  Repeat chest x-ray in a.m.

## 2018-05-19 NOTE — HPI
"Pt was seen and examined in ICU. H/o reviewed. Pt is a 56 y/o female with acute kidney injury, likely due to CHF exacerbation causing renal hypoperfusion. Pt has a baseline s Cr of 1.2 (1 month ago). s Cr is now 1.7. Pt was admitted with SOB that is "new", pt also has low exercise tolerance chronically due to SOB. Pt has a pertinent h/o of DM, HTN, CAD with past h/o of CABG and stents, and morbid obesity. Labs and meds in ER and ICU reviewed. Pt has received lasix IV injection, and remains fluid overloaded with LE swelling and continued SOB.  "

## 2018-05-19 NOTE — HPI
57-year-old female patient with history of CHF, coronary artery disease status post stent placement by ambulance to the hospital for worsening shortnessof breath for a few days and the respiratory distress.  She was started on noninvasive ventilation in the ER and transferred to the ICU.

## 2018-05-19 NOTE — HOSPITAL COURSE
Continue to be on noninvasive ventilation.ABGs and chest x-ray reviewed.afebrile.  5/19 Remains in distress tachypneic respiratory rate in the 30s.receiving IV Lasix.  An on IV heparin drip.  5/20 Sp intubation, Rt TLC intubation. On levophed, heparin, lasix, fentanyl gtt. Chest x-ray and ABG reviewed.  5/21 - Fully awake and comfortable intubated on mech ventilation.  Still on Heparin, Lasix, Levophed and Fentanyl infusions  5/22 - Still intubated on mech ventilation and Fentanyl infusion fully awake and responsive with stimuli.  Still on Levophed infusion.  Lasix infusion stopped.  05/23: Levophed started at 0.04 now at 0.08, off vent, ABG respiratory alkalosis  05/24: Symptomatic Afib RVR, sedation with cardioversion at bedside, amiodarone loaded, K+ and Mag+, levo weaned down, Added Abx: Vanco and Azactam    05/25: worsened lung infiltrate, edema, CRRT will be added  5/26 required intubation overnight with high PEEP requirement, CRRT not tolerated despite pressor and dobutrex support

## 2018-05-19 NOTE — CONSULTS
"  [facility]  Adult Nutrition  Consult Note    SUMMARY     Recommendations    Recommendation/Intervention: When feasible, advance diet as tolerated to Caridac, Diabetic diet. Site RD to monitor and educate on home diet complaince.  Goals: advancement energy intake within 48hrs  Nutrition Goal Status: new  Communication of RD Recs:  (poc reviewed)    Nutrition Discharge Planning: Cardiac, diabetic diet with adequate intake to meet EEN & EPN.    Reason for Assessment    Reason for Assessment: consult  Diagnosis: cardiac disease, pulmonary disease (acute respiratory failure)  Relevant Medical History: CHF, HLD, HTN, Ulcer, TIA, Diabetes, CAD  General Information Comments: Remote assessment completed. Per chart review, pt s/p recent heart cath with stent placement on 5/3/18; currently on AVAPS. pt noncompliant with home diet.      Nutrition Risk Screen    Nutrition Risk Screen: no indicators present    Nutrition/Diet History    Patient Reported Diet/Restrictions/Preferences: general  Do you have any cultural, spiritual, Jewish conflicts, given your current situation?: none reported  Food Allergies: NKFA  Factors Affecting Nutritional Intake: other (see comments), NPO (on AVAPS)    Anthropometrics    Temp: 98.1 °F (36.7 °C)  Height Method: Estimated  Height: 5' 6" (167.6 cm)  Height (inches): 66 in  Weight Method: Bed Scale  Weight: 103.2 kg (227 lb 9.6 oz) (rd reviewed)  Weight (lb): 227.6 lb  Ideal Body Weight (IBW), Female: 130 lb  % Ideal Body Weight, Female (lb): 175.08 lb  BMI (Calculated): 36.8  BMI Grade: 35 - 39.9 - obesity - grade II       Lab/Procedures/Meds    Pertinent Labs Reviewed: reviewed  Pertinent Medications Reviewed: reviewed      Recent Labs  Lab 05/19/18  0504   CALCIUM 8.6*   PROT 7.6      K 5.1   CO2 21*      BUN 24*   CREATININE 1.7*   ALKPHOS 101   ALT 32   AST 51*   BILITOT 0.5     Scheduled Meds:   aspirin  81 mg Oral Daily    carvedilol  6.25 mg Oral BID WM "    furosemide  60 mg Intravenous BID    levothyroxine  75 mcg Oral Daily    nitroGLYCERIN 2% TD oint  1 inch Topical (Top) Q6H    potassium chloride  40 mEq Oral BID    rosuvastatin  40 mg Oral Daily    ticagrelor  90 mg Oral BID     Continuous Infusions:   heparin (porcine) in D5W 12 Units/kg/hr (05/19/18 0915)     PRN Meds:.acetaminophen, dextrose 50%, glucagon (human recombinant), heparin (PORCINE), heparin (PORCINE), insulin aspart U-100, nitroGLYCERIN, ondansetron, ramelteon     Physical Findings/Assessment    Overall Physical Appearance: obese, on oxygen therapy  Tubes:  (-)  Oral/Mouth Cavity:  (UTO)  Skin: intact    Estimated/Assessed Needs    Weight Used For Calorie Calculations: 103.2 kg (227 lb 8.2 oz)  Energy Calorie Requirements (kcal): 1643  Energy Need Method: [place]-St Jeor (No AF BMI >25)  Protein Requirements: 82 (0.8)  Weight Used For Protein Calculations: 103.2 kg (227 lb 8.2 oz)  Fluid Requirements (mL): 1ml/kcal or per MD     RDA Method (mL): 1643  CHO Requirement: 50% calories      Nutrition Prescription Ordered    Current Diet Order: NPO    Evaluation of Received Nutrient/Fluid Intake    Energy Calories Required: not meeting needs  Protein Required: not meeting needs  Fluid Required: meeting needs  % Intake of Estimated Energy Needs: Other: NPO  % Meal Intake: NPO    Nutrition Risk    Level of Risk/Frequency of Follow-up:  (F/U 2x/wk)     Assessment and Plan    Inadequate dietary energy intake    Related to (etiology):   Inability to consume adequate intake    Signs and Symptoms (as evidenced by):   NPO, current oxygen therapy     Interventions/Recommendations (treatment strategy):  Cardiac, Diabetic Diet  Site RD to monitor & educate    Nutrition Diagnosis Status:   New               Monitor and Evaluation    Food and Nutrient Intake: food and beverage intake  Food and Nutrient Adminstration: diet order  Knowledge/Beliefs/Attitudes: food and nutrition knowledge/skill, beliefs and  attitudes  Physical Activity and Function: nutrition-related ADLs and IADLs  Anthropometric Measurements: weight, weight change  Biochemical Data, Medical Tests and Procedures: electrolyte and renal panel, gastrointestinal profile, glucose/endocrine profile, inflammatory profile, lipid profile  Nutrition-Focused Physical Findings: overall appearance     Nutrition Follow-Up    RD Follow-up?: Yes

## 2018-05-19 NOTE — H&P
Patient was seen and examined today . Case discussed with pulmonology,nephrology and cardiology . Continue with iv diuresis as long as her blood pressure allows to help with respiratory status .

## 2018-05-19 NOTE — CONSULTS
"[facility]  Nephrology  Consult Note    Patient Name: Milli Montez  MRN: [id number]  Admission Date: 2018  Hospital Length of Stay: 0 days  Attending Provider: Joseph Jackson MD   Primary Care Physician: Marito Amato MD  Principal Problem:Acute respiratory failure. KEN. CHF, MI    Referring physician: Dr. Joseph Jackson  Reason for consult: KEN and assistance with fluid mgmt    Consults  Subjective:     HPI: Pt was seen and examined in ICU. H/o reviewed. Pt is a 56 y/o female with acute kidney injury, likely due to CHF exacerbation causing renal hypoperfusion. Pt has a baseline s Cr of 1.2 (1 month ago). s Cr is now 1.7. Pt was admitted with SOB that is "new." Per daughter, SOB occurred acutely. Pt apparently had no CP, no cough, no fever, no other discomfort. Pt also has low exercise tolerance chronically due to SOB. Pt has a pertinent h/o of DM, HTN, CAD with past h/o of CABG and stents, and morbid obesity. Labs and meds in ER and ICU reviewed. Pt has received lasix IV injection, and remains fluid overloaded with LE swelling and continued SOB.    Past Medical History:   Diagnosis Date    Allergy     Arthritis     Blood transfusion     CAD (coronary artery disease)     CHF (congestive heart failure)     Diabetes mellitus     Heart murmur     History of loop recorder     Hyperlipidemia     Hypertension     Hypothyroidism 2013    TIA (transient ischemic attack)     Ulcer        Past Surgical History:   Procedure Laterality Date    CARDIAC SURGERY      CAROTID STENT Right      SECTION      CORONARY STENT PLACEMENT      TUBAL LIGATION         Review of patient's allergies indicates:   Allergen Reactions    Penicillins Swelling     Current Facility-Administered Medications   Medication Frequency    acetaminophen tablet 650 mg Q6H PRN    aspirin EC tablet 81 mg Daily    carvedilol tablet 6.25 mg BID WM    dextrose 50% injection 12.5 g PRN    furosemide " (LASIX) 2 mg/mL in sodium chloride 0.9% 100 mL infusion (conc: 2 mg/mL) Continuous    glucagon (human recombinant) injection 1 mg PRN    heparin 25,000 units in dextrose 5% 250 mL (100 units/mL) bolus from bag; PRN BOLUS PRN    heparin 25,000 units in dextrose 5% 250 mL (100 units/mL) bolus from bag; PRN BOLUS PRN    heparin 25,000 units in dextrose 5% 250 mL (100 units/mL) infusion; FEMALE Continuous    insulin aspart U-100 pen 0-5 Units Q6H PRN    levothyroxine tablet 75 mcg Daily    nitroGLYCERIN 2% TD oint ointment 1 inch Q6H    nitroGLYCERIN SL tablet 0.4 mg Q5 Min PRN    ondansetron injection 4 mg Q6H PRN    potassium chloride SA CR tablet 40 mEq BID    ramelteon tablet 8 mg Nightly PRN    rosuvastatin tablet 40 mg Daily    ticagrelor tablet 90 mg BID     Family History     Problem Relation (Age of Onset)    Cancer Father    Heart disease Mother, Father        Social History Main Topics    Smoking status: Never Smoker    Smokeless tobacco: Never Used    Alcohol use No    Drug use: No    Sexual activity: Yes     Partners: Male     Review of Systems   Constitutional: Positive for activity change and fatigue.   HENT: Negative.    Respiratory: Positive for shortness of breath.    Cardiovascular: Negative.    Genitourinary: Negative.    Musculoskeletal: Negative.    Neurological: Negative.    Psychiatric/Behavioral: Negative.      Objective:     Vital Signs (Most Recent):  Temp: 98.1 °F (36.7 °C) (05/19/18 0700)  Pulse: (!) 113 (05/19/18 1445)  Resp: (!) 35 (05/19/18 1445)  BP: (!) 116/96 (05/19/18 1430)  SpO2: 95 % (05/19/18 1445)  O2 Device (Oxygen Therapy): Other (see comments) (NIV AVAPS MODE) (05/19/18 1140) Vital Signs (24h Range):  Temp:  [97.7 °F (36.5 °C)-98.6 °F (37 °C)] 98.1 °F (36.7 °C)  Pulse:  [] 113  Resp:  [20-42] 35  SpO2:  [95 %-100 %] 95 %  BP: ()/(59-96) 116/96     Weight: 103.2 kg (227 lb 9.6 oz) (rd reviewed) (05/19/18 1103)  Body mass index is 36.74 kg/m².  Body  surface area is 2.19 meters squared.    I/O last 3 completed shifts:  In: 50 [P.O.:50]  Out: 125 [Urine:125]    Physical Exam   Constitutional: She is oriented to person, place, and time. She appears well-developed and well-nourished. No distress.   HENT:   Head: Normocephalic and atraumatic.   Neck: JVD present.   Cardiovascular: Normal rate, regular rhythm and normal heart sounds.  Exam reveals no friction rub.    Pulmonary/Chest: She is in respiratory distress. She has rales.   Abdominal: Soft. She exhibits no distension. There is no tenderness.   Musculoskeletal: She exhibits edema.   Neurological: She is oriented to person, place, and time.   Skin: Skin is warm and dry.   Psychiatric: She has a normal mood and affect. Her behavior is normal.   Nursing note and vitals reviewed.      Significant Labs: reviewed  BMP  Lab Results   Component Value Date     05/19/2018    K 5.1 05/19/2018     05/19/2018    CO2 21 (L) 05/19/2018    BUN 24 (H) 05/19/2018    CREATININE 1.7 (H) 05/19/2018    CALCIUM 8.6 (L) 05/19/2018    ANIONGAP 10 05/19/2018    ESTGFRAFRICA 39 (A) 05/19/2018    EGFRNONAA 33 (A) 05/19/2018     Lab Results   Component Value Date    WBC 13.27 (H) 05/19/2018    HGB 13.5 05/19/2018    HCT 40.8 05/19/2018    MCV 95 05/19/2018     05/19/2018     05/19/2018       Recent Labs  Lab 05/19/18  1203   TROPONINI 20.097*     ABG    Recent Labs  Lab 05/19/18  1443   PH 7.390   PO2 69*   PCO2 33.5*   HCO3 20.3*   BE -5         Significant Imaging:  Reviewed CXR, has cardiomegaly and large, diffuse pulmonary edema    Assessment/Plan:     KEN (acute kidney injury)    Renal: KEN. Due to prerenal azotemia due to renal hypoperfusion from poor cardiac function and MI.  CKD stage 3 at [facility]  K normal  Acid base: respiratory acidosis and metabolic acidosis on the initial ABG        CAD (coronary artery disease)    Significant cardiac h/o.  Past h/o of CAD  CABG (LIMA to Diagonal1 + distal LAD, and  SVG-RPDA) and balloon PTCA of LM stent on 2/6/18 followed by repeat balloon PTCA + DEWAYNE of LM in-stent steosis 5/2/18,  Current symptoms caused by the heart attack.  SOB acutely, unusual for CHF. Caused by MI  Has baseline SOB and low exercise tolerance due to CAD and CHF  Has combined systolic and diastolic dysfunction.  Acute of chronic CHF due to MI  Lactic acidosis c/w cardiogenic shock. MI  Respiratory acidosis and metabolic acidosis on the initial ABG  Poor response to IV lasix injection  Will switch to IV lasix drip + metolazone prn     Respiratory failure: pt is being intubated.        Plans and recommendations:  As discussed above  Mgmt reviewed  Cardiology has been consulted by the ICU  D/c lasix Iv injection  Start lasix IV infusion at 5 mg/hr + metolazone 5 mg x 1 and as needed  Total critical care time spent 70 minutes including time needed to review the records, the   patient evaluation, documentation, face-to-face discussion with the patient,   more than 50% of the time was spent on coordination of care and counseling.    Level V visit.      Sita nKight MD  Nephrology  [facility]

## 2018-05-19 NOTE — ASSESSMENT & PLAN NOTE
- Likely secondary to medication and dietary noncompliance.  - Recent 2D echo at [facility] showed LVEF of 55-60%.  - Aggressive diuresis with IV Lasix 60mg BID.  - Strict I&O's, daily weights, Na/fluid restriction.  - Cardiology consult in AM.

## 2018-05-19 NOTE — PLAN OF CARE
Problem: Nutrition, Imbalanced: Inadequate Oral Intake (Adult)  Intervention: Promote/Optimize Nutrition  Nutrition Goals: advancement energy intake within 48hrs  Nutrition Goal Status: new  Communication of RD Recs:  (poc reviewed)    Nutrition Discharge Planning: Cardiac, diabetic diet with adequate intake to meet EEN & EPN.

## 2018-05-19 NOTE — H&P
[facility] Hospital Medicine  History & Physical    Patient Name: Milli Montez  MRN: [id number]  Admission Date: 5/19/2018  Attending Physician: Dayne Patel MD   Primary Care Provider: Marito Amato MD         Patient information was obtained from patient, relative(s), past medical records and ER records.     Subjective:     Principal Problem:Acute respiratory failure    Chief Complaint:   Chief Complaint   Patient presents with    Shortness of Breath     sob worsening over several days; tachycardic, tachypneic, arrived on bipap        HPI: Ms. Montez is a 56yo female with  a PMHx of CAD with remote CABG (LIMA to Diagonal1 + distal LAD, and SVG-RPDA) and balloon PTCA of LM stent on 2/6/18 followed by repeat balloon PTCA + DEWAYNE of LM in-stent steosis 5/2/18, chronic diastolic HFpEF of 55-60%, HLD, HTN, DM II, and h/o TIA, who presented to the ED with c/o SOB that has progressively worsened over the past few days.  Associated orthopnea, PND, BLE edema, and palpitations.  Denies any CP, cough, weight gain, ABD pain or distention, N/V/D, dysuria, lightheadedness/dizziness, syncope, HA, AMS, focal deficits, diaphoresis, fever, or chills.  Upon arrival to ED, patient notably in severe respiratory distress and placed on BiPAP, and later AVAPS.  She received IV Lasix 40mg, followed by Lasix 60mg IV and IV Ativan with improvement in respiratory status.  Initial work-up resulted , troponin 0.006, cr. 1.5, lactic 5.1, ABG on AVAPS with pH 7.3, pCO2 41, pO2 525, HCO3 20.  CXR consistent with CHF.  EKG showed atrial tachycardia with LBBB (old), no acute ischemic ST-T changes from previous tracings.  Patient was admitted to ICU for acute decompensated HF under Hospital Medicine services.  Currently, patient appears comfortable in NAD.  Reports SOB greatly improved since IV Lasix and Ativan.  Patient is a Full Code.  Daughter Katherine eLwis is surrogate decision maker.    Past Medical History:    Diagnosis Date    Allergy     Arthritis     Blood transfusion     CAD (coronary artery disease)     CHF (congestive heart failure)     Diabetes mellitus     Heart murmur     History of loop recorder     Hyperlipidemia     Hypertension     Hypothyroidism 2013    TIA (transient ischemic attack)     Ulcer        Past Surgical History:   Procedure Laterality Date    CARDIAC SURGERY      CAROTID STENT Right      SECTION      CORONARY STENT PLACEMENT      TUBAL LIGATION         Review of patient's allergies indicates:   Allergen Reactions    Penicillins Swelling       No current facility-administered medications on file prior to encounter.      Current Outpatient Prescriptions on File Prior to Encounter   Medication Sig    amLODIPine (NORVASC) 5 MG tablet Take 5 mg by mouth once daily.    blood sugar diagnostic Strp check BS fastin and  2hr after biggest meal    blood-glucose meter (FREESTYLE SYSTEM KIT) kit Use as instructed    carvedilol (COREG) 12.5 MG tablet Take 12.5 mg by mouth 2 (two) times daily with meals.    clopidogrel (PLAVIX) 75 mg tablet Take 75 mg by mouth.    enalapril (VASOTEC) 5 MG tablet Take 5 mg by mouth 2 (two) times daily.     furosemide (LASIX) 40 MG tablet Take 1 tablet (40 mg total) by mouth once daily.    lancets-blood glucose strips 30 gauge Cmpk 2 Devices by Misc.(Non-Drug; Combo Route) route 2 (two) times daily. check BS fastin and  2hr after biggest meal    levothyroxine (SYNTHROID) 75 MCG tablet TAKE 1 TABLET BY MOUTH DAILY    ondansetron (ZOFRAN) 4 MG tablet Take 1 tablet (4 mg total) by mouth every 6 (six) hours as needed for Nausea.    rosuvastatin (CRESTOR) 20 MG tablet Take 40 mg by mouth once daily.     Family History     Problem Relation (Age of Onset)    Cancer Father    Heart disease Mother, Father        Social History Main Topics    Smoking status: Never Smoker    Smokeless tobacco: Never Used    Alcohol use No    Drug use: No     Sexual activity: Yes     Partners: Male     Review of Systems   Constitutional: Negative for activity change, chills, diaphoresis, fatigue, fever and unexpected weight change.   HENT: Negative for congestion, postnasal drip, rhinorrhea, sinus pressure, sore throat and trouble swallowing.    Eyes: Negative for photophobia and visual disturbance.   Respiratory: Positive for shortness of breath. Negative for apnea, cough, chest tightness and wheezing.         Orthopnea, PND.   Cardiovascular: Positive for palpitations and leg swelling. Negative for chest pain.   Gastrointestinal: Negative for abdominal distention, abdominal pain, blood in stool, constipation, diarrhea, nausea and vomiting.   Endocrine: Negative for polydipsia, polyphagia and polyuria.   Genitourinary: Negative for difficulty urinating, dysuria, frequency, hematuria and urgency.   Musculoskeletal: Negative for arthralgias, back pain, gait problem, joint swelling and myalgias.   Skin: Negative for pallor, rash and wound.   Neurological: Negative for dizziness, seizures, syncope, speech difficulty, weakness, light-headedness, numbness and headaches.   Psychiatric/Behavioral: Negative for confusion. The patient is not nervous/anxious.    All other systems reviewed and are negative.    Objective:     Vital Signs (Most Recent):  Temp: 98 °F (36.7 °C) (05/19/18 0232)  Pulse: 104 (05/19/18 0331)  Resp: (!) 33 (05/19/18 0331)  BP: 121/65 (05/19/18 0331)  SpO2: 99 % (05/19/18 0331) Vital Signs (24h Range):  Temp:  [97.7 °F (36.5 °C)-98 °F (36.7 °C)] 98 °F (36.7 °C)  Pulse:  [104-139] 104  Resp:  [30-42] 33  SpO2:  [99 %-100 %] 99 %  BP: (111-138)/(62-88) 121/65     Weight: 104 kg (229 lb 6 oz)  Body mass index is 37.02 kg/m².    Physical Exam   Constitutional: She is oriented to person, place, and time. She appears well-developed and well-nourished. No distress.   HENT:   Head: Normocephalic and atraumatic.   Eyes: Conjunctivae are normal.   PERRL; EOM intact.    Neck: Normal range of motion. Neck supple. JVD present.   Cardiovascular: Regular rhythm, S1 normal, S2 normal and intact distal pulses.   No extrasystoles are present. Tachycardia present.  Exam reveals no gallop and no friction rub.    Murmur heard.   Systolic murmur is present   Pulses:       Radial pulses are 2+ on the right side, and 2+ on the left side.        Dorsalis pedis pulses are 2+ on the right side, and 2+ on the left side.        Posterior tibial pulses are 2+ on the right side, and 2+ on the left side.   Pulmonary/Chest: No accessory muscle usage. Tachypnea noted. She is in respiratory distress (mild). She has no wheezes. She has no rhonchi. She has rales in the right lower field and the left lower field.   Abdominal: Soft. Bowel sounds are normal. She exhibits no distension. There is no tenderness. There is no rebound, no guarding and no CVA tenderness.   Musculoskeletal: Normal range of motion. She exhibits edema (BLE). She exhibits no tenderness or deformity.   Neurological: She is alert and oriented to person, place, and time. No cranial nerve deficit or sensory deficit.   Skin: Skin is warm, dry and intact. Capillary refill takes 2 to 3 seconds. No rash noted. She is not diaphoretic. No cyanosis or erythema.   Psychiatric: She has a normal mood and affect. Her speech is normal and behavior is normal. Cognition and memory are normal.   Nursing note and vitals reviewed.          Significant Labs:   Results for orders placed or performed during the hospital encounter of 05/19/18   Brain natriuretic peptide   Result Value Ref Range     (H) 0 - 99 pg/mL   CBC auto differential   Result Value Ref Range    WBC 12.33 3.90 - 12.70 K/uL    RBC 4.44 4.00 - 5.40 M/uL    Hemoglobin 13.8 12.0 - 16.0 g/dL    Hematocrit 42.6 37.0 - 48.5 %    MCV 96 82 - 98 fL    MCH 31.1 (H) 27.0 - 31.0 pg    MCHC 32.4 32.0 - 36.0 g/dL    RDW 14.7 (H) 11.5 - 14.5 %    Platelets 329 150 - 350 K/uL    MPV 9.9 9.2 - 12.9  fL    Gran # (ANC) 5.6 1.8 - 7.7 K/uL    Lymph # 5.6 (H) 1.0 - 4.8 K/uL    Mono # 0.5 0.3 - 1.0 K/uL    Eos # 0.5 0.0 - 0.5 K/uL    Baso # 0.06 0.00 - 0.20 K/uL    Gran% 45.7 38.0 - 73.0 %    Lymph% 45.3 18.0 - 48.0 %    Mono% 4.1 4.0 - 15.0 %    Eosinophil% 4.4 0.0 - 8.0 %    Basophil% 0.5 0.0 - 1.9 %    Differential Method Automated    CK-MB   Result Value Ref Range    CPK 56 20 - 180 U/L    CPK MB 0.6 0.1 - 6.5 ng/mL    MB% 1.1 0.0 - 5.0 %   Comprehensive metabolic panel   Result Value Ref Range    Sodium 138 136 - 145 mmol/L    Potassium 4.5 3.5 - 5.1 mmol/L    Chloride 106 95 - 110 mmol/L    CO2 17 (L) 23 - 29 mmol/L    Glucose 356 (H) 70 - 110 mg/dL    BUN, Bld 20 6 - 20 mg/dL    Creatinine 1.5 (H) 0.5 - 1.4 mg/dL    Calcium 8.8 8.7 - 10.5 mg/dL    Total Protein 7.9 6.0 - 8.4 g/dL    Albumin 3.2 (L) 3.5 - 5.2 g/dL    Total Bilirubin 0.5 0.1 - 1.0 mg/dL    Alkaline Phosphatase 104 55 - 135 U/L    AST 26 10 - 40 U/L    ALT 26 10 - 44 U/L    Anion Gap 15 8 - 16 mmol/L    eGFR if African American 45 (A) >60 mL/min/1.73 m^2    eGFR if non African American 39 (A) >60 mL/min/1.73 m^2   CK   Result Value Ref Range    CPK 56 20 - 180 U/L   Troponin I   Result Value Ref Range    Troponin I 0.006 0.000 - 0.026 ng/mL   Lactic acid, plasma   Result Value Ref Range    Lactate (Lactic Acid) 5.1 (HH) 0.5 - 2.2 mmol/L   APTT   Result Value Ref Range    aPTT 22.3 21.0 - 32.0 sec   Protime-INR   Result Value Ref Range    Prothrombin Time 10.6 9.0 - 12.5 sec    INR 1.0 0.8 - 1.2   Magnesium   Result Value Ref Range    Magnesium 2.2 1.6 - 2.6 mg/dL   Phosphorus   Result Value Ref Range    Phosphorus 5.3 (H) 2.7 - 4.5 mg/dL   TSH   Result Value Ref Range    TSH 7.432 (H) 0.400 - 4.000 uIU/mL   Urinalysis   Result Value Ref Range    Specimen UA Urine, Catheterized     Color, UA Yellow Yellow, Straw, Gini    Appearance, UA Clear Clear    pH, UA 6.0 5.0 - 8.0    Specific Gravity, UA 1.010 1.005 - 1.030    Protein, UA Negative  Negative    Glucose, UA Negative Negative    Ketones, UA Negative Negative    Bilirubin (UA) Negative Negative    Occult Blood UA Negative Negative    Nitrite, UA Negative Negative    Urobilinogen, UA Negative <2.0 EU/dL    Leukocytes, UA Negative Negative   Procalcitonin   Result Value Ref Range    Procalcitonin 0.04 <0.25 ng/mL   T4, free   Result Value Ref Range    Free T4 0.83 0.71 - 1.51 ng/dL   ISTAT PROCEDURE   Result Value Ref Range    POC PH 7.303 (L) 7.35 - 7.45    POC PCO2 41.4 35 - 45 mmHg    POC PO2 525 (H) 80 - 100 mmHg    POC HCO3 20.5 (L) 24 - 28 mmol/L    POC BE -6 -2 to 2 mmol/L    POC SATURATED O2 100 95 - 100 %    Rate 25     Sample ARTERIAL     Site RR     Allens Test Pass     DelSys Adult Vent     Mode AVAPS     Vt 520     FiO2 100       All pertinent labs within the past 24 hours have been reviewed.    Significant Imaging:   Imaging Results          X-Ray Chest 1 View (Preliminary result)  Result time 05/19/18 01:53:27    ED Interpretation by Ayaz Frost MD (05/19/18 01:53:27, [facility], Emergency Medicine)    Cardiomegaly Vascular Congestion                             I have reviewed all pertinent imaging results/findings within the past 24 hours.     EKG: (personally reviewed)  Atrial tachycardia, LBBB.  No acute ischemic ST-T changes from previous tracings.         Assessment/Plan:     * Acute respiratory failure    - Likely secondary to #2.  - Unable to perform CTA due to KEN.  Will order D-Dimer.  - Currently stable on AVAPS. Pulmonary/ICU consult for management, appreciate assistance.  - Diurese.        Acute on chronic diastolic congestive heart failure    - Likely secondary to medication and dietary noncompliance.  - Recent 2D echo at [facility] showed LVEF of 55-60%.  - Aggressive diuresis with IV Lasix 60mg BID.  - Strict I&O's, daily weights, Na/fluid restriction.  - Cardiology consult in AM.        CAD (coronary artery disease)    - Recent DEWAYNE of LM  due to critical in-stent stenosis on 5/2/18 per Dr. Wooten ([facility]).  - No angina.  Initial troponin negative, EKG unrevealing.  Follow serial results.  - Continue ASA, Brilinta, BB, and high-intensity statin.  - Cardiology consult in AM.        KEN (acute kidney injury)    - Likely cardiorenal syndrome.  - Diurese as above.  - Avoid nephrotoxic agents.  - Follow kidney function closely.        Hyperlipidemia    - Continue statin therapy.  - FLP in AM.        Morbid obesity with BMI of 40.0-44.9, adult    - Lifestyle modifications.  - Pulmonology consult, ? [facility].        Essential hypertension    - BP stable.  - Resume home Coreg.  - Diuretics as above.  - Will hold ACEi due to KEN, resume once cr. stable.        Type 2 diabetes mellitus with hyperglycemia    - Accuchecks with SSI.  - Hold glipizide, will resume at DC.  - Check HbA1c.          VTE Risk Mitigation         Ordered     enoxaparin injection 40 mg  Daily      05/19/18 0248     IP VTE HIGH RISK PATIENT  Once      05/19/18 0248        Critical care time spent on the evaluation and treatment of severe organ dysfunction, review of pertinent labs and imaging studies, discussions with consulting providers and discussions with patient/family: 60 minutes.     GUSTAVO Garcia  Department of Hospital Medicine   [facility]

## 2018-05-19 NOTE — ED NOTES
Family updated on pt status. Family states pt underwent a heart cath with stent placement 05/03/18 at [facility] by cardiologist Dr Wooten. Per family, pt coded during cath and Dr had to place a stent inside of another stent to stabilize pt. Dr Frost notified.

## 2018-05-19 NOTE — ASSESSMENT & PLAN NOTE
Significant cardiac h/o.  Past h/o of CAD  Current symptoms caused by the heart attack.  SOB acutely, unusual for CHF. Caused by MI  Has baseline SOB and low exercise tolerance due to CAD and CHF  Has combined systolic and diastolic dysfunction.  Acute of chronic CHF due to MI  Lactic acidosis c/w cardiogenic shock. MI  Poor response to IV lasix injection  Will switch to IV lasix drip + metolazone prn     Respiratory failure: pt is being intubated.

## 2018-05-19 NOTE — PLAN OF CARE
Problem: Patient Care Overview  Goal: Plan of Care Review  Outcome: Ongoing (interventions implemented as appropriate)  Patient on vent support at this time with setting verified with Dr. Booth. Will continue to monitor. RT to follow.

## 2018-05-19 NOTE — SUBJECTIVE & OBJECTIVE
Past Medical History:   Diagnosis Date    Allergy     Arthritis     Blood transfusion     CAD (coronary artery disease)     CHF (congestive heart failure)     Diabetes mellitus     Heart murmur     History of loop recorder     Hyperlipidemia     Hypertension     Hypothyroidism 2013    TIA (transient ischemic attack)     Ulcer        Past Surgical History:   Procedure Laterality Date    CARDIAC SURGERY      CAROTID STENT Right      SECTION      CORONARY STENT PLACEMENT      TUBAL LIGATION         Review of patient's allergies indicates:   Allergen Reactions    Penicillins Swelling       Family History     Problem Relation (Age of Onset)    Cancer Father    Heart disease Mother, Father        Social History Main Topics    Smoking status: Never Smoker    Smokeless tobacco: Never Used    Alcohol use No    Drug use: No    Sexual activity: Yes     Partners: Male         Review of Systems   Unable to perform ROS: Acuity of condition   dependent on noninvasive ventilation mask right now.  Objective:     Vital Signs (Most Recent):  Temp: 98.1 °F (36.7 °C) (18 0700)  Pulse: 105 (18 1030)  Resp: (!) 27 (18 1030)  BP: 92/72 (18 1030)  SpO2: 98 % (18 1030) Vital Signs (24h Range):  Temp:  [97.7 °F (36.5 °C)-98.6 °F (37 °C)] 98.1 °F (36.7 °C)  Pulse:  [] 105  Resp:  [20-42] 27  SpO2:  [97 %-100 %] 98 %  BP: ()/(62-90) 92/72     Weight: 103.2 kg (227 lb 9.6 oz)  Body mass index is 36.74 kg/m².      Intake/Output Summary (Last 24 hours) at 18 1051  Last data filed at 18 0908   Gross per 24 hour   Intake               89 ml   Output              210 ml   Net             -121 ml       Physical Exam   Constitutional: She appears well-developed and well-nourished.   HENT:   Head: Atraumatic.   NIV mask   Eyes: EOM are normal.   Cardiovascular: Regular rhythm.    K cardiac at 105 (minute   Pulmonary/Chest: She has rales.   Decreased breath sounds  bilaterally.  Crackles.  Tachypneic in the 30s respiratory rate.   Abdominal: Soft.   Genitourinary:   Genitourinary Comments: Carlin    Musculoskeletal: She exhibits edema.   Neurological: She is alert.   Skin: Skin is warm.   Psychiatric:   Appears anxious     Vents:  Oxygen Concentration (%): 30 (05/19/18 0908)    Lines/Drains/Airways     Drain                 Urethral Catheter 05/19/18 0115 Latex 16 Fr. less than 1 day          Peripheral Intravenous Line                 Peripheral IV - Single Lumen 05/19/18 0109 Right Antecubital less than 1 day         Peripheral IV - Single Lumen 05/19/18 Left Antecubital less than 1 day                Significant Labs:    CBC/Anemia Profile:    Recent Labs  Lab 05/19/18  0111 05/19/18  0504 05/19/18  0824   WBC 12.33 13.27* 13.27*   HGB 13.8 13.9 13.5   HCT 42.6 41.0 40.8    290 280  280   MCV 96 95 95   RDW 14.7* 14.4 14.8*        Chemistries:    Recent Labs  Lab 05/19/18 0111 05/19/18  0504    138   K 4.5 5.1    107   CO2 17* 21*   BUN 20 24*   CREATININE 1.5* 1.7*   CALCIUM 8.8 8.6*   ALBUMIN 3.2* 3.2*   PROT 7.9 7.6   BILITOT 0.5 0.5   ALKPHOS 104 101   ALT 26 32   AST 26 51*   MG 2.2  --    PHOS 5.3*  --      Significant Imaging:   CXR: I have reviewed all pertinent results/findings within the past 24 hours and my personal findings are:  Significant bilateral pulmonary congestion.      Echo April 2018:    CONCLUSIONS     1 - No wall motion abnormalities.     2 - Mildly depressed left ventricular systolic function (EF 45-50%).     3 - Impaired LV relaxation, normal LAP (grade 1 diastolic dysfunction).     4 - Normal right ventricular systolic function .     5 - The estimated PA systolic pressure is 29 mmHg.     6 - Moderate mitral regurgitation.     7 - Intermediate central venous pressure.

## 2018-05-19 NOTE — PROGRESS NOTES
Repeat troponin resulted 1.762, up from 0.006.  Patient denies any active CP, SOB remains stable on AVAPS.  EKG now.  Will initiate IV heparin drip per nomogram with bolus.  Cardiology consult now for further evaluation and recommendations.  Will continue to follow serial CE's + EKG, and monitor patient.            KYLER GarciaP

## 2018-05-19 NOTE — CONSULTS
[facility]  Cardiology  Consult Note    Patient Name: Milli Montez  MRN: [id number]  Admission Date: 5/19/2018  Hospital Length of Stay: 0 days  Code Status: Full Code   Attending Provider: Joseph Jackson MD   Consulting Provider: Katharine Sotelo PA-C  Primary Care Physician: Marito Amato MD  Principal Problem:Acute respiratory failure    Patient information was obtained from patient, past medical records    Inpatient consult to Cardiology  Consult performed by: KATHARINE SOTELO  Consult ordered by: JANICE WHITLOCK        Subjective:     Chief Complaint:  SOB     HPI:   HPI obtained primarily from chart as patient was on AVAPS    Ms. Montez is a 57 year old female patient with a PMHx of CAD s/p CABG, s/p PTCA of LM stent on 2/6/18, s/p repeat PTCA/DEWAYNE of LM in-stent restenosis on 5/2/18, chronic diastolic CHF, hyperlipidemia, HTN, DM type II, and TIA who presented to [facility] ED yesterday with a chief complaint of progressively worsening SOB over the past few days. Associated symptoms included orthopnea, PND, BLE edema, and palpitations. Patient denied any associated chest pain, near syncope, syncope, fever, or chills. While in ED, patient became progressively more dyspneic and tachypneic and was subsequently placed on Avaps. Initial workup in ED revealed BNP of 828, troponin of 0.006, creatinine of 1.5, lactic acidosis (%), and hypoxia with hypercapnia. Patient subsequently admitted to ICU for further evaluation and treatment. Patient seen and examined today while in ICU. Remains dyspneic on AVAPs. Complains of some back discomfort. Repeat troponin resulted at 1.762. Echo and repeat ABG pending.    Past Medical History:   Diagnosis Date    Allergy     Arthritis     Blood transfusion     CAD (coronary artery disease)     CHF (congestive heart failure)     Diabetes mellitus     Heart murmur     History of loop recorder     Hyperlipidemia     Hypertension     Hypothyroidism  2013    TIA (transient ischemic attack)     Ulcer        Past Surgical History:   Procedure Laterality Date    CARDIAC SURGERY      CAROTID STENT Right      SECTION      CORONARY STENT PLACEMENT      TUBAL LIGATION         Review of patient's allergies indicates:   Allergen Reactions    Penicillins Swelling       No current facility-administered medications on file prior to encounter.      Current Outpatient Prescriptions on File Prior to Encounter   Medication Sig    amLODIPine (NORVASC) 5 MG tablet Take 5 mg by mouth once daily.    blood sugar diagnostic Strp check BS fastin and  2hr after biggest meal    blood-glucose meter (FREESTYLE SYSTEM KIT) kit Use as instructed    carvedilol (COREG) 12.5 MG tablet Take 12.5 mg by mouth 2 (two) times daily with meals.    clopidogrel (PLAVIX) 75 mg tablet Take 75 mg by mouth.    enalapril (VASOTEC) 5 MG tablet Take 5 mg by mouth 2 (two) times daily.     furosemide (LASIX) 40 MG tablet Take 1 tablet (40 mg total) by mouth once daily.    lancets-blood glucose strips 30 gauge Cmpk 2 Devices by Misc.(Non-Drug; Combo Route) route 2 (two) times daily. check BS fastin and  2hr after biggest meal    levothyroxine (SYNTHROID) 75 MCG tablet TAKE 1 TABLET BY MOUTH DAILY    ondansetron (ZOFRAN) 4 MG tablet Take 1 tablet (4 mg total) by mouth every 6 (six) hours as needed for Nausea.    rosuvastatin (CRESTOR) 20 MG tablet Take 40 mg by mouth once daily.     Family History     Problem Relation (Age of Onset)    Cancer Father    Heart disease Mother, Father        Social History Main Topics    Smoking status: Never Smoker    Smokeless tobacco: Never Used    Alcohol use No    Drug use: No    Sexual activity: Yes     Partners: Male     Review of Systems   Unable to perform ROS: acuity of condition     Objective:     Vital Signs (Most Recent):  Temp: 98.1 °F (36.7 °C) (18 0700)  Pulse: 108 (18 0915)  Resp: (!) 28 (18 0915)  BP: 96/71  (05/19/18 0915)  SpO2: 97 % (05/19/18 0915) Vital Signs (24h Range):  Temp:  [97.7 °F (36.5 °C)-98.6 °F (37 °C)] 98.1 °F (36.7 °C)  Pulse:  [] 108  Resp:  [20-42] 28  SpO2:  [97 %-100 %] 97 %  BP: ()/(62-90) 96/71     Weight: 103.2 kg (227 lb 9.6 oz)  Body mass index is 36.74 kg/m².    SpO2: 97 %  O2 Device (Oxygen Therapy): Other (see comments) (NIV AVAPS mode)      Intake/Output Summary (Last 24 hours) at 05/19/18 0933  Last data filed at 05/19/18 0900   Gross per 24 hour   Intake               50 ml   Output              210 ml   Net             -160 ml       Lines/Drains/Airways     Drain                 Urethral Catheter 05/19/18 0115 Latex 16 Fr. less than 1 day          Peripheral Intravenous Line                 Peripheral IV - Single Lumen 05/19/18 0109 Right Antecubital less than 1 day         Peripheral IV - Single Lumen 05/19/18 Left Antecubital less than 1 day                Physical Exam   Constitutional: She is oriented to person, place, and time. She appears distressed.   Dyspneic on AVAPS   HENT:   Head: Normocephalic and atraumatic.   Eyes: Pupils are equal, round, and reactive to light. Right eye exhibits no discharge. Left eye exhibits no discharge.   Neck: Neck supple. JVD present.   Cardiovascular: Regular rhythm, S1 normal and S2 normal.  Tachycardia present.    Murmur heard.  High-pitched blowing holosystolic murmur is present  at the apex  Pulmonary/Chest: She is in respiratory distress. She has rales.   Abdominal: Soft. She exhibits no distension. There is no tenderness.   Musculoskeletal: She exhibits edema (BLE).   Neurological: She is alert and oriented to person, place, and time.   Skin: Skin is warm and dry. She is not diaphoretic.   Nursing note and vitals reviewed.      Significant Labs:   CMP   Recent Labs  Lab 05/19/18  0111 05/19/18  0504    138   K 4.5 5.1    107   CO2 17* 21*   * 248*   BUN 20 24*   CREATININE 1.5* 1.7*   CALCIUM 8.8 8.6*   PROT  7.9 7.6   ALBUMIN 3.2* 3.2*   BILITOT 0.5 0.5   ALKPHOS 104 101   AST 26 51*   ALT 26 32   ANIONGAP 15 10   ESTGFRAFRICA 45* 39*   EGFRNONAA 39* 33*   , CBC   Recent Labs  Lab 05/19/18  0111 05/19/18  0504 05/19/18  0824   WBC 12.33 13.27* 13.27*   HGB 13.8 13.9 13.5   HCT 42.6 41.0 40.8    290 280  280   , Troponin   Recent Labs  Lab 05/19/18  0111 05/19/18  0504   TROPONINI 0.006 1.762*    and All pertinent lab results from the last 24 hours have been reviewed.    Significant Imaging: Echocardiogram:   2D echo with color flow doppler:   Results for orders placed or performed during the hospital encounter of 04/19/18   2D echo with color flow doppler   Result Value Ref Range    EF 45 55 - 65    Mitral Valve Regurgitation MODERATE (A)     Diastolic Dysfunction Yes (A)     Est. PA Systolic Pressure 29.34    , EKG: Reviewed and X-Ray: CXR: X-Ray Chest 1 View (CXR):   Results for orders placed or performed during the hospital encounter of 05/19/18   X-Ray Chest 1 View    Narrative    EXAMINATION:  XR CHEST 1 VIEW    CLINICAL HISTORY:  Respiratory distress    COMPARISON:  Chest x-ray, 04/20/2018    FINDINGS:  There is marked pulmonary edema which has developed since the prior exam.  There is cardiomegaly.  There are surgical changes from prior CABG.  No pleural effusion or pneumothorax.      Impression    Cardiomegaly and marked pulmonary edema indicating congestive heart failure.      Electronically signed by: Sinan Johnson MD  Date:    05/19/2018  Time:    08:05    and X-Ray Chest PA and Lateral (CXR): No results found for this visit on 05/19/18.    Assessment and Plan:   Patient with significant history of CAD who presents with respiratory failure/decompensated diastolic CHF and NSTEMI. Continue current medical management. IV diuresis. Check 2D echo. Trend troponin. Will need C once more stable respiratory wise.     * Acute respiratory failure    -Secondary to volume overload/acute on chronic decompensated  diastolic CHF  -Continue IV Lasix  -Remains dyspneic on AVAPS, repeat ABG pending          NSTEMI (non-ST elevated myocardial infarction)    -Patient with significant history of CAD s/p recent PTCA/DEWAYNE of LM in-stent re-stenosis  -Initial troponin 0.006, repeat 1.762  -Continue to trend  -Continue current medical management-ASA, Coreg, statin, Brilinta  -Continue nitropaste  -Continue heparin gtt  -Check 2D echo  -Will need C once respiratory status is more stable        KEN (acute kidney injury)    -Likely cardiorenal, creatinine 1.7  -Continue to monitor        Acute on chronic diastolic congestive heart failure    -Continue aggressive diuresis  -Strict I's/O's  -Remains on AVAPS, may need to be intubated        CAD (coronary artery disease)    -See plan under NSTEMI        Hyperlipidemia    -Continue statin          Essential hypertension    -Continue Coreg  -ACEI held due to bumped creatinine            VTE Risk Mitigation         Ordered     heparin 25,000 units in dextrose 5% 250 mL (100 units/mL) infusion; FEMALE  Continuous      05/19/18 0905     heparin 25,000 units in dextrose 5% 250 mL (100 units/mL) bolus from bag; PRN BOLUS  As needed (PRN)      05/19/18 0905     heparin 25,000 units in dextrose 5% 250 mL (100 units/mL) bolus from bag; PRN BOLUS  As needed (PRN)      05/19/18 0905     Place sequential compression device  Until discontinued      05/19/18 0628     IP VTE HIGH RISK PATIENT  Once      05/19/18 0248          Thank you for your consult. I will follow-up with patient. Please contact us if you have any additional questions.    Katharine Bain PA-C  Cardiology   [facility]    Chart reviewed. Dr. Morris examined patient and agrees with plan as outlined above.

## 2018-05-19 NOTE — ED NOTES
Pt lying high duran in bed, calm, and in no acute distress. Pt states pain is a little better since the admin of pain meds and rates current pain 6/10. Will continue to monitor. Bed in low position, side rails up, call light in reach, family at bedside.

## 2018-05-19 NOTE — ED PROVIDER NOTES
SCRIBE #1 NOTE: I, Kae Main, am scribing for, and in the presence of, Ayaz Frost MD. I have scribed the entire note.      History      Chief Complaint   Patient presents with    Shortness of Breath     sob worsening over several days; tachycardic, tachypneic, arrived on bipap       Review of patient's allergies indicates:   Allergen Reactions    Penicillins Swelling        HPI   HPI    2018, 1:01 AM   History obtained from the [facility]      History of Present Illness: Milli Montez is a 57 y.o. female patient with PMHx of CHF who presents to the Emergency Department for SOB which onset gradually a few days ago. Symptoms are worsening and moderate in severity. Prior tx includes 2 nitro SL, nitro patch, and patient was placed on BiPAP en route. History limited secondary to severe respiratory distress.    Arrival mode: [facility]    PCP: Marito Amato MD       Past Medical History:  Past Medical History:   Diagnosis Date    Allergy     Arthritis     Blood transfusion     CAD (coronary artery disease)     CHF (congestive heart failure)     Diabetes mellitus     Heart murmur     History of loop recorder     Hyperlipidemia     Hypertension     Hypothyroidism 2013    TIA (transient ischemic attack)     Ulcer        Past Surgical History:  Past Surgical History:   Procedure Laterality Date    CARDIAC SURGERY      CAROTID STENT Right      SECTION      CORONARY STENT PLACEMENT      TUBAL LIGATION           Family History:  Family History   Problem Relation Age of Onset    Heart disease Mother     Cancer Father     Heart disease Father        Social History:  Social History     Social History Main Topics    Smoking status: Never Smoker    Smokeless tobacco: Never Used    Alcohol use No    Drug use: No    Sexual activity: Yes     Partners: Male       ROS   Review of Systems   Unable to perform ROS: Severe respiratory distress       Physical Exam      Initial Vitals [18  0104]   BP Pulse Resp Temp SpO2   115/83 (!) 139 (!) 30 97.7 °F (36.5 °C) 100 %      MAP       93.67          Physical Exam  Nursing Notes and Vital Signs Reviewed.  Constitutional: Patient is in severe distress. Well-developed and well-nourished.  Head: Atraumatic. Normocephalic.  Eyes: PERRL. EOM intact. Conjunctivae are not pale. No scleral icterus.  ENT: Mucous membranes are moist. Oropharynx is clear and symmetric.    Neck: Supple. Full ROM. No lymphadenopathy.  Cardiovascular: Tachycardic. Regular rhythm. No murmurs, rubs, or gallops. Distal pulses are 2+ and symmetric.  Pulmonary/Chest: Severe respiratory distress. Coarse breath sounds. Tachypneic. No wheezing or rales.  Abdominal: Obese. Soft and non-distended.  There is no tenderness.  No rebound, guarding, or rigidity. Good bowel sounds.  Genitourinary: No CVA tenderness  Musculoskeletal: Moves all extremities. No obvious deformities. No edema.  Skin: Warm and dry.  Neurological:  Alert, awake, and appropriate.  Normal speech.  No acute focal neurological deficits are appreciated.  Psychiatric: History limited secondary to patient's condition.    ED Course    Critical Care  Date/Time: 5/19/2018 2:39 AM  Performed by: REY ZAYAS  Authorized by: REY ZAYAS   Direct patient critical care time: 7 minutes  Additional history critical care time: 7 minutes  Ordering / reviewing critical care time: 7 minutes  Documentation critical care time: 7 minutes  Consulting other physicians critical care time: 7 minutes  Consult with family critical care time: 7 minutes  Total critical care time (exclusive of procedural time) : 42 minutes  Critical care time was exclusive of separately billable procedures and treating other patients.  Critical care was necessary to treat or prevent imminent or life-threatening deterioration of the following conditions: respiratory failure.  Critical care was time spent personally by me on the following  "activities: blood draw for specimens, development of treatment plan with patient or surrogate, discussions with consultants, interpretation of cardiac output measurements, evaluation of patient's response to treatment, examination of patient, obtaining history from patient or surrogate, ordering and performing treatments and interventions, ordering and review of laboratory studies, ordering and review of radiographic studies, re-evaluation of patient's condition and review of old charts.        ED Vital Signs:  Vitals:    05/19/18 0104 05/19/18 0111 05/19/18 0117 05/19/18 0120   BP: 115/83 111/79 111/66    Pulse: (!) 139 (!) 130 (!) 128 (!) 127   Resp: (!) 30 (!) 42 (!) 37 (!) 30   Temp: 97.7 °F (36.5 °C)      TempSrc: Axillary      SpO2: 100% 100% 100% 100%   Weight: 104 kg (229 lb 6 oz)      Height: 5' 6" (1.676 m)       05/19/18 0146 05/19/18 0202 05/19/18 0232   BP: 138/88 121/82 119/76   Pulse: (!) 123 (!) 119 (!) 117   Resp: (!) 34 (!) 38 (!) 38   Temp:   98 °F (36.7 °C)   TempSrc:   Rectal   SpO2: 100% 100% 100%   Weight:      Height:          Abnormal Lab Results:  Labs Reviewed   B-TYPE NATRIURETIC PEPTIDE - Abnormal; Notable for the following:        Result Value     (*)     All other components within normal limits   CBC W/ AUTO DIFFERENTIAL - Abnormal; Notable for the following:     MCH 31.1 (*)     RDW 14.7 (*)     Lymph # 5.6 (*)     All other components within normal limits   COMPREHENSIVE METABOLIC PANEL - Abnormal; Notable for the following:     CO2 17 (*)     Glucose 356 (*)     Creatinine 1.5 (*)     Albumin 3.2 (*)     eGFR if  45 (*)     eGFR if non  39 (*)     All other components within normal limits   LACTIC ACID, PLASMA - Abnormal; Notable for the following:     Lactate (Lactic Acid) 5.1 (*)     All other components within normal limits    Narrative:      LA  critical result(s) called and verbal readback obtained from   Stephen Campa RN, 05/19/2018 " 02:01   PHOSPHORUS - Abnormal; Notable for the following:     Phosphorus 5.3 (*)     All other components within normal limits   ISTAT PROCEDURE - Abnormal; Notable for the following:     POC PH 7.303 (*)     POC PO2 525 (*)     POC HCO3 20.5 (*)     All other components within normal limits   CULTURE, BLOOD   CULTURE, BLOOD   CK-MB   CK   TROPONIN I   APTT   PROTIME-INR   MAGNESIUM   URINALYSIS   TSH   PROCALCITONIN        All Lab Results:  Results for orders placed or performed during the hospital encounter of 05/19/18   Brain natriuretic peptide   Result Value Ref Range     (H) 0 - 99 pg/mL   CBC auto differential   Result Value Ref Range    WBC 12.33 3.90 - 12.70 K/uL    RBC 4.44 4.00 - 5.40 M/uL    Hemoglobin 13.8 12.0 - 16.0 g/dL    Hematocrit 42.6 37.0 - 48.5 %    MCV 96 82 - 98 fL    MCH 31.1 (H) 27.0 - 31.0 pg    MCHC 32.4 32.0 - 36.0 g/dL    RDW 14.7 (H) 11.5 - 14.5 %    Platelets 329 150 - 350 K/uL    MPV 9.9 9.2 - 12.9 fL    Gran # (ANC) 5.6 1.8 - 7.7 K/uL    Lymph # 5.6 (H) 1.0 - 4.8 K/uL    Mono # 0.5 0.3 - 1.0 K/uL    Eos # 0.5 0.0 - 0.5 K/uL    Baso # 0.06 0.00 - 0.20 K/uL    Gran% 45.7 38.0 - 73.0 %    Lymph% 45.3 18.0 - 48.0 %    Mono% 4.1 4.0 - 15.0 %    Eosinophil% 4.4 0.0 - 8.0 %    Basophil% 0.5 0.0 - 1.9 %    Differential Method Automated    CK-MB   Result Value Ref Range    CPK 56 20 - 180 U/L    CPK MB 0.6 0.1 - 6.5 ng/mL    MB% 1.1 0.0 - 5.0 %   Comprehensive metabolic panel   Result Value Ref Range    Sodium 138 136 - 145 mmol/L    Potassium 4.5 3.5 - 5.1 mmol/L    Chloride 106 95 - 110 mmol/L    CO2 17 (L) 23 - 29 mmol/L    Glucose 356 (H) 70 - 110 mg/dL    BUN, Bld 20 6 - 20 mg/dL    Creatinine 1.5 (H) 0.5 - 1.4 mg/dL    Calcium 8.8 8.7 - 10.5 mg/dL    Total Protein 7.9 6.0 - 8.4 g/dL    Albumin 3.2 (L) 3.5 - 5.2 g/dL    Total Bilirubin 0.5 0.1 - 1.0 mg/dL    Alkaline Phosphatase 104 55 - 135 U/L    AST 26 10 - 40 U/L    ALT 26 10 - 44 U/L    Anion Gap 15 8 - 16 mmol/L    eGFR if   45 (A) >60 mL/min/1.73 m^2    eGFR if non African American 39 (A) >60 mL/min/1.73 m^2   CK   Result Value Ref Range    CPK 56 20 - 180 U/L   Troponin I   Result Value Ref Range    Troponin I 0.006 0.000 - 0.026 ng/mL   Lactic acid, plasma   Result Value Ref Range    Lactate (Lactic Acid) 5.1 (HH) 0.5 - 2.2 mmol/L   APTT   Result Value Ref Range    aPTT 22.3 21.0 - 32.0 sec   Protime-INR   Result Value Ref Range    Prothrombin Time 10.6 9.0 - 12.5 sec    INR 1.0 0.8 - 1.2   Magnesium   Result Value Ref Range    Magnesium 2.2 1.6 - 2.6 mg/dL   Phosphorus   Result Value Ref Range    Phosphorus 5.3 (H) 2.7 - 4.5 mg/dL   Urinalysis   Result Value Ref Range    Specimen UA Urine, Catheterized     Color, UA Yellow Yellow, Straw, Gini    Appearance, UA Clear Clear    pH, UA 6.0 5.0 - 8.0    Specific Gravity, UA 1.010 1.005 - 1.030    Protein, UA Negative Negative    Glucose, UA Negative Negative    Ketones, UA Negative Negative    Bilirubin (UA) Negative Negative    Occult Blood UA Negative Negative    Nitrite, UA Negative Negative    Urobilinogen, UA Negative <2.0 EU/dL    Leukocytes, UA Negative Negative   ISTAT PROCEDURE   Result Value Ref Range    POC PH 7.303 (L) 7.35 - 7.45    POC PCO2 41.4 35 - 45 mmHg    POC PO2 525 (H) 80 - 100 mmHg    POC HCO3 20.5 (L) 24 - 28 mmol/L    POC BE -6 -2 to 2 mmol/L    POC SATURATED O2 100 95 - 100 %    Rate 25     Sample ARTERIAL     Site RR     Allens Test Pass     DelSys Adult Vent     Mode AVAPS     Vt 520     FiO2 100        Imaging Results:  Imaging Results          X-Ray Chest 1 View (Preliminary result)  Result time 05/19/18 01:53:27    ED Interpretation by Ayaz Frost MD (05/19/18 01:53:27, [facility], Emergency Medicine)    Cardiomegaly Vascular Congestion                             The EKG was ordered, reviewed, and independently interpreted by the ED provider.  Interpretation time: 0105  Rate: 135 BPM  Rhythm: sinus  tachycardia  Interpretation: LBBB. Abnormal ECG. No STEMI.           The Emergency Provider reviewed the vital signs and test results, which are outlined above.    ED Discussion     2:25 AM: I have discussed test results, shared treatment plan, and the need for admission with family at bedside. Family express understanding at this time and agree with all information. All questions answered. Family have no further questions or concerns at this time. Family reports patient had heart cath on 5/4/18 and needed a stent placed within her previous stent.     2:38 AM: Discussed case with Dr. Patel ([facility] Medicine), agrees with current care and management of pt and accepts admission.   Admitting Service: [facility] medicine   Admitting Physician: Dr. Patel  Admit to: ICU        ED Medication(s):  Medications   albuterol-ipratropium 2.5 mg-0.5 mg/3 mL nebulizer solution 3 mL (3 mLs Nebulization Given 5/19/18 0120)   furosemide injection 60 mg (60 mg Intravenous Given 5/19/18 0118)   furosemide injection 40 mg (40 mg Intravenous Given 5/19/18 0158)   lorazepam (ATIVAN) injection 0.5 mg (0.5 mg Intravenous Given 5/19/18 0159)       New Prescriptions    No medications on file             Medical Decision Making              Scribe Attestation:   Scribe #1: I performed the above scribed service and the documentation accurately describes the services I performed. I attest to the accuracy of the note.    Attending:   Physician Attestation Statement for Scribe #1: I, Ayaz Frost MD, personally performed the services described in this documentation, as scribed by Kae Main, in my presence, and it is both accurate and complete.          Clinical Impression       ICD-10-CM ICD-9-CM   1. Acute on chronic congestive heart failure, unspecified heart failure type I50.9 428.0   2. Dyspnea R06.00 786.09   3. Acute respiratory failure with hypoxia J96.01 518.81       Disposition:   Disposition: Admitted (ICU)  Condition:  Serious         Ayaz Frost MD  05/19/18 0564

## 2018-05-19 NOTE — SUBJECTIVE & OBJECTIVE
Past Medical History:   Diagnosis Date    Allergy     Arthritis     Blood transfusion     CAD (coronary artery disease)     CHF (congestive heart failure)     Diabetes mellitus     Heart murmur     History of loop recorder     Hyperlipidemia     Hypertension     Hypothyroidism 2013    TIA (transient ischemic attack)     Ulcer        Past Surgical History:   Procedure Laterality Date    CARDIAC SURGERY      CAROTID STENT Right      SECTION      CORONARY STENT PLACEMENT      TUBAL LIGATION         Review of patient's allergies indicates:   Allergen Reactions    Penicillins Swelling       No current facility-administered medications on file prior to encounter.      Current Outpatient Prescriptions on File Prior to Encounter   Medication Sig    amLODIPine (NORVASC) 5 MG tablet Take 5 mg by mouth once daily.    blood sugar diagnostic Strp check BS fastin and  2hr after biggest meal    blood-glucose meter (FREESTYLE SYSTEM KIT) kit Use as instructed    carvedilol (COREG) 12.5 MG tablet Take 12.5 mg by mouth 2 (two) times daily with meals.    clopidogrel (PLAVIX) 75 mg tablet Take 75 mg by mouth.    enalapril (VASOTEC) 5 MG tablet Take 5 mg by mouth 2 (two) times daily.     furosemide (LASIX) 40 MG tablet Take 1 tablet (40 mg total) by mouth once daily.    lancets-blood glucose strips 30 gauge Cmpk 2 Devices by Misc.(Non-Drug; Combo Route) route 2 (two) times daily. check BS fastin and  2hr after biggest meal    levothyroxine (SYNTHROID) 75 MCG tablet TAKE 1 TABLET BY MOUTH DAILY    ondansetron (ZOFRAN) 4 MG tablet Take 1 tablet (4 mg total) by mouth every 6 (six) hours as needed for Nausea.    rosuvastatin (CRESTOR) 20 MG tablet Take 40 mg by mouth once daily.     Family History     Problem Relation (Age of Onset)    Cancer Father    Heart disease Mother, Father        Social History Main Topics    Smoking status: Never Smoker    Smokeless tobacco: Never Used    Alcohol use No     Drug use: No    Sexual activity: Yes     Partners: Male     Review of Systems   Unable to perform ROS: acuity of condition     Objective:     Vital Signs (Most Recent):  Temp: 98.1 °F (36.7 °C) (05/19/18 0700)  Pulse: 108 (05/19/18 0915)  Resp: (!) 28 (05/19/18 0915)  BP: 96/71 (05/19/18 0915)  SpO2: 97 % (05/19/18 0915) Vital Signs (24h Range):  Temp:  [97.7 °F (36.5 °C)-98.6 °F (37 °C)] 98.1 °F (36.7 °C)  Pulse:  [] 108  Resp:  [20-42] 28  SpO2:  [97 %-100 %] 97 %  BP: ()/(62-90) 96/71     Weight: 103.2 kg (227 lb 9.6 oz)  Body mass index is 36.74 kg/m².    SpO2: 97 %  O2 Device (Oxygen Therapy): Other (see comments) (NIV AVAPS mode)      Intake/Output Summary (Last 24 hours) at 05/19/18 0933  Last data filed at 05/19/18 0900   Gross per 24 hour   Intake               50 ml   Output              210 ml   Net             -160 ml       Lines/Drains/Airways     Drain                 Urethral Catheter 05/19/18 0115 Latex 16 Fr. less than 1 day          Peripheral Intravenous Line                 Peripheral IV - Single Lumen 05/19/18 0109 Right Antecubital less than 1 day         Peripheral IV - Single Lumen 05/19/18 Left Antecubital less than 1 day                Physical Exam   Constitutional: She is oriented to person, place, and time. She appears distressed.   Dyspneic on AVAPS   HENT:   Head: Normocephalic and atraumatic.   Eyes: Pupils are equal, round, and reactive to light. Right eye exhibits no discharge. Left eye exhibits no discharge.   Neck: Neck supple. JVD present.   Cardiovascular: Regular rhythm, S1 normal and S2 normal.  Tachycardia present.    Murmur heard.  High-pitched blowing holosystolic murmur is present  at the apex  Pulmonary/Chest: She is in respiratory distress. She has rales.   Abdominal: Soft. She exhibits no distension. There is no tenderness.   Musculoskeletal: She exhibits edema (BLE).   Neurological: She is alert and oriented to person, place, and time.   Skin: Skin is  warm and dry. She is not diaphoretic.   Nursing note and vitals reviewed.      Significant Labs:   CMP   Recent Labs  Lab 05/19/18  0111 05/19/18  0504    138   K 4.5 5.1    107   CO2 17* 21*   * 248*   BUN 20 24*   CREATININE 1.5* 1.7*   CALCIUM 8.8 8.6*   PROT 7.9 7.6   ALBUMIN 3.2* 3.2*   BILITOT 0.5 0.5   ALKPHOS 104 101   AST 26 51*   ALT 26 32   ANIONGAP 15 10   ESTGFRAFRICA 45* 39*   EGFRNONAA 39* 33*   , CBC   Recent Labs  Lab 05/19/18  0111 05/19/18  0504 05/19/18  0824   WBC 12.33 13.27* 13.27*   HGB 13.8 13.9 13.5   HCT 42.6 41.0 40.8    290 280  280   , Troponin   Recent Labs  Lab 05/19/18  0111 05/19/18  0504   TROPONINI 0.006 1.762*    and All pertinent lab results from the last 24 hours have been reviewed.    Significant Imaging: Echocardiogram:   2D echo with color flow doppler:   Results for orders placed or performed during the hospital encounter of 04/19/18   2D echo with color flow doppler   Result Value Ref Range    EF 45 55 - 65    Mitral Valve Regurgitation MODERATE (A)     Diastolic Dysfunction Yes (A)     Est. PA Systolic Pressure 29.34    , EKG: Reviewed and X-Ray: CXR: X-Ray Chest 1 View (CXR):   Results for orders placed or performed during the hospital encounter of 05/19/18   X-Ray Chest 1 View    Narrative    EXAMINATION:  XR CHEST 1 VIEW    CLINICAL HISTORY:  Respiratory distress    COMPARISON:  Chest x-ray, 04/20/2018    FINDINGS:  There is marked pulmonary edema which has developed since the prior exam.  There is cardiomegaly.  There are surgical changes from prior CABG.  No pleural effusion or pneumothorax.      Impression    Cardiomegaly and marked pulmonary edema indicating congestive heart failure.      Electronically signed by: Sinan Johnson MD  Date:    05/19/2018  Time:    08:05    and X-Ray Chest PA and Lateral (CXR): No results found for this visit on 05/19/18.

## 2018-05-19 NOTE — EICU
Critical Care Telemedicine Assessment      Ms. Montez was admitted to ICU from ED for respiratory distress secondary to pulmonary edema (likely acute on chronic diastolic +/- systolic CHF).  Most recent echocardiogram a month ago showed LVEF 45-50% with mild diastolic dysfunction and normal PA pressure.  CXR now shows parenchymal infiltrates (right > left) and natriuretic peptide is elevated from baseline.    She looks pretty comfortable at present on NIPPV with AVAPS => Rate 25/Tidal volume 480 with minimal inspiratory pressure 10 and EPAP 5; FIO2 50%.  She's breathing at 25 with tidal volumes consistently above 600 ml for most breaths. Recorded urine output is modest despite furosemide dosing.    Results for CLAUDINE MONTEZ (MRN [id number])    5/19/2018 01:30   POC pH 7.303 (L)   POC PCO2 41.4   POC PO2 525 (H)   POC BE -6   POC HCO3 20.5 (L)   POC SAO2 100   FiO2 100   Vt 520   Mode AVAPS      5/19/2018 01:11   Lactate, Aneesh 5.1 (HH)     · Continue current NIPPV settings  · Trend lactic acid levels with adequate respiratory support  · Serial troponins and ECG now  · Diurese as tolerated

## 2018-05-19 NOTE — HPI
Ms. Montez is a 58yo female with a PMHx of CAD with remote CABG (LIMA to Diagonal1 + distal LAD, and SVG-RPDA) and balloon PTCA of LM stent on 2/6/18 followed by repeat balloon PTCA + DEWAYNE of LM in-stent steosis 5/2/18, chronic diastolic HFpEF of 55-60%, HLD, HTN, DM II, and h/o TIA, who presented to the ED with c/o SOB that has progressively worsened over the past few days.  Associated orthopnea, PND, BLE edema, and palpitations.  Denies any CP, cough, weight gain, ABD pain or distention, N/V/D, dysuria, lightheadedness/dizziness, syncope, HA, AMS, focal deficits, diaphoresis, fever, or chills.  Upon arrival to ED, patient notably in severe respiratory distress and placed on BiPAP, and later AVAPS.  She received IV Lasix 40mg, followed by Lasix 60mg IV and IV Ativan with improvement in respiratory status.  Initial work-up resulted , troponin 0.006, cr. 1.5, lactic 5.1, ABG on AVAPS with pH 7.3, pCO2 41, pO2 525, HCO3 20.  CXR consistent with CHF.  EKG showed atrial tachycardia with LBBB (old), no acute ischemic ST-T changes from previous tracings.  Patient was admitted to ICU for acute decompensated HF under Hospital Medicine services.  Currently, patient appears comfortable in NAD.  Reports SOB greatly improved since IV Lasix and Ativan.  Patient is a Full Code.  Daughter Katherine Lewis is surrogate decision maker.

## 2018-05-19 NOTE — PROCEDURES
"Milli Montez is a 57 y.o. female patient.    Temp: 98.1 °F (36.7 °C) (05/19/18 0700)  Pulse: (!) 113 (05/19/18 1445)  Resp: (!) 35 (05/19/18 1445)  BP: (!) 116/96 (05/19/18 1430)  SpO2: 95 % (05/19/18 1445)  Weight: 103.2 kg (227 lb 9.6 oz) (rd reviewed) (05/19/18 1103)  Height: 5' 6" (167.6 cm) (05/19/18 1107)       Intubation  Date/Time: 5/19/2018 3:16 PM  Location procedure was performed: [facility] INTENSIVE CARE UNIT  Performed by: MARQUEZ SUAREZ  Authorized by: MARQUEZ SUAREZ   Assisting provider: [facility] ICU  Pre-operative diagnosis: resp failure  Post-operative diagnosis: same   Consent Done: Not Needed  Indications: respiratory distress  Description of findings: vocal cords vizualized   Intubation method: direct  Patient status: sedated  Preoxygenation: BVM  Pretreatment medications: none  Sedatives: etomidate  Paralytic: none  Laryngoscope size: Mac 4  Tube size: 8.0 mm  Tube type: cuffed  Number of attempts: 1  Cricoid pressure: no  Cords visualized: yes  Post-procedure assessment: CO2 detector  Breath sounds: rales/crackles  Cuff inflated: yes  ETT to lip: 24 cm  Tube secured with: adhesive tape  Chest x-ray interpreted by me.  Chest x-ray findings: endotracheal tube in appropriate position  Patient tolerance: Patient tolerated the procedure well with no immediate complications  Technical procedures used: rapid sequence  Complications: No  Estimated blood loss (mL): 0  Specimens: No  Implants: No          Marquez Suarez  5/19/2018  "

## 2018-05-19 NOTE — PROGRESS NOTES
Patient arrived to unit from ER via stretcher. Transferred into bed with 2 nurses assist. Report given by DICK Aponte. VS currently stable. Tele monitored applied. Patient oriented to room and call bell. Encouraged to notify of all needs. Patient currently resting on AVAPS with no signs of distress. Will continue to monitor.

## 2018-05-19 NOTE — ASSESSMENT & PLAN NOTE
- Likely secondary to #2.  - Unable to perform CTA due to KEN.  Will order D-Dimer.  - Currently stable on AVAPS. Pulmonary/ICU consult for management, appreciate assistance.  - Tello.

## 2018-05-19 NOTE — SUBJECTIVE & OBJECTIVE
Past Medical History:   Diagnosis Date    Allergy     Arthritis     Blood transfusion     CAD (coronary artery disease)     CHF (congestive heart failure)     Diabetes mellitus     Heart murmur     History of loop recorder     Hyperlipidemia     Hypertension     Hypothyroidism 2013    TIA (transient ischemic attack)     Ulcer        Past Surgical History:   Procedure Laterality Date    CARDIAC SURGERY      CAROTID STENT Right      SECTION      CORONARY STENT PLACEMENT      TUBAL LIGATION         Review of patient's allergies indicates:   Allergen Reactions    Penicillins Swelling     Current Facility-Administered Medications   Medication Frequency    acetaminophen tablet 650 mg Q6H PRN    aspirin EC tablet 81 mg Daily    carvedilol tablet 6.25 mg BID WM    dextrose 50% injection 12.5 g PRN    furosemide (LASIX) 2 mg/mL in sodium chloride 0.9% 100 mL infusion (conc: 2 mg/mL) Continuous    glucagon (human recombinant) injection 1 mg PRN    heparin 25,000 units in dextrose 5% 250 mL (100 units/mL) bolus from bag; PRN BOLUS PRN    heparin 25,000 units in dextrose 5% 250 mL (100 units/mL) bolus from bag; PRN BOLUS PRN    heparin 25,000 units in dextrose 5% 250 mL (100 units/mL) infusion; FEMALE Continuous    insulin aspart U-100 pen 0-5 Units Q6H PRN    levothyroxine tablet 75 mcg Daily    nitroGLYCERIN 2% TD oint ointment 1 inch Q6H    nitroGLYCERIN SL tablet 0.4 mg Q5 Min PRN    ondansetron injection 4 mg Q6H PRN    potassium chloride SA CR tablet 40 mEq BID    ramelteon tablet 8 mg Nightly PRN    rosuvastatin tablet 40 mg Daily    ticagrelor tablet 90 mg BID     Family History     Problem Relation (Age of Onset)    Cancer Father    Heart disease Mother, Father        Social History Main Topics    Smoking status: Never Smoker    Smokeless tobacco: Never Used    Alcohol use No    Drug use: No    Sexual activity: Yes     Partners: Male     Review of Systems    Constitutional: Positive for activity change and fatigue.   HENT: Negative.    Respiratory: Positive for shortness of breath.    Cardiovascular: Negative.    Genitourinary: Negative.    Musculoskeletal: Negative.    Neurological: Negative.    Psychiatric/Behavioral: Negative.      Objective:     Vital Signs (Most Recent):  Temp: 98.1 °F (36.7 °C) (05/19/18 0700)  Pulse: (!) 113 (05/19/18 1445)  Resp: (!) 35 (05/19/18 1445)  BP: (!) 116/96 (05/19/18 1430)  SpO2: 95 % (05/19/18 1445)  O2 Device (Oxygen Therapy): Other (see comments) (NIV AVAPS MODE) (05/19/18 1140) Vital Signs (24h Range):  Temp:  [97.7 °F (36.5 °C)-98.6 °F (37 °C)] 98.1 °F (36.7 °C)  Pulse:  [] 113  Resp:  [20-42] 35  SpO2:  [95 %-100 %] 95 %  BP: ()/(59-96) 116/96     Weight: 103.2 kg (227 lb 9.6 oz) (rd reviewed) (05/19/18 1103)  Body mass index is 36.74 kg/m².  Body surface area is 2.19 meters squared.    I/O last 3 completed shifts:  In: 50 [P.O.:50]  Out: 125 [Urine:125]    Physical Exam   Constitutional: She is oriented to person, place, and time. She appears well-developed and well-nourished. No distress.   HENT:   Head: Normocephalic and atraumatic.   Neck: JVD present.   Cardiovascular: Normal rate, regular rhythm and normal heart sounds.  Exam reveals no friction rub.    Pulmonary/Chest: She is in respiratory distress. She has rales.   Abdominal: Soft. She exhibits no distension. There is no tenderness.   Musculoskeletal: She exhibits edema.   Neurological: She is oriented to person, place, and time.   Skin: Skin is warm and dry.   Psychiatric: She has a normal mood and affect. Her behavior is normal.   Nursing note and vitals reviewed.      Significant Labs: reviewed  BMP  Lab Results   Component Value Date     05/19/2018    K 5.1 05/19/2018     05/19/2018    CO2 21 (L) 05/19/2018    BUN 24 (H) 05/19/2018    CREATININE 1.7 (H) 05/19/2018    CALCIUM 8.6 (L) 05/19/2018    ANIONGAP 10 05/19/2018    ESTGFRAFRICA 39  (A) 05/19/2018    EGFRNONAA 33 (A) 05/19/2018     Lab Results   Component Value Date    WBC 13.27 (H) 05/19/2018    HGB 13.5 05/19/2018    HCT 40.8 05/19/2018    MCV 95 05/19/2018     05/19/2018     05/19/2018       Recent Labs  Lab 05/19/18  1203   TROPONINI 20.097*       Significant Imaging:  Reviewed CXR, has cardiomegaly and large, diffuse pulmonary edema

## 2018-05-19 NOTE — ASSESSMENT & PLAN NOTE
- Likely cardiorenal syndrome.  - Diurese as above.  - Avoid nephrotoxic agents.  - Follow kidney function closely.

## 2018-05-19 NOTE — ASSESSMENT & PLAN NOTE
- BP stable.  - Resume home Coreg.  - Diuretics as above.  - Will hold ACEi due to KEN, resume once cr. stable.

## 2018-05-20 PROBLEM — N17.9 AKI (ACUTE KIDNEY INJURY): Status: ACTIVE | Noted: 2018-01-01

## 2018-05-20 PROBLEM — R57.0 CARDIOGENIC SHOCK: Status: ACTIVE | Noted: 2018-01-01

## 2018-05-20 PROBLEM — E87.20 LACTIC ACIDOSIS: Status: RESOLVED | Noted: 2018-01-01 | Resolved: 2018-01-01

## 2018-05-20 PROBLEM — N18.30 ACUTE RENAL FAILURE SUPERIMPOSED ON STAGE 3 CHRONIC KIDNEY DISEASE: Status: ACTIVE | Noted: 2018-01-01

## 2018-05-20 NOTE — PLAN OF CARE
Problem: Patient Care Overview  Goal: Plan of Care Review  Outcome: Ongoing (interventions implemented as appropriate)  Patient currently resting quietly in bed. VS currently stable. Patient NSR on monitor at this time. Patient currently on levophed drip for hypotension. Blood pressure stable at this time. Patient currently receiving oxygen via ventilator. Patient on fentanyl drip for sedation. Patient also on heparin drip at 10 units at this time. Patient turned in bed every 2 hours to avoid skin breakdown to back side. Patient turned with 2 assist. No sign of wound development noted. Patient bilateral wrist restraints remain in place at this time. No injury noted. Plan of care updated with family. There are no further questions after updated on plan of care at this time. Will continue to monitor.

## 2018-05-20 NOTE — CONSULTS
"  [facility]  Adult Nutrition  Consult Note    SUMMARY     Recommendations    Recommendation/Intervention: While intubated, initiate Diabetisource AC at 15ml/hr increasing as tolerated to goal rate 55ml/hr with water flushes per MD; projected will provide 1584 calories (81% EEN), 79gm protein (96% EPN), and 1080 ml free fluid. Monitor Residuals and hold feeds with >500mls. Site RD to monitor.  Goals: advancement energy intake within 48hrs  Nutrition Goal Status: new  Communication of RD Recs:  (poc reviewed)    Nutrition Discharge Planning: to be determined    Reason for Assessment    Reason for Assessment: consult  Diagnosis: cardiac disease, pulmonary disease (acute respiratory failure)  Relevant Medical History: CHF, HLD, HTN, Ulcer, TIA, Diabetes, CAD  General Information Comments: Remote assessment completed. Pt now intubated and sedated. Enteral feeds to be initiated      Nutrition Risk Screen    Nutrition Risk Screen: no indicators present    Nutrition/Diet History    Patient Reported Diet/Restrictions/Preferences: general  Do you have any cultural, spiritual, Methodist conflicts, given your current situation?: none reported  Food Allergies: NKFA  Factors Affecting Nutritional Intake: on mechanical ventilation, NPO    Anthropometrics    Temp: 99.5 °F (37.5 °C)  Height Method: Estimated  Height: 5' 6" (167.6 cm)  Height (inches): 66 in  Weight Method: Bed Scale  Weight: 103.2 kg (227 lb 9.6 oz) (rd reviewed)  Weight (lb): 227.6 lb  Ideal Body Weight (IBW), Female: 130 lb  % Ideal Body Weight, Female (lb): 175.08 lb  BMI (Calculated): 36.8  BMI Grade: 35 - 39.9 - obesity - grade II       Lab/Procedures/Meds    Pertinent Labs Reviewed: reviewed  Pertinent Medications Reviewed: reviewed      Recent Labs  Lab 05/19/18  0504   CALCIUM 8.6*   PROT 7.6      K 5.1   CO2 21*      BUN 24*   CREATININE 1.7*   ALKPHOS 101   ALT 32   AST 51*   BILITOT 0.5     Scheduled Meds:   aspirin  81 mg " Oral Daily    carvedilol  6.25 mg Oral BID WM    chlorhexidine  15 mL Mouth/Throat BID    etomidate  20 mg Intravenous Once    levothyroxine  75 mcg Oral Daily    lorazepam  2 mg Intravenous Once    lorazepam  2 mg Intravenous Once    nitroGLYCERIN 2% TD oint  1 inch Topical (Top) Q6H    [START ON 5/20/2018] pantoprazole 40 mg in dextrose 5 % 100 mL IVPB (over 15 minutes) (ready to mix system)  40 mg Intravenous Daily    potassium chloride 10%  40 mEq Oral BID    rosuvastatin  40 mg Oral Daily    ticagrelor  90 mg Oral BID     Continuous Infusions:   fentanyl 5 mL/hr at 05/19/18 2100    furosemide (LASIX) 2 mg/mL infusion (non-titrating) 10 mg/hr (05/19/18 2100)    heparin (porcine) in D5W 10 Units/kg/hr (05/19/18 2100)    norepinephrine bitartrate-D5W 0.18 mcg/kg/min (05/19/18 2107)     PRN Meds:.acetaminophen, dextrose 50%, glucagon (human recombinant), heparin (PORCINE), heparin (PORCINE), insulin aspart U-100, lorazepam, nitroGLYCERIN, ondansetron, ramelteon  Physical Findings/Assessment    Overall Physical Appearance: obese, on oxygen therapy  Tubes: orogastric tube  Oral/Mouth Cavity:  (UTO)  Skin: intact    Estimated/Assessed Needs    Weight Used For Calorie Calculations: 103.2 kg (227 lb 8.2 oz)  Energy Calorie Requirements (kcal): 1937  Energy Need Method: [facility]  Protein Requirements: 82 (0.8)  Weight Used For Protein Calculations: 103.2 kg (227 lb 8.2 oz)  Fluid Requirements (mL): 1ml/kcal or per MD     RDA Method (mL): 1937  CHO Requirement: 50% calories      Nutrition Prescription Ordered    Current Diet Order: NPO    Evaluation of Received Nutrient/Fluid Intake    Energy Calories Required: not meeting needs  Protein Required: not meeting needs  Fluid Required: not meeting needs  % Intake of Estimated Energy Needs: Other: NPO  % Meal Intake: NPO    Nutrition Risk    Level of Risk/Frequency of Follow-up:  (F/U 2x/wk)     Assessment and Plan    Inadequate dietary energy intake     Related to (etiology):   Inability to consume adequate intake    Signs and Symptoms (as evidenced by):   NPO, intubated and sedated    Interventions/Recommendations (treatment strategy):  Diabetisource AC GR 55ml/hr with water flushes per MD  Site RD to monitor    Nutrition Diagnosis Status:   Other: revised               Monitor and Evaluation    Food and Nutrient Intake: energy intake, enteral nutrition intake  Food and Nutrient Adminstration: enteral and parenteral nutrition administration  Knowledge/Beliefs/Attitudes: food and nutrition knowledge/skill, beliefs and attitudes  Physical Activity and Function: nutrition-related ADLs and IADLs  Anthropometric Measurements: weight, weight change  Biochemical Data, Medical Tests and Procedures: gastrointestinal profile, electrolyte and renal panel, glucose/endocrine profile, inflammatory profile, lipid profile  Nutrition-Focused Physical Findings: overall appearance     Nutrition Follow-Up    RD Follow-up?: Yes

## 2018-05-20 NOTE — HOSPITAL COURSE
18-Patient seen and examined today, now intubated. On Levophed for pressor support. Responding to Lasix gtt, appreciate nephrology rec's. Troponin > 50. Repeat later today. 2D echo showed EF of 45-50-%, +WMA. TSH 7.    18- Patient remains intubated. Alert and follows commands. Low dose Levophed for pressor support.  Responding to Lasix gtt at 5mg/hr. Diuresing well since starting Lasix and Levophed. Troponin > 50.  2D echo showed EF of 45-50-%, +WMA. Creatine improved on morning labs.      18--Patient seen and examined in ICU bed, family at bedside. Remains intubated. Alert and follows simple commands, communicated with pen and paper at this time. Low dose Levophed for pressor support to keep MAP >65. Lasix gtt stopped this AM. Heparin gtt in place. Fentanyl gtt for sedation. Labs reviewed, K+ 3.4 and Creatinine 1.9 today. Gentle IV hydration started this AM, repeat labs at noon.     18--Patient seen and examined in ICU bed, family at bedside. Extubated now. Alert and following commands. Low dose levophed infusion in place to keep MAP >65. IV Heparin gtt in place. Labs reviewed, Creatinine 2.0, BNP 2305, Troponin >50.   . Developed afib with RVR in the AM.   to 140 bpm. Hypotensive, peaceful/alert, and no c/o chest pain, dyspnea. Amiodarone 300 mg bolus and epi given, and amio gtt started. Then pt developed nonsustained VT aound 10 seconds, switched to afib. adenosine IVP twice and the HR temporarily decreased to 90's and quickly back to 130 bpm. Had twice DCCV and no HR change. SBP at 60 to 80 mmHg. IVF and magnesium given. Her HR gradually decreased and the rhythm is back to sinus. K 3.2 and Mg 2.3 repeat ekg showed sinus rhythm. MIHAI back to 100 mmHg. Stable.   . Keep on sinus rhythm. Has SOB. CXR showed pulm. Edema. Elevated BNP. On Levophard gtt.   Had HD yesterday, removed 1.5 liters and had angina when she had CRRT. Intubated. Now on  and levophed gtt. On amio gtt.  Alert.

## 2018-05-20 NOTE — SUBJECTIVE & OBJECTIVE
Review of Systems   Unable to perform ROS: intubated     Objective:     Vital Signs (Most Recent):  Temp: 98.2 °F (36.8 °C) (05/20/18 0700)  Pulse: 86 (05/20/18 1057)  Resp: 17 (05/20/18 1057)  BP: 108/69 (05/20/18 1015)  SpO2: 99 % (05/20/18 1057) Vital Signs (24h Range):  Temp:  [96.4 °F (35.8 °C)-99.5 °F (37.5 °C)] 98.2 °F (36.8 °C)  Pulse:  [] 86  Resp:  [16-41] 17  SpO2:  [90 %-100 %] 99 %  BP: ()/(29-96) 108/69     Weight: 103.9 kg (229 lb 0.9 oz)  Body mass index is 36.97 kg/m².     SpO2: 99 %  O2 Device (Oxygen Therapy): ventilator      Intake/Output Summary (Last 24 hours) at 05/20/18 1112  Last data filed at 05/20/18 1000   Gross per 24 hour   Intake          1091.46 ml   Output             1324 ml   Net          -232.54 ml       Lines/Drains/Airways     Central Venous Catheter Line                 Percutaneous Central Line Insertion/Assessment - triple lumen  05/19/18 1600 right internal jugular less than 1 day          Drain                 Urethral Catheter 05/19/18 0115 Latex 16 Fr. 1 day         NG/OG Tube 05/19/18 1522 less than 1 day          Airway                 Airway - Non-Surgical 05/19/18 1516 Endotracheal Tube less than 1 day          Peripheral Intravenous Line                 Peripheral IV - Single Lumen 05/19/18 Left Antecubital 1 day                Physical Exam   Constitutional: She appears well-developed and well-nourished. No distress.   Appears ill     HENT:   Head: Normocephalic and atraumatic.   Eyes: Pupils are equal, round, and reactive to light. Right eye exhibits no discharge. Left eye exhibits no discharge.   Neck: Neck supple. JVD present.   Cardiovascular: Normal rate, regular rhythm, S1 normal and S2 normal.    No murmur heard.  Pulmonary/Chest:   Diminished BS at bases   Abdominal: Soft. Bowel sounds are normal. She exhibits no distension. There is no tenderness. There is no rebound.   Musculoskeletal: She exhibits edema.   Neurological:   Sedated     Skin:  Skin is warm and dry. She is not diaphoretic.   Psychiatric: She has a normal mood and affect.   Nursing note and vitals reviewed.      Significant Labs:   CMP   Recent Labs  Lab 05/19/18  0111 05/19/18  0504 05/19/18  2111 05/20/18  0435    138  --  137  137   K 4.5 5.1 4.2 4.0  4.0    107  --  102  102   CO2 17* 21*  --  24  24   * 248*  --  238*  238*   BUN 20 24*  --  35*  35*   CREATININE 1.5* 1.7*  --  2.3*  2.3*   CALCIUM 8.8 8.6*  --  8.4*  8.4*   PROT 7.9 7.6  --  7.1  7.1   ALBUMIN 3.2* 3.2*  --  3.0*  3.0*   BILITOT 0.5 0.5  --  0.8  0.8   ALKPHOS 104 101  --  87  87   AST 26 51*  --  517*  517*   ALT 26 32  --  81*  81*   ANIONGAP 15 10  --  11  11   ESTGFRAFRICA 45* 39*  --  27*  27*   EGFRNONAA 39* 33*  --  23*  23*   , CBC   Recent Labs  Lab 05/19/18  0504 05/19/18  0824 05/20/18  0435   WBC 13.27* 13.27* 15.78*  15.78*   HGB 13.9 13.5 12.4  12.4   HCT 41.0 40.8 38.0  38.0    280  280 312  312   , Troponin   Recent Labs  Lab 05/19/18  0504 05/19/18  1203 05/20/18  0825   TROPONINI 1.762* 20.097* >50.000*    and All pertinent lab results from the last 24 hours have been reviewed.    Significant Imaging: Echocardiogram:   2D echo with color flow doppler:   Results for orders placed or performed during the hospital encounter of 05/19/18   2D echo with color flow doppler   Result Value Ref Range    EF 45 55 - 65    Mitral Valve Regurgitation MILD     Diastolic Dysfunction Yes (A)     Est. PA Systolic Pressure 33.18     Tricuspid Valve Regurgitation MILD     and X-Ray: CXR: X-Ray Chest 1 View (CXR):   Results for orders placed or performed during the hospital encounter of 05/19/18   X-Ray Chest 1 View    Narrative    EXAMINATION:  XR CHEST 1 VIEW    CLINICAL HISTORY:  Respiratory failure    COMPARISON:  Chest x-ray, 05/19/2018.    FINDINGS:  The endotracheal tube is in good position.  The NG tube tip is in the antrum of the stomach.  There is a right IJ  central venous catheter with its tip in the SVC.  Surgical changes are noted from prior CABG.  There is cardiomegaly.  There are patchy ground-glass opacities throughout both lungs.  No pneumothorax.      Impression    No significant interval change in the patchy ground-glass opacities throughout both lungs.      Electronically signed by: Sinan Johnson MD  Date:    05/20/2018  Time:    08:48    and X-Ray Chest PA and Lateral (CXR): No results found for this visit on 05/19/18.

## 2018-05-20 NOTE — PROGRESS NOTES
[facility]  Cardiology  Progress Note    Patient Name: Milli Montez  MRN: [id number]  Admission Date: 5/19/2018  Hospital Length of Stay: 1 days  Code Status: Full Code   Attending Physician: Joseph Jackson MD   Primary Care Physician: Marito Amato MD  Expected Discharge Date:   Principal Problem:NSTEMI (non-ST elevated myocardial infarction)    Subjective:   HPI:  HPI obtained from chart as patient was on AVAPS    Ms. Montez is a 57 year old female patient with a PMHx of CAD s/p CABG, s/p PTCA of LM stent on 2/6/18, s/p repeat PTCA/DEWAYNE of LM in-stent restenosis on 5/2/18, chronic diastolic CHF, hyperlipidemia, HTN, DM type II, and TIA who presented to [facility] ED yesterday with a chief complaint of progressively worsening SOB over the past few days. Associated symptoms included orthopnea, PND, BLE edema, and palpitations. Patient denied any associated chest pain, near syncope, syncope, fever, or chills. While in ED, patient became progressively more dyspneic and tachypneic and was subsequently placed on Avaps. Initial workup in ED revealed BNP of 828, troponin of 0.006, creatinine of 1.5, lactic acidosis (%), and hypoxia with hypercapnia. Patient subsequently admitted to ICU for further evaluation and treatment. Patient seen and examined today while in ICU. Remains dyspneic on AVAPs. Complains of some back discomfort. Repeat troponin resulted at 1.762. Echo and repeat ABG pending.    Hospital Course:   5/20/18-Patient seen and examined today, now intubated. On Levophed for pressor support. Responding to Lasix gtt, appreciate nephrology rec's. Troponin > 50. Repeat later today. 2D echo showed EF of 45-50-%, +WMA. TSH 7.        Review of Systems   Unable to perform ROS: intubated     Objective:     Vital Signs (Most Recent):  Temp: 98.2 °F (36.8 °C) (05/20/18 0700)  Pulse: 86 (05/20/18 1057)  Resp: 17 (05/20/18 1057)  BP: 108/69 (05/20/18 1015)  SpO2: 99 % (05/20/18 1057) Vital Signs (24h  Range):  Temp:  [96.4 °F (35.8 °C)-99.5 °F (37.5 °C)] 98.2 °F (36.8 °C)  Pulse:  [] 86  Resp:  [16-41] 17  SpO2:  [90 %-100 %] 99 %  BP: ()/(29-96) 108/69     Weight: 103.9 kg (229 lb 0.9 oz)  Body mass index is 36.97 kg/m².     SpO2: 99 %  O2 Device (Oxygen Therapy): ventilator      Intake/Output Summary (Last 24 hours) at 05/20/18 1112  Last data filed at 05/20/18 1000   Gross per 24 hour   Intake          1091.46 ml   Output             1324 ml   Net          -232.54 ml       Lines/Drains/Airways     Central Venous Catheter Line                 Percutaneous Central Line Insertion/Assessment - triple lumen  05/19/18 1600 right internal jugular less than 1 day          Drain                 Urethral Catheter 05/19/18 0115 Latex 16 Fr. 1 day         NG/OG Tube 05/19/18 1522 less than 1 day          Airway                 Airway - Non-Surgical 05/19/18 1516 Endotracheal Tube less than 1 day          Peripheral Intravenous Line                 Peripheral IV - Single Lumen 05/19/18 Left Antecubital 1 day                Physical Exam   Constitutional: She appears well-developed and well-nourished. No distress.   Appears ill     HENT:   Head: Normocephalic and atraumatic.   Eyes: Pupils are equal, round, and reactive to light. Right eye exhibits no discharge. Left eye exhibits no discharge.   Neck: Neck supple. JVD present.   Cardiovascular: Normal rate, regular rhythm, S1 normal and S2 normal.    No murmur heard.  Pulmonary/Chest:   Diminished BS at bases   Abdominal: Soft. Bowel sounds are normal. She exhibits no distension. There is no tenderness. There is no rebound.   Musculoskeletal: She exhibits edema.   Neurological:   Sedated     Skin: Skin is warm and dry. She is not diaphoretic.   Psychiatric: She has a normal mood and affect.   Nursing note and vitals reviewed.      Significant Labs:   CMP   Recent Labs  Lab 05/19/18  0111 05/19/18  0504 05/19/18  2111 05/20/18  0435    138  --  137  137    K 4.5 5.1 4.2 4.0  4.0    107  --  102  102   CO2 17* 21*  --  24  24   * 248*  --  238*  238*   BUN 20 24*  --  35*  35*   CREATININE 1.5* 1.7*  --  2.3*  2.3*   CALCIUM 8.8 8.6*  --  8.4*  8.4*   PROT 7.9 7.6  --  7.1  7.1   ALBUMIN 3.2* 3.2*  --  3.0*  3.0*   BILITOT 0.5 0.5  --  0.8  0.8   ALKPHOS 104 101  --  87  87   AST 26 51*  --  517*  517*   ALT 26 32  --  81*  81*   ANIONGAP 15 10  --  11  11   ESTGFRAFRICA 45* 39*  --  27*  27*   EGFRNONAA 39* 33*  --  23*  23*   , CBC   Recent Labs  Lab 05/19/18  0504 05/19/18  0824 05/20/18  0435   WBC 13.27* 13.27* 15.78*  15.78*   HGB 13.9 13.5 12.4  12.4   HCT 41.0 40.8 38.0  38.0    280  280 312  312   , Troponin   Recent Labs  Lab 05/19/18  0504 05/19/18  1203 05/20/18  0825   TROPONINI 1.762* 20.097* >50.000*    and All pertinent lab results from the last 24 hours have been reviewed.    Significant Imaging: Echocardiogram:   2D echo with color flow doppler:   Results for orders placed or performed during the hospital encounter of 05/19/18   2D echo with color flow doppler   Result Value Ref Range    EF 45 55 - 65    Mitral Valve Regurgitation MILD     Diastolic Dysfunction Yes (A)     Est. PA Systolic Pressure 33.18     Tricuspid Valve Regurgitation MILD     and X-Ray: CXR: X-Ray Chest 1 View (CXR):   Results for orders placed or performed during the hospital encounter of 05/19/18   X-Ray Chest 1 View    Narrative    EXAMINATION:  XR CHEST 1 VIEW    CLINICAL HISTORY:  Respiratory failure    COMPARISON:  Chest x-ray, 05/19/2018.    FINDINGS:  The endotracheal tube is in good position.  The NG tube tip is in the antrum of the stomach.  There is a right IJ central venous catheter with its tip in the SVC.  Surgical changes are noted from prior CABG.  There is cardiomegaly.  There are patchy ground-glass opacities throughout both lungs.  No pneumothorax.      Impression    No significant interval change in the patchy  ground-glass opacities throughout both lungs.      Electronically signed by: Sinan Johnson MD  Date:    05/20/2018  Time:    08:48    and X-Ray Chest PA and Lateral (CXR): No results found for this visit on 05/19/18.    Assessment and Plan:   Patient who presents with acute CHF/NSTEMI/cardiogenic shock. Responding to Lasix gtt, appreciate nephrology assistance. Continue current medical mgmt with ASA, Brilinta, heparin gtt. Trend troponin until peak. LHC once respiratory status is mor stable.     * NSTEMI (non-ST elevated myocardial infarction)    -Patient with significant history of CAD s/p recent PTCA/DEWAYNE of LM in-stent re-stenosis, presents with NSTEMI/cardiogenic shock  -Continue Levophed for pressor support  -Troponin > 50, repeat pending  -Continue current medical management-ASA, Brilinta, heparin gtt  -Coreg held due to need for pressor support  -Statin held due to elevated liver enzymes  -2D echo showed EF of 45-50%, +WMA  -Needs LHC after diuresis/once respiratory status more stable        Acute renal failure superimposed on stage 3 chronic kidney disease    -Likely cardiorenal, creatinine 1.7  -Continue to monitor        Acute respiratory failure    -Secondary to NSTEMI and volume overload/acute on chronic decompensated diastolic CHF  -Now intubated  -Continue Lasix gtt, assess response, nephrology on board              Acute on chronic combined systolic and diastolic heart failure    -Improving with Lasix gtt  -continue current mgmt  -ACEI/ARB held due to KEN/need for pressor support          CAD (coronary artery disease)    -See plan under NSTEMI        Hyperlipidemia    -Hold statin given elevated liver enzymes          Essential hypertension    -BP meds held due to need for pressor support            VTE Risk Mitigation         Ordered     heparin 25,000 units in dextrose 5% 250 mL (100 units/mL) infusion; FEMALE  Continuous      05/19/18 0905     heparin 25,000 units in dextrose 5% 250 mL (100 units/mL)  bolus from bag; PRN BOLUS  As needed (PRN)      05/19/18 0905     heparin 25,000 units in dextrose 5% 250 mL (100 units/mL) bolus from bag; PRN BOLUS  As needed (PRN)      05/19/18 0905     Place sequential compression device  Until discontinued      05/19/18 0628     IP VTE HIGH RISK PATIENT  Once      05/19/18 0248          Katharine Bain PA-C  Cardiology  [facility]     Chart reviewed. Dr. Morris examined patient and agree with plan as outlined above.

## 2018-05-20 NOTE — ASSESSMENT & PLAN NOTE
Related to (etiology):   Inability to consume adequate intake    Signs and Symptoms (as evidenced by):   NPO, intubated and sedated    Interventions/Recommendations (treatment strategy):  Diabetisource AC GR 55ml/hr with water flushes per MD  Site RD to monitor    Nutrition Diagnosis Status:   Other: revised

## 2018-05-20 NOTE — SUBJECTIVE & OBJECTIVE
Interval History:   Review of Systems   Unable to perform ROS: Intubated       Objective:     Vital Signs (Most Recent):  Temp: 98.2 °F (36.8 °C) (05/20/18 0700)  Pulse: 86 (05/20/18 1057)  Resp: 17 (05/20/18 1057)  BP: 108/69 (05/20/18 1015)  SpO2: 99 % (05/20/18 1057) Vital Signs (24h Range):  Temp:  [98.1 °F (36.7 °C)-99.5 °F (37.5 °C)] 98.2 °F (36.8 °C)  Pulse:  [] 86  Resp:  [16-41] 17  SpO2:  [90 %-100 %] 99 %  BP: ()/(29-96) 108/69     Weight: 103.9 kg (229 lb 0.9 oz)  Body mass index is 36.97 kg/m².      Intake/Output Summary (Last 24 hours) at 05/20/18 1218  Last data filed at 05/20/18 1000   Gross per 24 hour   Intake          1091.46 ml   Output             1274 ml   Net          -182.54 ml       Physical Exam   Constitutional: She appears well-developed and well-nourished.   HENT:   Head: Atraumatic.   ETT   RT TLC    Eyes: EOM are normal.   Cardiovascular: Regular rhythm.    86 bpm   Pulmonary/Chest: She has rales.   Decreased breath sounds bilaterally.  Crackles.  Tachypneic in the 30s respiratory rate.   Abdominal: Soft.   Genitourinary:   Genitourinary Comments: Carlin    Musculoskeletal: She exhibits edema.   Neurological: She is alert.   Skin: Skin is warm.   Psychiatric:   Appears anxious       Vents:  Vent Mode: A/C (05/20/18 1057)  Ventilator Initiated: Yes (05/19/18 1539)  Set Rate: 16 bmp (05/20/18 1057)  Vt Set: 400 mL (05/20/18 1057)  Pressure Support: 0 cmH20 (05/20/18 1057)  PEEP/CPAP: 5 cmH20 (05/20/18 1057)  Oxygen Concentration (%): 40 (05/20/18 1057)  Peak Airway Pressure: 26 cmH2O (05/20/18 1057)  Plateau Pressure: 0 cmH20 (05/20/18 1057)  Total Ve: 7.02 mL (05/20/18 1057)  F/VT Ratio<105 (RSBI): (!) 43.15 (05/20/18 1057)    Lines/Drains/Airways     Central Venous Catheter Line                 Percutaneous Central Line Insertion/Assessment - triple lumen  05/19/18 1600 right internal jugular less than 1 day          Drain                 Urethral Catheter 05/19/18 0118  Latex 16 Fr. 1 day         NG/OG Tube 05/19/18 1522 less than 1 day          Airway                 Airway - Non-Surgical 05/19/18 1516 Endotracheal Tube less than 1 day          Peripheral Intravenous Line                 Peripheral IV - Single Lumen 05/19/18 Left Antecubital 1 day                Significant Labs:    CBC/Anemia Profile:    Recent Labs  Lab 05/19/18  0504 05/19/18  0824 05/20/18  0435   WBC 13.27* 13.27* 15.78*  15.78*   HGB 13.9 13.5 12.4  12.4   HCT 41.0 40.8 38.0  38.0    280  280 312  312   MCV 95 95 95  95   RDW 14.4 14.8* 14.9*  14.9*     BNP 1841    TROPONIN > 50,000.  Chemistries:    Recent Labs  Lab 05/19/18  0111 05/19/18  0504 05/19/18  2111 05/20/18  0435    138  --  137  137   K 4.5 5.1 4.2 4.0  4.0    107  --  102  102   CO2 17* 21*  --  24  24   BUN 20 24*  --  35*  35*   CREATININE 1.5* 1.7*  --  2.3*  2.3*   CALCIUM 8.8 8.6*  --  8.4*  8.4*   ALBUMIN 3.2* 3.2*  --  3.0*  3.0*   PROT 7.9 7.6  --  7.1  7.1   BILITOT 0.5 0.5  --  0.8  0.8   ALKPHOS 104 101  --  87  87   ALT 26 32  --  81*  81*   AST 26 51*  --  517*  517*   MG 2.2  --   --   --    PHOS 5.3*  --   --   --        Significant Imaging:  CXR: I have reviewed all pertinent results/findings within the past 24 hours and my personal findings are:  ET tube okay.  Right-sided triple-lumen okay.  Persistent bilateral pulmonary opacities.

## 2018-05-20 NOTE — ASSESSMENT & PLAN NOTE
5/20 aspirin, IV heparin, beta blocker with parameters.  She has had a stent within the last few months.  Cardiology follow-up possible cardiac catheter in a.m.

## 2018-05-20 NOTE — NURSING
Dr. Morris and SAVITA Alcantar at bedside.  Notified of pts BNP and Troponin results.  Also notified that pt remains on levo drip.  Orders to hold coreg and nitropaste until pt is off of levo.

## 2018-05-20 NOTE — PLAN OF CARE
Problem: Patient Care Overview  Goal: Plan of Care Review  Outcome: Ongoing (interventions implemented as appropriate)  Pt remained stable throughout the day. Urine output, respiratory status, and cardiac output improved. Daughter talked to Dr. Booth and Dr. Morris about POC.  IV gtts altered per pt status and MD orders. Possible heart cath tomorrow. VSS.

## 2018-05-20 NOTE — EICU
Requesting to change potassium 40 meq BID oral to liquid, since got intubated.  So change was made to 40 meq liquid via tube BID.  Follow labs.     AM potassium was 5.1  As per nephro notes has poor response to IV lasix for chf.  Get stat potassium level first.  Getting potassium BID dosing.    22:00  K at 4.2  DC 40 meq kcl bid.  Ordered  Liquid Kcl 20 meq once for now.

## 2018-05-20 NOTE — ASSESSMENT & PLAN NOTE
-Patient with significant history of CAD s/p recent PTCA/DEWAYNE of LM in-stent re-stenosis, presents with NSTEMI/cardiogenic shock  -Continue Levophed for pressor support  -Troponin > 50, repeat pending  -Continue current medical management-ASA, Brilinta, heparin gtt  -Coreg held due to need for pressor support  -Statin held due to elevated liver enzymes  -2D echo showed EF of 45-50%, +WMA  -Needs LHC after diuresis/once respiratory status more stable

## 2018-05-20 NOTE — ASSESSMENT & PLAN NOTE
-Secondary to NSTEMI and volume overload/acute on chronic decompensated diastolic CHF  -Now intubated  -Continue Lasix gtt, assess response, nephrology on board

## 2018-05-20 NOTE — SUBJECTIVE & OBJECTIVE
Interval History: Pt was seen and examined in ICU. No new events last pm, remained stable, discussed with ICU team. Has remained on lasix IV drip overnight.    Review of patient's allergies indicates:   Allergen Reactions    Penicillins Swelling     Current Facility-Administered Medications   Medication Frequency    acetaminophen tablet 650 mg Q6H PRN    aspirin EC tablet 81 mg Daily    carvedilol tablet 6.25 mg BID WM    chlorhexidine 0.12 % solution 15 mL BID    dextrose 50% injection 12.5 g PRN    etomidate injection 20 mg Once    fentaNYL 2500 mcg in D5W 250 mL infusion premix (titrating) (conc: 10 mcg/mL) Continuous    furosemide (LASIX) 2 mg/mL in sodium chloride 0.9% 100 mL infusion (conc: 2 mg/mL) Continuous    glucagon (human recombinant) injection 1 mg PRN    heparin 25,000 units in dextrose 5% 250 mL (100 units/mL) bolus from bag; PRN BOLUS PRN    heparin 25,000 units in dextrose 5% 250 mL (100 units/mL) bolus from bag; PRN BOLUS PRN    heparin 25,000 units in dextrose 5% 250 mL (100 units/mL) infusion; FEMALE Continuous    insulin aspart U-100 pen 0-5 Units Q6H PRN    levothyroxine tablet 75 mcg Daily    lorazepam (ATIVAN) injection 2 mg Q4H PRN    lorazepam (ATIVAN) injection 2 mg Once    lorazepam (ATIVAN) injection 2 mg Once    metOLazone tablet 5 mg Once    nitroGLYCERIN SL tablet 0.4 mg Q5 Min PRN    norepinephrine 4 mg in dextrose 5% 250 mL infusion (premix) (titrating) Continuous    ondansetron injection 4 mg Q6H PRN    pantoprazole 40 mg in dextrose 5 % 100 mL IVPB (over 15 minutes) (ready to mix system) Daily    ramelteon tablet 8 mg Nightly PRN    ticagrelor tablet 90 mg BID       Objective:     Vital Signs (Most Recent):  Temp: 96.1 °F (35.6 °C) (05/20/18 1115)  Pulse: 89 (05/20/18 1301)  Resp: 17 (05/20/18 1301)  BP: 116/70 (05/20/18 1245)  SpO2: 99 % (05/20/18 1301)  O2 Device (Oxygen Therapy): ventilator (05/20/18 1301) Vital Signs (24h Range):  Temp:  [96.1 °F  (35.6 °C)-99.5 °F (37.5 °C)] 96.1 °F (35.6 °C)  Pulse:  [] 89  Resp:  [16-41] 17  SpO2:  [90 %-100 %] 99 %  BP: ()/(29-96) 116/70     Weight: 103.9 kg (229 lb 0.9 oz) (05/20/18 0513)  Body mass index is 36.97 kg/m².  Body surface area is 2.2 meters squared.    I/O last 3 completed shifts:  In: 1180.5 [P.O.:50; I.V.:1091.5; IV Piggyback:39]  Out: 1309 [Urine:1309]    Physical Exam   Constitutional: She is oriented to person, place, and time. She appears well-developed and well-nourished. No distress.   HENT:   Head: Normocephalic and atraumatic.   Neck: JVD present.   Cardiovascular: Normal rate, regular rhythm and normal heart sounds.  Exam reveals no friction rub.    Pulmonary/Chest: She is in respiratory distress. She has rales.   Abdominal: Soft. She exhibits no distension. There is no tenderness.   Musculoskeletal: She exhibits edema.   Neurological: She is oriented to person, place, and time.   Skin: Skin is warm and dry.   Psychiatric: She has a normal mood and affect. Her behavior is normal.   Nursing note and vitals reviewed.      Significant Labs: reviewed  BMP  Lab Results   Component Value Date     05/20/2018     05/20/2018    K 4.0 05/20/2018    K 4.0 05/20/2018     05/20/2018     05/20/2018    CO2 24 05/20/2018    CO2 24 05/20/2018    BUN 35 (H) 05/20/2018    BUN 35 (H) 05/20/2018    CREATININE 2.3 (H) 05/20/2018    CREATININE 2.3 (H) 05/20/2018    CALCIUM 8.4 (L) 05/20/2018    CALCIUM 8.4 (L) 05/20/2018    ANIONGAP 11 05/20/2018    ANIONGAP 11 05/20/2018    ESTGFRAFRICA 27 (A) 05/20/2018    ESTGFRAFRICA 27 (A) 05/20/2018    EGFRNONAA 23 (A) 05/20/2018    EGFRNONAA 23 (A) 05/20/2018     Lab Results   Component Value Date    WBC 15.78 (H) 05/20/2018    WBC 15.78 (H) 05/20/2018    HGB 12.4 05/20/2018    HGB 12.4 05/20/2018    HCT 38.0 05/20/2018    HCT 38.0 05/20/2018    MCV 95 05/20/2018    MCV 95 05/20/2018     05/20/2018     05/20/2018       Recent  Labs  Lab 05/20/18  0825   TROPONINI >50.000*       Significant Imaging: CXR reviewed

## 2018-05-20 NOTE — HOSPITAL COURSE
5/20/2018  Patient continue to be on vent and iv diuresis for acute cardiac pulmonary edema which shows improvement  . Continue treatment for nstemi with aspirin,brilinta ,statin. Echo finding and elevated trop noted continue with medical management once stable patient will have LHC   5/21/2018  Patient remains intubated. Alert and follows commands. Low dose Levophed for pressor support.  Responding to Lasix gtt at 5mg/hr. Diuresing well since starting Lasix and Levophed. Troponin > 50.  2D echo showed EF of 45-50-%, +WMA.Plan for C and C   5/22/2018   Cardiology updated plan of care to wait on heart cath . Until her renal function improved . Diuresis on hold . Improved pulmonary edema . Continue to require pressors to support blood pressure   05/23-she remains on vasopressor support ,case was discussed extensively with cardiology-no benefit for [facility] at this time.  Lab data showed troponin of more than 50.  05/24- She developed symptomatic Afib RVR and had cardioversion done, IV amiodarone was also given at bedside ,critical care team added Vanco and Azactam.   05/25- She is now on bipap -remains on multiple drips-amiodarone,heparin, levophed, dobutamine   05/26- She was started on renal replacement therapy and had interval intubation.   The daughter who is the POA is concerned about overall prognosis and wants to know the options for meaningful cardiac recovery and advance directives was also discussed with her.  She wants to have a family conference with the cardiology so they can fully know their options before making DNR decision.    05/27-   DISCAHRGE SUMMARY -  57 year old   female patient with a PMHx of CAD s/p CABG, s/p PTCA of LM stent on 2/6/18, s/p repeat PTCA/DEWAYNE of LM in-stent restenosis on 5/2/18, chronic diastolic CHF, hyperlipidemia, HTN, DM type II, and TIA who presented to [facility] ED with  progressively worsening SOB over the past few days.  She was admitted and managed in the ICU . She was  managed for cardiogenic shock and was started on vasopressors with Levophed and Dobutamine.  Her troponin was elevated at more than 50 but she developed renela failure and was felt to have passed the therapeutic window for [facility].She developed an episode of V tach and was started on amiodarone infusion and cardioversion.  She was continued on vasopressors,heparin drip, amiodarone drip and had intraaortic balloon pump insertion .She has been accepted to the advanced CHF team in [place] .  Plan of care was discussed with family extensively .

## 2018-05-20 NOTE — ASSESSMENT & PLAN NOTE
Strict I's and O's.  Keep Carlin.  IV Lasix 40 mg every 12 hours.  Repeat BNP.  Repeat chest x-ray in a.m.  5/20 intravenous Lasix drip.  Keep I<O'S

## 2018-05-20 NOTE — ASSESSMENT & PLAN NOTE
-Improving with Lasix gtt  -continue current mgmt  -ACEI/ARB held due to KEN/need for pressor support

## 2018-05-20 NOTE — ASSESSMENT & PLAN NOTE
O2 keep sat above 90%.  Continue AVAPs for now.  Check venous lower extremity ultrasound.  5/20 ultrasound of lower extremities no DVT.  No changes of vent settings. No  Planning to do SBT today.  Continue O2/ mechanical ventilation/pulmonary toilet.

## 2018-05-20 NOTE — SUBJECTIVE & OBJECTIVE
Review of Systems   Unable to perform ROS: Intubated     Objective:     Vital Signs (Most Recent):  Temp: 98.2 °F (36.8 °C) (05/20/18 0700)  Pulse: 86 (05/20/18 0915)  Resp: 18 (05/20/18 0915)  BP: 98/65 (05/20/18 0915)  SpO2: 99 % (05/20/18 0915) Vital Signs (24h Range):  Temp:  [96.4 °F (35.8 °C)-99.5 °F (37.5 °C)] 98.2 °F (36.8 °C)  Pulse:  [] 86  Resp:  [16-41] 18  SpO2:  [90 %-100 %] 99 %  BP: ()/(29-96) 98/65     Weight: 103.9 kg (229 lb 0.9 oz)  Body mass index is 36.97 kg/m².    Intake/Output Summary (Last 24 hours) at 05/20/18 1009  Last data filed at 05/20/18 0900   Gross per 24 hour   Intake          1091.46 ml   Output             1309 ml   Net          -217.54 ml      Physical Exam   Constitutional: She appears well-developed and well-nourished. She is sedated and intubated.   HENT:   Head: Atraumatic.   Et tube in place    Eyes: Right eye exhibits no discharge. Left eye exhibits no discharge.   Cardiovascular: Regular rhythm.    K cardiac at 105 (minute   Pulmonary/Chest: Effort normal. She is intubated. She has rales.   Decreased breath sounds bilaterally.  Crackles.  Tachypneic in the 30s respiratory rate.   Abdominal: Soft.   Genitourinary:   Genitourinary Comments: Carlin    Musculoskeletal: She exhibits no edema.   Skin: Skin is warm.       Significant Labs: All pertinent labs within the past 24 hours have been reviewed.    Significant Imaging: I have reviewed all pertinent imaging results/findings within the past 24 hours.

## 2018-05-20 NOTE — PLAN OF CARE
Problem: Ventilation, Mechanical Invasive (Adult)  Intervention: Monitor/Manage Nutrition Support  Nutrition Goals: advancement energy intake within 48hrs  Nutrition Goal Status: new  Communication of RD Recs:  (poc reviewed)    Nutrition Discharge Planning: to be determined

## 2018-05-20 NOTE — ASSESSMENT & PLAN NOTE
KEN (acute kidney injury)     Renal: KEN. Due to prerenal azotemia due to renal hypoperfusion from poor cardiac function and MI.  CKD stage 3 at [facility]  K normal  Acid base: respiratory acidosis and metabolic acidosis on the initial ABG          CAD (coronary artery disease)     Significant cardiac h/o.  Past h/o of CAD  CABG (LIMA to Diagonal1 + distal LAD, and SVG-RPDA) and balloon PTCA of LM stent on 2/6/18 followed by repeat balloon PTCA + DEWAYNE of LM in-stent steosis 5/2/18,  Current symptoms caused by the heart attack.  SOB acutely, unusual for CHF. Caused by MI  Has baseline SOB and low exercise tolerance due to CAD and CHF  Has combined systolic and diastolic dysfunction.  Acute of chronic CHF due to MI  Lactic acidosis c/w cardiogenic shock. MI  Respiratory acidosis and metabolic acidosis on the initial ABG  Poor response to IV lasix injection  Will switch to IV lasix drip + metolazone prn      Respiratory failure: pt is being intubated.          Plans and recommendations:  As discussed above  Mgmt reviewed  Cardiology has been consulted by the ICU  D/c lasix Iv injection  Start lasix IV infusion at 5 mg/hr + metolazone 5 mg x 1 and as needed  Total critical care time spent 70 minutes including time needed to review the records, the   patient evaluation, documentation, face-to-face discussion with the patient,   more than 50% of the time was spent on coordination of care and counseling.    Level V visit.

## 2018-05-20 NOTE — PLAN OF CARE
Patient intubated , unable to participate in assessment. Patient's discharge disposition is dependent upon hospital progress. Will need to complete discharge assessment once patient able to answer assessment questions and re-admit questionnaire.          [id number] - [place], [place] - [address]  [address]  [place] [address]  Phone: [phone] Fax: [phone]    [facility] - [place], [place] - [address]  [address]  [facility] [address]  Phone: [phone] Fax: [phone]    [facility] - [place], [place] - [address]  [address]  [facility] [address]  Phone: [phone] Fax: [phone]    Marito Amato MD  Payor: MEDICARE / Plan: MEDICARE PART A & B / Product Type: [facility] /           05/20/18 0900   Discharge Assessment   Assessment Type Discharge Planning Assessment   Assessment information obtained from? Medical Record   Expected Length of Stay (days) (tbd)   Communicated expected length of stay with patient/caregiver no   Prior to hospitilization cognitive status: Unable to Assess   Current cognitive status: Coma/Sedated/Intubated   Current Functional Status: Completely Dependent   Facility Arrived From: home   Lives With spouse   Able to Return to Prior Arrangements unable to determine at this time (comments)   Is patient able to care for self after discharge? Unable to determine at this time (comments)   Who are your caregiver(s) and their phone number(s)? Katherine Ribeiro ( daughter ) [phone] or [phone]   Patient's perception of discharge disposition skilled nursing facility;home health   Readmission Within The Last 30 Days previous discharge plan unsuccessful   If yes, [facility]   Patient currently being followed by outpatient case management? No   Patient currently receives any other outside agency services? No   Do you have any problems affording any of your prescribed  medications? TBD   Does the patient receive services at the [facility]? No   Discharge Plan A Home;Home with family;Home Health;Skilled Nursing Facility

## 2018-05-20 NOTE — ASSESSMENT & PLAN NOTE
-continue with vent support iv diuresis with levophed support as  Needed   - plan for possible [facility] tomorrow

## 2018-05-20 NOTE — ASSESSMENT & PLAN NOTE
- Likely cardiorenal syndrome.  - Diurese as above.  - Avoid nephrotoxic agents.  - Follow kidney function closely.  -nephrology on board

## 2018-05-20 NOTE — ASSESSMENT & PLAN NOTE
- Recent DEWAYNE of LM due to critical in-stent stenosis on 5/2/18 per Dr. Wooten ([facility]).  -continue cardioprotective meds

## 2018-05-20 NOTE — PROGRESS NOTES
[facility] Hospital Medicine  Progress Note    Patient Name: Milli Montez  MRN: [id number]  Patient Class: IP- Inpatient   Admission Date: 5/19/2018  Length of Stay: 1 days  Attending Physician: Joseph Jackson MD  Primary Care Provider: Marito Amato MD        Subjective:     Principal Problem:NSTEMI (non-ST elevated myocardial infarction)    HPI:  Ms. Montez is a 58yo female with  a PMHx of CAD with remote CABG (LIMA to Diagonal1 + distal LAD, and SVG-RPDA) and balloon PTCA of LM stent on 2/6/18 followed by repeat balloon PTCA + DEWAYNE of LM in-stent steosis 5/2/18, chronic diastolic HFpEF of 55-60%, HLD, HTN, DM II, and h/o TIA, who presented to the ED with c/o SOB that has progressively worsened over the past few days.  Associated orthopnea, PND, BLE edema, and palpitations.  Denies any CP, cough, weight gain, ABD pain or distention, N/V/D, dysuria, lightheadedness/dizziness, syncope, HA, AMS, focal deficits, diaphoresis, fever, or chills.  Upon arrival to ED, patient notably in severe respiratory distress and placed on BiPAP, and later AVAPS.  She received IV Lasix 40mg, followed by Lasix 60mg IV and IV Ativan with improvement in respiratory status.  Initial work-up resulted , troponin 0.006, cr. 1.5, lactic 5.1, ABG on AVAPS with pH 7.3, pCO2 41, pO2 525, HCO3 20.  CXR consistent with CHF.  EKG showed atrial tachycardia with LBBB (old), no acute ischemic ST-T changes from previous tracings.  Patient was admitted to ICU for acute decompensated HF under Hospital Medicine services.  Currently, patient appears comfortable in NAD.  Reports SOB greatly improved since IV Lasix and Ativan.  Patient is a Full Code.  Daughter Katherine Lewis is surrogate decision maker.    Hospital Course:  5/20/2018  Patient continue to be on vent and iv diuresis for acute cardiac pulmonary edema which shows improvement  . Continue treatment for nstemi with aspirin,brilinta ,statin. Echo finding and  elevated trop noted continue with medical management once stable patient will have [facility]         Review of Systems   Unable to perform ROS: Intubated     Objective:     Vital Signs (Most Recent):  Temp: 98.2 °F (36.8 °C) (05/20/18 0700)  Pulse: 86 (05/20/18 0915)  Resp: 18 (05/20/18 0915)  BP: 98/65 (05/20/18 0915)  SpO2: 99 % (05/20/18 0915) Vital Signs (24h Range):  Temp:  [96.4 °F (35.8 °C)-99.5 °F (37.5 °C)] 98.2 °F (36.8 °C)  Pulse:  [] 86  Resp:  [16-41] 18  SpO2:  [90 %-100 %] 99 %  BP: ()/(29-96) 98/65     Weight: 103.9 kg (229 lb 0.9 oz)  Body mass index is 36.97 kg/m².    Intake/Output Summary (Last 24 hours) at 05/20/18 1009  Last data filed at 05/20/18 0900   Gross per 24 hour   Intake          1091.46 ml   Output             1309 ml   Net          -217.54 ml      Physical Exam   Constitutional: She appears well-developed and well-nourished. She is sedated and intubated.   HENT:   Head: Atraumatic.   Et tube in place    Eyes: Right eye exhibits no discharge. Left eye exhibits no discharge.   Cardiovascular: Regular rhythm.    K cardiac at 105 (minute   Pulmonary/Chest: Effort normal. She is intubated. She has rales.   Decreased breath sounds bilaterally.  Crackles.  Tachypneic in the 30s respiratory rate.   Abdominal: Soft.   Genitourinary:   Genitourinary Comments: Carlin    Musculoskeletal: She exhibits no edema.   Skin: Skin is warm.       Significant Labs: All pertinent labs within the past 24 hours have been reviewed.    Significant Imaging: I have reviewed all pertinent imaging results/findings within the past 24 hours.    Assessment/Plan:      * NSTEMI (non-ST elevated myocardial infarction)    -continue iv heparin,aspirin,brilinta,statin and ace on hold due to grisel and hypotension           Cardiogenic shock    - on levophed and continue with iv diuresis   - echo ef 45 with wall motion abnormalities   - continue with nstemi management           Acute renal failure superimposed on stage 3  chronic kidney disease    - Likely cardiorenal syndrome.  - Diurese as above.  - Avoid nephrotoxic agents.  - Follow kidney function closely.  -nephrology on board         Acute respiratory failure    - secondary to acute chf   - Currently on intubated and iv diuresis           Acute on chronic combined systolic and diastolic heart failure    -continue with vent support iv diuresis with levophed support as  Needed   - plan for possible [facility] tomorrow           CAD (coronary artery disease)    - Recent DEWAYNE of LM due to critical in-stent stenosis on 5/2/18 per Dr. Wooten ([facility]).  -continue cardioprotective meds         Hyperlipidemia    - Continue statin therapy.  - FLP in AM.        Morbid obesity with BMI of 40.0-44.9, adult    - Lifestyle modifications.  - Pulmonology consult, ? [facility].        Essential hypertension    - hypotensive hold on antihypertensives   - Resume home Coreg.  .        Type 2 diabetes mellitus with hyperglycemia    - Accuchecks with SSI.  - Hold glipizide, will resume at DC.  - Check HbA1c.          VTE Risk Mitigation         Ordered     heparin 25,000 units in dextrose 5% 250 mL (100 units/mL) infusion; FEMALE  Continuous      05/19/18 0905     heparin 25,000 units in dextrose 5% 250 mL (100 units/mL) bolus from bag; PRN BOLUS  As needed (PRN)      05/19/18 0905     heparin 25,000 units in dextrose 5% 250 mL (100 units/mL) bolus from bag; PRN BOLUS  As needed (PRN)      05/19/18 0905     Place sequential compression device  Until discontinued      05/19/18 0628     IP VTE HIGH RISK PATIENT  Once      05/19/18 0248          Critical care time spent on the evaluation and treatment of severe organ dysfunction, review of pertinent labs and imaging studies, discussions with consulting providers and discussions with patient/family: 40 minutes.    Joseph Jackson MD  Department of Hospital Medicine   [facility]

## 2018-05-20 NOTE — ASSESSMENT & PLAN NOTE
Cardiac monitoring.  Aspirin , statin IV heparin and beta blockers, Ticagrelor  5/20 same.  Cardiology follow-up for cardiac catheter.

## 2018-05-20 NOTE — PROGRESS NOTES
"[facility]  Nephrology  Progress Note    Patient Name: Milli Montez  MRN: [id number]  Admission Date: 5/19/2018  Hospital Length of Stay: 1 days  Attending Provider: Joseph Jackson MD   Primary Care Physician: Marito Amato MD  Principal Problem:NSTEMI (non-ST elevated myocardial infarction). Fluid gain, KEN    Subjective:     HPI: Pt was seen and examined in ICU. H/o reviewed. Pt is a 58 y/o female with acute kidney injury, likely due to CHF exacerbation causing renal hypoperfusion. Pt has a baseline s Cr of 1.2 (1 month ago). s Cr is now 1.7. Pt was admitted with SOB that is "new", pt also has low exercise tolerance chronically due to SOB. Pt has a pertinent h/o of DM, HTN, CAD with past h/o of CABG and stents, and morbid obesity. Labs and meds in ER and ICU reviewed. Pt has received lasix IV injection, and remains fluid overloaded with LE swelling and continued SOB.    Interval History: Pt was seen and examined in ICU. No new events last pm, remained stable, discussed with ICU team. Has remained on lasix IV drip overnight.    Review of patient's allergies indicates:   Allergen Reactions    Penicillins Swelling     Current Facility-Administered Medications   Medication Frequency    acetaminophen tablet 650 mg Q6H PRN    aspirin EC tablet 81 mg Daily    carvedilol tablet 6.25 mg BID WM    chlorhexidine 0.12 % solution 15 mL BID    dextrose 50% injection 12.5 g PRN    etomidate injection 20 mg Once    fentaNYL 2500 mcg in D5W 250 mL infusion premix (titrating) (conc: 10 mcg/mL) Continuous    furosemide (LASIX) 2 mg/mL in sodium chloride 0.9% 100 mL infusion (conc: 2 mg/mL) Continuous    glucagon (human recombinant) injection 1 mg PRN    heparin 25,000 units in dextrose 5% 250 mL (100 units/mL) bolus from bag; PRN BOLUS PRN    heparin 25,000 units in dextrose 5% 250 mL (100 units/mL) bolus from bag; PRN BOLUS PRN    heparin 25,000 units in dextrose 5% 250 mL (100 units/mL) infusion; " FEMALE Continuous    insulin aspart U-100 pen 0-5 Units Q6H PRN    levothyroxine tablet 75 mcg Daily    lorazepam (ATIVAN) injection 2 mg Q4H PRN    lorazepam (ATIVAN) injection 2 mg Once    lorazepam (ATIVAN) injection 2 mg Once    metOLazone tablet 5 mg Once    nitroGLYCERIN SL tablet 0.4 mg Q5 Min PRN    norepinephrine 4 mg in dextrose 5% 250 mL infusion (premix) (titrating) Continuous    ondansetron injection 4 mg Q6H PRN    pantoprazole 40 mg in dextrose 5 % 100 mL IVPB (over 15 minutes) (ready to mix system) Daily    ramelteon tablet 8 mg Nightly PRN    ticagrelor tablet 90 mg BID       Objective:     Vital Signs (Most Recent):  Temp: 96.1 °F (35.6 °C) (05/20/18 1115)  Pulse: 89 (05/20/18 1301)  Resp: 17 (05/20/18 1301)  BP: 116/70 (05/20/18 1245)  SpO2: 99 % (05/20/18 1301)  O2 Device (Oxygen Therapy): ventilator (05/20/18 1301) Vital Signs (24h Range):  Temp:  [96.1 °F (35.6 °C)-99.5 °F (37.5 °C)] 96.1 °F (35.6 °C)  Pulse:  [] 89  Resp:  [16-41] 17  SpO2:  [90 %-100 %] 99 %  BP: ()/(29-96) 116/70     Weight: 103.9 kg (229 lb 0.9 oz) (05/20/18 0513)  Body mass index is 36.97 kg/m².  Body surface area is 2.2 meters squared.    I/O last 3 completed shifts:  In: 1180.5 [P.O.:50; I.V.:1091.5; IV Piggyback:39]  Out: 1309 [Urine:1309]    Physical Exam   Constitutional: She is oriented to person, place, and time. She appears well-developed and well-nourished. No distress.   HENT:   Head: Normocephalic and atraumatic.   Neck: JVD present.   Cardiovascular: Normal rate, regular rhythm and normal heart sounds.  Exam reveals no friction rub.    Pulmonary/Chest: She is in respiratory distress. She has rales.   Abdominal: Soft. She exhibits no distension. There is no tenderness.   Musculoskeletal: She exhibits edema.   Neurological: She is oriented to person, place, and time.   Skin: Skin is warm and dry.   Psychiatric: She has a normal mood and affect. Her behavior is normal.   Nursing note and  vitals reviewed.      Significant Labs: reviewed  BMP  Lab Results   Component Value Date     05/20/2018     05/20/2018    K 4.0 05/20/2018    K 4.0 05/20/2018     05/20/2018     05/20/2018    CO2 24 05/20/2018    CO2 24 05/20/2018    BUN 35 (H) 05/20/2018    BUN 35 (H) 05/20/2018    CREATININE 2.3 (H) 05/20/2018    CREATININE 2.3 (H) 05/20/2018    CALCIUM 8.4 (L) 05/20/2018    CALCIUM 8.4 (L) 05/20/2018    ANIONGAP 11 05/20/2018    ANIONGAP 11 05/20/2018    ESTGFRAFRICA 27 (A) 05/20/2018    ESTGFRAFRICA 27 (A) 05/20/2018    EGFRNONAA 23 (A) 05/20/2018    EGFRNONAA 23 (A) 05/20/2018     Lab Results   Component Value Date    WBC 15.78 (H) 05/20/2018    WBC 15.78 (H) 05/20/2018    HGB 12.4 05/20/2018    HGB 12.4 05/20/2018    HCT 38.0 05/20/2018    HCT 38.0 05/20/2018    MCV 95 05/20/2018    MCV 95 05/20/2018     05/20/2018     05/20/2018       Recent Labs  Lab 05/20/18  0825   TROPONINI >50.000*       Significant Imaging: CXR reviewed    Assessment/Plan:   56 y/o female with KEN and MI:        KEN (acute kidney injury)     Renal: KEN. Due to prerenal azotemia due to renal hypoperfusion from poor cardiac function and MI.  Renal function is overall stable, s Cr slightly worse  CKD stage 3 at [facility]  K normal  Acid base: stable  Respiratory acidosis and metabolic acidosis  Has achieved good UF, will reduce lasix IV infusion rate          CAD (coronary artery disease)     MI/NSTEMI  Reviewed cardiology note  Pt will need [facility]    Symptoms of acute SOB caused by the heart attack.  Significant cardiac h/o  Past h/o of CAD  CABG (LIMA to Diagonal1 + distal LAD, and SVG-RPDA) and balloon PTCA of LM stent on 2/6/18 followed by repeat balloon PTCA + DEWAYNE of LM in-stent steosis 5/2/18,  Has combined systolic and diastolic dysfunction.  Acute of chronic CHF due to MI  Lactic acidosis c/w cardiogenic shock.  Respiratory failure          Plans and recommendations:  As discussed above  Decrease  lasix IV drip to 5 mg/hr  Repeat metolazone 5 mg x 1  Total critical care time spent 45 minutes            Sita Knight MD  Nephrology  [facility]

## 2018-05-20 NOTE — PROGRESS NOTES
[facility] -   Critical Care Medicine  Progress Note    Patient Name: Milli Montez  MRN: [id number]  Admission Date: 5/19/2018  Hospital Length of Stay: 1 days  Code Status: Full Code  Attending Provider: Joseph Jackson MD  Primary Care Provider: Marito Amato MD   Principal Problem: NSTEMI (non-ST elevated myocardial infarction)    Subjective:     HPI:  57-year-old female patient with history of CHF, coronary artery disease status post stent placement by ambulance to the hospital for worsening shortnessof breath for a few days and the respiratory distress.  She was started on noninvasive ventilation in the ER and transferred to the ICU.      Hospital/ICU Course:  Continue to be on noninvasive ventilation.ABGs and chest x-ray reviewed.afebrile.  5/19 Remains in distress tachypneic respiratory rate in the 30s.receiving IV Lasix.  An on IV heparin drip.  5/20 Sp intubation, Rt TLC intubation. On levophed, heparin, lasix, fentanyl gtt. Chest x-ray and ABG reviewed.    No new subjective & objective note has been filed under this hospital service since the last note was generated.    Assessment/Plan:     Pulmonary   Acute respiratory failure    O2 keep sat above 90%.  Continue AVAPs for now.  Check venous lower extremity ultrasound.  5/20 ultrasound of lower extremities no DVT.  No changes of vent settings. No  Planning to do SBT today.  Continue O2/ mechanical ventilation/pulmonary toilet.        Cardiac/Vascular   * NSTEMI (non-ST elevated myocardial infarction)    Cardiac monitoring.  Aspirin , statin IV heparin and beta blockers, Ticagrelor  5/20 same.  Cardiology follow-up for cardiac catheter.        Cardiogenic shock    5/20 continue Levophed drip. Continue Lasix drip. MAP 65 mmHg.        Acute on chronic combined systolic and diastolic heart failure    Strict I's and O's.  Keep Carlin.  IV Lasix 40 mg every 12 hours.  Repeat BNP.  Repeat chest x-ray in a.m.  5/20 intravenous Lasix drip.  Keep  I<O'S        CAD (coronary artery disease)    5/20 aspirin, IV heparin, beta blocker with parameters.  She has had a stent within the last few months.  Cardiology follow-up possible cardiac catheter in a.m.        Renal/   KEN (acute kidney injury)    5/20 Nonoliguric.  Strict I's and O's.  Monitor BMP.        Acute renal failure superimposed on stage 3 chronic kidney disease    Strict I's and O's.  Monitor BMP.           Critical Care Daily Checklist:    A: Awake: RASS Goal/Actual Goal: RASS Goal: -2-->light sedation  Actual: Donato Agitation Sedation Scale (RASS): Light sedation   B: Spontaneous Breathing Trial Performed?     C: SAT & SBT Coordinated?  Y. Patient with acute MI.  No SBT today                      D: Delirium: CAM-ICU Overall CAM-ICU: Negative   E: Early Mobility Performed? Yes.bedrest   F: Feeding Goal: Goals: advancement energy intake within 48hrs  Status: Nutrition Goal Status: new   Current Diet Order   Procedures    Diet NPO Except for: Sips with Medication     Order Specific Question:   Except for     Answer:   Sips with Medication      AS: Analgesia/Sedation Fentanyl IV   T: Thromboembolic Prophylaxis On IV heparin   H: HOB > 300 Yes   U: Stress Ulcer Prophylaxis (if needed) pantoprazole   G: Glucose Control Fingerstick as per ICU protocol   B: Bowel Function     I: Indwelling Catheter (Lines & Carlin) Necessity Keep Carlin   D: De-escalation of Antimicrobials/Pharmacotherapies No antibiotic    Plan for the day/ETD Y    Code Status:  Family/Goals of Care: Full Code  Y     Critical Care Time: 40 minutes  Critical secondary to Patient has a condition that poses threat to life and bodily function: Acute Myocardial Infarction /Respiratory failure /CHF exacerbation.    Critical care was time spent personally by me on the following activities: development of treatment plan with patient or surrogate and bedside caregivers, discussions with consultants, evaluation of patient's response to  treatment, examination of patient, ordering and performing treatments and interventions, ordering and review of laboratory studies, ordering and review of radiographic studies, pulse oximetry, re-evaluation of patient's condition. This critical care time did not overlap with that of any other provider or involve time for any procedures.     Marquez Booth MD  Critical Care Medicine  [facility]

## 2018-05-20 NOTE — PLAN OF CARE
Problem: Patient Care Overview  Goal: Plan of Care Review  Outcome: Ongoing (interventions implemented as appropriate)  Pt on AVAPS with increased RR and HR complaining that she could not breathe. Dr. Jackson at bedside, Ativan ordered and given without effect. Pt's breathing became more labored. Dr. Booth at bedside talked with family and pt about POC. Everyone agrees intubation to be best course of action. Pt successfully intubated and central line placed by Dr. Booth. Pt started on Fentanyl and Levophed gtts and OG tube inserted. Family's questions and concerns answered. Vital signs now stable and pt properly sedated.

## 2018-05-20 NOTE — ASSESSMENT & PLAN NOTE
- on levophed and continue with iv diuresis   - echo ef 45 with wall motion abnormalities   - continue with nstemi management

## 2018-05-21 PROBLEM — J96.01 ACUTE RESPIRATORY FAILURE WITH HYPOXIA: Status: ACTIVE | Noted: 2018-01-01

## 2018-05-21 NOTE — PROGRESS NOTES
"[facility]  Nephrology  Progress Note    Patient Name: Milli Montez  MRN: [id number]  Admission Date: 5/19/2018  Hospital Length of Stay: 2 days  Attending Provider: Joseph Jackson MD   Primary Care Physician: Marito Amato MD  Principal Problem:NSTEMI (non-ST elevated myocardial infarction). KEN    Subjective:     HPI: Pt was seen and examined in ICU. H/o reviewed. Pt is a 56 y/o female with acute kidney injury, likely due to CHF exacerbation causing renal hypoperfusion. Pt has a baseline s Cr of 1.2 (1 month ago). s Cr is now 1.7. Pt was admitted with SOB that is "new", pt also has low exercise tolerance chronically due to SOB. Pt has a pertinent h/o of DM, HTN, CAD with past h/o of CABG and stents, and morbid obesity. Labs and meds in ER and ICU reviewed. Pt has received lasix IV injection, and remains fluid overloaded with LE swelling and continued SOB.    Interval History: Pt was seen and examined in ICU. No new events, remained stable last pm, reviewed cardiology note. Discussed with cardiology.    Review of patient's allergies indicates:   Allergen Reactions    Penicillins Swelling     Current Facility-Administered Medications   Medication Frequency    acetaminophen tablet 650 mg Q6H PRN    aspirin chewable tablet 81 mg Daily    carvedilol tablet 6.25 mg BID WM    chlorhexidine 0.12 % solution 15 mL BID    dextrose 50% injection 12.5 g PRN    fentaNYL 2500 mcg in D5W 250 mL infusion premix (titrating) (conc: 10 mcg/mL) Continuous    furosemide (LASIX) 2 mg/mL in sodium chloride 0.9% 100 mL infusion (conc: 2 mg/mL) Continuous    glucagon (human recombinant) injection 1 mg PRN    heparin 25,000 units in dextrose 5% 250 mL (100 units/mL) bolus from bag; PRN BOLUS PRN    heparin 25,000 units in dextrose 5% 250 mL (100 units/mL) bolus from bag; PRN BOLUS PRN    heparin 25,000 units in dextrose 5% 250 mL (100 units/mL) infusion; FEMALE Continuous    insulin aspart U-100 pen 1-10 " Units Q6H PRN    levothyroxine tablet 75 mcg Daily    lorazepam (ATIVAN) injection 2 mg Q4H PRN    nitroGLYCERIN SL tablet 0.4 mg Q5 Min PRN    norepinephrine bitartrate-D5W 8 mg/250 mL (32 mcg/mL) Soln Continuous    ondansetron injection 4 mg Q6H PRN    pantoprazole 40 mg in dextrose 5 % 100 mL IVPB (over 15 minutes) (ready to mix system) Daily    ramelteon tablet 8 mg Nightly PRN    ticagrelor tablet 90 mg BID       Objective:     Vital Signs (Most Recent):  Temp: 97.8 °F (36.6 °C) (05/21/18 0715)  Pulse: 89 (05/21/18 1322)  Resp: (!) 29 (05/21/18 1322)  BP: 108/70 (05/21/18 1245)  SpO2: 99 % (05/21/18 1322)  O2 Device (Oxygen Therapy): ventilator (05/21/18 1322) Vital Signs (24h Range):  Temp:  [96.8 °F (36 °C)-98.1 °F (36.7 °C)] 97.8 °F (36.6 °C)  Pulse:  [] 89  Resp:  [13-29] 29  SpO2:  [97 %-100 %] 99 %  BP: ()/(44-82) 108/70     Weight: 103.3 kg (227 lb 11.8 oz) (05/21/18 0550)  Body mass index is 36.76 kg/m².  Body surface area is 2.19 meters squared.    I/O last 3 completed shifts:  In: 1565.6 [I.V.:1565.6]  Out: 3738 [Urine:3738]    Physical Exam   Constitutional: She is oriented to person, place, and time. She appears well-developed and well-nourished. No distress.   HENT:   Head: Normocephalic and atraumatic.   Neck: JVD present.   Cardiovascular: Normal rate, regular rhythm and normal heart sounds.  Exam reveals no friction rub.    Pulmonary/Chest: She is in respiratory distress. She has rales.   Abdominal: Soft. She exhibits no distension. There is no tenderness.   Musculoskeletal: She exhibits edema.   Neurological: She is oriented to person, place, and time.   Skin: Skin is warm and dry.   Psychiatric: She has a normal mood and affect. Her behavior is normal.   Nursing note and vitals reviewed.      Significant Labs: reviewed  BMP  Lab Results   Component Value Date     (L) 05/21/2018    K 3.7 05/21/2018    CL 90 (L) 05/21/2018    CO2 31 (H) 05/21/2018    BUN 31 (H)  05/21/2018    CREATININE 1.8 (H) 05/21/2018    CALCIUM 9.0 05/21/2018    ANIONGAP 11 05/21/2018    ESTGFRAFRICA 36 (A) 05/21/2018    EGFRNONAA 31 (A) 05/21/2018     Lab Results   Component Value Date    WBC 16.54 (H) 05/21/2018    HGB 12.0 05/21/2018    HCT 35.5 (L) 05/21/2018    MCV 94 05/21/2018     05/21/2018       Recent Labs  Lab 05/20/18 2013   TROPONINI >50.000*         Significant Imaging: Reviewed, looks worse, greater pulmonary edema    Assessment/Plan:   56 y/o female with KEN, acute MI,a nd CHF exacerbation:      Renal: KEN. Due to prerenal azotemia due to renal hypoperfusion from poor cardiac function and MI.  Renal function is stable, s Cr lower today  CKD stage 3 at baseline  K normal  Acid base: metabolic alkalosis due to diuretics  Good UOP                      CAD (coronary artery disease)  MI/NSTEMI, inferior MI  Reviewed cardiology note  [facility] tomorrow  CXR looks worse, will increase lasix back to 10 mg/hr    Significant cardiac h/o  Past h/o of CAD  CABG (LIMA to Diagonal1 + distal LAD, and SVG-RPDA) and balloon PTCA of LM stent on 2/6/18 followed by repeat balloon PTCA + DEWAYNE of LM in-stent steosis 5/2/18,  Has combined systolic and diastolic dysfunction.  Acute of chronic CHF due to MI  Lactic acidosis c/w cardiogenic shock.  Respiratory failure                 Plans and recommendations:  As discussed above  Increase lasix IV drip to 10 mg/hr  Repeat metolazone 5 mg x 1  Total critical care time spent 45 minutes              Sita Knight MD  Nephrology  [facility]

## 2018-05-21 NOTE — PROGRESS NOTES
Patient assessed.  Soft bilat wrist restraints renewed due to risk of pulling lines, tubes and/or climbing OOB.    FRANCISCA [facility]

## 2018-05-21 NOTE — ASSESSMENT & PLAN NOTE
- on levophed and continue with iv diuresis   - echo ef 45 with wall motion abnormalities   - continue with nstemi management   -plan for lhc/rhc tomorrow

## 2018-05-21 NOTE — SUBJECTIVE & OBJECTIVE
Interval History: Pt was seen and examined in ICU. No new events, remained stable last pm, reviewed cardiology note. Discussed with cardiology.    Review of patient's allergies indicates:   Allergen Reactions    Penicillins Swelling     Current Facility-Administered Medications   Medication Frequency    acetaminophen tablet 650 mg Q6H PRN    aspirin chewable tablet 81 mg Daily    carvedilol tablet 6.25 mg BID WM    chlorhexidine 0.12 % solution 15 mL BID    dextrose 50% injection 12.5 g PRN    fentaNYL 2500 mcg in D5W 250 mL infusion premix (titrating) (conc: 10 mcg/mL) Continuous    furosemide (LASIX) 2 mg/mL in sodium chloride 0.9% 100 mL infusion (conc: 2 mg/mL) Continuous    glucagon (human recombinant) injection 1 mg PRN    heparin 25,000 units in dextrose 5% 250 mL (100 units/mL) bolus from bag; PRN BOLUS PRN    heparin 25,000 units in dextrose 5% 250 mL (100 units/mL) bolus from bag; PRN BOLUS PRN    heparin 25,000 units in dextrose 5% 250 mL (100 units/mL) infusion; FEMALE Continuous    insulin aspart U-100 pen 1-10 Units Q6H PRN    levothyroxine tablet 75 mcg Daily    lorazepam (ATIVAN) injection 2 mg Q4H PRN    nitroGLYCERIN SL tablet 0.4 mg Q5 Min PRN    norepinephrine bitartrate-D5W 8 mg/250 mL (32 mcg/mL) Soln Continuous    ondansetron injection 4 mg Q6H PRN    pantoprazole 40 mg in dextrose 5 % 100 mL IVPB (over 15 minutes) (ready to mix system) Daily    ramelteon tablet 8 mg Nightly PRN    ticagrelor tablet 90 mg BID       Objective:     Vital Signs (Most Recent):  Temp: 97.8 °F (36.6 °C) (05/21/18 0715)  Pulse: 89 (05/21/18 1322)  Resp: (!) 29 (05/21/18 1322)  BP: 108/70 (05/21/18 1245)  SpO2: 99 % (05/21/18 1322)  O2 Device (Oxygen Therapy): ventilator (05/21/18 1322) Vital Signs (24h Range):  Temp:  [96.8 °F (36 °C)-98.1 °F (36.7 °C)] 97.8 °F (36.6 °C)  Pulse:  [] 89  Resp:  [13-29] 29  SpO2:  [97 %-100 %] 99 %  BP: ()/(44-82) 108/70     Weight: 103.3 kg (227 lb  11.8 oz) (05/21/18 0550)  Body mass index is 36.76 kg/m².  Body surface area is 2.19 meters squared.    I/O last 3 completed shifts:  In: 1565.6 [I.V.:1565.6]  Out: 3738 [Urine:3738]    Physical Exam   Constitutional: She is oriented to person, place, and time. She appears well-developed and well-nourished. No distress.   HENT:   Head: Normocephalic and atraumatic.   Neck: JVD present.   Cardiovascular: Normal rate, regular rhythm and normal heart sounds.  Exam reveals no friction rub.    Pulmonary/Chest: She is in respiratory distress. She has rales.   Abdominal: Soft. She exhibits no distension. There is no tenderness.   Musculoskeletal: She exhibits edema.   Neurological: She is oriented to person, place, and time.   Skin: Skin is warm and dry.   Psychiatric: She has a normal mood and affect. Her behavior is normal.   Nursing note and vitals reviewed.      Significant Labs: reviewed  BMP  Lab Results   Component Value Date     (L) 05/21/2018    K 3.7 05/21/2018    CL 90 (L) 05/21/2018    CO2 31 (H) 05/21/2018    BUN 31 (H) 05/21/2018    CREATININE 1.8 (H) 05/21/2018    CALCIUM 9.0 05/21/2018    ANIONGAP 11 05/21/2018    ESTGFRAFRICA 36 (A) 05/21/2018    EGFRNONAA 31 (A) 05/21/2018     Lab Results   Component Value Date    WBC 16.54 (H) 05/21/2018    HGB 12.0 05/21/2018    HCT 35.5 (L) 05/21/2018    MCV 94 05/21/2018     05/21/2018       Recent Labs  Lab 05/20/18 2013   TROPONINI >50.000*         Significant Imaging: Reviewed

## 2018-05-21 NOTE — PROGRESS NOTES
[facility] Hospital Medicine  Progress Note    Patient Name: Milli Montez  MRN: [id number]  Patient Class: IP- Inpatient   Admission Date: 5/19/2018  Length of Stay: 2 days  Attending Physician: Joseph Jackson MD  Primary Care Provider: Marito Amato MD        Subjective:     Principal Problem:NSTEMI (non-ST elevated myocardial infarction)    HPI:  Ms. Montez is a 56yo female with  a PMHx of CAD with remote CABG (LIMA to Diagonal1 + distal LAD, and SVG-RPDA) and balloon PTCA of LM stent on 2/6/18 followed by repeat balloon PTCA + DEWAYNE of LM in-stent steosis 5/2/18, chronic diastolic HFpEF of 55-60%, HLD, HTN, DM II, and h/o TIA, who presented to the ED with c/o SOB that has progressively worsened over the past few days.  Associated orthopnea, PND, BLE edema, and palpitations.  Denies any CP, cough, weight gain, ABD pain or distention, N/V/D, dysuria, lightheadedness/dizziness, syncope, HA, AMS, focal deficits, diaphoresis, fever, or chills.  Upon arrival to ED, patient notably in severe respiratory distress and placed on BiPAP, and later AVAPS.  She received IV Lasix 40mg, followed by Lasix 60mg IV and IV Ativan with improvement in respiratory status.  Initial work-up resulted , troponin 0.006, cr. 1.5, lactic 5.1, ABG on AVAPS with pH 7.3, pCO2 41, pO2 525, HCO3 20.  CXR consistent with CHF.  EKG showed atrial tachycardia with LBBB (old), no acute ischemic ST-T changes from previous tracings.  Patient was admitted to ICU for acute decompensated HF under Hospital Medicine services.  Currently, patient appears comfortable in NAD.  Reports SOB greatly improved since IV Lasix and Ativan.  Patient is a Full Code.  Daughter Katherine Lewis is surrogate decision maker.    Hospital Course:  5/20/2018  Patient continue to be on vent and iv diuresis for acute cardiac pulmonary edema which shows improvement  . Continue treatment for nstemi with aspirin,brilinta ,statin. Echo finding and  elevated trop noted continue with medical management once stable patient will have LHC   5/21/2018  Patient remains intubated. Alert and follows commands. Low dose Levophed for pressor support.  Responding to Lasix gtt at 5mg/hr. Diuresing well since starting Lasix and Levophed. Troponin > 50.  2D echo showed EF of 45-50-%, +WMA.Plan for C and C         Review of Systems   Unable to perform ROS: Intubated     Objective:     Vital Signs (Most Recent):  Temp: 97.9 °F (36.6 °C) (05/21/18 1515)  Pulse: 78 (05/21/18 1545)  Resp: 20 (05/21/18 1545)  BP: 91/67 (05/21/18 1545)  SpO2: 99 % (05/21/18 1545) Vital Signs (24h Range):  Temp:  [97.6 °F (36.4 °C)-98.1 °F (36.7 °C)] 97.9 °F (36.6 °C)  Pulse:  [72-96] 78  Resp:  [13-39] 20  SpO2:  [97 %-100 %] 99 %  BP: ()/(44-82) 91/67     Weight: 103.3 kg (227 lb 11.8 oz)  Body mass index is 36.76 kg/m².    Intake/Output Summary (Last 24 hours) at 05/21/18 1559  Last data filed at 05/21/18 1500   Gross per 24 hour   Intake           374.78 ml   Output             3475 ml   Net         -3100.22 ml      Physical Exam   Constitutional: She is oriented to person, place, and time. She appears well-developed and well-nourished. No distress.   HENT:   Head: Normocephalic and atraumatic.   Neck: No JVD present.   Cardiovascular: Normal rate, regular rhythm and normal heart sounds.  Exam reveals no friction rub.    Pulmonary/Chest: No respiratory distress. She has rales.   Abdominal: Soft. She exhibits no distension. There is no tenderness.   Musculoskeletal: She exhibits no edema.   Neurological: She is oriented to person, place, and time.   Skin: Skin is warm and dry.   Psychiatric: She has a normal mood and affect. Her behavior is normal.   Nursing note and vitals reviewed.      Significant Labs: All pertinent labs within the past 24 hours have been reviewed.    Significant Imaging: I have reviewed all pertinent imaging results/findings within the past 24  hours.    Assessment/Plan:      * NSTEMI (non-ST elevated myocardial infarction)    -continue iv heparin,aspirin,brilinta,statin and ace on hold due to ken and hypotension   -plan heart cath tomorrow          KEN (acute kidney injury)    Improved         Cardiogenic shock    - on levophed and continue with iv diuresis   - echo ef 45 with wall motion abnormalities   - continue with nstemi management   -plan for lhc/rhc tomorrow           Acute renal failure superimposed on stage 3 chronic kidney disease    - Likely cardiorenal syndrome.  - Diurese as above.  - Avoid nephrotoxic agents.  - Follow kidney function closely.  -nephrology on board         Acute respiratory failure    - secondary to acute chf   - Currently on intubated and iv diuresis           Acute on chronic combined systolic and diastolic heart failure    -continue with vent support iv diuresis with levophed support as  Needed   - plan for possible lhc/rhc tomorrow           CAD (coronary artery disease)    - Recent DEWAYNE of LM due to critical in-stent stenosis on 5/2/18 per Dr. Wooten ([facility]).  -continue cardioprotective meds         Hyperlipidemia    - Continue statin therapy.  - FLP in AM.        Morbid obesity with BMI of 40.0-44.9, adult    - Lifestyle modifications.  - Pulmonology consult, ? [facility].        Essential hypertension    - hypotensive hold on antihypertensives   - Resume home Coreg.  .        Type 2 diabetes mellitus with hyperglycemia    - Accuchecks with SSI.  - Hold glipizide, will resume at DC.            VTE Risk Mitigation         Ordered     heparin 25,000 units in dextrose 5% 250 mL (100 units/mL) infusion; FEMALE  Continuous      05/19/18 0905     heparin 25,000 units in dextrose 5% 250 mL (100 units/mL) bolus from bag; PRN BOLUS  As needed (PRN)      05/19/18 0905     heparin 25,000 units in dextrose 5% 250 mL (100 units/mL) bolus from bag; PRN BOLUS  As needed (PRN)      05/19/18 0905     Place sequential compression  device  Until discontinued      05/19/18 0628     IP VTE HIGH RISK PATIENT  Once      05/19/18 0248          Critical care time spent on the evaluation and treatment of severe organ dysfunction, review of pertinent labs and imaging studies, discussions with consulting providers and discussions with patient/family: 40 minutes.    Joseph Jackson MD  Department of Hospital Medicine   [facility]

## 2018-05-21 NOTE — PLAN OF CARE
Problem: Patient Care Overview  Goal: Plan of Care Review  Outcome: Ongoing (interventions implemented as appropriate)  Patient currently resting quietly in bed. Patient NSR on monitor at this time. Patient currently on levophed drip for hypotension. Patient blood pressure stable at this time. Patient currently receiving oxygen via ventilator. Patient on fentanyl drip for sedation and comfort. Patient also on lasix and heparin drip at this time. Patient turned in bed every 2 hours to avoid skin breakdown to back side. Patient turned in bed with 2 assist. No sign of wound development noted. Patient bilateral wrist restraints remain in place at this time. No sign of injury noted. Plan of care updated with family. There no further questions after updated on plan of care at this time. Will continue to monitor.

## 2018-05-21 NOTE — PLAN OF CARE
Problem: Patient Care Overview  Goal: Plan of Care Review  Outcome: Ongoing (interventions implemented as appropriate)  Recommendations    Recommendation/Intervention: 1. If pt remains intubated, recommend Peptamen Bariatric @ goal rate 60ml/hr with flushes 30 mL water q4hrs or per MD/NP. Provides 1440 calories, 133gm protein, and 1210 ml free water. Check residuals q4hrs, and hold feeds if >500cc's. 2. When medically able, and if safe for PO intake, ADAT to Diabetic/Cardiac diet. 3. Will continue to monitor.  Goals: advancement energy intake within 48hrs  Nutrition Goal Status: not met, continues  Communication of RD Recs: (poc, sticky note)

## 2018-05-21 NOTE — PROGRESS NOTES
[facility] -   Adult Nutrition  Progress Note    SUMMARY     Recommendations    Recommendation/Intervention: 1. If pt remains intubated, recommend Peptamen Bariatric @ goal rate 60ml/hr with flushes 30 mL water q4hrs or per MD/NP. Provides 1440 calories, 133gm protein, and 1210 ml free water. Check residuals q4hrs, and hold feeds if >500cc's. 2. When medically able, and if safe for PO intake, ADAT to Diabetic/Cardiac diet. 3. Will continue to monitor.  Goals: advancement energy intake within 48hrs  Nutrition Goal Status: not met, continues  Communication of RD Recs: (poc, sticky note)    Nutrition Discharge Planning: to be determined    Reason for Assessment    Reason for Assessment: RD f/u  Dx:  1. Acute on chronic congestive heart failure, unspecified heart failure type    2. Dyspnea    3. Acute respiratory failure with hypoxia    4. Abnormal EKG    5. Acute respiratory failure    6. NSTEMI (non-ST elevated myocardial infarction)    7. Acute on chronic combined systolic and diastolic heart failure    8. KEN (acute kidney injury)    9. Coronary artery disease involving native coronary artery of native heart without angina pectoris    10. Dyspnea, unspecified type    11. Essential hypertension    12. Lactic acidosis    13. Morbid obesity with BMI of 40.0-44.9, adult    14. Stage 3 chronic kidney disease due to type 1 diabetes mellitus    15. Diabetic nephropathy associated with type 2 diabetes mellitus      Past Medical History:   Diagnosis Date    Allergy     Arthritis     Blood transfusion     CAD (coronary artery disease)     CHF (congestive heart failure)     Diabetes mellitus     Heart murmur     History of loop recorder     Hyperlipidemia     Hypertension     Hypothyroidism 9/17/2013    TIA (transient ischemic attack)     Ulcer        Diagnosis: cardiac disease, pulmonary disease (acute respiratory failure)  Relevant Medical History: CHF, HLD, HTN, Ulcer, TIA, Diabetes,  "CAD  General Information Comments: Pt remains sedated and intubated, and was admitted on 5/19/18. TF has not been ordered yet.    Nutrition Risk Screen    Nutrition Risk Screen: no indicators present    Nutrition/Diet History    Patient Reported Diet/Restrictions/Preferences: general  Do you have any cultural, spiritual, Buddhism conflicts, given your current situation?: none reported  Food Allergies: NKFA  Factors Affecting Nutritional Intake: on mechanical ventilation, NPO    Anthropometrics    Temp: 97.8 °F (36.6 °C)  Height Method: Estimated  Height: 5' 6" (167.6 cm)  Height (inches): 66 in  Weight Method: Bed Scale  Weight: 103.3 kg (227 lb 11.8 oz)  Weight (lb): 227.74 lb  Ideal Body Weight (IBW), Female: 130 lb  % Ideal Body Weight, Female (lb): 175.08 lb  BMI (Calculated): 36.8  BMI Grade: 35 - 39.9 - obesity - grade II       Lab/Procedures/Meds    Pertinent Labs Reviewed: reviewed  Pertinent Medications Reviewed: reviewed      Recent Labs  Lab 05/21/18  0441   CALCIUM 9.0   PROT 7.7   *   K 3.7   CO2 31*   CL 90*   BUN 31*   CREATININE 1.8*   ALKPHOS 98   ALT 75*   *   BILITOT 1.4*     Scheduled Meds:   aspirin  81 mg Oral Daily    carvedilol  6.25 mg Oral BID WM    chlorhexidine  15 mL Mouth/Throat BID    levothyroxine  75 mcg Oral Daily    pantoprazole 40 mg in dextrose 5 % 100 mL IVPB (over 15 minutes) (ready to mix system)  40 mg Intravenous Daily    ticagrelor  90 mg Oral BID     Continuous Infusions:   fentanyl 7.5 mL/hr at 05/21/18 1000    furosemide (LASIX) 2 mg/mL infusion (non-titrating) 10 mg/hr (05/21/18 1013)    heparin (porcine) in D5W 9.981 Units/kg/hr (05/21/18 1000)    norepinephrine bitartrate-D5W 0.08 mcg/kg/min (05/21/18 1134)     PRN Meds:.acetaminophen, dextrose 50%, glucagon (human recombinant), heparin (PORCINE), heparin (PORCINE), insulin aspart U-100, lorazepam, nitroGLYCERIN, ondansetron, ramelteon  Physical Findings/Assessment    Overall Physical " Appearance: obese, on oxygen therapy  Tubes: orogastric tube  Oral/Mouth Cavity: tooth/teeth missing  Skin: Lauri=13    Estimated/Assessed Needs    Weight Used For Calorie Calculations: 103.3 kg (227 lb 11.8 oz)  Energy Calorie Requirements (kcal): 1682 kcal/day  Energy Need Method: [place]  Protein Requirements: 118 g/day  Weight Used For Protein Calculations: 59.1 kg (130 lb 4.3 oz) (using IBW (Obese - ICU))  Fluid Requirements (mL): 1ml/kcal or per MD  Fluid Need Method: RDA Method (or per MD/NP)  RDA Method (mL): 1682  CHO Requirement: 50% EEN      Nutrition Prescription Ordered    Current Diet Order: NPO    Evaluation of Received Nutrient/Fluid Intake    % Intake of Estimated Energy Needs: 0 - 25 %  % Meal Intake: NPO    Nutrition Risk    Level of Risk/Frequency of Follow-up:  (F/U 2x/wk)     Assessment and Plan    Inadequate dietary energy intake    Related to (etiology):   Inability to consume adequate intake    Signs and Symptoms (as evidenced by):   NPO, intubated and sedated    Interventions/Recommendations (treatment strategy):  See above    Nutrition Diagnosis Status:   Continues               Monitor and Evaluation    Food and Nutrient Intake: energy intake, enteral nutrition intake  Food and Nutrient Adminstration: diet order, enteral and parenteral nutrition administration  Knowledge/Beliefs/Attitudes: food and nutrition knowledge/skill, beliefs and attitudes  Physical Activity and Function: nutrition-related ADLs and IADLs  Anthropometric Measurements: weight, weight change  Biochemical Data, Medical Tests and Procedures: gastrointestinal profile, electrolyte and renal panel, glucose/endocrine profile  Nutrition-Focused Physical Findings: overall appearance     Nutrition Follow-Up    RD Follow-up?: Yes (2x weekly)

## 2018-05-21 NOTE — ASSESSMENT & PLAN NOTE
-continue with vent support iv diuresis with levophed support as  Needed   - plan for possible lhc/rhc tomorrow

## 2018-05-21 NOTE — ASSESSMENT & PLAN NOTE
Related to (etiology):   Inability to consume adequate intake    Signs and Symptoms (as evidenced by):   NPO, intubated and sedated    Interventions/Recommendations (treatment strategy):  See above    Nutrition Diagnosis Status:   Continues

## 2018-05-21 NOTE — ASSESSMENT & PLAN NOTE
-Patient with significant history of CAD s/p recent PTCA/DEWAYNE of LM in-stent re-stenosis, presents with NSTEMI/cardiogenic shock  -Continue Levophed for pressor support  -Troponin remains  > 50  -Continue current medical management-ASA, Brilinta, heparin gtt  -Coreg held due to need for pressor support  -Statin held due to elevated liver enzymes  -2D echo showed EF of 45-50%, +WMA  -Cath images from Feb and May 2018 have been requested for Dr. Boone to review. Will make plans for cath based on Dr. Boone's review of images.   -Dr. Boone has requested arterial studies to BLE to assess for PAD if high risk intervention to LM or LAD needed

## 2018-05-21 NOTE — ASSESSMENT & PLAN NOTE
-continue iv heparin,aspirin,brilinta,statin and ace on hold due to grisel and hypotension   -plan heart cath tomorrow

## 2018-05-21 NOTE — SUBJECTIVE & OBJECTIVE
Review of Systems   Reason unable to perform ROS: intubated      Objective:     Vital Signs (Most Recent):  Temp: 97.9 °F (36.6 °C) (05/21/18 0305)  Pulse: 83 (05/21/18 0910)  Resp: 16 (05/21/18 0910)  BP: (!) 91/51 (05/21/18 0800)  SpO2: 99 % (05/21/18 0910) Vital Signs (24h Range):  Temp:  [96.1 °F (35.6 °C)-98.1 °F (36.7 °C)] 97.9 °F (36.6 °C)  Pulse:  [] 83  Resp:  [15-27] 16  SpO2:  [97 %-100 %] 99 %  BP: ()/(45-81) 91/51     Weight: 103.3 kg (227 lb 11.8 oz)  Body mass index is 36.76 kg/m².     SpO2: 99 %  O2 Device (Oxygen Therapy): ventilator      Intake/Output Summary (Last 24 hours) at 05/21/18 0916  Last data filed at 05/21/18 0600   Gross per 24 hour   Intake           498.13 ml   Output             2815 ml   Net         -2316.87 ml       Lines/Drains/Airways     Central Venous Catheter Line                 Percutaneous Central Line Insertion/Assessment - triple lumen  05/19/18 1600 right internal jugular 1 day          Drain                 Urethral Catheter 05/19/18 0115 Latex 16 Fr. 2 days         NG/OG Tube 05/19/18 1522 1 day          Airway                 Airway - Non-Surgical 05/19/18 1516 Endotracheal Tube 1 day          Peripheral Intravenous Line                 Peripheral IV - Single Lumen 05/19/18 Left Antecubital 2 days                Physical Exam   Constitutional: She is oriented to person, place, and time. She appears well-developed and well-nourished. She is intubated.   Neck: Neck supple. No JVD present.   Cardiovascular: Normal rate, regular rhythm and normal heart sounds.  Exam reveals decreased pulses (distally ). Exam reveals no friction rub.    No murmur heard.  Extremities cool to touch    Pulmonary/Chest: Effort normal and breath sounds normal. She is intubated. No respiratory distress. She has no wheezes. She has no rales.   Abdominal: Soft. Bowel sounds are normal. She exhibits no distension.   Musculoskeletal: She exhibits no edema or tenderness.    Neurological: She is alert and oriented to person, place, and time.   Skin: Skin is warm and dry. No rash noted.   Bruise to RLE   Psychiatric: She has a normal mood and affect. Her behavior is normal.   Nursing note and vitals reviewed.      Significant Labs:   All pertinent lab results from the last 24 hours have been reviewed. and   Recent Lab Results       05/21/18  0822 05/21/18  0535 05/21/18  0441 05/21/18  0433 05/20/18  2355      Albumin   2.9(L)       Alkaline Phosphatase   98       Allens Test    Pass      ALT   75(H)       Anion Gap   11       aPTT   43.8  Comment:  aPTT therapeutic range = 39-69 seconds(H)       AST   317(H)       Baso #   0.02       Basophil%   0.1       Total Bilirubin   1.4  Comment:  For infants and newborns, interpretation of results should be based  on gestational age, weight and in agreement with clinical  observations.  Premature Infant recommended reference ranges:  Up to 24 hours.............<8.0 mg/dL  Up to 48 hours............<12.0 mg/dL  3-5 days..................<15.0 mg/dL  6-29 days.................<15.0 mg/dL  (H)       Site    RR      BUN, Bld   31(H)       Calcium   9.0       Chloride   90(L)       CO2   31(H)       Creatinine   1.8(H)       DelSys    Adult Vent      Differential Method   Automated       eGFR if    36(A)       eGFR if non    31  Comment:  Calculation used to obtain the estimated glomerular filtration  rate (eGFR) is the CKD-EPI equation.   (A)       Eos #   0.0       Eosinophil%   0.2       FiO2    40      Glucose   238(H)       Gran # (ANC)   14.2(H)       Gran%   85.8(H)       Hematocrit   35.5(L)       Hemoglobin   12.0       Lymph #   1.5       Lymph%   9.3(L)       MCH   31.7(H)       MCHC   33.8       MCV   94       Mode    AC/PRVC      Mono #   0.8       Mono%   4.6       MPV   9.7       PEEP    5      Platelets   264       POC BE    9      POC HCO3    33.1(H)      POC PCO2    45.6(H)      POC PH    7.469(H)       POC PO2    134(H)      POC SATURATED O2    99      POCT Glucose 221(H) 207(H)   201(H)     Potassium   3.7       Total Protein   7.7       Rate    16      RBC   3.79(L)       RDW   14.9(H)       Sample    ARTERIAL      Sodium   132(L)       Troponin I          Vt    400      WBC   16.54(H)                   05/20/18 2013 05/20/18  1823 05/20/18  1340 05/20/18  1207      Albumin         Alkaline Phosphatase         Allens Test         ALT         Anion Gap         aPTT         AST         Baso #         Basophil%         Total Bilirubin         Site         BUN, Bld         Calcium         Chloride         CO2         Creatinine         DelSys         Differential Method         eGFR if          eGFR if non          Eos #         Eosinophil%         FiO2         Glucose         Gran # (ANC)         Gran%         Hematocrit         Hemoglobin         Lymph #         Lymph%         MCH         MCHC         MCV         Mode         Mono #         Mono%         MPV         PEEP         Platelets         POC BE         POC HCO3         POC PCO2         POC PH         POC PO2         POC SATURATED O2         POCT Glucose  194(H)  245(H)     Potassium         Total Protein         Rate         RBC         RDW         Sample         Sodium         Troponin I >50.000  Comment:  The reference interval for Troponin I represents the 99th percentile   cutoff   for our facility and is consistent with 3rd generation assay   performance.  (H)  >50.000  Comment:  The reference interval for Troponin I represents the 99th percentile   cutoff   for our facility and is consistent with 3rd generation assay   performance.  (H)      Vt         WBC               Significant Imaging: Echocardiogram:   2D echo with color flow doppler:   Results for orders placed or performed during the hospital encounter of 05/19/18   2D echo with color flow doppler   Result Value Ref Range    EF 45 55 - 65    Mitral Valve  Regurgitation MILD     Diastolic Dysfunction Yes (A)     Est. PA Systolic Pressure 33.18     Tricuspid Valve Regurgitation MILD     and X-Ray: CXR: X-Ray Chest 1 View (CXR):   Results for orders placed or performed during the hospital encounter of 05/19/18   X-Ray Chest 1 View    Narrative    EXAMINATION:  XR CHEST 1 VIEW    CLINICAL HISTORY:  Respiratory distress., Resp failure; OG Tube; ET Tube;    COMPARISON:  05/20/2018.    FINDINGS:  Portable chest x-ray.  Multiple wire leads overlie the chest.  Right central line, endotracheal tube and nasogastric tube remain.      Impression    Stable chest x-ray.      Electronically signed by: Get Loco MD  Date:    05/21/2018  Time:    08:13    and X-Ray Chest PA and Lateral (CXR): No results found for this visit on 05/19/18.

## 2018-05-21 NOTE — PLAN OF CARE
Problem: Patient Care Overview  Goal: Plan of Care Review  Outcome: Ongoing (interventions implemented as appropriate)  Pt remained stable throughout the day. Dr. Boone requested and received films of pt's past two heart caths from Dr. Wooten. Plan is left and right heart catheterization tomorrow. Ventilator adjusted by Rafael Fulton NP. Urine output adequate on 10mg/hr of Lasix. Pt's blood pressure and RASS stable at 125mcg/hr of Fentanyl and 0.06mcg/kg/hr of Levophed.

## 2018-05-21 NOTE — PLAN OF CARE
Patient is intubated. . CM unable to determine an optimal d/c plan at this time. No family at the bedside     05/21/18 1200   Discharge Reassessment   Assessment Type Discharge Planning Reassessment   Provided patient/caregiver education on the expected discharge date and the discharge plan No   Do you have any problems affording any of your prescribed medications? TBD   Discharge Plan A Skilled Nursing Facility   Discharge Plan B Skilled Nursing Facility   Can the patient answer the patient profile reliably? No, cognitively impaired   How does the patient rate their overall health at the present time? Fair   Describe the patient's ability to walk at the present time. Minor restrictions or changes   How often would a person be available to care for the patient? Whenever needed   Number of comorbid conditions (as recorded on the chart) Three   During the past month, has the patient often been bothered by feeling down, depressed or hopeless? No   During the past month, has the patient often been bothered by little interest or pleasure in doing things? No

## 2018-05-21 NOTE — ASSESSMENT & PLAN NOTE
- Recent DEWAYNE of LM due to critical in-stent stenosis on 5/2/18 per Dr. Wooten ([facility]).  -continue cardioprotective meds

## 2018-05-21 NOTE — ASSESSMENT & PLAN NOTE
Renal: KEN. Due to prerenal azotemia due to renal hypoperfusion from poor cardiac function and MI.  Renal function is overall stable, s Cr slightly worse  CKD stage 3 at basline  K normal  Acid base: stable  Respiratory acidosis and metabolic acidosis  Has achieved good UF, will reduce lasix IV infusion rate           CAD (coronary artery disease)     MI/NSTEMI  Reviewed cardiology note  Pt will need C     Symptoms of acute SOB caused by the heart attack.  Significant cardiac h/o  Past h/o of CAD  CABG (LIMA to Diagonal1 + distal LAD, and SVG-RPDA) and balloon PTCA of LM stent on 2/6/18 followed by repeat balloon PTCA + DEWAYNE of LM in-stent steosis 5/2/18,  Has combined systolic and diastolic dysfunction.  Acute of chronic CHF due to MI  Lactic acidosis c/w cardiogenic shock.  Respiratory failure          Plans and recommendations:  As discussed above  Decrease lasix IV drip to 5 mg/hr  Repeat metolazone 5 mg x 1  Total critical care time spent 45 minutes

## 2018-05-21 NOTE — NURSING
Chart screened for diabetes  patient has b een seen by this nurse on previous admit  Please consult if any diabetes educational needs are identified

## 2018-05-22 PROBLEM — D72.829 LEUKOCYTOSIS: Status: ACTIVE | Noted: 2018-01-01

## 2018-05-22 PROBLEM — E87.8 ELECTROLYTE IMBALANCE: Status: ACTIVE | Noted: 2018-01-01

## 2018-05-22 NOTE — ASSESSMENT & PLAN NOTE
- iv diuresis on hold  - on vent   - on beta blocker   - ace on hold due to grisel and hypotension

## 2018-05-22 NOTE — ASSESSMENT & PLAN NOTE
Strict I's and O's.  Monitor BMP.  Renal following and agrees with proceeding with [facility]  Lasix stopped  Min IVF per Cards

## 2018-05-22 NOTE — SUBJECTIVE & OBJECTIVE
Review of Systems   Unable to perform ROS: Intubated       Objective:     Vital Signs (Most Recent):  Temp: 97.9 °F (36.6 °C) (05/21/18 1515)  Pulse: 72 (05/21/18 1845)  Resp: (!) 22 (05/21/18 1845)  BP: 94/66 (05/21/18 1845)  SpO2: 99 % (05/21/18 1845) Vital Signs (24h Range):  Temp:  [97.6 °F (36.4 °C)-98.1 °F (36.7 °C)] 97.9 °F (36.6 °C)  Pulse:  [72-96] 72  Resp:  [13-39] 22  SpO2:  [97 %-100 %] 99 %  BP: ()/(44-82) 94/66     Weight: 103.3 kg (227 lb 11.8 oz)  Body mass index is 36.76 kg/m².      Intake/Output Summary (Last 24 hours) at 05/21/18 1949  Last data filed at 05/21/18 1800   Gross per 24 hour   Intake            504.8 ml   Output             3190 ml   Net          -2685.2 ml       Physical Exam   Constitutional: She appears well-developed and well-nourished. She is cooperative.  Non-toxic appearance. She does not have a sickly appearance. She appears ill. No distress. She is sedated, intubated and restrained.   HENT:   Head: Normocephalic and atraumatic.   Mouth/Throat: Oropharynx is clear and moist and mucous membranes are normal.   Eyes: EOM and lids are normal. Pupils are equal, round, and reactive to light.   Neck: Trachea normal and full passive range of motion without pain. Carotid bruit is not present.       Cardiovascular: Normal rate and regular rhythm.  Exam reveals distant heart sounds.    Pulses:       Radial pulses are 1+ on the right side, and 1+ on the left side.        Dorsalis pedis pulses are 1+ on the right side, and 1+ on the left side.   Pulmonary/Chest: Effort normal. No accessory muscle usage. She is intubated. No respiratory distress. She has decreased breath sounds.   Abdominal: Soft. She exhibits no distension. Bowel sounds are decreased. There is no tenderness.   Obese    Genitourinary:   Genitourinary Comments: Carlin in place   Musculoskeletal: Normal range of motion.        Right foot: There is no deformity.        Left foot: There is no deformity.   +1 tibial edema    Lymphadenopathy:     She has no cervical adenopathy.   Neurological: She is alert.   Fully alert on Fentanyl infusion nodding head to questions and follows all simple one-step commands   Skin: Skin is warm, dry and intact. Capillary refill takes less than 2 seconds. Ecchymosis (distal ant RLE) noted. No rash noted. No cyanosis.   Psychiatric: She has a normal mood and affect. Her behavior is normal.       Vents:  Vent Mode: A/C (05/21/18 1515)  Ventilator Initiated: Yes (05/19/18 1539)  Set Rate: 16 bmp (05/21/18 1515)  Vt Set: 370 mL (05/21/18 1515)  Pressure Support: 0 cmH20 (05/21/18 1515)  PEEP/CPAP: 10 cmH20 (05/21/18 1515)  Oxygen Concentration (%): 30 (05/21/18 1845)  Peak Airway Pressure: 22 cmH2O (05/21/18 1515)  Plateau Pressure: 0 cmH20 (05/21/18 1515)  Total Ve: 7.35 mL (05/21/18 1515)  F/VT Ratio<105 (RSBI): (!) 100 (05/21/18 1515)    Lines/Drains/Airways     Central Venous Catheter Line                 Percutaneous Central Line Insertion/Assessment - triple lumen  05/19/18 1600 right internal jugular 2 days          Drain                 NG/OG Tube 05/19/18 1522 2 days         Urethral Catheter 05/19/18 0115 Latex 16 Fr. 2 days          Airway                 Airway - Non-Surgical 05/19/18 1516 Endotracheal Tube 2 days          Peripheral Intravenous Line                 Peripheral IV - Single Lumen 05/19/18 Left Antecubital 2 days                Significant Labs:    CBC/Anemia Profile:    Recent Labs  Lab 05/20/18  0435 05/21/18  0441   WBC 15.78*  15.78* 16.54*   HGB 12.4  12.4 12.0   HCT 38.0  38.0 35.5*     312 264   MCV 95  95 94   RDW 14.9*  14.9* 14.9*        Chemistries:    Recent Labs  Lab 05/19/18  2111 05/20/18  0435 05/21/18  0441   NA  --  137  137 132*   K 4.2 4.0  4.0 3.7   CL  --  102  102 90*   CO2  --  24  24 31*   BUN  --  35*  35* 31*   CREATININE  --  2.3*  2.3* 1.8*   CALCIUM  --  8.4*  8.4* 9.0   ALBUMIN  --  3.0*  3.0* 2.9*   PROT  --  7.1  7.1 7.7    BILITOT  --  0.8  0.8 1.4*   ALKPHOS  --  87  87 98   ALT  --  81*  81* 75*   AST  --  517*  517* 317*       ABGs:   Recent Labs  Lab 05/21/18  0939   PH 7.463*   PCO2 52.9*   HCO3 37.9*   POCSATURATED 58*   BE 14     Coagulation:   Recent Labs  Lab 05/21/18  0441   APTT 43.8*     Troponin:   Recent Labs  Lab 05/20/18  0825 05/20/18  1340 05/20/18 2013   TROPONINI >50.000* >50.000* >50.000*     All pertinent labs within the past 24 hours have been reviewed.    Significant Imaging:  CXR: I have reviewed all pertinent results/findings within the past 24 hours and my personal findings are:  diffuse bilat pulm edema

## 2018-05-22 NOTE — ASSESSMENT & PLAN NOTE
- on levophed and iv diuresis on hold   - echo ef 45 with wall motion abnormalities   - continue with nstemi management   -plan for lhc/rhc once kidney function improves

## 2018-05-22 NOTE — PROGRESS NOTES
[facility] -   Critical Care Medicine  Progress Note    Patient Name: Milli Montez  MRN: [id number]  Admission Date: 5/19/2018  Hospital Length of Stay: 2 days  Code Status: Full Code  Attending Provider: Joseph Jackson MD  Primary Care Provider: Marito Amato MD   Principal Problem: NSTEMI (non-ST elevated myocardial infarction)    Subjective:     HPI:  57-year-old female patient with history of CHF, coronary artery disease status post stent placement by ambulance to the hospital for worsening shortnessof breath for a few days and the respiratory distress.  She was started on noninvasive ventilation in the ER and transferred to the ICU.      Hospital/ICU Course:  Continue to be on noninvasive ventilation.ABGs and chest x-ray reviewed.afebrile.  5/19 Remains in distress tachypneic respiratory rate in the 30s.receiving IV Lasix.  An on IV heparin drip.  5/20 Sp intubation, Rt TLC intubation. On levophed, heparin, lasix, fentanyl gtt. Chest x-ray and ABG reviewed.  5/21 - Fully awake and comfortable [facility].  Still on Heparin, Lasix, Levophed and Fentanyl infusions    Review of Systems   Unable to perform ROS: Intubated       Objective:     Vital Signs (Most Recent):  Temp: 97.9 °F (36.6 °C) (05/21/18 1515)  Pulse: 72 (05/21/18 1845)  Resp: (!) 22 (05/21/18 1845)  BP: 94/66 (05/21/18 1845)  SpO2: 99 % (05/21/18 1845) Vital Signs (24h Range):  Temp:  [97.6 °F (36.4 °C)-98.1 °F (36.7 °C)] 97.9 °F (36.6 °C)  Pulse:  [72-96] 72  Resp:  [13-39] 22  SpO2:  [97 %-100 %] 99 %  BP: ()/(44-82) 94/66     Weight: 103.3 kg (227 lb 11.8 oz)  Body mass index is 36.76 kg/m².      Intake/Output Summary (Last 24 hours) at 05/21/18 1949  Last data filed at 05/21/18 1800   Gross per 24 hour   Intake            504.8 ml   Output             3190 ml   Net          -2685.2 ml       Physical Exam   Constitutional: She appears well-developed and well-nourished. She is cooperative.  Non-toxic  appearance. She does not have a sickly appearance. She appears ill. No distress. She is sedated, intubated and restrained.   HENT:   Head: Normocephalic and atraumatic.   Mouth/Throat: Oropharynx is clear and moist and mucous membranes are normal.   Eyes: EOM and lids are normal. Pupils are equal, round, and reactive to light.   Neck: Trachea normal and full passive range of motion without pain. Carotid bruit is not present.       Cardiovascular: Normal rate and regular rhythm.  Exam reveals distant heart sounds.    Pulses:       Radial pulses are 1+ on the right side, and 1+ on the left side.        Dorsalis pedis pulses are 1+ on the right side, and 1+ on the left side.   Pulmonary/Chest: Effort normal. No accessory muscle usage. She is intubated. No respiratory distress. She has decreased breath sounds.   Abdominal: Soft. She exhibits no distension. Bowel sounds are decreased. There is no tenderness.   Obese    Genitourinary:   Genitourinary Comments: Carlin in place   Musculoskeletal: Normal range of motion.        Right foot: There is no deformity.        Left foot: There is no deformity.   +1 tibial edema   Lymphadenopathy:     She has no cervical adenopathy.   Neurological: She is alert.   Fully alert on Fentanyl infusion nodding head to questions and follows all simple one-step commands   Skin: Skin is warm, dry and intact. Capillary refill takes less than 2 seconds. Ecchymosis (distal ant RLE) noted. No rash noted. No cyanosis.   Psychiatric: She has a normal mood and affect. Her behavior is normal.       Vents:  Vent Mode: A/C (05/21/18 1515)  Ventilator Initiated: Yes (05/19/18 1539)  Set Rate: 16 bmp (05/21/18 1515)  Vt Set: 370 mL (05/21/18 1515)  Pressure Support: 0 cmH20 (05/21/18 1515)  PEEP/CPAP: 10 cmH20 (05/21/18 1515)  Oxygen Concentration (%): 30 (05/21/18 1845)  Peak Airway Pressure: 22 cmH2O (05/21/18 1515)  Plateau Pressure: 0 cmH20 (05/21/18 1515)  Total Ve: 7.35 mL (05/21/18 1515)  F/VT  Ratio<105 (RSBI): (!) 100 (05/21/18 1515)    Lines/Drains/Airways     Central Venous Catheter Line                 Percutaneous Central Line Insertion/Assessment - triple lumen  05/19/18 1600 right internal jugular 2 days          Drain                 NG/OG Tube 05/19/18 1522 2 days         Urethral Catheter 05/19/18 0115 Latex 16 Fr. 2 days          Airway                 Airway - Non-Surgical 05/19/18 1516 Endotracheal Tube 2 days          Peripheral Intravenous Line                 Peripheral IV - Single Lumen 05/19/18 Left Antecubital 2 days                Significant Labs:    CBC/Anemia Profile:    Recent Labs  Lab 05/20/18  0435 05/21/18  0441   WBC 15.78*  15.78* 16.54*   HGB 12.4  12.4 12.0   HCT 38.0  38.0 35.5*     312 264   MCV 95  95 94   RDW 14.9*  14.9* 14.9*        Chemistries:    Recent Labs  Lab 05/19/18  2111 05/20/18  0435 05/21/18  0441   NA  --  137  137 132*   K 4.2 4.0  4.0 3.7   CL  --  102  102 90*   CO2  --  24  24 31*   BUN  --  35*  35* 31*   CREATININE  --  2.3*  2.3* 1.8*   CALCIUM  --  8.4*  8.4* 9.0   ALBUMIN  --  3.0*  3.0* 2.9*   PROT  --  7.1  7.1 7.7   BILITOT  --  0.8  0.8 1.4*   ALKPHOS  --  87  87 98   ALT  --  81*  81* 75*   AST  --  517*  517* 317*       ABGs:   Recent Labs  Lab 05/21/18  0939   PH 7.463*   PCO2 52.9*   HCO3 37.9*   POCSATURATED 58*   BE 14     Coagulation:   Recent Labs  Lab 05/21/18  0441   APTT 43.8*     Troponin:   Recent Labs  Lab 05/20/18  0825 05/20/18  1340 05/20/18 2013   TROPONINI >50.000* >50.000* >50.000*     All pertinent labs within the past 24 hours have been reviewed.    Significant Imaging:  CXR: I have reviewed all pertinent results/findings within the past 24 hours and my personal findings are:  diffuse bilat pulm edema      Assessment/Plan:     Pulmonary   Acute respiratory failure with hypoxia    Cont full vent support  Attempt SBT for extubation post [facility] tomorrow   Vent settings reviewed and adjusted.          Cardiac/Vascular   * NSTEMI (non-ST elevated myocardial infarction)    Cards following  Cont Heparin infusion  Plan LHC tomorrow        CAD (coronary artery disease)    Cards following  LHC tomorrow  Cont ASA, Coreg and Ticagrelor        Cardiogenic shock    Cont Levophed infusion and wean as tolerated  ICU cardiac monitoring        Acute on chronic combined systolic and diastolic heart failure    Lasix infusion  Monitor I/Os and daily weights  BMP in AM        Renal/   Acute renal failure superimposed on stage 3 chronic kidney disease    Strict I's and O's.  Monitor BMP.  Renal following        Endocrine   Hypothyroidism    Cont Levothyroxine        Morbid obesity with BMI of 40.0-44.9, adult    Encourage weight loss post extubation        Type 2 diabetes mellitus with hyperglycemia    Cont SSI          Preventive Measures and Monitoring:   Stress Ulcer: PPI  Nutrition: NPO will start TF if unable to extubate post LHC tomorrow  Glucose control:  SSI  Bowel prophylaxis: Miralax and prn Dulcolax  DVT prophylaxis: Heparin infusion  Hx CAD on B-Blocker: Coreg  Head of Bed/Reposition: Elevate HOB and turn Q1-2 hours   Early Mobility: Bed rest  SAT/SBT: daily post LHC  RASS goal: 0  Vent Day: #3  OG Day: #3  Central Line Right IJ Day: #3  Carlin Day: #3  Code Status: Full    Counseling/Consultation:I have discussed the care of this patient in detail with the bedside nursing staff and Dr. Rod and Dr. Jackson and Dr. Boone    Critical Care Time: 49 minutes  Critical secondary to Patient has a condition that poses threat to life and bodily function: Acute Myocardial Infarction, Acute Renal Failure and Resp Failure on mech ventilation  Patient is currently on drug therapy requiring intensive monitoring for toxicity: Levophed infusion  Patient is currently receiving parenteral controlled substances: Fentanyl infusion      Critical care was time spent personally by me on the following activities: development of  treatment plan with patient or surrogate and bedside caregivers, discussions with consultants, evaluation of patient's response to treatment, examination of patient, ordering and performing treatments and interventions, ordering and review of laboratory studies, ordering and review of radiographic studies, pulse oximetry, re-evaluation of patient's condition. This critical care time did not overlap with that of any other provider or involve time for any procedures.     Elian Fulton NP  Critical Care Medicine  [facility]

## 2018-05-22 NOTE — ASSESSMENT & PLAN NOTE
Lasix infusion stopped given increased creatine  pulm edema resolved with increased PEEP and diuresis  Monitor I/Os and daily weights  BMP in AM

## 2018-05-22 NOTE — PROGRESS NOTES
[facility] Hospital Medicine  Progress Note    Patient Name: Milli Montez  MRN: [id number]  Patient Class: IP- Inpatient   Admission Date: 5/19/2018  Length of Stay: 3 days  Attending Physician: Joseph Jackson MD  Primary Care Provider: Marito Amato MD        Subjective:     Principal Problem:NSTEMI (non-ST elevated myocardial infarction)    HPI:  Ms. Montez is a 58yo female with  a PMHx of CAD with remote CABG (LIMA to Diagonal1 + distal LAD, and SVG-RPDA) and balloon PTCA of LM stent on 2/6/18 followed by repeat balloon PTCA + DEWAYNE of LM in-stent steosis 5/2/18, chronic diastolic HFpEF of 55-60%, HLD, HTN, DM II, and h/o TIA, who presented to the ED with c/o SOB that has progressively worsened over the past few days.  Associated orthopnea, PND, BLE edema, and palpitations.  Denies any CP, cough, weight gain, ABD pain or distention, N/V/D, dysuria, lightheadedness/dizziness, syncope, HA, AMS, focal deficits, diaphoresis, fever, or chills.  Upon arrival to ED, patient notably in severe respiratory distress and placed on BiPAP, and later AVAPS.  She received IV Lasix 40mg, followed by Lasix 60mg IV and IV Ativan with improvement in respiratory status.  Initial work-up resulted , troponin 0.006, cr. 1.5, lactic 5.1, ABG on AVAPS with pH 7.3, pCO2 41, pO2 525, HCO3 20.  CXR consistent with CHF.  EKG showed atrial tachycardia with LBBB (old), no acute ischemic ST-T changes from previous tracings.  Patient was admitted to ICU for acute decompensated HF under Hospital Medicine services.  Currently, patient appears comfortable in NAD.  Reports SOB greatly improved since IV Lasix and Ativan.  Patient is a Full Code.  Daughter Katherine Lewis is surrogate decision maker.    Hospital Course:  5/20/2018  Patient continue to be on vent and iv diuresis for acute cardiac pulmonary edema which shows improvement  . Continue treatment for nstemi with aspirin,brilinta ,statin. Echo finding and  elevated trop noted continue with medical management once stable patient will have LHC   5/21/2018  Patient remains intubated. Alert and follows commands. Low dose Levophed for pressor support.  Responding to Lasix gtt at 5mg/hr. Diuresing well since starting Lasix and Levophed. Troponin > 50.  2D echo showed EF of 45-50-%, +WMA.Plan for LHC and RHC   5/22/2018   Cardiology updated plan of care to wait on heart cath . Until her renal function improved . Diuresis on hold . Improved pulmonary edema . Continue to require pressors to support blood pressure         Review of Systems   Unable to perform ROS: Intubated     Objective:     Vital Signs (Most Recent):  Temp: 98.4 °F (36.9 °C) (05/22/18 1115)  Pulse: 76 (05/22/18 1415)  Resp: (!) 22 (05/22/18 1415)  BP: (!) 83/61 (05/22/18 1415)  SpO2: 99 % (05/22/18 1415) Vital Signs (24h Range):  Temp:  [97.9 °F (36.6 °C)-98.9 °F (37.2 °C)] 98.4 °F (36.9 °C)  Pulse:  [] 76  Resp:  [16-42] 22  SpO2:  [87 %-100 %] 99 %  BP: ()/(52-81) 83/61     Weight: 101 kg (222 lb 10.6 oz)  Body mass index is 35.94 kg/m².    Intake/Output Summary (Last 24 hours) at 05/22/18 1444  Last data filed at 05/22/18 1304   Gross per 24 hour   Intake          1022.36 ml   Output             1440 ml   Net          -417.64 ml      Physical Exam   Constitutional: She appears well-developed and well-nourished. She is cooperative. She is easily aroused.  Non-toxic appearance. She does not have a sickly appearance. She appears ill. No distress. She is sedated, intubated and restrained.   HENT:   Head: Normocephalic and atraumatic.   Mouth/Throat: Oropharynx is clear and moist and mucous membranes are normal.   Eyes: EOM and lids are normal. Pupils are equal, round, and reactive to light.   Neck: Trachea normal and full passive range of motion without pain. Carotid bruit is not present.       Cardiovascular: Normal rate and regular rhythm.  Exam reveals distant heart sounds.    Pulses:        Radial pulses are 1+ on the right side, and 1+ on the left side.        Dorsalis pedis pulses are 1+ on the right side, and 1+ on the left side.   Pulmonary/Chest: Effort normal. No accessory muscle usage. She is intubated. No respiratory distress. She has decreased breath sounds.   Abdominal: Soft. She exhibits no distension. Bowel sounds are decreased. There is no tenderness.   Obese    Genitourinary:   Genitourinary Comments: Carlin in place   Musculoskeletal: Normal range of motion.        Right foot: There is no deformity.        Left foot: There is no deformity.   +1 tibial edema   Lymphadenopathy:     She has no cervical adenopathy.   Neurological: She is alert and easily aroused.   Fully alert with stimuli and on Fentanyl infusion nodding head to questions and follows all simple one-step commands   Skin: Skin is warm, dry and intact. Capillary refill takes less than 2 seconds. Ecchymosis (distal ant RLE) noted. No rash noted. No cyanosis.   Psychiatric: She has a normal mood and affect. Her behavior is normal.   Nursing note and vitals reviewed.      Significant Labs: All pertinent labs within the past 24 hours have been reviewed.    Significant Imaging: I have reviewed all pertinent imaging results/findings within the past 24 hours.    Assessment/Plan:      * NSTEMI (non-ST elevated myocardial infarction)    -continue iv heparin,aspirin,brilinta,statin and ace on hold due to ken and hypotension   -plan heart cath once kidney function improved           KEN (acute kidney injury)    - cardiorenal   Improved from admission           Leukocytosis    Leukocytosis reactive will continue to monitor         Electrolyte imbalance              Cardiogenic shock    - on levophed and iv diuresis on hold   - echo ef 45 with wall motion abnormalities   - continue with nstemi management   -plan for lhc/rhc once kidney function improves           Diabetic nephropathy associated with type 2 diabetes mellitus               Inadequate dietary energy intake              Acute renal failure superimposed on stage 3 chronic kidney disease    - Likely cardiorenal syndrome.  - Diurese as above.  - Avoid nephrotoxic agents.  - Follow kidney function closely.  -nephrology on board         Acute respiratory failure with hypoxia    - secondary to acute chf   - Currently on intubated           Acute on chronic combined systolic and diastolic heart failure    - iv diuresis on hold  - on vent   - on beta blocker   - ace on hold due to grisel and hypotension         Acute pulmonary edema with congestive heart failure    - improved   -iv diuresis on hold .   - patient still on pressors         CAD (coronary artery disease)    - Recent DEWAYNE of LM due to critical in-stent stenosis on 5/2/18 per Dr. Wooten ([facility]).  -continue cardioprotective meds         Hyperlipidemia    - Continue statin therapy.  - FLP in AM.        Morbid obesity with BMI of 40.0-44.9, adult    - Lifestyle modifications.  - Pulmonology consult, ? [facility].        Essential hypertension    - hypotensive hold on antihypertensives   - Resume home Coreg.  .        Type 2 diabetes mellitus with hyperglycemia    - Accuchecks with SSI.  - Hold glipizide, will resume at DC.            VTE Risk Mitigation         Ordered     heparin 25,000 units in dextrose 5% 250 mL (100 units/mL) infusion; FEMALE  Continuous      05/19/18 0905     heparin 25,000 units in dextrose 5% 250 mL (100 units/mL) bolus from bag; PRN BOLUS  As needed (PRN)      05/19/18 0905     heparin 25,000 units in dextrose 5% 250 mL (100 units/mL) bolus from bag; PRN BOLUS  As needed (PRN)      05/19/18 0905     Place sequential compression device  Until discontinued      05/19/18 0628     IP VTE HIGH RISK PATIENT  Once      05/19/18 0248          Critical care time spent on the evaluation and treatment of severe organ dysfunction, review of pertinent labs and imaging studies, discussions with consulting providers and  discussions with patient/family: 35 minutes.    Joesph Jackson MD  Department of Hospital Medicine   [facility]

## 2018-05-22 NOTE — PROGRESS NOTES
"[facility]  Nephrology  Progress Note    Patient Name: Milli Montez  MRN: [id number]  Admission Date: 5/19/2018  Hospital Length of Stay: 3 days  Attending Provider: Joseph Jackson MD   Primary Care Physician: Marito Amato MD  Principal Problem:NSTEMI (non-ST elevated myocardial infarction). KEN, CHF.    Subjective:     HPI: Pt was seen and examined in ICU. H/o reviewed. Pt is a 56 y/o female with acute kidney injury, likely due to CHF exacerbation causing renal hypoperfusion. Pt has a baseline s Cr of 1.2 (1 month ago). s Cr is now 1.7. Pt was admitted with SOB that is "new", pt also has low exercise tolerance chronically due to SOB. Pt has a pertinent h/o of DM, HTN, CAD with past h/o of CABG and stents, and morbid obesity. Labs and meds in ER and ICU reviewed. Pt has received lasix IV injection, and remains fluid overloaded with LE swelling and continued SOB.    Interval History: Pt was seen and examined in ICU. No new events, discussed with ICU team. Discussed possibility of LHC with cardiology    Review of patient's allergies indicates:   Allergen Reactions    Penicillins Swelling     Current Facility-Administered Medications   Medication Frequency    0.9%  NaCl infusion Continuous    acetaminophen tablet 650 mg Q6H PRN    aspirin chewable tablet 81 mg Daily    bisacodyl suppository 10 mg Daily PRN    carvedilol tablet 6.25 mg BID WM    chlorhexidine 0.12 % solution 15 mL BID    dextrose 50% injection 12.5 g PRN    fentaNYL 2500 mcg in D5W 250 mL infusion premix (titrating) (conc: 10 mcg/mL) Continuous    glucagon (human recombinant) injection 1 mg PRN    heparin 25,000 units in dextrose 5% 250 mL (100 units/mL) bolus from bag; PRN BOLUS PRN    heparin 25,000 units in dextrose 5% 250 mL (100 units/mL) bolus from bag; PRN BOLUS PRN    heparin 25,000 units in dextrose 5% 250 mL (100 units/mL) infusion; FEMALE Continuous    insulin aspart U-100 pen 1-10 Units 6 times per day "    levothyroxine tablet 75 mcg Daily    lorazepam (ATIVAN) injection 2 mg Q4H PRN    nitroGLYCERIN SL tablet 0.4 mg Q5 Min PRN    norepinephrine bitartrate-D5W 8 mg/250 mL (32 mcg/mL) Soln Continuous    ondansetron injection 4 mg Q6H PRN    pantoprazole 40 mg in dextrose 5 % 100 mL IVPB (over 15 minutes) (ready to mix system) Daily    polyethylene glycol packet 17 g Daily    ramelteon tablet 8 mg Nightly PRN    ticagrelor tablet 90 mg BID       Objective:     Vital Signs (Most Recent):  Temp: 98.4 °F (36.9 °C) (05/22/18 1115)  Pulse: 73 (05/22/18 1203)  Resp: 18 (05/22/18 1203)  BP: (!) 88/60 (05/22/18 1203)  SpO2: 100 % (05/22/18 1203)  O2 Device (Oxygen Therapy): ventilator (05/22/18 1029) Vital Signs (24h Range):  Temp:  [97.9 °F (36.6 °C)-98.9 °F (37.2 °C)] 98.4 °F (36.9 °C)  Pulse:  [] 73  Resp:  [16-42] 18  SpO2:  [87 %-100 %] 100 %  BP: ()/(52-81) 88/60     Weight: 101 kg (222 lb 10.6 oz) (05/22/18 0412)  Body mass index is 35.94 kg/m².  Body surface area is 2.17 meters squared.    I/O last 3 completed shifts:  In: 1158.5 [I.V.:1003.5; NG/GT:155]  Out: 3855 [Urine:3855]    Physical Exam   Constitutional: She is oriented to person, place, and time. She appears well-developed and well-nourished. No distress.   HENT:   Head: Normocephalic and atraumatic.   Neck: JVD present.   Cardiovascular: Normal rate, regular rhythm and normal heart sounds.  Exam reveals no friction rub.    Pulmonary/Chest: She is in respiratory distress. She has rales.   Abdominal: Soft. She exhibits no distension. There is no tenderness.   Musculoskeletal: She exhibits edema.   Neurological: She is oriented to person, place, and time.   Skin: Skin is warm and dry.   Psychiatric: She has a normal mood and affect. Her behavior is normal.   Nursing note and vitals reviewed.      Significant Labs: reviewed  BMP  Lab Results   Component Value Date     (L) 05/22/2018    K 3.8 05/22/2018    CL 81 (L) 05/22/2018    CO2  33 (H) 05/22/2018    BUN 40 (H) 05/22/2018    CREATININE 1.9 (H) 05/22/2018    CALCIUM 9.6 05/22/2018    ANIONGAP 14 05/22/2018    ESTGFRAFRICA 34 (A) 05/22/2018    EGFRNONAA 29 (A) 05/22/2018     Lab Results   Component Value Date    WBC 16.99 (H) 05/22/2018    HGB 12.2 05/22/2018    HCT 35.9 (L) 05/22/2018    MCV 92 05/22/2018     05/22/2018       Significant Imaging: CXR reviewed    Assessment/Plan:     Acute renal failure superimposed on stage 3 chronic kidney disease    58 y/o female with KEN, acute MI, and CHF exacerbation:        Renal: KEN. Due to prerenal azotemia due to renal hypoperfusion from poor cardiac function and MI.  Renal function is stable, s Cr no significant change  CKD stage 3 at baseline  K normal  Acid base: metabolic alkalosis due to diuretics  Good UOP and response to diuretics    From the renal view, recommend LHC should be done if pre-test probability for CAD is high and if pt is expected to benefit from coronary revascularization, even at small risk of possibility of contrast nephropathy.  Suggest on day of LHC, hold diuretics, and provide gentle IV rehydration about 50-75 ml/hr as long as pt's condition can tolerate IV hydration.                            CAD (coronary artery disease)  MI/NSTEMI, inferior MI  Reviewed cardiology note  LHC being considered  CXR looks worse, will increase lasix back to 10 mg/hr     Significant cardiac h/o  Past h/o of CAD  CABG (LIMA to Diagonal1 + distal LAD, and SVG-RPDA) and balloon PTCA of LM stent on 2/6/18 followed by repeat balloon PTCA + DEWAYNE of LM in-stent steosis 5/2/18,  Has combined systolic and diastolic dysfunction.  Acute of chronic CHF due to MI  Lactic acidosis c/w cardiogenic shock.  Respiratory failure                   Plans and recommendations:  As discussed above  Total critical care time spent 45 minutes                 Sita Knight MD  Nephrology  [facility]

## 2018-05-22 NOTE — SUBJECTIVE & OBJECTIVE
Review of Systems   Unable to perform ROS: intubated     Objective:     Vital Signs (Most Recent):  Temp: 98.5 °F (36.9 °C) (05/22/18 0705)  Pulse: 84 (05/22/18 0908)  Resp: (!) 25 (05/22/18 0908)  BP: (!) 84/62 (05/22/18 0815)  SpO2: 99 % (05/22/18 0908) Vital Signs (24h Range):  Temp:  [97.9 °F (36.6 °C)-98.9 °F (37.2 °C)] 98.5 °F (36.9 °C)  Pulse:  [] 84  Resp:  [13-42] 25  SpO2:  [87 %-100 %] 99 %  BP: ()/(44-82) 84/62     Weight: 101 kg (222 lb 10.6 oz)  Body mass index is 35.94 kg/m².     SpO2: 99 %  O2 Device (Oxygen Therapy): ventilator      Intake/Output Summary (Last 24 hours) at 05/22/18 0918  Last data filed at 05/22/18 0800   Gross per 24 hour   Intake          1035.53 ml   Output             1865 ml   Net          -829.47 ml       Lines/Drains/Airways     Central Venous Catheter Line                 Percutaneous Central Line Insertion/Assessment - triple lumen  05/19/18 1600 right internal jugular 2 days          Drain                 Urethral Catheter 05/19/18 0115 Latex 16 Fr. 3 days         NG/OG Tube 05/19/18 1522 2 days          Airway                 Airway - Non-Surgical 05/19/18 1516 Endotracheal Tube 2 days          Peripheral Intravenous Line                 Peripheral IV - Single Lumen 05/19/18 Left Antecubital 3 days                Physical Exam   Constitutional: She is oriented to person, place, and time. She appears well-developed and well-nourished. She appears ill. She is sedated and intubated.   HENT:   Head: Normocephalic and atraumatic.   Eyes: Conjunctivae are normal.   Neck: Full passive range of motion without pain. Neck supple. No JVD present.   Cardiovascular: Normal rate, regular rhythm, S1 normal, S2 normal and intact distal pulses.  PMI is not displaced.  Exam reveals no distant heart sounds.    No murmur heard.  Pulses:       Radial pulses are 1+ on the right side, and 1+ on the left side.        Dorsalis pedis pulses are 1+ on the right side, and 1+ on the  left side.   Pulmonary/Chest: Effort normal. She is intubated. No respiratory distress. She has decreased breath sounds in the left lower field. She has no wheezes.   Intubated   Abdominal: Soft. Normal appearance. Bowel sounds are decreased.   Obese   Genitourinary:   Genitourinary Comments: Carlin in place  Deferred   Musculoskeletal: Normal range of motion. She exhibits edema (trace edema BLE).        Right ankle: She exhibits swelling.        Left ankle: She exhibits swelling.   Large bruise to RLE   Neurological: She is alert and oriented to person, place, and time.   Skin: Skin is warm and dry. Bruising noted. No cyanosis. Nails show no clubbing.   Psychiatric: She has a normal mood and affect. Her behavior is normal.   Intubated/sedated   Nursing note and vitals reviewed.      Significant Labs:   Blood Culture: No results for input(s): LABBLOO in the last 48 hours., CMP   Recent Labs  Lab 05/21/18  0441 05/22/18  0100   * 129*   K 3.7 3.4*   CL 90* 80*   CO2 31* 35*   * 197*   BUN 31* 37*   CREATININE 1.8* 1.9*   CALCIUM 9.0 9.9   PROT 7.7 8.5*   ALBUMIN 2.9* 3.0*   BILITOT 1.4* 1.6*   ALKPHOS 98 112   * 148*   ALT 75* 62*   ANIONGAP 11 14   ESTGFRAFRICA 36* 34*   EGFRNONAA 31* 29*   , INR No results for input(s): INR, PROTIME in the last 48 hours., Lipid Panel No results for input(s): CHOL, HDL, LDLCALC, TRIG, CHOLHDL in the last 48 hours., Troponin   Recent Labs  Lab 05/20/18  1340 05/20/18  2013   TROPONINI >50.000* >50.000*    and All pertinent lab results from the last 24 hours have been reviewed.    Significant Imaging: Echocardiogram:   2D echo with color flow doppler:   Results for orders placed or performed during the hospital encounter of 05/19/18   2D echo with color flow doppler   Result Value Ref Range    EF 45 55 - 65    Mitral Valve Regurgitation MILD     Diastolic Dysfunction Yes (A)     Est. PA Systolic Pressure 33.18     Tricuspid Valve Regurgitation MILD     and X-Ray:  CXR: X-Ray Chest 1 View (CXR):   Results for orders placed or performed during the hospital encounter of 05/19/18   X-Ray Chest 1 View    Narrative    EXAMINATION:  XR CHEST 1 VIEW    CLINICAL HISTORY:  resp failure; OG Tube; ET Tube;    COMPARISON:  05/21/2018    FINDINGS:  Line and tubes remain in place.  There are multiple sternotomy wires in place.  There is mild cardiomegaly.  The lungs are clear. There is no pneumothorax.  The costophrenic angles are sharp.      Impression    1. The lungs are clear.  2. There is mild cardiomegaly.  3. Lines and tubes remain in place.  .      Electronically signed by: Prabhjot Lopez MD  Date:    05/22/2018  Time:    09:06

## 2018-05-22 NOTE — ASSESSMENT & PLAN NOTE
Cont full vent support if going for LHC today  If no LHC today will attempt SBT and extubation  Vent settings reviewed and adjusted.

## 2018-05-22 NOTE — NURSING
Mckenna Jackson NP, VIKY Cook, and, Dr. Boone at bedside discussing plan of care with pt.  DR. Boone requests cath procedures from February and May that were performed by Dr. Wooten at [facility] in [place].  Release of info form signed by pts daughter and faxed to [facility].  Placed a call to [facility] to notify of fax.  Dr. Boone also called Dr. Wooten to notify.

## 2018-05-22 NOTE — PROGRESS NOTES
[facility] -   Critical Care Medicine  Progress Note    Patient Name: Milli Montez  MRN: [id number]  Admission Date: 5/19/2018  Hospital Length of Stay: 3 days  Code Status: Full Code  Attending Provider: Joseph Jackson MD  Primary Care Provider: Marito Amato MD   Principal Problem: NSTEMI (non-ST elevated myocardial infarction)    Subjective:     HPI:  57-year-old female patient with history of CHF, coronary artery disease status post stent placement by ambulance to the hospital for worsening shortnessof breath for a few days and the respiratory distress.  She was started on noninvasive ventilation in the ER and transferred to the ICU.      Hospital/ICU Course:  Continue to be on noninvasive ventilation.ABGs and chest x-ray reviewed.afebrile.  5/19 Remains in distress tachypneic respiratory rate in the 30s.receiving IV Lasix.  An on IV heparin drip.  5/20 Sp intubation, Rt TLC intubation. On levophed, heparin, lasix, fentanyl gtt. Chest x-ray and ABG reviewed.  5/21 - Fully awake and comfortable intubated on mech ventilation.  Still on Heparin, Lasix, Levophed and Fentanyl infusions  5/22 - Still intubated on mech ventilation and Fentanyl infusion fully awake and responsive with stimuli.  Still on Levophed infusion.  Lasix infusion stopped.    Review of Systems   Unable to perform ROS: Intubated       Objective:     Vital Signs (Most Recent):  Temp: 98.4 °F (36.9 °C) (05/22/18 1115)  Pulse: 72 (05/22/18 1115)  Resp: 16 (05/22/18 1115)  BP: (!) 91/59 (05/22/18 1115)  SpO2: 99 % (05/22/18 1115) Vital Signs (24h Range):  Temp:  [97.9 °F (36.6 °C)-98.9 °F (37.2 °C)] 98.4 °F (36.9 °C)  Pulse:  [] 72  Resp:  [16-42] 16  SpO2:  [87 %-100 %] 99 %  BP: ()/(52-82) 91/59     Weight: 101 kg (222 lb 10.6 oz)  Body mass index is 35.94 kg/m².      Intake/Output Summary (Last 24 hours) at 05/22/18 1141  Last data filed at 05/22/18 1000   Gross per 24 hour   Intake          1128.48 ml   Output              1700 ml   Net          -571.52 ml       Physical Exam   Constitutional: She appears well-developed and well-nourished. She is cooperative. She is easily aroused.  Non-toxic appearance. She does not have a sickly appearance. She appears ill. No distress. She is sedated, intubated and restrained.   HENT:   Head: Normocephalic and atraumatic.   Mouth/Throat: Oropharynx is clear and moist and mucous membranes are normal.   Eyes: EOM and lids are normal. Pupils are equal, round, and reactive to light.   Neck: Trachea normal and full passive range of motion without pain. Carotid bruit is not present.       Cardiovascular: Normal rate and regular rhythm.  Exam reveals distant heart sounds.    Pulses:       Radial pulses are 1+ on the right side, and 1+ on the left side.        Dorsalis pedis pulses are 1+ on the right side, and 1+ on the left side.   Pulmonary/Chest: Effort normal. No accessory muscle usage. She is intubated. No respiratory distress. She has decreased breath sounds.   Abdominal: Soft. She exhibits no distension. Bowel sounds are decreased. There is no tenderness.   Obese    Genitourinary:   Genitourinary Comments: Carlin in place   Musculoskeletal: Normal range of motion.        Right foot: There is no deformity.        Left foot: There is no deformity.   +1 tibial edema   Lymphadenopathy:     She has no cervical adenopathy.   Neurological: She is alert and easily aroused.   Fully alert with stimuli and on Fentanyl infusion nodding head to questions and follows all simple one-step commands   Skin: Skin is warm, dry and intact. Capillary refill takes less than 2 seconds. Ecchymosis (distal ant RLE) noted. No rash noted. No cyanosis.   Psychiatric: She has a normal mood and affect. Her behavior is normal.       Vents:  Vent Mode: A/C (05/22/18 1029)  Ventilator Initiated: Yes (05/19/18 1539)  Set Rate: 16 bmp (05/22/18 1029)  Vt Set: 370 mL (05/22/18 1029)  Pressure Support: 0 cmH20 (05/22/18  1029)  PEEP/CPAP: 10 cmH20 (05/22/18 1029)  Oxygen Concentration (%): 30 (05/22/18 1115)  Peak Airway Pressure: 25 cmH2O (05/22/18 1029)  Plateau Pressure: 0 cmH20 (05/22/18 1029)  Total Ve: 5.89 mL (05/22/18 1029)  F/VT Ratio<105 (RSBI): (!) 56.76 (05/22/18 1029)    Lines/Drains/Airways     Central Venous Catheter Line                 Percutaneous Central Line Insertion/Assessment - triple lumen  05/19/18 1600 right internal jugular 2 days          Drain                 Urethral Catheter 05/19/18 0115 Latex 16 Fr. 3 days         NG/OG Tube 05/19/18 1522 2 days          Airway                 Airway - Non-Surgical 05/19/18 1516 Endotracheal Tube 2 days          Peripheral Intravenous Line                 Peripheral IV - Single Lumen 05/19/18 Left Antecubital 3 days                Significant Labs:    CBC/Anemia Profile:    Recent Labs  Lab 05/21/18  0441 05/22/18  0100   WBC 16.54* 16.99*   HGB 12.0 12.2   HCT 35.5* 35.9*    304   MCV 94 92   RDW 14.9* 14.9*        Chemistries:    Recent Labs  Lab 05/21/18  0441 05/22/18  0100   * 129*   K 3.7 3.4*   CL 90* 80*   CO2 31* 35*   BUN 31* 37*   CREATININE 1.8* 1.9*   CALCIUM 9.0 9.9   ALBUMIN 2.9* 3.0*   PROT 7.7 8.5*   BILITOT 1.4* 1.6*   ALKPHOS 98 112   ALT 75* 62*   * 148*   MG  --  1.8       ABGs:   Recent Labs  Lab 05/22/18  0418   PH 7.536*   PCO2 45.2*   HCO3 38.3*   POCSATURATED 99   BE 16     All pertinent labs within the past 24 hours have been reviewed.    Significant Imaging:  CXR: I have reviewed all pertinent results/findings within the past 24 hours and my personal findings are:  no acute pulm process noted      Assessment/Plan:     Pulmonary   Acute respiratory failure with hypoxia    Cont full vent support if going for LHC today  If no LHC today will attempt SBT and extubation  Vent settings reviewed and adjusted.         Cardiac/Vascular   * NSTEMI (non-ST elevated myocardial infarction)    Cards following  Cont Heparin  infusion  Hopefully C today        CAD (coronary artery disease)    Cards following  [place] planned today  Cont ASA, Coreg and Ticagrelor        Acute pulmonary edema with congestive heart failure    Resolved with diuresis  CXR again in AM        Cardiogenic shock    Cont Levophed infusion and wean as tolerated  ICU cardiac monitoring        Acute on chronic combined systolic and diastolic heart failure    Lasix infusion stopped given increased creatine  pulm edema resolved with increased PEEP and diuresis  Monitor I/Os and daily weights  BMP in AM        Renal/   Electrolyte imbalance    Replace K+ and Mg+        Acute renal failure superimposed on stage 3 chronic kidney disease    Strict I's and O's.  Monitor BMP.  Renal following and agrees with proceeding with [place]  Lasix stopped  Min IVF per Cards        Oncology   Leukocytosis    Afeb and does not appear septic  Blood and Sputum cultures NGTD  UA neg and CXR without focal infiltrate  Repeat CBC in AM        Endocrine   Hypothyroidism    Cont Levothyroxine        Morbid obesity with BMI of 40.0-44.9, adult    Encourage weight loss post extubation        Type 2 diabetes mellitus with hyperglycemia    Cont SSI        Preventive Measures and Monitoring:   Stress Ulcer: PPI  Nutrition: NPO will start TF if unable to extubate today  Glucose control:  SSI  Bowel prophylaxis: Miralax and prn Dulcolax  DVT prophylaxis: Heparin infusion  Hx CAD on B-Blocker: Coreg  Head of Bed/Reposition: Elevate HOB and turn Q1-2 hours   Early Mobility: Bed rest  SAT/SBT: later today  RASS goal: 0  Vent Day: #4  OG Day: #4  Central Line Right IJ Day: #4  Carlin Day: #4  Code Status: Full     Counseling/Consultation:I have discussed the care of this patient in detail with the bedside nursing staff and Dr. Phillips and Dr. Jackson and Dr. Boone and Dr. Knight    Critical Care Time: 51 minutes  Critical secondary to Patient has a condition that poses threat to life and bodily function:  Acute Myocardial Infarction, Acute Renal Failure and Resp failure intubated   Patient is currently on drug therapy requiring intensive monitoring for toxicity: Levophed infusion  Patient is currently receiving parenteral controlled substances: Fentanyl infusion      Critical care was time spent personally by me on the following activities: development of treatment plan with patient or surrogate and bedside caregivers, discussions with consultants, evaluation of patient's response to treatment, examination of patient, ordering and performing treatments and interventions, ordering and review of laboratory studies, ordering and review of radiographic studies, pulse oximetry, re-evaluation of patient's condition. This critical care time did not overlap with that of any other provider or involve time for any procedures.     Elian Fulton NP  Critical Care Medicine  [facility]

## 2018-05-22 NOTE — ASSESSMENT & PLAN NOTE
58 y/o female with KEN, acute MI,a nd CHF exacerbation:        Renal: KEN. Due to prerenal azotemia due to renal hypoperfusion from poor cardiac function and MI.  Renal function is stable, s Cr lower today  CKD stage 3 at baseline  K normal  Acid base: metabolic alkalosis due to diuretics  Good UOP                             CAD (coronary artery disease)  MI/NSTEMI, inferior MI  Reviewed cardiology note  LHC tomorrow  CXR looks worse, will increase lasix back to 10 mg/hr     Significant cardiac h/o  Past h/o of CAD  CABG (LIMA to Diagonal1 + distal LAD, and SVG-RPDA) and balloon PTCA of LM stent on 2/6/18 followed by repeat balloon PTCA + DEWAYNE of LM in-stent steosis 5/2/18,  Has combined systolic and diastolic dysfunction.  Acute of chronic CHF due to MI  Lactic acidosis c/w cardiogenic shock.  Respiratory failure                   Plans and recommendations:  As discussed above  Increase lasix IV drip to 10 mg/hr  Repeat metolazone 5 mg x 1  Total critical care time spent 45 minutes

## 2018-05-22 NOTE — ASSESSMENT & PLAN NOTE
-continue iv heparin,aspirin,brilinta,statin and ace on hold due to grisel and hypotension   -plan heart cath once kidney function improved

## 2018-05-22 NOTE — SUBJECTIVE & OBJECTIVE
Interval History: Pt was seen and examined in ICU. No new events, discussed with ICU team. Discussed possibility of LHC with cardiology    Review of patient's allergies indicates:   Allergen Reactions    Penicillins Swelling     Current Facility-Administered Medications   Medication Frequency    0.9%  NaCl infusion Continuous    acetaminophen tablet 650 mg Q6H PRN    aspirin chewable tablet 81 mg Daily    bisacodyl suppository 10 mg Daily PRN    carvedilol tablet 6.25 mg BID WM    chlorhexidine 0.12 % solution 15 mL BID    dextrose 50% injection 12.5 g PRN    fentaNYL 2500 mcg in D5W 250 mL infusion premix (titrating) (conc: 10 mcg/mL) Continuous    glucagon (human recombinant) injection 1 mg PRN    heparin 25,000 units in dextrose 5% 250 mL (100 units/mL) bolus from bag; PRN BOLUS PRN    heparin 25,000 units in dextrose 5% 250 mL (100 units/mL) bolus from bag; PRN BOLUS PRN    heparin 25,000 units in dextrose 5% 250 mL (100 units/mL) infusion; FEMALE Continuous    insulin aspart U-100 pen 1-10 Units 6 times per day    levothyroxine tablet 75 mcg Daily    lorazepam (ATIVAN) injection 2 mg Q4H PRN    nitroGLYCERIN SL tablet 0.4 mg Q5 Min PRN    norepinephrine bitartrate-D5W 8 mg/250 mL (32 mcg/mL) Soln Continuous    ondansetron injection 4 mg Q6H PRN    pantoprazole 40 mg in dextrose 5 % 100 mL IVPB (over 15 minutes) (ready to mix system) Daily    polyethylene glycol packet 17 g Daily    ramelteon tablet 8 mg Nightly PRN    ticagrelor tablet 90 mg BID       Objective:     Vital Signs (Most Recent):  Temp: 98.4 °F (36.9 °C) (05/22/18 1115)  Pulse: 73 (05/22/18 1203)  Resp: 18 (05/22/18 1203)  BP: (!) 88/60 (05/22/18 1203)  SpO2: 100 % (05/22/18 1203)  O2 Device (Oxygen Therapy): ventilator (05/22/18 1029) Vital Signs (24h Range):  Temp:  [97.9 °F (36.6 °C)-98.9 °F (37.2 °C)] 98.4 °F (36.9 °C)  Pulse:  [] 73  Resp:  [16-42] 18  SpO2:  [87 %-100 %] 100 %  BP: ()/(52-81) 88/60      Weight: 101 kg (222 lb 10.6 oz) (05/22/18 0412)  Body mass index is 35.94 kg/m².  Body surface area is 2.17 meters squared.    I/O last 3 completed shifts:  In: 1158.5 [I.V.:1003.5; NG/GT:155]  Out: 3855 [Urine:3855]    Physical Exam   Constitutional: She is oriented to person, place, and time. She appears well-developed and well-nourished. No distress.   HENT:   Head: Normocephalic and atraumatic.   Neck: JVD present.   Cardiovascular: Normal rate, regular rhythm and normal heart sounds.  Exam reveals no friction rub.    Pulmonary/Chest: She is in respiratory distress. She has rales.   Abdominal: Soft. She exhibits no distension. There is no tenderness.   Musculoskeletal: She exhibits edema.   Neurological: She is oriented to person, place, and time.   Skin: Skin is warm and dry.   Psychiatric: She has a normal mood and affect. Her behavior is normal.   Nursing note and vitals reviewed.      Significant Labs: reviewed  BMP  Lab Results   Component Value Date     (L) 05/22/2018    K 3.8 05/22/2018    CL 81 (L) 05/22/2018    CO2 33 (H) 05/22/2018    BUN 40 (H) 05/22/2018    CREATININE 1.9 (H) 05/22/2018    CALCIUM 9.6 05/22/2018    ANIONGAP 14 05/22/2018    ESTGFRAFRICA 34 (A) 05/22/2018    EGFRNONAA 29 (A) 05/22/2018     Lab Results   Component Value Date    WBC 16.99 (H) 05/22/2018    HGB 12.2 05/22/2018    HCT 35.9 (L) 05/22/2018    MCV 92 05/22/2018     05/22/2018       Significant Imaging: CXR reviewed

## 2018-05-22 NOTE — SUBJECTIVE & OBJECTIVE
Review of Systems   Unable to perform ROS: Intubated       Objective:     Vital Signs (Most Recent):  Temp: 98.4 °F (36.9 °C) (05/22/18 1115)  Pulse: 72 (05/22/18 1115)  Resp: 16 (05/22/18 1115)  BP: (!) 91/59 (05/22/18 1115)  SpO2: 99 % (05/22/18 1115) Vital Signs (24h Range):  Temp:  [97.9 °F (36.6 °C)-98.9 °F (37.2 °C)] 98.4 °F (36.9 °C)  Pulse:  [] 72  Resp:  [16-42] 16  SpO2:  [87 %-100 %] 99 %  BP: ()/(52-82) 91/59     Weight: 101 kg (222 lb 10.6 oz)  Body mass index is 35.94 kg/m².      Intake/Output Summary (Last 24 hours) at 05/22/18 1141  Last data filed at 05/22/18 1000   Gross per 24 hour   Intake          1128.48 ml   Output             1700 ml   Net          -571.52 ml       Physical Exam   Constitutional: She appears well-developed and well-nourished. She is cooperative. She is easily aroused.  Non-toxic appearance. She does not have a sickly appearance. She appears ill. No distress. She is sedated, intubated and restrained.   HENT:   Head: Normocephalic and atraumatic.   Mouth/Throat: Oropharynx is clear and moist and mucous membranes are normal.   Eyes: EOM and lids are normal. Pupils are equal, round, and reactive to light.   Neck: Trachea normal and full passive range of motion without pain. Carotid bruit is not present.       Cardiovascular: Normal rate and regular rhythm.  Exam reveals distant heart sounds.    Pulses:       Radial pulses are 1+ on the right side, and 1+ on the left side.        Dorsalis pedis pulses are 1+ on the right side, and 1+ on the left side.   Pulmonary/Chest: Effort normal. No accessory muscle usage. She is intubated. No respiratory distress. She has decreased breath sounds.   Abdominal: Soft. She exhibits no distension. Bowel sounds are decreased. There is no tenderness.   Obese    Genitourinary:   Genitourinary Comments: Carlin in place   Musculoskeletal: Normal range of motion.        Right foot: There is no deformity.        Left foot: There is no  deformity.   +1 tibial edema   Lymphadenopathy:     She has no cervical adenopathy.   Neurological: She is alert and easily aroused.   Fully alert with stimuli and on Fentanyl infusion nodding head to questions and follows all simple one-step commands   Skin: Skin is warm, dry and intact. Capillary refill takes less than 2 seconds. Ecchymosis (distal ant RLE) noted. No rash noted. No cyanosis.   Psychiatric: She has a normal mood and affect. Her behavior is normal.       Vents:  Vent Mode: A/C (05/22/18 1029)  Ventilator Initiated: Yes (05/19/18 1539)  Set Rate: 16 bmp (05/22/18 1029)  Vt Set: 370 mL (05/22/18 1029)  Pressure Support: 0 cmH20 (05/22/18 1029)  PEEP/CPAP: 10 cmH20 (05/22/18 1029)  Oxygen Concentration (%): 30 (05/22/18 1115)  Peak Airway Pressure: 25 cmH2O (05/22/18 1029)  Plateau Pressure: 0 cmH20 (05/22/18 1029)  Total Ve: 5.89 mL (05/22/18 1029)  F/VT Ratio<105 (RSBI): (!) 56.76 (05/22/18 1029)    Lines/Drains/Airways     Central Venous Catheter Line                 Percutaneous Central Line Insertion/Assessment - triple lumen  05/19/18 1600 right internal jugular 2 days          Drain                 Urethral Catheter 05/19/18 0115 Latex 16 Fr. 3 days         NG/OG Tube 05/19/18 1522 2 days          Airway                 Airway - Non-Surgical 05/19/18 1516 Endotracheal Tube 2 days          Peripheral Intravenous Line                 Peripheral IV - Single Lumen 05/19/18 Left Antecubital 3 days                Significant Labs:    CBC/Anemia Profile:    Recent Labs  Lab 05/21/18  0441 05/22/18  0100   WBC 16.54* 16.99*   HGB 12.0 12.2   HCT 35.5* 35.9*    304   MCV 94 92   RDW 14.9* 14.9*        Chemistries:    Recent Labs  Lab 05/21/18  0441 05/22/18  0100   * 129*   K 3.7 3.4*   CL 90* 80*   CO2 31* 35*   BUN 31* 37*   CREATININE 1.8* 1.9*   CALCIUM 9.0 9.9   ALBUMIN 2.9* 3.0*   PROT 7.7 8.5*   BILITOT 1.4* 1.6*   ALKPHOS 98 112   ALT 75* 62*   * 148*   MG  --  1.8       ABGs:    Recent Labs  Lab 05/22/18  0418   PH 7.536*   PCO2 45.2*   HCO3 38.3*   POCSATURATED 99   BE 16     All pertinent labs within the past 24 hours have been reviewed.    Significant Imaging:  CXR: I have reviewed all pertinent results/findings within the past 24 hours and my personal findings are:  no acute pulm process noted

## 2018-05-22 NOTE — PROGRESS NOTES
[facility]  Cardiology  Progress Note    Patient Name: Milli Montez  MRN: [id number]  Admission Date: 5/19/2018  Hospital Length of Stay: 3 days  Code Status: Full Code   Attending Physician: Joseph Jackson MD   Primary Care Physician: Marito Amato MD  Expected Discharge Date:   Principal Problem:NSTEMI (non-ST elevated myocardial infarction)    Subjective:   HPI    HPI obtained from chart as patient was on AVAPS    Ms. Montez is a 57 year old female patient with a PMHx of CAD s/p CABG, s/p PTCA of LM stent on 2/6/18, s/p repeat PTCA/DEWAYNE of LM in-stent restenosis on 5/2/18, chronic diastolic CHF, hyperlipidemia, HTN, DM type II, and TIA who presented to [facility] ED yesterday with a chief complaint of progressively worsening SOB over the past few days. Associated symptoms included orthopnea, PND, BLE edema, and palpitations. Patient denied any associated chest pain, near syncope, syncope, fever, or chills. While in ED, patient became progressively more dyspneic and tachypneic and was subsequently placed on Avaps. Initial workup in ED revealed BNP of 828, troponin of 0.006, creatinine of 1.5, lactic acidosis (%), and hypoxia with hypercapnia. Patient subsequently admitted to ICU for further evaluation and treatment. Patient seen and examined today while in ICU. Remains dyspneic on AVAPs. Complains of some back discomfort. Repeat troponin resulted at 1.762. Echo and repeat ABG pending.    Hospital Course:   5/20/18-Patient seen and examined today, now intubated. On Levophed for pressor support. Responding to Lasix gtt, appreciate nephrology rec's. Troponin > 50. Repeat later today. 2D echo showed EF of 45-50-%, +WMA. TSH 7.    5/21/18- Patient remains intubated. Alert and follows commands. Low dose Levophed for pressor support.  Responding to Lasix gtt at 5mg/hr. Diuresing well since starting Lasix and Levophed. Troponin > 50.  2D echo showed EF of 45-50-%, +WMA. Creatine improved on morning labs.       5/22/18--Patient seen and examined in ICU bed, family at bedside. Remains intubated. Alert and follows simple commands, communicated with pen and paper at this time. Low dose Levophed for pressor support to keep MAP >65. Lasix gtt stopped this AM. Heparin gtt in place. Fentanyl gtt for sedation. Labs reviewed, K+ 3.4 and Creatinine 1.9 today. Gentle IV hydration started this AM, repeat labs at noon.         Review of Systems   Unable to perform ROS: intubated     Objective:     Vital Signs (Most Recent):  Temp: 98.5 °F (36.9 °C) (05/22/18 0705)  Pulse: 84 (05/22/18 0908)  Resp: (!) 25 (05/22/18 0908)  BP: (!) 84/62 (05/22/18 0815)  SpO2: 99 % (05/22/18 0908) Vital Signs (24h Range):  Temp:  [97.9 °F (36.6 °C)-98.9 °F (37.2 °C)] 98.5 °F (36.9 °C)  Pulse:  [] 84  Resp:  [13-42] 25  SpO2:  [87 %-100 %] 99 %  BP: ()/(44-82) 84/62     Weight: 101 kg (222 lb 10.6 oz)  Body mass index is 35.94 kg/m².     SpO2: 99 %  O2 Device (Oxygen Therapy): ventilator      Intake/Output Summary (Last 24 hours) at 05/22/18 0918  Last data filed at 05/22/18 0800   Gross per 24 hour   Intake          1035.53 ml   Output             1865 ml   Net          -829.47 ml       Lines/Drains/Airways     Central Venous Catheter Line                 Percutaneous Central Line Insertion/Assessment - triple lumen  05/19/18 1600 right internal jugular 2 days          Drain                 Urethral Catheter 05/19/18 0115 Latex 16 Fr. 3 days         NG/OG Tube 05/19/18 1522 2 days          Airway                 Airway - Non-Surgical 05/19/18 1516 Endotracheal Tube 2 days          Peripheral Intravenous Line                 Peripheral IV - Single Lumen 05/19/18 Left Antecubital 3 days                Physical Exam   Constitutional: She is oriented to person, place, and time. She appears well-developed and well-nourished. She appears ill. She is sedated and intubated.   HENT:   Head: Normocephalic and atraumatic.   Eyes: Conjunctivae are  normal.   Neck: Full passive range of motion without pain. Neck supple. No JVD present.   Cardiovascular: Normal rate, regular rhythm, S1 normal, S2 normal and intact distal pulses.  PMI is not displaced.  Exam reveals no distant heart sounds.    No murmur heard.  Pulses:       Radial pulses are 1+ on the right side, and 1+ on the left side.        Dorsalis pedis pulses are 1+ on the right side, and 1+ on the left side.   Pulmonary/Chest: Effort normal. She is intubated. No respiratory distress. She has decreased breath sounds in the left lower field. She has no wheezes.   Intubated   Abdominal: Soft. Normal appearance. Bowel sounds are decreased.   Obese   Genitourinary:   Genitourinary Comments: Carlin in place  Deferred   Musculoskeletal: Normal range of motion. She exhibits edema (trace edema BLE).        Right ankle: She exhibits swelling.        Left ankle: She exhibits swelling.   Large bruise to RLE   Neurological: She is alert and oriented to person, place, and time.   Skin: Skin is warm and dry. Bruising noted. No cyanosis. Nails show no clubbing.   Psychiatric: She has a normal mood and affect. Her behavior is normal.   Intubated/sedated   Nursing note and vitals reviewed.      Significant Labs:   Blood Culture: No results for input(s): LABBLOO in the last 48 hours., CMP   Recent Labs  Lab 05/21/18  0441 05/22/18  0100   * 129*   K 3.7 3.4*   CL 90* 80*   CO2 31* 35*   * 197*   BUN 31* 37*   CREATININE 1.8* 1.9*   CALCIUM 9.0 9.9   PROT 7.7 8.5*   ALBUMIN 2.9* 3.0*   BILITOT 1.4* 1.6*   ALKPHOS 98 112   * 148*   ALT 75* 62*   ANIONGAP 11 14   ESTGFRAFRICA 36* 34*   EGFRNONAA 31* 29*   , INR No results for input(s): INR, PROTIME in the last 48 hours., Lipid Panel No results for input(s): CHOL, HDL, LDLCALC, TRIG, CHOLHDL in the last 48 hours., Troponin   Recent Labs  Lab 05/20/18  1340 05/20/18  2013   TROPONINI >50.000* >50.000*    and All pertinent lab results from the last 24 hours  have been reviewed.    Significant Imaging: Echocardiogram:   2D echo with color flow doppler:   Results for orders placed or performed during the hospital encounter of 05/19/18   2D echo with color flow doppler   Result Value Ref Range    EF 45 55 - 65    Mitral Valve Regurgitation MILD     Diastolic Dysfunction Yes (A)     Est. PA Systolic Pressure 33.18     Tricuspid Valve Regurgitation MILD     and X-Ray: CXR: X-Ray Chest 1 View (CXR):   Results for orders placed or performed during the hospital encounter of 05/19/18   X-Ray Chest 1 View    Narrative    EXAMINATION:  XR CHEST 1 VIEW    CLINICAL HISTORY:  resp failure; OG Tube; ET Tube;    COMPARISON:  05/21/2018    FINDINGS:  Line and tubes remain in place.  There are multiple sternotomy wires in place.  There is mild cardiomegaly.  The lungs are clear. There is no pneumothorax.  The costophrenic angles are sharp.      Impression    1. The lungs are clear.  2. There is mild cardiomegaly.  3. Lines and tubes remain in place.  .      Electronically signed by: Prabhjot Lopez MD  Date:    05/22/2018  Time:    09:06     Assessment and Plan:   Patient seen and examined in ICU bed, family at bedside. Remains intubated and sedated on Fentanyl gtt. IV heparin gtt and Levophed gtt infusing as well. Patient alert and communicates with written communication at this time. Increase in creatinine overnight, lasix gtt stopped this AM. Gentle IVF hydration started this AM, NS @ 25 ml/hr and repeat labs at noon. LHC/RHC per Dr. Boone pending repeat labs at noon. Family updated on plan of care this AM.       * NSTEMI (non-ST elevated myocardial infarction)    -Patient with significant history of CAD s/p recent PTCA/DEWAYNE of LM in-stent re-stenosis, presents with NSTEMI/cardiogenic shock  -Continue Levophed for pressor support  -Troponin remains  > 50  -Continue current medical management-ASA, Brilinta, heparin gtt  -Coreg held due to need for pressor support  -Statin held due to  elevated liver enzymes  -2D echo showed EF of 45-50%, +WMA  -Cath images from Feb and May 2018 have been requested for Dr. Boone to review. Will make plans for cath based on Dr. Boone's review of images.   -Dr. Boone has requested arterial studies to BLE to assess for PAD if high risk intervention to LM or LAD needed   -Patient with new inferior wall MI and severe MR on echo. Will plan for left and right heart cath tomorrow with Dr. Boone.   -morning labs to reassess renal function   -Gentle IVF hydration, repeat labs at noon to evaluate renal function.  -LHC/RHC pending repeat labs          Cardiogenic shock    -Levophed gtt to keep MAP >65        Acute renal failure superimposed on stage 3 chronic kidney disease    -Likely cardiorenal, creatinine 1.7  -Continue to monitor        Acute respiratory failure with hypoxia    -Secondary to NSTEMI and volume overload/acute on chronic decompensated diastolic CHF  -Now intubated  -Continue Lasix gtt, assess response, nephrology on board              Acute on chronic combined systolic and diastolic heart failure    -Improving with Lasix gtt  -continue current mgmt  -ACEI/ARB held due to KEN/need for pressor support  -Lasix gtt stopped this AM due to increased creatinine to 1.9          CAD (coronary artery disease)    -See plan under NSTEMI        Hyperlipidemia    -Hold statin given elevated liver enzymes          Essential hypertension    -BP meds held due to need for pressor support  -Levophed gtt to keep MAP >65            VTE Risk Mitigation         Ordered     heparin 25,000 units in dextrose 5% 250 mL (100 units/mL) infusion; FEMALE  Continuous      05/19/18 0905     heparin 25,000 units in dextrose 5% 250 mL (100 units/mL) bolus from bag; PRN BOLUS  As needed (PRN)      05/19/18 0905     heparin 25,000 units in dextrose 5% 250 mL (100 units/mL) bolus from bag; PRN BOLUS  As needed (PRN)      05/19/18 0905     Place sequential compression device  Until discontinued       05/19/18 0628     IP VTE HIGH RISK PATIENT  Once      05/19/18 0248          Joyce Roman NP  Cardiology  [facility]

## 2018-05-22 NOTE — PROGRESS NOTES
Fully awake and alert off sedation lifts head off pillow and follows all commands and denies SOB tolerating SBT.  Will extubate and provide NPPV overnight.    FRANCISCA Fulton [facility]-BC

## 2018-05-22 NOTE — PLAN OF CARE
Problem: Patient Care Overview  Goal: Plan of Care Review  Outcome: Ongoing (interventions implemented as appropriate)  Pt extubated to 3Lnc, bilat soft wrist restraints dc'd @1750.  Pt stable.  Plan of care reviewed w pt and daughter.

## 2018-05-22 NOTE — SUBJECTIVE & OBJECTIVE
Review of Systems   Unable to perform ROS: Intubated     Objective:     Vital Signs (Most Recent):  Temp: 98.4 °F (36.9 °C) (05/22/18 1115)  Pulse: 76 (05/22/18 1415)  Resp: (!) 22 (05/22/18 1415)  BP: (!) 83/61 (05/22/18 1415)  SpO2: 99 % (05/22/18 1415) Vital Signs (24h Range):  Temp:  [97.9 °F (36.6 °C)-98.9 °F (37.2 °C)] 98.4 °F (36.9 °C)  Pulse:  [] 76  Resp:  [16-42] 22  SpO2:  [87 %-100 %] 99 %  BP: ()/(52-81) 83/61     Weight: 101 kg (222 lb 10.6 oz)  Body mass index is 35.94 kg/m².    Intake/Output Summary (Last 24 hours) at 05/22/18 1444  Last data filed at 05/22/18 1304   Gross per 24 hour   Intake          1022.36 ml   Output             1440 ml   Net          -417.64 ml      Physical Exam   Constitutional: She appears well-developed and well-nourished. She is cooperative. She is easily aroused.  Non-toxic appearance. She does not have a sickly appearance. She appears ill. No distress. She is sedated, intubated and restrained.   HENT:   Head: Normocephalic and atraumatic.   Mouth/Throat: Oropharynx is clear and moist and mucous membranes are normal.   Eyes: EOM and lids are normal. Pupils are equal, round, and reactive to light.   Neck: Trachea normal and full passive range of motion without pain. Carotid bruit is not present.       Cardiovascular: Normal rate and regular rhythm.  Exam reveals distant heart sounds.    Pulses:       Radial pulses are 1+ on the right side, and 1+ on the left side.        Dorsalis pedis pulses are 1+ on the right side, and 1+ on the left side.   Pulmonary/Chest: Effort normal. No accessory muscle usage. She is intubated. No respiratory distress. She has decreased breath sounds.   Abdominal: Soft. She exhibits no distension. Bowel sounds are decreased. There is no tenderness.   Obese    Genitourinary:   Genitourinary Comments: Carlin in place   Musculoskeletal: Normal range of motion.        Right foot: There is no deformity.        Left foot: There is no  deformity.   +1 tibial edema   Lymphadenopathy:     She has no cervical adenopathy.   Neurological: She is alert and easily aroused.   Fully alert with stimuli and on Fentanyl infusion nodding head to questions and follows all simple one-step commands   Skin: Skin is warm, dry and intact. Capillary refill takes less than 2 seconds. Ecchymosis (distal ant RLE) noted. No rash noted. No cyanosis.   Psychiatric: She has a normal mood and affect. Her behavior is normal.   Nursing note and vitals reviewed.      Significant Labs: All pertinent labs within the past 24 hours have been reviewed.    Significant Imaging: I have reviewed all pertinent imaging results/findings within the past 24 hours.

## 2018-05-22 NOTE — PROGRESS NOTES
SAT and SBT not performed today.  Dr Boone states he wants pt intubated for cath procedure tomorrow.  noted

## 2018-05-22 NOTE — ASSESSMENT & PLAN NOTE
Afeb and does not appear septic  Blood and Sputum cultures NGTD  UA neg and CXR without focal infiltrate  Repeat CBC in AM

## 2018-05-22 NOTE — PROGRESS NOTES
Patient assessed.  Soft bilat wrist restraints renewed due to risk of pulling lines, tubes and/or climbing OOB.    FRANC[facility]CA [facility]

## 2018-05-22 NOTE — PLAN OF CARE
Problem: Patient Care Overview  Goal: Plan of Care Review  Outcome: Ongoing (interventions implemented as appropriate)  Remained intubated throughout shift with BUE soft restraints on to prevent removing medical devices.  Patient frequently attempting to readjust tube despite multiple efforts to educate that tube can not be freely moved in mouth. Alert, nods appropriately to questions and communicates with writing. C/O intermittent hand & leg cramps throughout night, resolved with massage. Morning labs reveal K+ low at 3.4.  Scheduled to [facility] this am. Chlorhexidine baths x2 given and bilateral groin areas clipped per protocol. NSR on scope. CVP reading fluctuating between 13 to 22 throughout shift. Requiring pressor support to maintain MAP of 65 or above. Lasix drip infusing; urinary output averaging 40-50ml/hr. Heparin drip infusing at 10u/hr.  Morning labs reveal APTT 40, no changes to infusion rate per nomogram. Fentanyl infusing at 125mcg/hr to maintain RASS -2. CBG monitoring Q4H, SSI given as required. Pre-existing large purple bruise to right anterior shin from fall at home. Turned Q2H throughout shift.  Safety maintained.

## 2018-05-22 NOTE — ASSESSMENT & PLAN NOTE
Cont full vent support  Attempt SBT for extubation post [facility] tomorrow   Vent settings reviewed and adjusted.

## 2018-05-22 NOTE — ASSESSMENT & PLAN NOTE
-Improving with Lasix gtt  -continue current mgmt  -ACEI/ARB held due to KEN/need for pressor support  -Lasix gtt stopped this AM due to increased creatinine to 1.9

## 2018-05-22 NOTE — PROGRESS NOTES
"Katty at the lab notified that morning labs (drawn at 0455 and delivered at 0456) were posted at 0100 and that time needed to be changed to reflect correct timing. Explained that current posting makes it appear that nurse lubna morning labs in middle of the night and some of these labs (APTT) were specifically timed and needed to be posted for correct time.  Katty stated the labs (CBC, CMP, APTT) were resulted shortly after 0500; and that she would change "resulted time" in computer.  "

## 2018-05-23 PROBLEM — E87.3 RESPIRATORY ALKALOSIS: Status: ACTIVE | Noted: 2018-01-01

## 2018-05-23 PROBLEM — E87.3 METABOLIC ALKALOSIS: Status: ACTIVE | Noted: 2018-01-01

## 2018-05-23 NOTE — PLAN OF CARE
Per MDR, Patient is extubated. No C and patient will be medically managed.     05/23/18 3634   Discharge Reassessment   Assessment Type Discharge Planning Reassessment   Provided patient/caregiver education on the expected discharge date and the discharge plan No   Do you have any problems affording any of your prescribed medications? No   Discharge Plan A Skilled Nursing Facility   Discharge Plan B Home with family;Home Health   Can the patient answer the patient profile reliably? No, cognitively impaired   Describe the patient's ability to walk at the present time. Minor restrictions or changes   How often would a person be available to care for the patient? Whenever needed   Number of comorbid conditions (as recorded on the chart) Three   During the past month, has the patient often been bothered by feeling down, depressed or hopeless? No   During the past month, has the patient often been bothered by little interest or pleasure in doing things? No

## 2018-05-23 NOTE — PROGRESS NOTES
"[facility]  Nephrology  Progress Note    Patient Name: Milli Montez  MRN: [id number]  Admission Date: 5/19/2018  Hospital Length of Stay: 4 days  Attending Provider: Elian Cha MD   Primary Care Physician: Marito mAato MD  Principal Problem:NSTEMI (non-ST elevated myocardial infarction)    Subjective:     HPI: Pt was seen and examined in ICU. H/o reviewed. Pt is a 58 y/o female with acute kidney injury, likely due to CHF exacerbation causing renal hypoperfusion. Pt has a baseline s Cr of 1.2 (1 month ago). s Cr is now 1.7. Pt was admitted with SOB that is "new", pt also has low exercise tolerance chronically due to SOB. Pt has a pertinent h/o of DM, HTN, CAD with past h/o of CABG and stents, and morbid obesity. Labs and meds in ER and ICU reviewed. Pt has received lasix IV injection, and remains fluid overloaded with LE swelling and continued SOB.    Interval History: Pt was seen and examined in ICU. Discussed with ICU team and cardiology.  Remained stable last pm. Pt is now extubated. Labs and meds reviewed with ICU nursing staff.    Review of patient's allergies indicates:   Allergen Reactions    Penicillins Swelling     Current Facility-Administered Medications   Medication Frequency    acetaminophen tablet 650 mg Q6H PRN    acetaZOLAMIDE (DIAMOX) 250 mg in dextrose 5 % 50 mL Q8H    aspirin chewable tablet 81 mg Daily    bisacodyl suppository 10 mg Daily PRN    carvedilol tablet 6.25 mg BID WM    dextrose 50% injection 12.5 g PRN    furosemide (LASIX) 2 mg/mL in sodium chloride 0.9% 100 mL infusion (conc: 2 mg/mL) Continuous    glucagon (human recombinant) injection 1 mg PRN    heparin 25,000 units in dextrose 5% 250 mL (100 units/mL) bolus from bag; PRN BOLUS PRN    heparin 25,000 units in dextrose 5% 250 mL (100 units/mL) bolus from bag; PRN BOLUS PRN    heparin 25,000 units in dextrose 5% 250 mL (100 units/mL) infusion; FEMALE Continuous    insulin aspart U-100 pen 1-10 Units " 6 times per day    insulin detemir U-100 pen 15 Units QHS    levothyroxine tablet 75 mcg Daily    nitroGLYCERIN SL tablet 0.4 mg Q5 Min PRN    norepinephrine bitartrate-D5W 8 mg/250 mL (32 mcg/mL) Soln Continuous    ondansetron injection 4 mg Q6H PRN    pantoprazole 40 mg in dextrose 5 % 100 mL IVPB (over 15 minutes) (ready to mix system) Daily    polyethylene glycol packet 17 g Daily    ramelteon tablet 8 mg Nightly PRN    rosuvastatin tablet 10 mg QHS    ticagrelor tablet 90 mg BID       Objective:     Vital Signs (Most Recent):  Temp: 98.8 °F (37.1 °C) (05/23/18 1105)  Pulse: 103 (05/23/18 1300)  Resp: 17 (05/23/18 1300)  BP: (!) 104/52 (05/23/18 1300)  SpO2: 98 % (05/23/18 1300)  O2 Device (Oxygen Therapy): nasal cannula (05/23/18 1105) Vital Signs (24h Range):  Temp:  [98.2 °F (36.8 °C)-98.8 °F (37.1 °C)] 98.8 °F (37.1 °C)  Pulse:  [] 103  Resp:  [12-38] 17  SpO2:  [89 %-100 %] 98 %  BP: ()/(44-82) 104/52     Weight: 99.8 kg (220 lb 0.3 oz) (05/23/18 0430)  Body mass index is 35.51 kg/m².  Body surface area is 2.16 meters squared.    I/O last 3 completed shifts:  In: 2078.6 [P.O.:150; I.V.:1773.6; NG/GT:155]  Out: 1430 [Urine:1430]    Physical Exam   Constitutional: She is oriented to person, place, and time. She appears well-developed and well-nourished. No distress.   HENT:   Head: Normocephalic and atraumatic.   Neck: JVD present.   Cardiovascular: Normal rate, regular rhythm and normal heart sounds.  Exam reveals no friction rub.    Pulmonary/Chest: She is in respiratory distress. She has rales.   Abdominal: Soft. She exhibits no distension. There is no tenderness.   Musculoskeletal: She exhibits edema.   Neurological: She is oriented to person, place, and time.   Skin: Skin is warm and dry.   Psychiatric: She has a normal mood and affect. Her behavior is normal.   Nursing note and vitals reviewed.      Significant Labs: reviewed  BMP  Lab Results   Component Value Date     (L)  05/23/2018    K 3.8 05/23/2018    CL 82 (L) 05/23/2018    CO2 32 (H) 05/23/2018    BUN 53 (H) 05/23/2018    CREATININE 2.0 (H) 05/23/2018    CALCIUM 9.5 05/23/2018    ANIONGAP 15 05/23/2018    ESTGFRAFRICA 32 (A) 05/23/2018    EGFRNONAA 28 (A) 05/23/2018     Lab Results   Component Value Date    WBC 12.27 05/23/2018    HGB 11.1 (L) 05/23/2018    HCT 32.6 (L) 05/23/2018    MCV 92 05/23/2018     05/23/2018       Recent Labs  Lab 05/23/18  0400   TROPONINI >50.000*     ABG    Recent Labs  Lab 05/23/18  1038   PH 7.617*   PO2 72*   PCO2 30.8*   HCO3 31.5*   BE 10         Significant Imaging: Reviewed    Assessment/Plan:         58 y/o female with KEN, acute MI, and CHF exacerbation:            Renal: KEN. The increase in s Cr is due to prerenal azotemia, not from any intrinsic renal damage.  Prerenal azotemia due to MI (tropoinin 50), due to CHF exacerbation, and due to pt receiving IV diuretics   Renal function is overall stable, with prerenal azotemia, small daily fluctuations are expected.  CKD stage 3 at baseline  K normal  Acid base: metabolic alkalosis due to diuretics  ABG c/w metabolic alkalosis and respiratory alkalosis  Good UOP and response to diuretics  However, pt is still wet, will re-start IV lasix drip (was on hold because LHC was being considered)     From the renal view, recommend LHC should be done if pre-test probability for CAD is high and if pt is expected to benefit from coronary revascularization, even at small risk of possibility of contrast nephropathy.  Suggest on day of LHC, hold diuretics, and provide gentle IV rehydration about 50-75 ml/hr as long as pt's condition can tolerate IV hydration.                              CAD (coronary artery disease)  MI/NSTEMI, inferior MI  Reviewed cardiology note  LHC indication and timing per cardiology  Will defer mgmt     Significant cardiac h/o  Past h/o of CAD  CABG (LIMA to Diagonal1 + distal LAD, and SVG-RPDA) and balloon PTCA of LM stent on  2/6/18 followed by repeat balloon PTCA + DEWAYNE of LM in-stent steosis 5/2/18,  Has combined systolic and diastolic dysfunction.  Acute of chronic CHF due to MI  Lactic acidosis c/w cardiogenic shock.  Respiratory failure                   Plans and recommendations:  As discussed above  Re-start lasix IV 5 mg/hr infusion  Add acetazolamide 250 mg IV q 8 hours for alkalosis  Repeat labs this afternoon  Total critical care time spent 55 minutes                   Sita Knight MD  Nephrology  [facility]

## 2018-05-23 NOTE — ASSESSMENT & PLAN NOTE
Strict I's and O's.  Monitor BMP.  Renal following    Lasix drip and Acetazolamide  Min IVF per Cards

## 2018-05-23 NOTE — PROGRESS NOTES
[facility] -   Adult Nutrition  Progress Note    SUMMARY     Recommendations    Recommendation/Intervention: 1. When medically able, ADAT to Diabetic/Cardiac diet. 2. If unable to advance diet, consider alternate nutrition support 3. Will continue to monitor.  Goals: advancement energy intake within 48hrs  Nutrition Goal Status: not met, continues  Communication of RD Recs: (poc, sticky note)    Nutrition Discharge Planning: to be determined    Reason for Assessment    Reason for Assessment: RD f/u  Dx:  1. Acute on chronic congestive heart failure, unspecified heart failure type    2. Dyspnea    3. Acute respiratory failure with hypoxia    4. Abnormal EKG    5. Acute respiratory failure    6. NSTEMI (non-ST elevated myocardial infarction)    7. Acute on chronic combined systolic and diastolic heart failure    8. KEN (acute kidney injury)    9. Coronary artery disease involving native coronary artery of native heart without angina pectoris    10. Dyspnea, unspecified type    11. Essential hypertension    12. Lactic acidosis    13. Morbid obesity with BMI of 40.0-44.9, adult    14. Stage 3 chronic kidney disease due to type 1 diabetes mellitus    15. Diabetic nephropathy associated with type 2 diabetes mellitus    16. Acute renal failure superimposed on stage 3 chronic kidney disease, unspecified acute renal failure type    17. Cardiogenic shock    18. Hypothyroidism, unspecified type    19. Type 2 diabetes mellitus with hyperglycemia, without long-term current use of insulin    20. Acute pulmonary edema with congestive heart failure    21. Electrolyte imbalance    22. Leukocytosis, unspecified type    23. Ejection fraction < 50%      Past Medical History:   Diagnosis Date    Allergy     Arthritis     Blood transfusion     CAD (coronary artery disease)     CHF (congestive heart failure)     Diabetes mellitus     Heart murmur     History of loop recorder     Hyperlipidemia     Hypertension   "   Hypothyroidism 9/17/2013    TIA (transient ischemic attack)     Ulcer        General Information Comments: Pt remains sedated and intubated, and was admitted on 5/19/18. TF has not been ordered yet.    5/23/18: Pt was extubated yesterday morning, remains NPO except for medications. Per RN pt is very lethargic, "doesn't look good".    Nutrition Risk Screen    Nutrition Risk Screen: no indicators present    Nutrition/Diet History    Patient Reported Diet/Restrictions/Preferences: general  Do you have any cultural, spiritual, Bahai conflicts, given your current situation?: none reported  Food Allergies: NKFA  Factors Affecting Nutritional Intake: NPO    Anthropometrics    Temp: 98.8 °F (37.1 °C)  Height Method: Estimated  Height: 5' 6" (167.6 cm)  Height (inches): 66 in  Weight Method: Bed Scale  Weight: 99.8 kg (220 lb 0.3 oz)  Weight (lb): 220.02 lb  Ideal Body Weight (IBW), Female: 130 lb  % Ideal Body Weight, Female (lb): 175.08 lb  BMI (Calculated): 36.8  BMI Grade: 35 - 39.9 - obesity - grade II       Lab/Procedures/Meds    Pertinent Labs Reviewed: reviewed  Pertinent Medications Reviewed: reviewed      Recent Labs  Lab 05/23/18  0400   CALCIUM 9.5   PROT 7.9   *   K 3.8   CO2 32*   CL 82*   BUN 53*   CREATININE 2.0*   ALKPHOS 119   ALT 43   AST 67*   BILITOT 1.9*     Scheduled Meds:   acetaZOLAMIDE (DIAMOX) IVPB  250 mg Intravenous Q8H    aspirin  81 mg Oral Daily    carvedilol  6.25 mg Oral BID WM    insulin aspart U-100  1-10 Units Subcutaneous 6 times per day    insulin detemir U-100  15 Units Subcutaneous QHS    levothyroxine  75 mcg Oral Daily    pantoprazole 40 mg in dextrose 5 % 100 mL IVPB (over 15 minutes) (ready to mix system)  40 mg Intravenous Daily    polyethylene glycol  17 g Oral Daily    rosuvastatin  10 mg Oral QHS    ticagrelor  90 mg Oral BID     Continuous Infusions:   furosemide (LASIX) 2 mg/mL infusion (non-titrating) 5 mg/hr (05/23/18 1200)    heparin (porcine) " in D5W 12 Units/kg/hr (05/23/18 1200)    norepinephrine bitartrate-D5W 0.08 mcg/kg/min (05/23/18 1200)     PRN Meds:.acetaminophen, bisacodyl, dextrose 50%, glucagon (human recombinant), heparin (PORCINE), heparin (PORCINE), nitroGLYCERIN, ondansetron, ramelteon  Physical Findings/Assessment    Overall Physical Appearance: obese  Oral/Mouth Cavity: tooth/teeth missing  Skin: Lauri=13    Estimated/Assessed Needs    Weight Used For Calorie Calculations: 99.8 kg (220 lb 0.3 oz)  Energy Calorie Requirements (kcal): 1932 kcal/day  Energy Need Method: [place]-St Jeor  Protein Requirements:  gm/day  Weight Used For Protein Calculations: 99.8 kg (220 lb 0.3 oz)  Fluid Requirements (mL): 1ml/kcal or per MD  Fluid Need Method: RDA Method (or per MD/NP)  RDA Method (mL): 1932  CHO Requirement: 50% EEN      Nutrition Prescription Ordered    Current Diet Order: NPO    Evaluation of Received Nutrient/Fluid Intake    % Intake of Estimated Energy Needs: 0 - 25 %  % Meal Intake: NPO    Nutrition Risk    Level of Risk/Frequency of Follow-up:  (F/U 2x/wk)     Assessment and Plan    Inadequate dietary energy intake    Related to (etiology):   Inability to consume adequate intake    Signs and Symptoms (as evidenced by):   Current diet order (NPO)    Interventions/Recommendations (treatment strategy):  See above    Nutrition Diagnosis Status:   Continues               Monitor and Evaluation    Food and Nutrient Intake: energy intake, enteral nutrition intake  Food and Nutrient Adminstration: diet order, enteral and parenteral nutrition administration  Knowledge/Beliefs/Attitudes: food and nutrition knowledge/skill, beliefs and attitudes  Physical Activity and Function: nutrition-related ADLs and IADLs  Anthropometric Measurements: weight, weight change  Biochemical Data, Medical Tests and Procedures: gastrointestinal profile, electrolyte and renal panel, glucose/endocrine profile  Nutrition-Focused Physical Findings: overall  appearance     Nutrition Follow-Up    RD Follow-up?: Yes (2x weekly)

## 2018-05-23 NOTE — PLAN OF CARE
Problem: Patient Care Overview  Goal: Plan of Care Review  Outcome: Ongoing (interventions implemented as appropriate)  Recommendations    Recommendation/Intervention: 1. When medically able, ADAT to Diabetic/Cardiac diet. 2. If unable to advance diet, consider alternate nutrition support 3. Will continue to monitor.  Goals: advancement energy intake within 48hrs  Nutrition Goal Status: not met, continues  Communication of RD Recs: (poc, sticky note)

## 2018-05-23 NOTE — ASSESSMENT & PLAN NOTE
58 y/o female with KEN, acute MI, and CHF exacerbation:            Renal: KEN. Due to prerenal azotemia due to renal hypoperfusion from poor cardiac function and MI.  Renal function is stable, s Cr no significant change  CKD stage 3 at baseline  K normal  Acid base: metabolic alkalosis due to diuretics  Good UOP and response to diuretics     From the renal view, recommend LHC should be done if pre-test probability for CAD is high and if pt is expected to benefit from coronary revascularization, even at small risk of possibility of contrast nephropathy.  Suggest on day of LHC, hold diuretics, and provide gentle IV rehydration about 50-75 ml/hr as long as pt's condition can tolerate IV hydration.                              CAD (coronary artery disease)  MI/NSTEMI, inferior MI  Reviewed cardiology note  LHC being considered  CXR looks worse, will increase lasix back to 10 mg/hr     Significant cardiac h/o  Past h/o of CAD  CABG (LIMA to Diagonal1 + distal LAD, and SVG-RPDA) and balloon PTCA of LM stent on 2/6/18 followed by repeat balloon PTCA + DEWAYNE of LM in-stent steosis 5/2/18,  Has combined systolic and diastolic dysfunction.  Acute of chronic CHF due to MI  Lactic acidosis c/w cardiogenic shock.  Respiratory failure                   Plans and recommendations:  As discussed above  Total critical care time spent 45 minutes

## 2018-05-23 NOTE — ASSESSMENT & PLAN NOTE
-Patient with significant history of CAD s/p recent PTCA/DEWAYNE of LM in-stent re-stenosis, presents with NSTEMI/cardiogenic shock  -Continue Levophed for pressor support  -Troponin remains  > 50  -Continue current medical management-ASA, Brilinta, heparin gtt  -Coreg held due to need for pressor support  -Statin held due to elevated liver enzymes  -2D echo showed EF of 45-50%, +WMA  -Cath images from Feb and May 2018 have been requested for Dr. Boone to review. Will make plans for cath based on Dr. Boone's review of images.   -Dr. Boone has requested arterial studies to BLE to assess for PAD if high risk intervention to LM or LAD needed   -Patient with new inferior wall MI and severe MR on echo. Will plan for left and right heart cath tomorrow with Dr. Boone.   -morning labs to reassess renal function   -Restart low dose statin today  -Continue IV heparin gtt, BB, Statin, ASA.   -Limited Echo pending  -Per Dr. Matthews's recommendations, no C at this time and will optimize medical management.

## 2018-05-23 NOTE — SUBJECTIVE & OBJECTIVE
Interval History:     Still on levophed  NPO  Night events noted,   Good UO    Review of Systems   HENT: Negative.    Eyes: Negative.    Respiratory: Positive for shortness of breath.    Cardiovascular: Positive for leg swelling.   Gastrointestinal: Negative.    Genitourinary: Negative.    Musculoskeletal: Negative.    Neurological: Positive for weakness.   Endo/Heme/Allergies: Negative.    Psychiatric/Behavioral: Negative.        Objective:     Vital Signs (Most Recent):  Temp: 98.8 °F (37.1 °C) (05/23/18 1105)  Pulse: 99 (05/23/18 1200)  Resp: 12 (05/23/18 1200)  BP: 102/73 (05/23/18 1200)  SpO2: 100 % (05/23/18 1200) Vital Signs (24h Range):  Temp:  [98.2 °F (36.8 °C)-98.8 °F (37.1 °C)] 98.8 °F (37.1 °C)  Pulse:  [] 99  Resp:  [12-38] 12  SpO2:  [89 %-100 %] 100 %  BP: ()/(44-82) 102/73     Weight: 99.8 kg (220 lb 0.3 oz)  Body mass index is 35.51 kg/m².      Intake/Output Summary (Last 24 hours) at 05/23/18 1208  Last data filed at 05/23/18 1200   Gross per 24 hour   Intake          1359.84 ml   Output              565 ml   Net           794.84 ml       Physical Exam   Constitutional: She is oriented to person, place, and time. She appears well-developed and well-nourished. No distress.   HENT:   Head: Normocephalic and atraumatic.       Nose: Nose normal.   Mouth/Throat: Oropharynx is clear and moist.   Eyes: EOM are normal. Pupils are equal, round, and reactive to light. No scleral icterus.   Neck: Normal range of motion. Neck supple. No JVD present.   Cardiovascular: Normal rate, regular rhythm, normal heart sounds and intact distal pulses.    No murmur heard.  Pulmonary/Chest: Effort normal and breath sounds normal. No respiratory distress.   Abdominal: Soft. Bowel sounds are normal.   Genitourinary:   Genitourinary Comments: pham   Musculoskeletal: Normal range of motion. She exhibits edema.   Neurological: She is alert and oriented to person, place, and time. No cranial nerve deficit.   Skin:  Skin is warm and dry. Capillary refill takes 2 to 3 seconds.   Psychiatric: She has a normal mood and affect.   Nursing note and vitals reviewed.      Vents:  Vent Mode: Spont (05/22/18 1445)  Ventilator Initiated: Yes (05/19/18 1539)  Set Rate: 0 bmp (05/22/18 1445)  Vt Set: 400 mL (05/22/18 1445)  Pressure Support: 12 cmH20 (05/22/18 1445)  PEEP/CPAP: 8 cmH20 (05/22/18 1445)  Oxygen Concentration (%): 32 (05/23/18 0253)  Peak Airway Pressure: 20 cmH2O (05/22/18 1445)  Plateau Pressure: 0 cmH20 (05/22/18 1445)  Total Ve: 5.78 mL (05/22/18 1445)  F/VT Ratio<105 (RSBI): (!) 63.25 (05/22/18 1445)    Lines/Drains/Airways     Central Venous Catheter Line                 Percutaneous Central Line Insertion/Assessment - triple lumen  05/19/18 1600 right internal jugular 3 days          Drain                 Urethral Catheter 05/19/18 0115 Latex 16 Fr. 4 days          Peripheral Intravenous Line                 Peripheral IV - Single Lumen 05/19/18 Left Antecubital 4 days                Significant Labs:    CBC/Anemia Profile:    Recent Labs  Lab 05/22/18  0100 05/23/18  0400   WBC 16.99* 12.27   HGB 12.2 11.1*   HCT 35.9* 32.6*    267   MCV 92 92   RDW 14.9* 14.9*        Chemistries:    Recent Labs  Lab 05/22/18  0100 05/22/18  1143 05/23/18  0400   * 128* 129*   K 3.4* 3.8 3.8   CL 80* 81* 82*   CO2 35* 33* 32*   BUN 37* 40* 53*   CREATININE 1.9* 1.9* 2.0*   CALCIUM 9.9 9.6 9.5   ALBUMIN 3.0* 2.9* 2.7*   PROT 8.5* 8.1 7.9   BILITOT 1.6* 1.4* 1.9*   ALKPHOS 112 101 119   ALT 62* 58* 43   * 109* 67*   MG 1.8  --   --        ABGs:   Recent Labs  Lab 05/23/18  1038   PH 7.617*   PCO2 30.8*   HCO3 31.5*   POCSATURATED 97   BE 10     Acute (uncompensated) primary respiratory alkalosis,  with metabolic alkalosis    pH > 7.48 and HCO3 > 22, for acute (uncompensated)  pH < 7.42 and HCO3 < 19, for chronic (compensated)    expected pH = 7.64  expected CO2 = 33  expected HCO3- = 29    Cardiac Markers: No results  for input(s): CKMB, TROPONINT, MYOGLOBIN in the last 48 hours.  POCT Glucose:   Recent Labs  Lab 05/23/18  0426 05/23/18  0836 05/23/18  1153   POCTGLUCOSE 182* 198* 193*     Troponin:   Recent Labs  Lab 05/23/18  0400   TROPONINI >50.000*     All pertinent labs within the past 24 hours have been reviewed.    Significant Imaging:  I have reviewed and interpreted all pertinent imaging results/findings within the past 24 hours.    Multiple wire leads overlie the chest.  Sternal wires are noted.  Right central line is noted.  Right carotid stent is noted.  The endotracheal and nasogastric tubes have been removed.    Cardiomegaly.    Increased interstitial prominence, possible interstitial edema worse as compared to the previous exam.   Impression       Increasing interstitial prominence.  Possible interstitial edema.  Otherwise removal of the endotracheal and nasogastric tubes.

## 2018-05-23 NOTE — PROCEDURES
"Milli Montez is a 57 y.o. female patient.    Temp: 98.1 °F (36.7 °C) (05/23/18 1505)  Pulse: 102 (05/23/18 1830)  Resp: (!) 21 (05/23/18 1830)  BP: 111/65 (05/23/18 1830)  SpO2: 99 % (05/23/18 1830)  Weight: 99.8 kg (220 lb 0.3 oz) (05/23/18 0430)  Height: 5' 6" (167.6 cm) (05/23/18 1244)       Arterial Line  Date/Time: 5/23/2018 6:55 PM  Performed by: SORIN SUBRAMANIAN  Authorized by: SORIN SUBRAMANIAN   Pre-op Diagnosis: CARDIOGENIC SHOCK  Post-operative diagnosis: SAME  Consent Done: Not Needed  Preparation: Patient was prepped and draped in the usual sterile fashion.  Indications: multiple ABGs and hemodynamic monitoring  Location: right radial  Anesthesia: local infiltration    Anesthesia:  Local Anesthetic: lidocaine 1% without epinephrine  Anesthetic total: 5 mL  Patient sedated: no  Jefe's test normal: yes  Needle gauge: 18  Seldinger technique: Seldinger technique used  Number of attempts: 2  Complications: No  Specimens: No  Implants: No  Post-procedure: line sutured and dressing applied  Post-procedure CMS: normal and unchanged  Patient tolerance: Patient tolerated the procedure well with no immediate complications          Sorin Subramanian  5/23/2018  "

## 2018-05-23 NOTE — PROGRESS NOTES
[facility] -   Critical Care Medicine  Progress Note    Patient Name: Milli Montez  MRN: [id number]  Admission Date: 5/19/2018  Hospital Length of Stay: 4 days  Code Status: Full Code  Attending Provider: Elian Cha MD  Primary Care Provider: Marito Amato MD   Principal Problem: NSTEMI (non-ST elevated myocardial infarction)    Subjective:     HPI:  57-year-old female patient with history of CHF, coronary artery disease status post stent placement by ambulance to the hospital for worsening shortnessof breath for a few days and the respiratory distress.  She was started on noninvasive ventilation in the ER and transferred to the ICU.      Hospital/ICU Course:  Continue to be on noninvasive ventilation.ABGs and chest x-ray reviewed.afebrile.  5/19 Remains in distress tachypneic respiratory rate in the 30s.receiving IV Lasix.  An on IV heparin drip.  5/20 Sp intubation, Rt TLC intubation. On levophed, heparin, lasix, fentanyl gtt. Chest x-ray and ABG reviewed.  5/21 - Fully awake and comfortable intubated on mech ventilation.  Still on Heparin, Lasix, Levophed and Fentanyl infusions  5/22 - Still intubated on mech ventilation and Fentanyl infusion fully awake and responsive with stimuli.  Still on Levophed infusion.  Lasix infusion stopped.  05/23: Levophed started at 0.04 now at 0.08, off vent, ABG respiratory alkalosis    Interval History:     Still on levophed  NPO  Night events noted,   Good UO    Review of Systems   HENT: Negative.    Eyes: Negative.    Respiratory: Positive for shortness of breath.    Cardiovascular: Positive for leg swelling.   Gastrointestinal: Negative.    Genitourinary: Negative.    Musculoskeletal: Negative.    Neurological: Positive for weakness.   Endo/Heme/Allergies: Negative.    Psychiatric/Behavioral: Negative.        Objective:     Vital Signs (Most Recent):  Temp: 98.8 °F (37.1 °C) (05/23/18 1105)  Pulse: 99 (05/23/18 1200)  Resp: 12 (05/23/18 1200)  BP: 102/73  (05/23/18 1200)  SpO2: 100 % (05/23/18 1200) Vital Signs (24h Range):  Temp:  [98.2 °F (36.8 °C)-98.8 °F (37.1 °C)] 98.8 °F (37.1 °C)  Pulse:  [] 99  Resp:  [12-38] 12  SpO2:  [89 %-100 %] 100 %  BP: ()/(44-82) 102/73     Weight: 99.8 kg (220 lb 0.3 oz)  Body mass index is 35.51 kg/m².      Intake/Output Summary (Last 24 hours) at 05/23/18 1208  Last data filed at 05/23/18 1200   Gross per 24 hour   Intake          1359.84 ml   Output              565 ml   Net           794.84 ml       Physical Exam   Constitutional: She is oriented to person, place, and time. She appears well-developed and well-nourished. No distress.   HENT:   Head: Normocephalic and atraumatic.       Nose: Nose normal.   Mouth/Throat: Oropharynx is clear and moist.   Eyes: EOM are normal. Pupils are equal, round, and reactive to light. No scleral icterus.   Neck: Normal range of motion. Neck supple. No JVD present.   Cardiovascular: Normal rate, regular rhythm, normal heart sounds and intact distal pulses.    No murmur heard.  Pulmonary/Chest: Effort normal and breath sounds normal. No respiratory distress.   Abdominal: Soft. Bowel sounds are normal.   Genitourinary:   Genitourinary Comments: pham   Musculoskeletal: Normal range of motion. She exhibits edema.   Neurological: She is alert and oriented to person, place, and time. No cranial nerve deficit.   Skin: Skin is warm and dry. Capillary refill takes 2 to 3 seconds.   Psychiatric: She has a normal mood and affect.   Nursing note and vitals reviewed.      Vents:  Vent Mode: Spont (05/22/18 1445)  Ventilator Initiated: Yes (05/19/18 1539)  Set Rate: 0 bmp (05/22/18 1445)  Vt Set: 400 mL (05/22/18 1445)  Pressure Support: 12 cmH20 (05/22/18 1445)  PEEP/CPAP: 8 cmH20 (05/22/18 1445)  Oxygen Concentration (%): 32 (05/23/18 0253)  Peak Airway Pressure: 20 cmH2O (05/22/18 1445)  Plateau Pressure: 0 cmH20 (05/22/18 1445)  Total Ve: 5.78 mL (05/22/18 1445)  F/VT Ratio<105 (RSBI): (!)  63.25 (05/22/18 1445)    Lines/Drains/Airways     Central Venous Catheter Line                 Percutaneous Central Line Insertion/Assessment - triple lumen  05/19/18 1600 right internal jugular 3 days          Drain                 Urethral Catheter 05/19/18 0115 Latex 16 Fr. 4 days          Peripheral Intravenous Line                 Peripheral IV - Single Lumen 05/19/18 Left Antecubital 4 days                Significant Labs:    CBC/Anemia Profile:    Recent Labs  Lab 05/22/18  0100 05/23/18  0400   WBC 16.99* 12.27   HGB 12.2 11.1*   HCT 35.9* 32.6*    267   MCV 92 92   RDW 14.9* 14.9*        Chemistries:    Recent Labs  Lab 05/22/18  0100 05/22/18  1143 05/23/18  0400   * 128* 129*   K 3.4* 3.8 3.8   CL 80* 81* 82*   CO2 35* 33* 32*   BUN 37* 40* 53*   CREATININE 1.9* 1.9* 2.0*   CALCIUM 9.9 9.6 9.5   ALBUMIN 3.0* 2.9* 2.7*   PROT 8.5* 8.1 7.9   BILITOT 1.6* 1.4* 1.9*   ALKPHOS 112 101 119   ALT 62* 58* 43   * 109* 67*   MG 1.8  --   --        ABGs:   Recent Labs  Lab 05/23/18  1038   PH 7.617*   PCO2 30.8*   HCO3 31.5*   POCSATURATED 97   BE 10     Acute (uncompensated) primary respiratory alkalosis,  with metabolic alkalosis    pH > 7.48 and HCO3 > 22, for acute (uncompensated)  pH < 7.42 and HCO3 < 19, for chronic (compensated)    expected pH = 7.64  expected CO2 = 33  expected HCO3- = 29    Cardiac Markers: No results for input(s): CKMB, TROPONINT, MYOGLOBIN in the last 48 hours.  POCT Glucose:   Recent Labs  Lab 05/23/18  0426 05/23/18  0836 05/23/18  1153   POCTGLUCOSE 182* 198* 193*     Troponin:   Recent Labs  Lab 05/23/18  0400   TROPONINI >50.000*     All pertinent labs within the past 24 hours have been reviewed.    Significant Imaging:  I have reviewed and interpreted all pertinent imaging results/findings within the past 24 hours.    Multiple wire leads overlie the chest.  Sternal wires are noted.  Right central line is noted.  Right carotid stent is noted.  The endotracheal and  nasogastric tubes have been removed.    Cardiomegaly.    Increased interstitial prominence, possible interstitial edema worse as compared to the previous exam.   Impression       Increasing interstitial prominence.  Possible interstitial edema.  Otherwise removal of the endotracheal and nasogastric tubes.         Assessment/Plan:     Pulmonary   Acute respiratory failure with hypoxia    SP extubation   Stable on nasal canula  Incentive spirometry  Deep breathing Aspiration precautions        Cardiac/Vascular   * NSTEMI (non-ST elevated myocardial infarction)    Cards following  Cont Heparin infusion  Timing and indication of LHC per cardiology        Cardiogenic shock    Cont Levophed infusion and has not tolerated weaning  ICU cardiac monitoring  Keep MAP > 65mmHG  May need A line        Acute on chronic combined systolic and diastolic heart failure    Lasix infusion   Pulm edema resolved  Monitor I/Os and daily weights  BMP in AM        CAD (coronary artery disease)    Cards following  No LHC, medical management  Cont ASA, Coreg and Ticagrelor        Acute pulmonary edema with congestive heart failure    Resolved with diuresis : Lasix drip and Diamox  CXR again in AM        Renal/   Electrolyte imbalance    Replace K+ and Mg+        Acute renal failure superimposed on stage 3 chronic kidney disease    Strict I's and O's.  Monitor BMP.  Renal following    Lasix drip and Acetazolamide  Min IVF per Cards        Oncology   Leukocytosis    Afeb and does not appear septic  Blood and Sputum cultures NGTD  UA neg and CXR without focal infiltrate  Repeat CBC in AM        Endocrine   Morbid obesity with BMI of 40.0-44.9, adult    Encourage weight loss post extubation        Hypothyroidism    Cont Levothyroxine        Type 2 diabetes mellitus with hyperglycemia    Cont SSI           Critical Care Daily Checklist:    A: Awake: RASS Goal/Actual Goal: RASS Goal: 0-->alert and calm  Actual: Donato Agitation Sedation Scale  (RASS): Light sedation   B: Spontaneous Breathing Trial Performed?     C: SAT & SBT Coordinated?  N/A                      D: Delirium: CAM-ICU Overall CAM-ICU: Negative   E: Early Mobility Performed? Yes   F: Feeding Goal: Goals: advancement energy intake within 48hrs  Status: Nutrition Goal Status: progressing towards goal   Current Diet Order   Procedures    Diet NPO Except for: Sips with Medication     Order Specific Question:   Except for     Answer:   Sips with Medication      AS: Analgesia/Sedation NONE   T: Thromboembolic Prophylaxis Heparin GTT   H: HOB > 300 Yes   U: Stress Ulcer Prophylaxis (if needed) YES   G: Glucose Control SSI   B: Bowel Function     I: Indwelling Catheter (Lines & Ford) Necessity FORD, RIGHT IJ   D: De-escalation of Antimicrobials/Pharmacotherapies N/A    Plan for the day/ETD Optimized BP    Code Status:  Family/Goals of Care: Full Code        Critical Care Time: 45 minutes  Critical secondary to CARDIOGENIC SHOCK       Critical care was time spent personally by me on the following activities: development of treatment plan with patient or surrogate and bedside caregivers, discussions with consultants, evaluation of patient's response to treatment, examination of patient, ordering and performing treatments and interventions, ordering and review of laboratory studies, ordering and review of radiographic studies, pulse oximetry, re-evaluation of patient's condition. This critical care time did not overlap with that of any other provider or involve time for any procedures.     Sorin Rod MD  Critical Care Medicine  [facility] -

## 2018-05-23 NOTE — PLAN OF CARE
Problem: Patient Care Overview  Goal: Plan of Care Review  Outcome: Ongoing (interventions implemented as appropriate)  Cardiology recommending medical management at this time.  Lasix gtt restarted at 5mg/hr; Diamox also started.  UOP slightly increased since start of infusion.  Levophed and Heparin gtt continue.  Echo completed today.  Remains on 2LNC; however, patient frequently taking off O2.  Patient lethargic, but follows commands appropriately.  Patient repositioning self in bed.  POC reviewed with patient and family at the bedside.  All questions answered.

## 2018-05-23 NOTE — SUBJECTIVE & OBJECTIVE
Interval History: Pt was seen and examined in ICU. Discussed with ICU team and cardiology.  Remained stable last pm. Pt is now extubated. Labs and meds reviewed with ICU nursing staff.    Review of patient's allergies indicates:   Allergen Reactions    Penicillins Swelling     Current Facility-Administered Medications   Medication Frequency    acetaminophen tablet 650 mg Q6H PRN    acetaZOLAMIDE (DIAMOX) 250 mg in dextrose 5 % 50 mL Q8H    aspirin chewable tablet 81 mg Daily    bisacodyl suppository 10 mg Daily PRN    carvedilol tablet 6.25 mg BID WM    dextrose 50% injection 12.5 g PRN    furosemide (LASIX) 2 mg/mL in sodium chloride 0.9% 100 mL infusion (conc: 2 mg/mL) Continuous    glucagon (human recombinant) injection 1 mg PRN    heparin 25,000 units in dextrose 5% 250 mL (100 units/mL) bolus from bag; PRN BOLUS PRN    heparin 25,000 units in dextrose 5% 250 mL (100 units/mL) bolus from bag; PRN BOLUS PRN    heparin 25,000 units in dextrose 5% 250 mL (100 units/mL) infusion; FEMALE Continuous    insulin aspart U-100 pen 1-10 Units 6 times per day    insulin detemir U-100 pen 15 Units QHS    levothyroxine tablet 75 mcg Daily    nitroGLYCERIN SL tablet 0.4 mg Q5 Min PRN    norepinephrine bitartrate-D5W 8 mg/250 mL (32 mcg/mL) Soln Continuous    ondansetron injection 4 mg Q6H PRN    pantoprazole 40 mg in dextrose 5 % 100 mL IVPB (over 15 minutes) (ready to mix system) Daily    polyethylene glycol packet 17 g Daily    ramelteon tablet 8 mg Nightly PRN    rosuvastatin tablet 10 mg QHS    ticagrelor tablet 90 mg BID       Objective:     Vital Signs (Most Recent):  Temp: 98.8 °F (37.1 °C) (05/23/18 1105)  Pulse: 103 (05/23/18 1300)  Resp: 17 (05/23/18 1300)  BP: (!) 104/52 (05/23/18 1300)  SpO2: 98 % (05/23/18 1300)  O2 Device (Oxygen Therapy): nasal cannula (05/23/18 1105) Vital Signs (24h Range):  Temp:  [98.2 °F (36.8 °C)-98.8 °F (37.1 °C)] 98.8 °F (37.1 °C)  Pulse:  [] 103  Resp:   [12-38] 17  SpO2:  [89 %-100 %] 98 %  BP: ()/(44-82) 104/52     Weight: 99.8 kg (220 lb 0.3 oz) (05/23/18 0430)  Body mass index is 35.51 kg/m².  Body surface area is 2.16 meters squared.    I/O last 3 completed shifts:  In: 2078.6 [P.O.:150; I.V.:1773.6; NG/GT:155]  Out: 1430 [Urine:1430]    Physical Exam   Constitutional: She is oriented to person, place, and time. She appears well-developed and well-nourished. No distress.   HENT:   Head: Normocephalic and atraumatic.   Neck: JVD present.   Cardiovascular: Normal rate, regular rhythm and normal heart sounds.  Exam reveals no friction rub.    Pulmonary/Chest: She is in respiratory distress. She has rales.   Abdominal: Soft. She exhibits no distension. There is no tenderness.   Musculoskeletal: She exhibits edema.   Neurological: She is oriented to person, place, and time.   Skin: Skin is warm and dry.   Psychiatric: She has a normal mood and affect. Her behavior is normal.   Nursing note and vitals reviewed.      Significant Labs: reviewed  BMP  Lab Results   Component Value Date     (L) 05/23/2018    K 3.8 05/23/2018    CL 82 (L) 05/23/2018    CO2 32 (H) 05/23/2018    BUN 53 (H) 05/23/2018    CREATININE 2.0 (H) 05/23/2018    CALCIUM 9.5 05/23/2018    ANIONGAP 15 05/23/2018    ESTGFRAFRICA 32 (A) 05/23/2018    EGFRNONAA 28 (A) 05/23/2018     Lab Results   Component Value Date    WBC 12.27 05/23/2018    HGB 11.1 (L) 05/23/2018    HCT 32.6 (L) 05/23/2018    MCV 92 05/23/2018     05/23/2018       Recent Labs  Lab 05/23/18  0400   TROPONINI >50.000*       Significant Imaging: Reviewed

## 2018-05-23 NOTE — PLAN OF CARE
Pt placed on BiPAP, tolerating well. Mepilex in place, no redness or breakdown noted at this time.

## 2018-05-23 NOTE — PROGRESS NOTES
Placed on nasal O2 due to patient repeatedly removing BiPap mask since originally applied at 2045.  Has been re-educated numerous times regarding purpose for and need to wear BiPap, patient will nod in acknowledgment and within minutes, has pulled mask to side of face or down off nose. Prior to removal, educated on possible consequences regarding not wearing mask as ordered.  Verbalized understanding and confirms that she does not want to wear the mask.

## 2018-05-23 NOTE — SUBJECTIVE & OBJECTIVE
Review of Systems   Constitution: Positive for malaise/fatigue. Negative for weakness.   HENT: Negative for hearing loss and hoarse voice.    Eyes: Negative for visual disturbance.   Cardiovascular: Positive for dyspnea on exertion. Negative for chest pain, claudication, irregular heartbeat, leg swelling, near-syncope, orthopnea, palpitations, paroxysmal nocturnal dyspnea and syncope.   Respiratory: Negative for cough, hemoptysis, shortness of breath, sleep disturbances due to breathing, snoring and wheezing.    Endocrine: Negative for cold intolerance.   Hematologic/Lymphatic: Does not bruise/bleed easily.   Skin: Negative for color change, dry skin and nail changes.   Musculoskeletal: Positive for back pain and myalgias. Negative for joint pain.   Gastrointestinal: Negative for bloating, abdominal pain, constipation, nausea and vomiting.   Genitourinary: Negative for dysuria, flank pain, hematuria and hesitancy.   Neurological: Negative for headaches, light-headedness, loss of balance, numbness and paresthesias.   Psychiatric/Behavioral: Negative for altered mental status.   Allergic/Immunologic: Negative for environmental allergies.     Objective:     Vital Signs (Most Recent):  Temp: 98.6 °F (37 °C) (05/23/18 0705)  Pulse: 108 (05/23/18 0905)  Resp: (!) 28 (05/23/18 0905)  BP: 96/60 (05/23/18 0905)  SpO2: 96 % (05/23/18 0905) Vital Signs (24h Range):  Temp:  [98.2 °F (36.8 °C)-98.6 °F (37 °C)] 98.6 °F (37 °C)  Pulse:  [] 108  Resp:  [13-38] 28  SpO2:  [89 %-100 %] 96 %  BP: ()/(44-82) 96/60     Weight: 99.8 kg (220 lb 0.3 oz)  Body mass index is 35.51 kg/m².     SpO2: 96 %  O2 Device (Oxygen Therapy): room air      Intake/Output Summary (Last 24 hours) at 05/23/18 0947  Last data filed at 05/23/18 0900   Gross per 24 hour   Intake          1309.84 ml   Output              625 ml   Net           684.84 ml       Lines/Drains/Airways     Central Venous Catheter Line                 Percutaneous Central  Line Insertion/Assessment - triple lumen  05/19/18 1600 right internal jugular 3 days          Drain                 Urethral Catheter 05/19/18 0115 Latex 16 Fr. 4 days          Peripheral Intravenous Line                 Peripheral IV - Single Lumen 05/19/18 Left Antecubital 4 days                Physical Exam   Constitutional: She is oriented to person, place, and time. She appears well-developed and well-nourished. She appears ill.   HENT:   Head: Normocephalic and atraumatic.   Eyes: Conjunctivae are normal. Pupils are equal, round, and reactive to light.   Neck: Full passive range of motion without pain. Neck supple. No JVD present.   Cardiovascular: Normal rate, regular rhythm, S1 normal, S2 normal and intact distal pulses.  PMI is not displaced.  Exam reveals distant heart sounds.    No murmur heard.  Pulses:       Radial pulses are 2+ on the right side, and 2+ on the left side.        Dorsalis pedis pulses are 2+ on the right side, and 2+ on the left side.   Pulmonary/Chest: Effort normal. No respiratory distress. She has decreased breath sounds in the right lower field and the left lower field. She has no wheezes.   Abdominal: Soft. Bowel sounds are normal. She exhibits no distension.   Obese   Genitourinary:   Genitourinary Comments: deferred   Musculoskeletal: Normal range of motion. She exhibits edema (trace BLE).        Right ankle: She exhibits swelling.        Left ankle: She exhibits swelling.   Neurological: She is alert and oriented to person, place, and time.   Skin: Skin is warm and dry. No cyanosis. Nails show no clubbing.   Psychiatric: She has a normal mood and affect. Her speech is normal. Judgment and thought content normal. Cognition and memory are impaired.   Intermittently confused   Nursing note and vitals reviewed.      Significant Labs:   All pertinent lab results from the last 24 hours have been reviewed. and   Recent Lab Results       05/23/18  0836 05/23/18  0745 05/23/18  0427  05/23/18  0400 05/22/18  2331      Albumin    2.7(L)      Alkaline Phosphatase    119      Allens Test  Pass        ALT    43      Anion Gap    15      aPTT    36.3  Comment:  aPTT therapeutic range = 39-69 seconds(H)          36.3  Comment:  aPTT therapeutic range = 39-69 seconds(H)      AST    67(H)      Baso #    0.01      Basophil%    0.1      Total Bilirubin    1.9  Comment:  For infants and newborns, interpretation of results should be based  on gestational age, weight and in agreement with clinical  observations.  Premature Infant recommended reference ranges:  Up to 24 hours.............<8.0 mg/dL  Up to 48 hours............<12.0 mg/dL  3-5 days..................<15.0 mg/dL  6-29 days.................<15.0 mg/dL  (H)      BNP    2,305  Comment:  Values of less than 100 pg/ml are consistent with non-CHF populations.(H)      Site  RR        BUN, Bld    53(H)      Calcium    9.5      Chloride    82(L)      CO2    32(H)      Creatinine    2.0(H)      DelSys  Room Air        Differential Method    Automated      eGFR if     32(A)      eGFR if non     28  Comment:  Calculation used to obtain the estimated glomerular filtration  rate (eGFR) is the CKD-EPI equation.   (A)      Eos #    0.0      Eosinophil%    0.1      FiO2  21        Glucose    170(H)      Gran # (ANC)    10.5(H)      Gran%    85.4(H)      Hematocrit    32.6(L)      Hemoglobin    11.1(L)      Lymph #    1.3      Lymph%    10.7(L)      MCH    31.4(H)      MCHC    34.0      MCV    92      Mode  SPONT        Mono #    0.5      Mono%    3.7(L)      MPV    10.6      Platelets    267      POC BE  10        POC HCO3  31.0(H)        POC PCO2  30.7(L)        POC PH  7.614(HH)        POC PO2  67(L)        POC SATURATED O2  96        POCT Glucose 198(H)  182(H)  178(H)     Potassium    3.8      Total Protein    7.9      RBC    3.54(L)      RDW    14.9(H)      Sample  ARTERIAL        Sodium    129(L)      Troponin I     >50.000  Comment:  The reference interval for Troponin I represents the 99th percentile   cutoff   for our facility and is consistent with 3rd generation assay   performance.  (H)      WBC    12.27                  05/22/18  1926 05/22/18  1652 05/22/18  1153 05/22/18  1143      Albumin    2.9(L)     Alkaline Phosphatase    101     Allens Test         ALT    58(H)     Anion Gap    14     aPTT         AST    109(H)     Baso #         Basophil%         Total Bilirubin    1.4  Comment:  For infants and newborns, interpretation of results should be based  on gestational age, weight and in agreement with clinical  observations.  Premature Infant recommended reference ranges:  Up to 24 hours.............<8.0 mg/dL  Up to 48 hours............<12.0 mg/dL  3-5 days..................<15.0 mg/dL  6-29 days.................<15.0 mg/dL  (H)     BNP         Site         BUN, Bld    40(H)     Calcium    9.6     Chloride    81(L)     CO2    33(H)     Creatinine    1.9(H)     DelSys         Differential Method         eGFR if     34(A)     eGFR if non     29  Comment:  Calculation used to obtain the estimated glomerular filtration  rate (eGFR) is the CKD-EPI equation.   (A)     Eos #         Eosinophil%         FiO2         Glucose    246(H)     Gran # (ANC)         Gran%         Hematocrit         Hemoglobin         Lymph #         Lymph%         MCH         MCHC         MCV         Mode         Mono #         Mono%         MPV         Platelets         POC BE         POC HCO3         POC PCO2         POC PH         POC PO2         POC SATURATED O2         POCT Glucose 175(H) 161(H) 253(H)      Potassium    3.8     Total Protein    8.1     RBC         RDW         Sample         Sodium    128(L)     Troponin I         WBC               Significant Imaging: Echocardiogram:   2D echo with color flow doppler:   Results for orders placed or performed during the hospital encounter of 05/19/18   2D echo  with color flow doppler   Result Value Ref Range    EF 45 55 - 65    Mitral Valve Regurgitation MILD     Diastolic Dysfunction Yes (A)     Est. PA Systolic Pressure 33.18     Tricuspid Valve Regurgitation MILD     and X-Ray: CXR: X-Ray Chest 1 View (CXR):   Results for orders placed or performed during the hospital encounter of 05/19/18   X-Ray Chest 1 View    Narrative    EXAMINATION:  XR CHEST 1 VIEW    CLINICAL HISTORY:  Respiratory failure., Resp failure;    COMPARISON:  05/22/2018.    FINDINGS:  Multiple wire leads overlie the chest.  Sternal wires are noted.  Right central line is noted.  Right carotid stent is noted.  The endotracheal and nasogastric tubes have been removed.    Cardiomegaly.    Increased interstitial prominence, possible interstitial edema worse as compared to the previous exam.      Impression    Increasing interstitial prominence.  Possible interstitial edema.  Otherwise removal of the endotracheal and nasogastric tubes.      Electronically signed by: Get Loco MD  Date:    05/23/2018  Time:    08:09    and X-Ray Chest PA and Lateral (CXR): No results found for this visit on 05/19/18.

## 2018-05-23 NOTE — ASSESSMENT & PLAN NOTE
Cont Levophed infusion and has not tolerated weaning  ICU cardiac monitoring  Keep MAP > 65mmHG  May need A line

## 2018-05-23 NOTE — PROGRESS NOTES
[facility]  Cardiology  Progress Note    Patient Name: Milli Montez  MRN: [id number]  Admission Date: 5/19/2018  Hospital Length of Stay: 4 days  Code Status: Full Code   Attending Physician: Elian Cha MD   Primary Care Physician: Marito Amato MD  Expected Discharge Date:   Principal Problem:NSTEMI (non-ST elevated myocardial infarction)    Subjective:   HPI  HPI obtained from chart as patient was on AVAPS    Ms. Montez is a 57 year old female patient with a PMHx of CAD s/p CABG, s/p PTCA of LM stent on 2/6/18, s/p repeat PTCA/DEWAYNE of LM in-stent restenosis on 5/2/18, chronic diastolic CHF, hyperlipidemia, HTN, DM type II, and TIA who presented to [facility] ED yesterday with a chief complaint of progressively worsening SOB over the past few days. Associated symptoms included orthopnea, PND, BLE edema, and palpitations. Patient denied any associated chest pain, near syncope, syncope, fever, or chills. While in ED, patient became progressively more dyspneic and tachypneic and was subsequently placed on Avaps. Initial workup in ED revealed BNP of 828, troponin of 0.006, creatinine of 1.5, lactic acidosis (%), and hypoxia with hypercapnia. Patient subsequently admitted to ICU for further evaluation and treatment. Patient seen and examined today while in ICU. Remains dyspneic on AVAPs. Complains of some back discomfort. Repeat troponin resulted at 1.762. Echo and repeat ABG pending.    Hospital Course:   5/20/18-Patient seen and examined today, now intubated. On Levophed for pressor support. Responding to Lasix gtt, appreciate nephrology rec's. Troponin > 50. Repeat later today. 2D echo showed EF of 45-50-%, +WMA. TSH 7.    5/21/18- Patient remains intubated. Alert and follows commands. Low dose Levophed for pressor support.  Responding to Lasix gtt at 5mg/hr. Diuresing well since starting Lasix and Levophed. Troponin > 50.  2D echo showed EF of 45-50-%, +WMA. Creatine improved on morning labs.       5/22/18--Patient seen and examined in ICU bed, family at bedside. Remains intubated. Alert and follows simple commands, communicated with pen and paper at this time. Low dose Levophed for pressor support to keep MAP >65. Lasix gtt stopped this AM. Heparin gtt in place. Fentanyl gtt for sedation. Labs reviewed, K+ 3.4 and Creatinine 1.9 today. Gentle IV hydration started this AM, repeat labs at noon.     5/23/18--Patient seen and examined in ICU bed, family at bedside. Extubated now. Alert and following commands. Low dose levophed infusion in place to keep MAP >65. IV Heparin gtt in place. Labs reviewed, Creatinine 2.0, BNP 2305, Troponin >50.     Review of Systems   Constitution: Positive for malaise/fatigue. Negative for weakness.   HENT: Negative for hearing loss and hoarse voice.    Eyes: Negative for visual disturbance.   Cardiovascular: Positive for dyspnea on exertion. Negative for chest pain, claudication, irregular heartbeat, leg swelling, near-syncope, orthopnea, palpitations, paroxysmal nocturnal dyspnea and syncope.   Respiratory: Negative for cough, hemoptysis, shortness of breath, sleep disturbances due to breathing, snoring and wheezing.    Endocrine: Negative for cold intolerance.   Hematologic/Lymphatic: Does not bruise/bleed easily.   Skin: Negative for color change, dry skin and nail changes.   Musculoskeletal: Positive for back pain and myalgias. Negative for joint pain.   Gastrointestinal: Negative for bloating, abdominal pain, constipation, nausea and vomiting.   Genitourinary: Negative for dysuria, flank pain, hematuria and hesitancy.   Neurological: Negative for headaches, light-headedness, loss of balance, numbness and paresthesias.   Psychiatric/Behavioral: Negative for altered mental status.   Allergic/Immunologic: Negative for environmental allergies.     Objective:     Vital Signs (Most Recent):  Temp: 98.6 °F (37 °C) (05/23/18 0705)  Pulse: 108 (05/23/18 0905)  Resp: (!) 28  (05/23/18 0905)  BP: 96/60 (05/23/18 0905)  SpO2: 96 % (05/23/18 0905) Vital Signs (24h Range):  Temp:  [98.2 °F (36.8 °C)-98.6 °F (37 °C)] 98.6 °F (37 °C)  Pulse:  [] 108  Resp:  [13-38] 28  SpO2:  [89 %-100 %] 96 %  BP: ()/(44-82) 96/60     Weight: 99.8 kg (220 lb 0.3 oz)  Body mass index is 35.51 kg/m².     SpO2: 96 %  O2 Device (Oxygen Therapy): room air      Intake/Output Summary (Last 24 hours) at 05/23/18 0947  Last data filed at 05/23/18 0900   Gross per 24 hour   Intake          1309.84 ml   Output              625 ml   Net           684.84 ml       Lines/Drains/Airways     Central Venous Catheter Line                 Percutaneous Central Line Insertion/Assessment - triple lumen  05/19/18 1600 right internal jugular 3 days          Drain                 Urethral Catheter 05/19/18 0115 Latex 16 Fr. 4 days          Peripheral Intravenous Line                 Peripheral IV - Single Lumen 05/19/18 Left Antecubital 4 days                Physical Exam   Constitutional: She is oriented to person, place, and time. She appears well-developed and well-nourished. She appears ill.   HENT:   Head: Normocephalic and atraumatic.   Eyes: Conjunctivae are normal. Pupils are equal, round, and reactive to light.   Neck: Full passive range of motion without pain. Neck supple. No JVD present.   Cardiovascular: Normal rate, regular rhythm, S1 normal, S2 normal and intact distal pulses.  PMI is not displaced.  Exam reveals distant heart sounds.    No murmur heard.  Pulses:       Radial pulses are 2+ on the right side, and 2+ on the left side.        Dorsalis pedis pulses are 2+ on the right side, and 2+ on the left side.   Pulmonary/Chest: Effort normal. No respiratory distress. She has decreased breath sounds in the right lower field and the left lower field. She has no wheezes.   Abdominal: Soft. Bowel sounds are normal. She exhibits no distension.   Obese   Genitourinary:   Genitourinary Comments: deferred    Musculoskeletal: Normal range of motion. She exhibits edema (trace BLE).        Right ankle: She exhibits swelling.        Left ankle: She exhibits swelling.   Neurological: She is alert and oriented to person, place, and time.   Skin: Skin is warm and dry. No cyanosis. Nails show no clubbing.   Psychiatric: She has a normal mood and affect. Her speech is normal. Judgment and thought content normal. Cognition and memory are impaired.   Intermittently confused   Nursing note and vitals reviewed.      Significant Labs:   All pertinent lab results from the last 24 hours have been reviewed. and   Recent Lab Results       05/23/18  0836 05/23/18  0745 05/23/18  0426 05/23/18  0400 05/22/18  2331      Albumin    2.7(L)      Alkaline Phosphatase    119      Allens Test  Pass        ALT    43      Anion Gap    15      aPTT    36.3  Comment:  aPTT therapeutic range = 39-69 seconds(H)          36.3  Comment:  aPTT therapeutic range = 39-69 seconds(H)      AST    67(H)      Baso #    0.01      Basophil%    0.1      Total Bilirubin    1.9  Comment:  For infants and newborns, interpretation of results should be based  on gestational age, weight and in agreement with clinical  observations.  Premature Infant recommended reference ranges:  Up to 24 hours.............<8.0 mg/dL  Up to 48 hours............<12.0 mg/dL  3-5 days..................<15.0 mg/dL  6-29 days.................<15.0 mg/dL  (H)      BNP    2,305  Comment:  Values of less than 100 pg/ml are consistent with non-CHF populations.(H)      Site  RR        BUN, Bld    53(H)      Calcium    9.5      Chloride    82(L)      CO2    32(H)      Creatinine    2.0(H)      DelSys  Room Air        Differential Method    Automated      eGFR if     32(A)      eGFR if non     28  Comment:  Calculation used to obtain the estimated glomerular filtration  rate (eGFR) is the CKD-EPI equation.   (A)      Eos #    0.0      Eosinophil%    0.1      FiO2   21        Glucose    170(H)      Gran # (ANC)    10.5(H)      Gran%    85.4(H)      Hematocrit    32.6(L)      Hemoglobin    11.1(L)      Lymph #    1.3      Lymph%    10.7(L)      MCH    31.4(H)      MCHC    34.0      MCV    92      Mode  SPONT        Mono #    0.5      Mono%    3.7(L)      MPV    10.6      Platelets    267      POC BE  10        POC HCO3  31.0(H)        POC PCO2  30.7(L)        POC PH  7.614(HH)        POC PO2  67(L)        POC SATURATED O2  96        POCT Glucose 198(H)  182(H)  178(H)     Potassium    3.8      Total Protein    7.9      RBC    3.54(L)      RDW    14.9(H)      Sample  ARTERIAL        Sodium    129(L)      Troponin I    >50.000  Comment:  The reference interval for Troponin I represents the 99th percentile   cutoff   for our facility and is consistent with 3rd generation assay   performance.  (H)      WBC    12.27                  05/22/18  1926 05/22/18  1652 05/22/18  1153 05/22/18  1143      Albumin    2.9(L)     Alkaline Phosphatase    101     Allens Test         ALT    58(H)     Anion Gap    14     aPTT         AST    109(H)     Baso #         Basophil%         Total Bilirubin    1.4  Comment:  For infants and newborns, interpretation of results should be based  on gestational age, weight and in agreement with clinical  observations.  Premature Infant recommended reference ranges:  Up to 24 hours.............<8.0 mg/dL  Up to 48 hours............<12.0 mg/dL  3-5 days..................<15.0 mg/dL  6-29 days.................<15.0 mg/dL  (H)     BNP         Site         BUN, Bld    40(H)     Calcium    9.6     Chloride    81(L)     CO2    33(H)     Creatinine    1.9(H)     DelSys         Differential Method         eGFR if     34(A)     eGFR if non     29  Comment:  Calculation used to obtain the estimated glomerular filtration  rate (eGFR) is the CKD-EPI equation.   (A)     Eos #         Eosinophil%         FiO2         Glucose    246(H)     Gran  # (ANC)         Gran%         Hematocrit         Hemoglobin         Lymph #         Lymph%         MCH         MCHC         MCV         Mode         Mono #         Mono%         MPV         Platelets         POC BE         POC HCO3         POC PCO2         POC PH         POC PO2         POC SATURATED O2         POCT Glucose 175(H) 161(H) 253(H)      Potassium    3.8     Total Protein    8.1     RBC         RDW         Sample         Sodium    128(L)     Troponin I         WBC               Significant Imaging: Echocardiogram:   2D echo with color flow doppler:   Results for orders placed or performed during the hospital encounter of 05/19/18   2D echo with color flow doppler   Result Value Ref Range    EF 45 55 - 65    Mitral Valve Regurgitation MILD     Diastolic Dysfunction Yes (A)     Est. PA Systolic Pressure 33.18     Tricuspid Valve Regurgitation MILD     and X-Ray: CXR: X-Ray Chest 1 View (CXR):   Results for orders placed or performed during the hospital encounter of 05/19/18   X-Ray Chest 1 View    Narrative    EXAMINATION:  XR CHEST 1 VIEW    CLINICAL HISTORY:  Respiratory failure., Resp failure;    COMPARISON:  05/22/2018.    FINDINGS:  Multiple wire leads overlie the chest.  Sternal wires are noted.  Right central line is noted.  Right carotid stent is noted.  The endotracheal and nasogastric tubes have been removed.    Cardiomegaly.    Increased interstitial prominence, possible interstitial edema worse as compared to the previous exam.      Impression    Increasing interstitial prominence.  Possible interstitial edema.  Otherwise removal of the endotracheal and nasogastric tubes.      Electronically signed by: Get Loco MD  Date:    05/23/2018  Time:    08:09    and X-Ray Chest PA and Lateral (CXR): No results found for this visit on 05/19/18.    Assessment and Plan:   Patient seen and examined in ICU bed, family at bedside. Dr. Matthews discussed treatment plan with patient's daughter at bedside.  Per Dr. Matthews, no [facility] at this time and will optimize medical management as BP allows. HOLD ACEI/ARB due to KEN. Recommend restarting lasix for IV diuresis. Continue ASA, IV heparin gtt, BB. Restart low dose Crestor tonight due to decrease in LFT's, will monitor. Limited Echo pending. Levophed gtt to keep MAP >65.       * NSTEMI (non-ST elevated myocardial infarction)    -Patient with significant history of CAD s/p recent PTCA/DEWAYNE of LM in-stent re-stenosis, presents with NSTEMI/cardiogenic shock  -Continue Levophed for pressor support  -Troponin remains  > 50  -Continue current medical management-ASA, Brilinta, heparin gtt  -Coreg held due to need for pressor support  -Statin held due to elevated liver enzymes  -2D echo showed EF of 45-50%, +WMA  -Cath images from Feb and May 2018 have been requested for Dr. Boone to review. Will make plans for cath based on Dr. Boone's review of images.   -Dr. Boone has requested arterial studies to BLE to assess for PAD if high risk intervention to LM or LAD needed   -Patient with new inferior wall MI and severe MR on echo. Will plan for left and right heart cath tomorrow with Dr. Boone.   -morning labs to reassess renal function   -Restart low dose statin today  -Continue IV heparin gtt, BB, Statin, ASA.   -Limited Echo pending  -Per Dr. Matthews's recommendations, no [facility] at this time and will optimize medical management.         Electrolyte imbalance    -Keep Mag >2  -Keep K+ >4        Cardiogenic shock    -Levophed gtt to keep MAP >65        Acute renal failure superimposed on stage 3 chronic kidney disease    -Likely cardiorenal, creatinine 1.7  -Continue to monitor        Acute respiratory failure with hypoxia    -Secondary to NSTEMI and volume overload/acute on chronic decompensated diastolic CHF  -Now intubated  -Continue Lasix gtt, assess response, nephrology on board              Acute on chronic combined systolic and diastolic heart failure    -Improving with Lasix  gtt  -continue current mgmt  -ACEI/ARB held due to KEN/need for pressor support  -CXR showed Increased interstitial prominence, possible interstitial edema worse as compared to the previous exam.  -Recommend restart IV lasix          CAD (coronary artery disease)    -See plan under NSTEMI        Hyperlipidemia    -Restart low dose statin tonight  -Monitor LFT's          Morbid obesity with BMI of 40.0-44.9, adult    -Encourage weight loss        Essential hypertension    -BP meds held due to need for pressor support  -Levophed gtt to keep MAP >65            VTE Risk Mitigation         Ordered     heparin 25,000 units in dextrose 5% 250 mL (100 units/mL) infusion; FEMALE  Continuous      05/19/18 0905     heparin 25,000 units in dextrose 5% 250 mL (100 units/mL) bolus from bag; PRN BOLUS  As needed (PRN)      05/19/18 0905     heparin 25,000 units in dextrose 5% 250 mL (100 units/mL) bolus from bag; PRN BOLUS  As needed (PRN)      05/19/18 0905     Place sequential compression device  Until discontinued      05/19/18 0628     IP VTE HIGH RISK PATIENT  Once      05/19/18 0248          Joyce Roman NP  Cardiology  [facility]

## 2018-05-23 NOTE — ASSESSMENT & PLAN NOTE
SP extubation   Stable on nasal canula  Incentive spirometry  Deep breathing Aspiration precautions

## 2018-05-23 NOTE — ASSESSMENT & PLAN NOTE
-Improving with Lasix gtt  -continue current mgmt  -ACEI/ARB held due to KEN/need for pressor support  -CXR showed Increased interstitial prominence, possible interstitial edema worse as compared to the previous exam.  -Recommend restart IV lasix

## 2018-05-23 NOTE — PLAN OF CARE
Problem: Patient Care Overview  Goal: Plan of Care Review  Outcome: Ongoing (interventions implemented as appropriate)  Alert but lethargic this shift, falling asleep when undisturbed. Noncompliant with BiPap use, kept removing mask.  Converted to nasal cannula at 0200. ST with LBBB on monitor. CVP 13-18. Remains hypotensive, requiring intermittent pressor support. Pressor off x 2.5 hours.  Heparin infusion continues. UOP low, ranging 10-20 ml/hr. Remains NPO, except meds.  Moves self freely in bed but at times, needs verbal cueing. Safety maintained.

## 2018-05-24 PROBLEM — I48.0 PAF (PAROXYSMAL ATRIAL FIBRILLATION): Status: ACTIVE | Noted: 2018-01-01

## 2018-05-24 PROBLEM — J18.9 RIGHT LOWER LOBE PNEUMONIA: Status: ACTIVE | Noted: 2018-01-01

## 2018-05-24 PROBLEM — I47.29 NONSUSTAINED PAROXYSMAL VENTRICULAR TACHYCARDIA: Status: ACTIVE | Noted: 2018-01-01

## 2018-05-24 PROBLEM — I48.91 ATRIAL FIBRILLATION WITH RVR: Status: ACTIVE | Noted: 2018-01-01

## 2018-05-24 NOTE — ASSESSMENT & PLAN NOTE
Episode of PAF with RVR. Hypotensive. No c/o chest pain and dyspnea. With nonsustained VT.  Now back to sinus    -continue Amiodarone gtt now.  -decrease Coreg to 3.125 mg daily due to low BP and CHF decompensated  -keep K >4 and Mg>2

## 2018-05-24 NOTE — PROGRESS NOTES
"[facility]  Nephrology  Progress Note    Patient Name: Milli Montez  MRN: [id number]  Admission Date: 5/19/2018  Hospital Length of Stay: 5 days  Attending Provider: Elian Cha MD   Primary Care Physician: Marito Amato MD  Principal Problem:Cardiogenic shock. KEN    Subjective:     HPI: Pt was seen and examined in ICU. H/o reviewed. Pt is a 58 y/o female with acute kidney injury, likely due to CHF exacerbation causing renal hypoperfusion. Pt has a baseline s Cr of 1.2 (1 month ago). s Cr is now 1.7. Pt was admitted with SOB that is "new", pt also has low exercise tolerance chronically due to SOB. Pt has a pertinent h/o of DM, HTN, CAD with past h/o of CABG and stents, and morbid obesity. Labs and meds in ER and ICU reviewed. Pt has received lasix IV injection, and remains fluid overloaded with LE swelling and continued SOB.    Interval History: Pt was seen and examined in ICU. Reviewed the chart. Met with pt's daughter and discussed the medical issues in details with her and updated her. Discussed with ICU team.    Pt had an arrhythmia last pm, AF with RVR followed by a 10 sec nonsustained VT, then AF. Currently back in sinus rhythm. Pt appears more alert today, has no SOB at rest, no CP.    Review of patient's allergies indicates:   Allergen Reactions    Penicillins Swelling     Current Facility-Administered Medications   Medication Frequency    acetaminophen tablet 650 mg Q6H PRN    amiodarone 360 mg/200 mL (1.8 mg/mL) infusion Continuous    amiodarone in dextrose 150 mg/100 mL (1.5 mg/mL) loading dose     aspirin chewable tablet 81 mg Daily    aztreonam injection 1,000 mg Q8H    bisacodyl suppository 10 mg Daily PRN    [START ON 5/25/2018] carvedilol tablet 3.125 mg Daily    dextrose 50% injection 12.5 g PRN    docusate sodium capsule 100 mg Daily    furosemide (LASIX) 2 mg/mL in sodium chloride 0.9% 100 mL infusion (conc: 2 mg/mL) Continuous    glucagon (human recombinant) " injection 1 mg PRN    heparin 25,000 units in dextrose 5% 250 mL (100 units/mL) bolus from bag; PRN BOLUS PRN    heparin 25,000 units in dextrose 5% 250 mL (100 units/mL) bolus from bag; PRN BOLUS PRN    heparin 25,000 units in dextrose 5% 250 mL (100 units/mL) infusion; FEMALE Continuous    insulin aspart U-100 pen 1-10 Units 6 times per day    insulin detemir U-100 pen 15 Units QHS    levothyroxine tablet 75 mcg Daily    nitroGLYCERIN SL tablet 0.4 mg Q5 Min PRN    norepinephrine bitartrate-D5W 8 mg/250 mL (32 mcg/mL) Soln Continuous    ondansetron injection 4 mg Q6H PRN    pantoprazole 40 mg in dextrose 5 % 100 mL IVPB (over 15 minutes) (ready to mix system) Daily    polyethylene glycol packet 17 g Daily    potassium chloride 10% oral solution 40 mEq Once    ramelteon tablet 8 mg Nightly PRN    rosuvastatin tablet 10 mg QHS    ticagrelor tablet 90 mg BID    [START ON 5/26/2018] vancomycin (VANCOCIN) 1,000 mg in sodium chloride 0.9% 250 mL IVPB Q48H       Objective:     Vital Signs (Most Recent):  Temp: 99.2 °F (37.3 °C) (05/24/18 1115)  Pulse: 97 (05/24/18 1508)  Resp: (!) 24 (05/24/18 1508)  BP: 101/63 (05/24/18 1430)  SpO2: 99 % (05/24/18 1508)  O2 Device (Oxygen Therapy): BiPAP (05/24/18 1508) Vital Signs (24h Range):  Temp:  [98.2 °F (36.8 °C)-99.2 °F (37.3 °C)] 99.2 °F (37.3 °C)  Pulse:  [] 97  Resp:  [6-41] 24  SpO2:  [89 %-100 %] 99 %  BP: ()/(51-80) 101/63     Weight: 95.8 kg (211 lb 3.2 oz) (05/24/18 0305)  Body mass index is 34.09 kg/m².  Body surface area is 2.11 meters squared.    I/O last 3 completed shifts:  In: 1775.1 [P.O.:310; I.V.:1315.1; IV Piggyback:150]  Out: 2150 [Urine:2150]    Physical Exam   Constitutional: She is oriented to person, place, and time. She appears well-developed and well-nourished. No distress.   HENT:   Head: Normocephalic and atraumatic.   Neck: JVD present.   Cardiovascular: Normal rate, regular rhythm and normal heart sounds.  Exam reveals no  friction rub.    Pulmonary/Chest: She is in respiratory distress. She has rales.   Abdominal: Soft. She exhibits no distension. There is no tenderness.   Musculoskeletal: She exhibits edema.   Neurological: She is oriented to person, place, and time.   Skin: Skin is warm and dry.   Psychiatric: She has a normal mood and affect. Her behavior is normal.   Nursing note and vitals reviewed.      Significant Labs:  Reviewed  BMP  Lab Results   Component Value Date     (L) 05/24/2018    K 3.4 (L) 05/24/2018    CL 84 (L) 05/24/2018    CO2 27 05/24/2018    BUN 63 (H) 05/24/2018    CREATININE 2.2 (H) 05/24/2018    CALCIUM 9.1 05/24/2018    ANIONGAP 18 (H) 05/24/2018    ESTGFRAFRICA 28 (A) 05/24/2018    EGFRNONAA 25 (A) 05/24/2018     Lab Results   Component Value Date    WBC 13.97 (H) 05/24/2018    HGB 11.5 (L) 05/24/2018    HCT 32.9 (L) 05/24/2018    MCV 89 05/24/2018     05/24/2018         Significant Imaging: reviewed CXR: has pulmonary edema    Assessment/Plan:     Acute on chronic combined systolic and diastolic heart failure    58 y/o female with KEN, acute MI, and CHF exacerbation:              Renal: KEN. Renal function is overall stable  Cardiorenal syndrome  K not high  Good UOP  Good response to diuretics  No indications at this point for acute HD    The increase in s Cr is due to prerenal azotemia, not from any intrinsic renal damage.  Prerenal azotemia due to MI (tropoinin 50), due to CHF exacerbation, and due to pt receiving IV diuretics   Small daily fluctuations are expected.  CKD stage 3 at baseline  K normal  Acid base: metabolic alkalosis due to diuretics: improved with diamox  ABG c/w metabolic alkalosis and respiratory alkalosis     From the renal view, recommend LHC should be done if pre-test probability for CAD is high and if pt is expected to benefit from coronary revascularization, even at small risk of possibility of contrast nephropathy.  Suggest on day of LHC, hold diuretics, and  provide gentle IV rehydration about 50-75 ml/hr as long as pt's condition can tolerate IV hydration.                              CAD (coronary artery disease)  MI/NSTEMI, inferior MI  Reviewed cardiology note  [facility] indication and timing per cardiology  Will defer mgmt     Significant cardiac h/o  Past h/o of CAD  CABG (LIMA to Diagonal1 + distal LAD, and SVG-RPDA) and balloon PTCA of LM stent on 2/6/18 followed by repeat balloon PTCA + DEWAYNE of LM in-stent steosis 5/2/18,  Has combined systolic and diastolic dysfunction.  Acute of chronic CHF due to MI  Lactic acidosis c/w cardiogenic shock.  Respiratory failure                   Plans and recommendations:  NO renal contraindications to doing heart cath (see above rationale)  As discussed above  Re-start lasix IV 2.5 mg/hr infusion  Replace K  Continue acetazolamide 250 mg IV q 8 hours for alkalosis  Repeat labs  Total critical care time spent 55 minutes                 Sita Knight MD  Nephrology  [facility]

## 2018-05-24 NOTE — PROGRESS NOTES
Spoke with Dr. Humphrey Yin regarding current BiPap order for nightly and most recent critical ABG levels.  Dr. Yin stated BiPap was not neccesary unless patient was in resp. Distress or could not maintain oxygen saturation levels. Continue oxygen at 3LPM per NC.

## 2018-05-24 NOTE — SUBJECTIVE & OBJECTIVE
Interval History: Pt was seen and examined in ICU. Reviewed the chart. Met with pt's daughter and discussed the medical issues in details with her and updated her. Discussed with ICU team.    Pt had an arrhythmia last pm, AF with RVR followed by a 10 sec nonsustained VT, then AF. Currently back in sinus rhythm. Pt appears more alert today, has no SOB at rest, no CP.    Review of patient's allergies indicates:   Allergen Reactions    Penicillins Swelling     Current Facility-Administered Medications   Medication Frequency    acetaminophen tablet 650 mg Q6H PRN    amiodarone 360 mg/200 mL (1.8 mg/mL) infusion Continuous    amiodarone in dextrose 150 mg/100 mL (1.5 mg/mL) loading dose     aspirin chewable tablet 81 mg Daily    aztreonam injection 1,000 mg Q8H    bisacodyl suppository 10 mg Daily PRN    [START ON 5/25/2018] carvedilol tablet 3.125 mg Daily    dextrose 50% injection 12.5 g PRN    docusate sodium capsule 100 mg Daily    furosemide (LASIX) 2 mg/mL in sodium chloride 0.9% 100 mL infusion (conc: 2 mg/mL) Continuous    glucagon (human recombinant) injection 1 mg PRN    heparin 25,000 units in dextrose 5% 250 mL (100 units/mL) bolus from bag; PRN BOLUS PRN    heparin 25,000 units in dextrose 5% 250 mL (100 units/mL) bolus from bag; PRN BOLUS PRN    heparin 25,000 units in dextrose 5% 250 mL (100 units/mL) infusion; FEMALE Continuous    insulin aspart U-100 pen 1-10 Units 6 times per day    insulin detemir U-100 pen 15 Units QHS    levothyroxine tablet 75 mcg Daily    nitroGLYCERIN SL tablet 0.4 mg Q5 Min PRN    norepinephrine bitartrate-D5W 8 mg/250 mL (32 mcg/mL) Soln Continuous    ondansetron injection 4 mg Q6H PRN    pantoprazole 40 mg in dextrose 5 % 100 mL IVPB (over 15 minutes) (ready to mix system) Daily    polyethylene glycol packet 17 g Daily    potassium chloride 10% oral solution 40 mEq Once    ramelteon tablet 8 mg Nightly PRN    rosuvastatin tablet 10 mg QHS     ticagrelor tablet 90 mg BID    [START ON 5/26/2018] vancomycin (VANCOCIN) 1,000 mg in sodium chloride 0.9% 250 mL IVPB Q48H       Objective:     Vital Signs (Most Recent):  Temp: 99.2 °F (37.3 °C) (05/24/18 1115)  Pulse: 97 (05/24/18 1508)  Resp: (!) 24 (05/24/18 1508)  BP: 101/63 (05/24/18 1430)  SpO2: 99 % (05/24/18 1508)  O2 Device (Oxygen Therapy): BiPAP (05/24/18 1508) Vital Signs (24h Range):  Temp:  [98.2 °F (36.8 °C)-99.2 °F (37.3 °C)] 99.2 °F (37.3 °C)  Pulse:  [] 97  Resp:  [6-41] 24  SpO2:  [89 %-100 %] 99 %  BP: ()/(51-80) 101/63     Weight: 95.8 kg (211 lb 3.2 oz) (05/24/18 0305)  Body mass index is 34.09 kg/m².  Body surface area is 2.11 meters squared.    I/O last 3 completed shifts:  In: 1775.1 [P.O.:310; I.V.:1315.1; IV Piggyback:150]  Out: 2150 [Urine:2150]    Physical Exam   Constitutional: She is oriented to person, place, and time. She appears well-developed and well-nourished. No distress.   HENT:   Head: Normocephalic and atraumatic.   Neck: JVD present.   Cardiovascular: Normal rate, regular rhythm and normal heart sounds.  Exam reveals no friction rub.    Pulmonary/Chest: She is in respiratory distress. She has rales.   Abdominal: Soft. She exhibits no distension. There is no tenderness.   Musculoskeletal: She exhibits edema.   Neurological: She is oriented to person, place, and time.   Skin: Skin is warm and dry.   Psychiatric: She has a normal mood and affect. Her behavior is normal.   Nursing note and vitals reviewed.      Significant Labs:  Reviewed  BMP  Lab Results   Component Value Date     (L) 05/24/2018    K 3.4 (L) 05/24/2018    CL 84 (L) 05/24/2018    CO2 27 05/24/2018    BUN 63 (H) 05/24/2018    CREATININE 2.2 (H) 05/24/2018    CALCIUM 9.1 05/24/2018    ANIONGAP 18 (H) 05/24/2018    ESTGFRAFRICA 28 (A) 05/24/2018    EGFRNONAA 25 (A) 05/24/2018     Lab Results   Component Value Date    WBC 13.97 (H) 05/24/2018    HGB 11.5 (L) 05/24/2018    HCT 32.9 (L)  05/24/2018    MCV 89 05/24/2018     05/24/2018         Significant Imaging: reviewed CXR: has pulmonary edema

## 2018-05-24 NOTE — PROGRESS NOTES
[facility] -   Adult Nutrition  Progress Note    SUMMARY     Recommendations    Recommendation/Intervention:1. Add diabetic restriction to current diet. 2. Add boost glucose control BID. 3. If PO intake < 25 %, Consider NG placement, and initiate enteral nutrition. Rec Novasource at 40 ml/hr  + 1 pack of beneprotein BID with flushes as needed per MD for hydration (1970 calories, 98 g protein, 688 ml water). Hold TF x 4 hrs for residuals > 250 mls.   Goals:Meet > 85 % EEN/EPN  Nutrition Goal Status: new   Communication of RD Recs: Reviewed with RN     Reason for Assessment    Reason for Assessment: RD to follow up   Dx:  1. Acute on chronic congestive heart failure, unspecified heart failure type    2. Dyspnea    3. Acute respiratory failure with hypoxia    4. Abnormal EKG    5. Acute respiratory failure    6. NSTEMI (non-ST elevated myocardial infarction)    7. Acute on chronic combined systolic and diastolic heart failure    8. KEN (acute kidney injury)    9. Coronary artery disease involving native coronary artery of native heart without angina pectoris    10. Dyspnea, unspecified type    11. Essential hypertension    12. Lactic acidosis    13. Morbid obesity with BMI of 40.0-44.9, adult    14. Stage 3 chronic kidney disease due to type 1 diabetes mellitus    15. Diabetic nephropathy associated with type 2 diabetes mellitus    16. Acute renal failure superimposed on stage 3 chronic kidney disease, unspecified acute renal failure type    17. Cardiogenic shock    18. Hypothyroidism, unspecified type    19. Type 2 diabetes mellitus with hyperglycemia, without long-term current use of insulin    20. Acute pulmonary edema with congestive heart failure    21. Electrolyte imbalance    22. Leukocytosis, unspecified type    23. Ejection fraction < 50%    24. SOB (shortness of breath)    25. Atrial fibrillation      Past Medical History:   Diagnosis Date    Allergy     Arthritis     Blood transfusion      "CAD (coronary artery disease)     CHF (congestive heart failure)     Diabetes mellitus     Heart murmur     History of loop recorder     Hyperlipidemia     Hypertension     Hypothyroidism 9/17/2013    TIA (transient ischemic attack)     Ulcer        General Information Comments: Per ICU rounds: Pt coded this AM, Pt was NPO, diet just advanced this am, plans x NG TF ~ if poor PO intake.  Reviewed dietary recommendations with RN this am.    Nutrition discharge planning:  Cardiac Diabetic diet     Nutrition Risk Screen    Nutrition Risk Screen: no indicators present    Nutrition/Diet History      Do you have any cultural, spiritual, Lutheran conflicts, given your current situation?: none reported      Anthropometrics    Temp: 99.1 °F (37.3 °C)  Height Method: Estimated  Height: 5' 6" (167.6 cm)  Height (inches): 66 in  Weight Method: Bed Scale  Weight: 95.8 kg (211 lb 3.2 oz)  Weight (lb): 211.2 lb  Ideal Body Weight (IBW), Female: 130 lb  % Ideal Body Weight, Female (lb): 175.08 lb  BMI (Calculated): 36.8  BMI Grade: 35 - 39.9 - obesity - grade II       Lab/Procedures/Meds    Pertinent Labs Reviewed: reviewed  BMP  Lab Results   Component Value Date     (L) 05/24/2018    K 3.4 (L) 05/24/2018    CL 84 (L) 05/24/2018    CO2 27 05/24/2018    BUN 63 (H) 05/24/2018    CREATININE 2.2 (H) 05/24/2018    CALCIUM 9.1 05/24/2018    ANIONGAP 18 (H) 05/24/2018    ESTGFRAFRICA 28 (A) 05/24/2018    EGFRNONAA 25 (A) 05/24/2018     Lab Results   Component Value Date    CALCIUM 9.1 05/24/2018    PHOS 5.3 (H) 05/19/2018     Lab Results   Component Value Date    ALBUMIN 2.7 (L) 05/24/2018       Recent Labs  Lab 05/24/18  0942   POCTGLUCOSE 237*     Lab Results   Component Value Date    HGBA1C 6.4 (H) 05/19/2018       Pertinent Medications Reviewed: reviewed    Physical Findings/Assessment    Overall Physical Appearance: obese  Oral/Mouth Cavity: tooth/teeth missing  Skin: Lauri 16    Estimated/Assessed Needs    Weight " Used For Calorie Calculations: 99.8 kg (220 lb 0.3 oz)  Energy Calorie Requirements (kcal): 1932 kcal/day  Energy Need Method: Titus-St Jeor x1.2  Protein Requirements: 96 - 118 g/day  Weight Used For Protein Calculations: 56.96 kg (130 lb) ~ IBW- obese ICU    Fluid Need Method: RDA Method (or per MD/NP)  RDA Method (mL): 1932  CHO Requirement: 50% EEN      Nutrition Prescription Ordered    Current Diet Order: Cardiac diet    Evaluation of Received Nutrient/Fluid Intake    Intake/Output Summary (Last 24 hours) at 05/24/18 1254  Last data filed at 05/24/18 1100   Gross per 24 hour   Intake          1664.81 ml   Output             2245 ml   Net          -580.19 ml       % Intake of Estimated Energy Needs: Other: Diet just advanced  % Meal Intake: Other: diet just advanced    Nutrition Risk    Level of Risk/Frequency of Follow-up:  (F/U 2x/wk)     Assessment and Plan    Inadequate dietary energy intake    Related to (etiology):   Inadequate energy intake     Signs and Symptoms (as evidenced by):   Pt was NPO this morning    Interventions/Recommendations (treatment strategy):  Not meeting EEN/EPN     Nutrition Diagnosis Status:   Continues               Monitor and Evaluation    Food and Nutrient Intake: energy intake, enteral nutrition intake  Food and Nutrient Adminstration: diet order, enteral and parenteral nutrition administration  Knowledge/Beliefs/Attitudes: food and nutrition knowledge/skill, beliefs and attitudes  Physical Activity and Function: nutrition-related ADLs and IADLs  Anthropometric Measurements: weight, weight change  Biochemical Data, Medical Tests and Procedures: gastrointestinal profile, electrolyte and renal panel, glucose/endocrine profile  Nutrition-Focused Physical Findings: overall appearance     Nutrition Follow-Up    RD Follow-up?: Yes (2x weekly)

## 2018-05-24 NOTE — PROGRESS NOTES
"Lethargic, slow to respond verbally. Will briefly open eyes but then closes them quickly. Falls asleep when left unattended. Oriented to person and recognizes family members. Knows she is in [place] but unable to state where (hospital, home, etc).  Unable to state [place] president.  When asked, replies that Katherine (her daughter) is the president. Thinks it is 2004.  Patient also noted to repeat answer to previous question for next question.  For example- "What is your name?"- "Milli Montez". "Where do you live?"- "Milli Montez".    Will follow commands with minimal direction/instruction. Moving self freely in bed and able to pull self up in bed on command.  "

## 2018-05-24 NOTE — PROGRESS NOTES
[facility] Hospital Medicine  Progress Note    Patient Name: Milli Montez  MRN: [id number]  Patient Class: IP- Inpatient   Admission Date: 5/19/2018  Length of Stay: 5 days  Attending Physician: Elian Cha MD  Primary Care Provider: Marito Amato MD        Subjective:     Principal Problem:NSTEMI (non-ST elevated myocardial infarction)    HPI:  Ms. Montez is a 56yo female with  a PMHx of CAD with remote CABG (LIMA to Diagonal1 + distal LAD, and SVG-RPDA) and balloon PTCA of LM stent on 2/6/18 followed by repeat balloon PTCA + DEWAYNE of LM in-stent steosis 5/2/18, chronic diastolic HFpEF of 55-60%, HLD, HTN, DM II, and h/o TIA, who presented to the ED with c/o SOB that has progressively worsened over the past few days.  Associated orthopnea, PND, BLE edema, and palpitations.  Denies any CP, cough, weight gain, ABD pain or distention, N/V/D, dysuria, lightheadedness/dizziness, syncope, HA, AMS, focal deficits, diaphoresis, fever, or chills.  Upon arrival to ED, patient notably in severe respiratory distress and placed on BiPAP, and later AVAPS.  She received IV Lasix 40mg, followed by Lasix 60mg IV and IV Ativan with improvement in respiratory status.  Initial work-up resulted , troponin 0.006, cr. 1.5, lactic 5.1, ABG on AVAPS with pH 7.3, pCO2 41, pO2 525, HCO3 20.  CXR consistent with CHF.  EKG showed atrial tachycardia with LBBB (old), no acute ischemic ST-T changes from previous tracings.  Patient was admitted to ICU for acute decompensated HF under Hospital Medicine services.  Currently, patient appears comfortable in NAD.  Reports SOB greatly improved since IV Lasix and Ativan.  Patient is a Full Code.  Daughter Katherine Lewis is surrogate decision maker.    Hospital Course:  5/20/2018  Patient continue to be on vent and iv diuresis for acute cardiac pulmonary edema which shows improvement  . Continue treatment for nstemi with aspirin,brilinta ,statin. Echo finding and elevated  trop noted continue with medical management once stable patient will have LHC   5/21/2018  Patient remains intubated. Alert and follows commands. Low dose Levophed for pressor support.  Responding to Lasix gtt at 5mg/hr. Diuresing well since starting Lasix and Levophed. Troponin > 50.  2D echo showed EF of 45-50-%, +WMA.Plan for LHC and RHC   5/22/2018   Cardiology updated plan of care to wait on heart cath . Until her renal function improved . Diuresis on hold . Improved pulmonary edema . Continue to require pressors to support blood pressure   05/23-she remains on vasopressor support ,case was discussed extensively with cardiology-no benefit for [facility] at this time.  Lab data showed troponin of more than 50.    Interval History: 05/23-she remains on vasopressor support ,case was discussed extensively with cardiology-no benefit for [facility] at this time.  Lab data showed troponin of more than 50,creatinine of 2.2      Review of Systems   Constitutional: Negative for activity change, chills, diaphoresis, fatigue, fever and unexpected weight change.   HENT: Negative for congestion, postnasal drip, rhinorrhea, sinus pressure, sore throat and trouble swallowing.    Eyes: Negative for photophobia and visual disturbance.   Respiratory: Positive for shortness of breath. Negative for apnea, cough, chest tightness and wheezing.         Orthopnea, PND.   Cardiovascular: Positive for palpitations and leg swelling. Negative for chest pain.   Gastrointestinal: Negative for abdominal distention, abdominal pain, blood in stool, constipation, diarrhea, nausea and vomiting.   Endocrine: Negative for polydipsia, polyphagia and polyuria.   Genitourinary: Negative for difficulty urinating, dysuria, frequency, hematuria and urgency.   Musculoskeletal: Negative for arthralgias, back pain, gait problem, joint swelling and myalgias.   Skin: Negative for pallor, rash and wound.   Neurological: Negative for dizziness, seizures, syncope, speech  difficulty, weakness, light-headedness, numbness and headaches.   Psychiatric/Behavioral: Negative for confusion. The patient is not nervous/anxious.    All other systems reviewed and are negative.    Objective:     Vital Signs (Most Recent):  Temp: 98.2 °F (36.8 °C) (05/23/18 1905)  Pulse: 100 (05/23/18 2105)  Resp: (!) 28 (05/23/18 2105)  BP: (!) 97/57 (05/23/18 2100)  SpO2: 96 % (05/23/18 2105) Vital Signs (24h Range):  Temp:  [98.1 °F (36.7 °C)-98.8 °F (37.1 °C)] 98.2 °F (36.8 °C)  Pulse:  [] 100  Resp:  [11-34] 28  SpO2:  [89 %-100 %] 96 %  BP: ()/(44-95) 97/57     Weight: 99.8 kg (220 lb 0.3 oz)  Body mass index is 35.51 kg/m².    Intake/Output Summary (Last 24 hours) at 05/23/18 2152  Last data filed at 05/23/18 2102   Gross per 24 hour   Intake           979.89 ml   Output             1040 ml   Net           -60.11 ml      Physical Exam   Constitutional: She is oriented to person, place, and time. She appears well-developed and well-nourished. No distress.   HENT:   Head: Normocephalic and atraumatic.   Neck: JVD present.   Cardiovascular: Normal rate, regular rhythm and normal heart sounds.  Exam reveals no friction rub.    Pulmonary/Chest: No respiratory distress. She has rales.   Abdominal: Soft. She exhibits no distension. There is no tenderness.   Musculoskeletal: She exhibits edema.   Neurological: She is oriented to person, place, and time.   Skin: Skin is warm and dry.   Psychiatric: She has a normal mood and affect. Her behavior is normal.   Nursing note and vitals reviewed.      Significant Labs: Blood Culture: No results for input(s): LABBLOO in the last 48 hours.  BMP:   Recent Labs  Lab 05/22/18  0100  05/23/18  1415   *  < > 204*   *  < > 129*   K 3.4*  < > 3.3*   CL 80*  < > 83*   CO2 35*  < > 30*   BUN 37*  < > 60*   CREATININE 1.9*  < > 2.2*   CALCIUM 9.9  < > 9.2   MG 1.8  --   --    < > = values in this interval not displayed.  All pertinent labs within the past 24  hours have been reviewed.    Significant Imaging: I have reviewed and interpreted all pertinent imaging results/findings within the past 24 hours.    Assessment/Plan:      * NSTEMI (non-ST elevated myocardial infarction)    -continue iv heparin,aspirin,brilinta,statin and ace on hold due to ken and hypotension   -plan heart cath once kidney function improved           Respiratory alkalosis      05/23-cardiology follow up , serum troponin is more than 50 , will need LHC but optimal timing was discussed extensively with cardiology .    Dr Matthews recommends holding off on LHC at this time.  Will optimize medical therapy .  Continue heparin drip         KEN (acute kidney injury)    - cardiorenal   Improved from admission           Leukocytosis    Leukocytosis reactive will continue to monitor         Electrolyte imbalance              Cardiogenic shock    - on levophed and iv diuresis on hold   - echo ef 45 with wall motion abnormalities   - continue with nstemi management   -plan for lhc/rhc once kidney function improves     05/23- will continue levophed and wean as tolerated.        Diabetic nephropathy associated with type 2 diabetes mellitus      05/23- insulin sliding scale , closely monitor glucose.    Continue levemir 15 units        Inadequate dietary energy intake              Acute renal failure superimposed on stage 3 chronic kidney disease    - Likely cardiorenal syndrome.  - Diurese as above.  - Avoid nephrotoxic agents.  - Follow kidney function closely.  -nephrology on board     05/23-will monitor renal function closely while on lasix drip ,nephrology follow up         Acute respiratory failure with hypoxia    - secondary to acute chf   - Currently on intubated           Acute on chronic combined systolic and diastolic heart failure    - iv diuresis on hold  - on vent   - on beta blocker   - ace on hold due to ken and hypotension     05/23-Will restart lasix drip after discussion with cardiology .  Cardiac  echo-  1 - Wall motion abnormalities.     2 - Mildly depressed left ventricular systolic function (EF 45-50%).     3 - Indeterminate LV diastolic function  Case was discussed extensively with cardiology .-no benefit for [facility] at this time as risks will outweigh the benefit.        Acute pulmonary edema with congestive heart failure    - improved   -iv diuresis on hold .   - patient still on pressors         CAD (coronary artery disease)    - Recent DEWAYNE of LM due to critical in-stent stenosis on 5/2/18 per Dr. Wooten ([facility]).  -continue cardioprotective meds         Hyperlipidemia    - Continue statin therapy.  - FLP in AM.        Morbid obesity with BMI of 40.0-44.9, adult    - Lifestyle modifications.  - Pulmonology consult, ? [facility].        Essential hypertension    - hypotensive hold on antihypertensives   - Resume home Coreg.  .        Hypothyroidism      05/23- will continue synthroid         Type 2 diabetes mellitus with hyperglycemia    - Accuchecks with SSI.  - Hold glipizide, will resume at DC.            VTE Risk Mitigation         Ordered     heparin 25,000 units in dextrose 5% 250 mL (100 units/mL) infusion; FEMALE  Continuous      05/19/18 0905     heparin 25,000 units in dextrose 5% 250 mL (100 units/mL) bolus from bag; PRN BOLUS  As needed (PRN)      05/19/18 0905     heparin 25,000 units in dextrose 5% 250 mL (100 units/mL) bolus from bag; PRN BOLUS  As needed (PRN)      05/19/18 0905     Place sequential compression device  Until discontinued      05/19/18 0628     IP VTE HIGH RISK PATIENT  Once      05/19/18 0248          Critical care time spent on the evaluation and treatment of severe organ dysfunction, review of pertinent labs and imaging studies, discussions with consulting providers and discussions with patient/family: 45 minutes.    Elian Cha MD  Department of Hospital Medicine   [facility]

## 2018-05-24 NOTE — ASSESSMENT & PLAN NOTE
-Patient with significant history of CAD s/p recent PTCA/DEWAYNE of LM in-stent re-stenosis, presents with NSTEMI/cardiogenic shock  -Troponin remains  Was > 50  -Continue current medical management-ASA, Brilinta, heparin gtt  -Coreg held due to need for pressor support  -Statin held due to elevated liver enzymes  -2D echo showed EF of 45-50%, +WMA  -Cath images from Feb and May 2018 have been requested for Dr. Boone to review.   -Patient with recent inferior wall MI and severe MR on echo.   -Restart low dose statin today  -Continue IV heparin gtt, BB, Statin, ASA.   -had lengthy discussion with Dr. Boone, Dr. Turpin (pt's primary cardiologist at [facility]) and family. Pt does not need [facility] now and will continue medical Rx for CAD, CHF and secondary MR, severe.

## 2018-05-24 NOTE — PLAN OF CARE
Problem: Patient Care Overview  Goal: Plan of Care Review  Outcome: Ongoing (interventions implemented as appropriate)  Recommendations     Recommendation/Intervention:1. Add diabetic restriction to current diet. 2. Add boost glucose control BID. 3. If PO intake < 25 %, Consider NG placement, and initiate enteral nutrition. Rec Novasource at 40 ml/hr  + 1 pack of beneprotein BID with flushes as needed per MD for hydration (1970 calories, 98 g protein, 688 ml water). Hold TF x 4 hrs for residuals > 250 mls.   Goals:Meet > 85 % EEN/EPN  Nutrition Goal Status: new   Communication of RD Recs: Reviewed with RN

## 2018-05-24 NOTE — ASSESSMENT & PLAN NOTE
05/24- now started on empiric antibiotics-will follow X-ray in am .  This can be fluid versus pneumonia

## 2018-05-24 NOTE — PLAN OF CARE
"Problem: Patient Care Overview  Goal: Plan of Care Review  Outcome: Ongoing (interventions implemented as appropriate)  Lethargic and drowsy all shift. Flat affect and at times, appears to "look through" you versus at you. Disoriented to time and situation consistently, intermittent confusion to place. Slow verbal responses and falls asleep when answering questions. Arouses to voice stimulation and is able to physically follow commands such as squeezing hands, moving self in bed, etc. Very restless and changing positions frequently in bed. NSR to ST with LBBB on monitor. Continues to require pressor support, Levophed titrated to maintain MAP > 65 on right arterial line. O2 at 3 LPM NC, oxygen sats maintaining %. Compliant this shift with keeping oxygen on. Resp shallow and rapid at times but in no acute resp distress. UOP ranging 100-225 ml/hr per indwelling catheter. Lasix drip infusing at 5 mg/hr and Diamox 250 mg IVPB given Q8H. Heparin drip titrated per nomogram. CBG's Q4H and SSI followed.  Acid base balance remains alkalotic with arterial ph at 7.541. Morning labs reveal Na @ 129, K+ 3.2, and creatine 2.2.  EICU notified of low K+, no new orders received. POC reviewed with patient and daughter, all questions answered & emotional support provided.  Safety maintained.            "

## 2018-05-24 NOTE — ASSESSMENT & PLAN NOTE
- on levophed and iv diuresis on hold   - echo ef 45 with wall motion abnormalities   - continue with nstemi management   -plan for lhc/rhc once kidney function improves     05/23- will continue levophed and wean as tolerated.  05/24- will wean off levophed as tolerated.

## 2018-05-24 NOTE — ASSESSMENT & PLAN NOTE
Has not required NIPPA  Stable on nasal canula  Incentive spirometry  Deep breathing Aspiration precautions

## 2018-05-24 NOTE — ASSESSMENT & PLAN NOTE
- on levophed and iv diuresis on hold   - echo ef 45 with wall motion abnormalities   - continue with nstemi management   -plan for lhc/rhc once kidney function improves     05/23- will continue levophed and wean as tolerated.

## 2018-05-24 NOTE — ASSESSMENT & PLAN NOTE
- Likely cardiorenal syndrome.  - Diurese as above.  - Avoid nephrotoxic agents.  - Follow kidney function closely.  -nephrology on board     05/23-will monitor renal function closely while on lasix drip ,nephrology follow up .  05/24- serum creatine is stable at 2.2. Will continue to monitor very closely

## 2018-05-24 NOTE — PROGRESS NOTES
Remains drowsy and lethargic but is currently able to answer ALL orientation questions correctly.  Will continue to monitor.

## 2018-05-24 NOTE — CONSULTS
Clinical Pharmacy: Vancomycin Consult Note    Pharmacy consulted to dose Vancomycin by Dr. Rod  58 y/o female with PMH of DM, CABG, and CHF    Indication: Pneumonia  Goal trough: 15-20 mcg/ml     WBC = 13.97  Tmax = 99.2    SCr = 2.2, CrCl = 33.7 ml/min   Resp cultures (05/21): few gram (+) cocci, few gram (-) rods    ABW = 95.8 kg   IBW = 59.3 kg   Adj wt = 73.9 kg --> use for dosing     Patient receive a Vancomycin 1250mg dose today and will be pulse dosed at this time. Vancomycin 1gm every 48 hours is a placeholder dose only.  A Vancomycin random level will be ordered with AM labs. Patient may receive another dose once level is <18 mcg/ml.   Random level due: 05/25 at 0430    Thank you for allowing us to participate in this patient's care.   Chel Richmond, PharmD 5/24/2018 2:35 PM

## 2018-05-24 NOTE — PROGRESS NOTES
[facility] -   Critical Care Medicine  Progress Note    Patient Name: Milli Montez  MRN: [id number]  Admission Date: 5/19/2018  Hospital Length of Stay: 5 days  Code Status: Full Code  Attending Provider: Elian Cha MD  Primary Care Provider: Marito Amato MD   Principal Problem: Cardiogenic shock    Subjective:     HPI:  57-year-old female patient with history of CHF, coronary artery disease status post stent placement by ambulance to the hospital for worsening shortnessof breath for a few days and the respiratory distress.  She was started on noninvasive ventilation in the ER and transferred to the ICU.      Hospital/ICU Course:  Continue to be on noninvasive ventilation.ABGs and chest x-ray reviewed.afebrile.  5/19 Remains in distress tachypneic respiratory rate in the 30s.receiving IV Lasix.  An on IV heparin drip.  5/20 Sp intubation, Rt TLC intubation. On levophed, heparin, lasix, fentanyl gtt. Chest x-ray and ABG reviewed.  5/21 - Fully awake and comfortable intubated on mech ventilation.  Still on Heparin, Lasix, Levophed and Fentanyl infusions  5/22 - Still intubated on mech ventilation and Fentanyl infusion fully awake and responsive with stimuli.  Still on Levophed infusion.  Lasix infusion stopped.  05/23: Levophed started at 0.04 now at 0.08, off vent, ABG respiratory alkalosis  05/24: Symptomatic Afib RVR, sedation with cardioversion at bedside, amiodarone loaded, K+ and Mag+, levo weaned down, Added Abx: Vanco and Azactam    Interval History:   Remains withdrawn, poor po intake    Review of Systems   Constitutional: Positive for malaise/fatigue.   HENT: Negative.    Eyes: Negative.    Respiratory: Positive for cough and shortness of breath.    Gastrointestinal: Negative.    Genitourinary: Negative.    Musculoskeletal: Negative.    Skin: Negative.    Neurological: Positive for weakness.   Endo/Heme/Allergies: Negative.    Psychiatric/Behavioral: Negative.        Objective:     Vital  Signs (Most Recent):  Temp: 99.2 °F (37.3 °C) (05/24/18 1115)  Pulse: 97 (05/24/18 1508)  Resp: (!) 24 (05/24/18 1508)  BP: 101/63 (05/24/18 1430)  SpO2: 99 % (05/24/18 1508) Vital Signs (24h Range):  Temp:  [98.2 °F (36.8 °C)-99.2 °F (37.3 °C)] 99.2 °F (37.3 °C)  Pulse:  [] 97  Resp:  [6-41] 24  SpO2:  [89 %-100 %] 99 %  BP: ()/(51-80) 101/63     Weight: 95.8 kg (211 lb 3.2 oz)  Body mass index is 34.09 kg/m².      Intake/Output Summary (Last 24 hours) at 05/24/18 1553  Last data filed at 05/24/18 1400   Gross per 24 hour   Intake          1914.81 ml   Output             2275 ml   Net          -360.19 ml       Physical Exam   Constitutional: She is oriented to person, place, and time. Vital signs are normal. She appears well-developed and well-nourished. She is sleeping. She appears ill. Nasal cannula in place.       HENT:   Head: Normocephalic and atraumatic.   Nose: Nose normal.   Eyes: Conjunctivae and EOM are normal. Pupils are equal, round, and reactive to light. Left eye exhibits no discharge. No scleral icterus.   Neck: Normal range of motion. Neck supple. No JVD present. No tracheal deviation present. No thyromegaly present.   Cardiovascular: Normal rate and intact distal pulses.    Murmur heard.  Pulmonary/Chest: Effort normal and breath sounds normal. No respiratory distress. She has no wheezes.   Abdominal: Soft. Bowel sounds are normal.   Genitourinary:   Genitourinary Comments: pham   Musculoskeletal: Normal range of motion. She exhibits edema.   Neurological: She is alert and oriented to person, place, and time. No cranial nerve deficit.   Skin: Skin is warm and dry. Capillary refill takes 2 to 3 seconds.   Psychiatric: She has a normal mood and affect.   Nursing note and vitals reviewed.      Vents:  Vent Mode: Spont (05/22/18 1445)  Ventilator Initiated: Yes (05/19/18 1539)  Set Rate: 0 bmp (05/22/18 1445)  Vt Set: 400 mL (05/22/18 1445)  Pressure Support: 12 cmH20 (05/22/18  1445)  PEEP/CPAP: 8 cmH20 (05/22/18 1445)  Oxygen Concentration (%): 30 (05/24/18 1508)  Peak Airway Pressure: 20 cmH2O (05/22/18 1445)  Plateau Pressure: 0 cmH20 (05/22/18 1445)  Total Ve: 5.78 mL (05/22/18 1445)  F/VT Ratio<105 (RSBI): (!) 63.25 (05/22/18 1445)    Lines/Drains/Airways     Central Venous Catheter Line                 Percutaneous Central Line Insertion/Assessment - triple lumen  05/19/18 1600 right internal jugular 4 days          Drain                 Urethral Catheter 05/19/18 0115 Latex 16 Fr. 5 days          Arterial Line                 Arterial Line 05/23/18 1800 Right Radial less than 1 day                Significant Labs:    CBC/Anemia Profile:    Recent Labs  Lab 05/23/18  0400 05/24/18  0400   WBC 12.27 13.97*   HGB 11.1* 11.5*   HCT 32.6* 32.9*    334   MCV 92 89   RDW 14.9* 15.0*        Chemistries:    Recent Labs  Lab 05/23/18  0400 05/23/18  1415 05/24/18  0400 05/24/18  1030   * 129* 129*  --    K 3.8 3.3* 3.2* 3.4*   CL 82* 83* 84*  --    CO2 32* 30* 27  --    BUN 53* 60* 63*  --    CREATININE 2.0* 2.2* 2.2*  --    CALCIUM 9.5 9.2 9.1  --    ALBUMIN 2.7*  --  2.7*  --    PROT 7.9  --  8.2  --    BILITOT 1.9*  --  2.1*  --    ALKPHOS 119  --  124  --    ALT 43  --  30  --    AST 67*  --  45*  --    MG  --   --  2.3 2.8*       ABGs:   Recent Labs  Lab 05/24/18  0435   PH 7.541*   PCO2 39.7   HCO3 34.0*   POCSATURATED 99   BE 12     All pertinent labs within the past 24 hours have been reviewed.    Significant Imaging:  I have reviewed and interpreted all pertinent imaging results/findings within the past 24 hours.     CXR  The heart size remains enlarged with postoperative changes along the sternum.  Right-sided jugular line again noted.  Interval development of bilateral infiltrates/edematous changes, greatest in the right perihilar zone and right lung base.  This is somewhat confluent on the right.  This may all be related to congestion/edema but superimposed pneumonia,  especially at the right base cannot be excluded.  Follow-up recommended      Assessment/Plan:     Neuro    Neuro check, No issues        Pulmonary   Right lower lobe pneumonia    New infiltrate in critically ill patient  RLL   Added Azactam and Vancomycin  Sputum culture        Acute respiratory failure with hypoxia    Has not required NIPPA  Stable on nasal canula  Incentive spirometry  Deep breathing Aspiration precautions        Cardiac/Vascular   * Cardiogenic shock    Cont Levophed infusion and has   tolerated weaning  ICU cardiac monitoring  Keep MAP > 65mmHG  Continue A Line        Nonsustained paroxysmal ventricular tachycardia    SP intervention with Adenosine, Synch cardioversion x 2        PAF (paroxysmal atrial fibrillation)    A fib loading  Heparin gtt  Keep K+> 4 and Mag> 2        NSTEMI (non-ST elevated myocardial infarction)    Cards following  Cont Heparin infusion  Timing and indication of LHC per cardiology        Acute on chronic combined systolic and diastolic heart failure        Pulm edema resolved  Monitor I/Os and daily weights  BMP in AM        CAD (coronary artery disease)    Cards following  No LHC, medical management  Cont ASA, Coreg and Ticagrelor        Essential hypertension    Hold BP meds for now: Norvasc and vasotec        Acute pulmonary edema with congestive heart failure    Resolving with diuresis : Lasix drip reduced to 2.5/hr and Diamox discontinued  CXR again in AM        Renal/   Electrolyte imbalance    Replace K+ and Mg+        Diabetic nephropathy associated with type 2 diabetes mellitus    SSI, monitor for toxicity        Acute renal failure superimposed on stage 3 chronic kidney disease    Strict I's and O's.  Monitor BMP.  Renal following    Stable creatinine        Oncology   Leukocytosis    Afeb and does not appear septic  Blood and Sputum cultures NGTD  UA neg and CXR without focal infiltrate  Repeat CBC in AM        Endocrine   Morbid obesity with BMI of  40.0-44.9, adult    Encourage weight loss post extubation        Hypothyroidism    Cont Levothyroxine        Type 2 diabetes mellitus with hyperglycemia    Cont SSI           Critical Care Daily Checklist:    A: Awake: RASS Goal/Actual Goal: RASS Goal: 0-->alert and calm  Actual: Donato Agitation Sedation Scale (RASS): Light sedation   B: Spontaneous Breathing Trial Performed?     C: SAT & SBT Coordinated?  N/A                      D: Delirium: CAM-ICU Overall CAM-ICU: Negative   E: Early Mobility Performed? Yes   F: Feeding Goal: Goals: advancement energy intake within 48hrs  Status: Nutrition Goal Status: progressing towards goal   Current Diet Order   Procedures    Diet Cardiac      AS: Analgesia/Sedation N/A   T: Thromboembolic Prophylaxis Heparin gtt   H: HOB > 300 Yes   U: Stress Ulcer Prophylaxis (if needed) YES   G: Glucose Control SSI   B: Bowel Function     I: Indwelling Catheter (Lines & Carlin) Necessity Carlin, A line, right IJ   D: De-escalation of Antimicrobials/Pharmacotherapies Based on cultures    Plan for the day/ETD Wean pressors    Code Status:  Family/Goals of Care: Full Code  HOME     Critical Care Time: 56 minutes  Critical secondary to Patient has a condition that poses threat to life and bodily function: Acute Renal Failure and CARDIOGENIC SHOCK      Critical care was time spent personally by me on the following activities: development of treatment plan with patient or surrogate and bedside caregivers, discussions with consultants, evaluation of patient's response to treatment, examination of patient, ordering and performing treatments and interventions, ordering and review of laboratory studies, ordering and review of radiographic studies, pulse oximetry, re-evaluation of patient's condition. This critical care time did not overlap with that of any other provider or involve time for any procedures.     Sorin Rod MD  Critical Care Medicine  [facility] -

## 2018-05-24 NOTE — ASSESSMENT & PLAN NOTE
05/23-cardiology follow up , serum troponin is more than 50 , will need LHC but optimal timing was discussed extensively with cardiology .    Dr Matthews recommends holding off on LHC at this time.  Will optimize medical therapy .  Continue heparin drip

## 2018-05-24 NOTE — PROGRESS NOTES
[facility] -   Cardiology  Progress Note    Patient Name: Milli Montez  MRN: [id number]  Admission Date: 5/19/2018  Hospital Length of Stay: 5 days  Code Status: Full Code   Attending Physician: Elian Cha MD   Primary Care Physician: Marito Amato MD  Expected Discharge Date:   Principal Problem:NSTEMI (non-ST elevated myocardial infarction)    Subjective:     Hospital Course:   5/20/18-Patient seen and examined today, now intubated. On Levophed for pressor support. Responding to Lasix gtt, appreciate nephrology rec's. Troponin > 50. Repeat later today. 2D echo showed EF of 45-50-%, +WMA. TSH 7.    5/21/18- Patient remains intubated. Alert and follows commands. Low dose Levophed for pressor support.  Responding to Lasix gtt at 5mg/hr. Diuresing well since starting Lasix and Levophed. Troponin > 50.  2D echo showed EF of 45-50-%, +WMA. Creatine improved on morning labs.      5/22/18--Patient seen and examined in ICU bed, family at bedside. Remains intubated. Alert and follows simple commands, communicated with pen and paper at this time. Low dose Levophed for pressor support to keep MAP >65. Lasix gtt stopped this AM. Heparin gtt in place. Fentanyl gtt for sedation. Labs reviewed, K+ 3.4 and Creatinine 1.9 today. Gentle IV hydration started this AM, repeat labs at noon.     5/23/18--Patient seen and examined in ICU bed, family at bedside. Extubated now. Alert and following commands. Low dose levophed infusion in place to keep MAP >65. IV Heparin gtt in place. Labs reviewed, Creatinine 2.0, BNP 2305, Troponin >50.   5/24. Developed afib with RVR in the AM.   to 140 bpm. Hypotensive, peaceful/alert, and no c/o chest pain, dyspnea. Amiodarone 300 mg bolus and epi given, and amio gtt started. Then pt developed nonsustained VT aound 10 seconds, switched to afib. adenosine IVP twice and the HR temporarily decreased to 90's and quickly back to 130 bpm. Had twice DCCV and no HR change. SBP at 60 to  80 mmHg. IVF and magnesium given. Her HR gradually decreased and the rhythm is back to sinus. K 3.2 and Mg 2.3 repeat ekg showed sinus rhythm. MIHAI back to 100 mmHg. Stable.       Review of Systems   Constitution: Positive for malaise/fatigue. Negative for weakness.   HENT: Negative for hearing loss and hoarse voice.    Eyes: Negative for visual disturbance.   Cardiovascular: Positive for dyspnea on exertion. Negative for chest pain, claudication, irregular heartbeat, leg swelling, near-syncope, orthopnea, palpitations, paroxysmal nocturnal dyspnea and syncope.   Respiratory: Negative for cough, hemoptysis, shortness of breath, sleep disturbances due to breathing, snoring and wheezing.    Endocrine: Negative for cold intolerance.   Hematologic/Lymphatic: Does not bruise/bleed easily.   Skin: Negative for color change, dry skin and nail changes.   Musculoskeletal: Positive for back pain and myalgias. Negative for joint pain.   Gastrointestinal: Negative for bloating, abdominal pain, constipation, nausea and vomiting.   Genitourinary: Negative for dysuria, flank pain, hematuria and hesitancy.   Neurological: Negative for headaches, light-headedness, loss of balance, numbness and paresthesias.   Psychiatric/Behavioral: Negative for altered mental status.   Allergic/Immunologic: Negative for environmental allergies.     Objective:     Vital Signs (Most Recent):  Temp: 99.1 °F (37.3 °C) (05/24/18 0715)  Pulse: 91 (05/24/18 1130)  Resp: (!) 28 (05/24/18 1130)  BP: 115/74 (05/24/18 1000)  SpO2: 99 % (05/24/18 1130) Vital Signs (24h Range):  Temp:  [98.1 °F (36.7 °C)-99.1 °F (37.3 °C)] 99.1 °F (37.3 °C)  Pulse:  [] 91  Resp:  [6-41] 28  SpO2:  [89 %-100 %] 99 %  BP: ()/(50-95) 115/74     Weight: 95.8 kg (211 lb 3.2 oz)  Body mass index is 34.09 kg/m².     SpO2: 99 %  O2 Device (Oxygen Therapy): nasal cannula      Intake/Output Summary (Last 24 hours) at 05/24/18 1311  Last data filed at 05/24/18 1100   Gross per  24 hour   Intake          1664.81 ml   Output             2200 ml   Net          -535.19 ml       Lines/Drains/Airways     Central Venous Catheter Line                 Percutaneous Central Line Insertion/Assessment - triple lumen  05/19/18 1600 right internal jugular 4 days          Drain                 Urethral Catheter 05/19/18 0115 Latex 16 Fr. 5 days          Arterial Line                 Arterial Line 05/23/18 1800 Right Radial less than 1 day                Physical Exam   Constitutional: She is oriented to person, place, and time. She appears well-developed and well-nourished. She appears ill.   HENT:   Head: Normocephalic and atraumatic.   Eyes: Conjunctivae are normal. Pupils are equal, round, and reactive to light.   Neck: Full passive range of motion without pain. Neck supple. No JVD present.   Cardiovascular: Normal rate, regular rhythm, S1 normal, S2 normal and intact distal pulses.  PMI is not displaced.  Exam reveals distant heart sounds.    No murmur heard.  Pulses:       Radial pulses are 2+ on the right side, and 2+ on the left side.        Dorsalis pedis pulses are 2+ on the right side, and 2+ on the left side.   Pulmonary/Chest: Effort normal. No respiratory distress. She has decreased breath sounds in the right lower field and the left lower field. She has no wheezes.   Abdominal: Soft. Bowel sounds are normal. She exhibits no distension.   Obese   Genitourinary:   Genitourinary Comments: deferred   Musculoskeletal: Normal range of motion. She exhibits edema (trace BLE).        Right ankle: She exhibits swelling.        Left ankle: She exhibits swelling.   Neurological: She is alert and oriented to person, place, and time.   Skin: Skin is warm and dry. No cyanosis. Nails show no clubbing.   Psychiatric: She has a normal mood and affect. Her speech is normal. Judgment and thought content normal. Cognition and memory are impaired.   Intermittently confused   Nursing note and vitals  reviewed.      Significant Labs:   ABG:   Recent Labs  Lab 05/23/18  0745 05/23/18  1038 05/24/18  0435   PH 7.614* 7.617* 7.541*   PCO2 30.7* 30.8* 39.7   HCO3 31.0* 31.5* 34.0*   POCSATURATED 96 97 99   BE 10 10 12   , Blood Culture: No results for input(s): LABBLOO in the last 48 hours., BMP:   Recent Labs  Lab 05/23/18  0400 05/23/18  1415 05/24/18  0400 05/24/18  1030   * 204* 197*  --    * 129* 129*  --    K 3.8 3.3* 3.2* 3.4*   CL 82* 83* 84*  --    CO2 32* 30* 27  --    BUN 53* 60* 63*  --    CREATININE 2.0* 2.2* 2.2*  --    CALCIUM 9.5 9.2 9.1  --    MG  --   --  2.3 2.8*   , CMP   Recent Labs  Lab 05/23/18  0400 05/23/18  1415 05/24/18  0400 05/24/18  1030   * 129* 129*  --    K 3.8 3.3* 3.2* 3.4*   CL 82* 83* 84*  --    CO2 32* 30* 27  --    * 204* 197*  --    BUN 53* 60* 63*  --    CREATININE 2.0* 2.2* 2.2*  --    CALCIUM 9.5 9.2 9.1  --    PROT 7.9  --  8.2  --    ALBUMIN 2.7*  --  2.7*  --    BILITOT 1.9*  --  2.1*  --    ALKPHOS 119  --  124  --    AST 67*  --  45*  --    ALT 43  --  30  --    ANIONGAP 15 16 18*  --    ESTGFRAFRICA 32* 28* 28*  --    EGFRNONAA 28* 25* 25*  --    , CBC   Recent Labs  Lab 05/23/18  0400 05/24/18  0400   WBC 12.27 13.97*   HGB 11.1* 11.5*   HCT 32.6* 32.9*    334   , INR No results for input(s): INR, PROTIME in the last 48 hours., Lipid Panel No results for input(s): CHOL, HDL, LDLCALC, TRIG, CHOLHDL in the last 48 hours. and Troponin   Recent Labs  Lab 05/23/18  0400   TROPONINI >50.000*       Significant Imaging:      Assessment and Plan:       * NSTEMI (non-ST elevated myocardial infarction)    -Patient with significant history of CAD s/p recent PTCA/DEWAYNE of LM in-stent re-stenosis, presents with NSTEMI/cardiogenic shock  -Continue Levophed for pressor support  -Troponin remains  > 50  -Continue current medical management-ASA, Brilinta, heparin gtt  -Coreg held due to need for pressor support  -Statin held due to elevated liver  enzymes  -2D echo showed EF of 45-50%, +WMA  -Cath images from Feb and May 2018 have been requested for Dr. Boone to review. Will make plans for cath based on Dr. Boone's review of images.   -Dr. Boone has requested arterial studies to BLE to assess for PAD if high risk intervention to LM or LAD needed   -Patient with new inferior wall MI and severe MR on echo. Will plan for left and right heart cath tomorrow with Dr. Boone.   -morning labs to reassess renal function   -Restart low dose statin today  -Continue IV heparin gtt, BB, Statin, ASA.   -Limited Echo pending  -Per Dr. Matthews's recommendations, no C at this time and will optimize medical management.         Nonsustained paroxysmal ventricular tachycardia    See above note        PAF (paroxysmal atrial fibrillation)    Episode of PAF with RVR. Hypotensive. No c/o chest pain and dyspnea. With nonsustained VT.  Now back to sinus    -continue Amiodarone gtt now.  -decrease Coreg to 3.125 mg daily due to low BP and CHF decompensated  -keep K >4 and Mg>2            Electrolyte imbalance    -Keep Mag >2  -Keep K+ >4        Cardiogenic shock    -Levophed gtt to keep MAP >65        Acute renal failure superimposed on stage 3 chronic kidney disease    -Likely cardiorenal, creatinine 1.7  -Continue to monitor        Acute respiratory failure with hypoxia    -Secondary to NSTEMI and volume overload/acute on chronic decompensated diastolic CHF  -Now intubated  -Continue Lasix gtt, assess response, nephrology on board              Acute on chronic combined systolic and diastolic heart failure    -Improving with Lasix gtt  -continue current mgmt  -ACEI/ARB held due to KEN/need for pressor support  -continue ASA, Brilinta and Statin  -continue Coreg on 3.125 mg daily          CAD (coronary artery disease)    -See above note        Hyperlipidemia    -Restart low dose statin tonight  -Monitor LFT's          Morbid obesity with BMI of 40.0-44.9, adult    -Encourage weight  loss        Essential hypertension    -BP meds held due to need for pressor support  -Levophed gtt to keep MAP >65            VTE Risk Mitigation         Ordered     heparin 25,000 units in dextrose 5% 250 mL (100 units/mL) infusion; FEMALE  Continuous      05/19/18 0905     heparin 25,000 units in dextrose 5% 250 mL (100 units/mL) bolus from bag; PRN BOLUS  As needed (PRN)      05/19/18 0905     heparin 25,000 units in dextrose 5% 250 mL (100 units/mL) bolus from bag; PRN BOLUS  As needed (PRN)      05/19/18 0905     Place sequential compression device  Until discontinued      05/19/18 0628     IP VTE HIGH RISK PATIENT  Once      05/19/18 0248          Rogelio Matthews MD  Cardiology  [facility] [place]

## 2018-05-24 NOTE — ASSESSMENT & PLAN NOTE
- Likely cardiorenal syndrome.  - Diurese as above.  - Avoid nephrotoxic agents.  - Follow kidney function closely.  -nephrology on board     05/23-will monitor renal function closely while on lasix drip ,nephrology follow up

## 2018-05-24 NOTE — ASSESSMENT & PLAN NOTE
- iv diuresis on hold  - on vent   - on beta blocker   - ace on hold due to grisel and hypotension     05/23-Will restart lasix drip after discussion with cardiology .  Cardiac echo-  1 - Wall motion abnormalities.     2 - Mildly depressed left ventricular systolic function (EF 45-50%).     3 - Indeterminate LV diastolic function  Case was discussed extensively with cardiology .-no benefit for LHC at this time as risks will outweigh the benefit.

## 2018-05-24 NOTE — PROGRESS NOTES
[facility] Hospital Medicine  Progress Note    Patient Name: Milli Montez  MRN: [id number]  Patient Class: IP- Inpatient   Admission Date: 5/19/2018  Length of Stay: 5 days  Attending Physician: Elian Cha MD  Primary Care Provider: Marito Amato MD        Subjective:     Principal Problem:Cardiogenic shock    HPI:  Ms. Montez is a 56yo female with  a PMHx of CAD with remote CABG (LIMA to Diagonal1 + distal LAD, and SVG-RPDA) and balloon PTCA of LM stent on 2/6/18 followed by repeat balloon PTCA + DEWAYNE of LM in-stent steosis 5/2/18, chronic diastolic HFpEF of 55-60%, HLD, HTN, DM II, and h/o TIA, who presented to the ED with c/o SOB that has progressively worsened over the past few days.  Associated orthopnea, PND, BLE edema, and palpitations.  Denies any CP, cough, weight gain, ABD pain or distention, N/V/D, dysuria, lightheadedness/dizziness, syncope, HA, AMS, focal deficits, diaphoresis, fever, or chills.  Upon arrival to ED, patient notably in severe respiratory distress and placed on BiPAP, and later AVAPS.  She received IV Lasix 40mg, followed by Lasix 60mg IV and IV Ativan with improvement in respiratory status.  Initial work-up resulted , troponin 0.006, cr. 1.5, lactic 5.1, ABG on AVAPS with pH 7.3, pCO2 41, pO2 525, HCO3 20.  CXR consistent with CHF.  EKG showed atrial tachycardia with LBBB (old), no acute ischemic ST-T changes from previous tracings.  Patient was admitted to ICU for acute decompensated HF under Hospital Medicine services.  Currently, patient appears comfortable in NAD.  Reports SOB greatly improved since IV Lasix and Ativan.  Patient is a Full Code.  Daughter Katherine Lewis is surrogate decision maker.    Hospital Course:  5/20/2018  Patient continue to be on vent and iv diuresis for acute cardiac pulmonary edema which shows improvement  . Continue treatment for nstemi with aspirin,brilinta ,statin. Echo finding and elevated trop noted continue with medical  management once stable patient will have LHC   5/21/2018  Patient remains intubated. Alert and follows commands. Low dose Levophed for pressor support.  Responding to Lasix gtt at 5mg/hr. Diuresing well since starting Lasix and Levophed. Troponin > 50.  2D echo showed EF of 45-50-%, +WMA.Plan for LHC and RHC   5/22/2018   Cardiology updated plan of care to wait on heart cath . Until her renal function improved . Diuresis on hold . Improved pulmonary edema . Continue to require pressors to support blood pressure   05/23-she remains on vasopressor support ,case was discussed extensively with cardiology-no benefit for LHC at this time.  Lab data showed troponin of more than 50.  05/24- She developed symptomatic Afib RVR and had cardioversion done, IV amiodarone was also given at bedside ,critical care team added Vanco and Azactam.       Interval History: 05/24- She developed symptomatic Afib RVR and had cardioversion done, IV amiodarone was also given at bedside ,critical care team added Vanco and Azactam.         Review of Systems   Constitutional: Negative for activity change, chills, diaphoresis, fatigue, fever and unexpected weight change.   HENT: Negative for congestion, postnasal drip, rhinorrhea, sinus pressure, sore throat and trouble swallowing.    Eyes: Negative for photophobia and visual disturbance.   Respiratory: Positive for shortness of breath. Negative for apnea, cough, chest tightness and wheezing.         Orthopnea, PND.   Cardiovascular: Positive for palpitations. Negative for chest pain and leg swelling.   Gastrointestinal: Negative for abdominal distention, abdominal pain, blood in stool, constipation, diarrhea, nausea and vomiting.   Endocrine: Negative for polydipsia, polyphagia and polyuria.   Genitourinary: Negative for difficulty urinating, dysuria, frequency, hematuria and urgency.   Musculoskeletal: Negative for arthralgias, back pain, gait problem, joint swelling and myalgias.   Skin:  Negative for pallor, rash and wound.   Neurological: Negative for dizziness, seizures, syncope, speech difficulty, weakness, light-headedness, numbness and headaches.   Psychiatric/Behavioral: Negative for confusion. The patient is not nervous/anxious.    All other systems reviewed and are negative.    Objective:     Vital Signs (Most Recent):  Temp: 98.9 °F (37.2 °C) (05/24/18 1508)  Pulse: 86 (05/24/18 1645)  Resp: (!) 26 (05/24/18 1645)  BP: (!) 79/57 (05/24/18 1645)  SpO2: 100 % (05/24/18 1645) Vital Signs (24h Range):  Temp:  [98.2 °F (36.8 °C)-99.2 °F (37.3 °C)] 98.9 °F (37.2 °C)  Pulse:  [] 86  Resp:  [6-41] 26  SpO2:  [89 %-100 %] 100 %  BP: ()/(51-80) 79/57     Weight: 95.8 kg (211 lb 3.2 oz)  Body mass index is 34.09 kg/m².    Intake/Output Summary (Last 24 hours) at 05/24/18 1718  Last data filed at 05/24/18 1645   Gross per 24 hour   Intake          2275.89 ml   Output             2230 ml   Net            45.89 ml      Physical Exam   Constitutional: She is oriented to person, place, and time. Vital signs are normal. She appears well-developed and well-nourished. She is sleeping. She appears ill. Nasal cannula in place.       HENT:   Head: Normocephalic and atraumatic.   Nose: Nose normal.   Eyes: Conjunctivae and EOM are normal. Pupils are equal, round, and reactive to light. Left eye exhibits no discharge. No scleral icterus.   Neck: Normal range of motion. Neck supple. No JVD present. No tracheal deviation present. No thyromegaly present.   Cardiovascular: Normal rate and intact distal pulses.    Murmur heard.  Pulmonary/Chest: Effort normal and breath sounds normal. No respiratory distress. She has no wheezes.   Abdominal: Soft. Bowel sounds are normal.   Genitourinary:   Genitourinary Comments: pham   Musculoskeletal: Normal range of motion. She exhibits edema.   Neurological: She is alert and oriented to person, place, and time. No cranial nerve deficit.   Skin: Skin is warm and dry.  Capillary refill takes 2 to 3 seconds.   Psychiatric: She has a normal mood and affect.   Nursing note and vitals reviewed.      Significant Labs:   Blood Culture: No results for input(s): LABBLOO in the last 48 hours.  BMP:   Recent Labs  Lab 05/24/18  0400 05/24/18  1030   *  --    *  --    K 3.2* 3.4*   CL 84*  --    CO2 27  --    BUN 63*  --    CREATININE 2.2*  --    CALCIUM 9.1  --    MG 2.3 2.8*     CBC:   Recent Labs  Lab 05/23/18  0400 05/24/18  0400   WBC 12.27 13.97*   HGB 11.1* 11.5*   HCT 32.6* 32.9*    334     All pertinent labs within the past 24 hours have been reviewed.    Significant Imaging: I have reviewed and interpreted all pertinent imaging results/findings within the past 24 hours.    Assessment/Plan:      * Cardiogenic shock    - on levophed and iv diuresis on hold   - echo ef 45 with wall motion abnormalities   - continue with nstemi management   -plan for lhc/rhc once kidney function improves     05/23- will continue levophed and wean as tolerated.  05/24- will wean off levophed as tolerated.          Right lower lobe pneumonia      05/24- now started on empiric antibiotics-will follow X-ray in am .  This can be fluid versus pneumonia         Atrial fibrillation with RVR      05/24- will continue amiodarone ,on heparin drip, now better rate controlled.        Respiratory alkalosis      05/23-cardiology follow up , serum troponin is more than 50 , will need LHC but optimal timing was discussed extensively with cardiology .    Dr Matthews recommends holding off on LHC at this time.  Will optimize medical therapy .  Continue heparin drip         KEN (acute kidney injury)    - cardiorenal   Improved from admission           Leukocytosis    Leukocytosis reactive will continue to monitor         Electrolyte imbalance              Diabetic nephropathy associated with type 2 diabetes mellitus      05/23- insulin sliding scale , closely monitor glucose.    Continue levemir 15  units        Inadequate dietary energy intake              NSTEMI (non-ST elevated myocardial infarction)    -continue iv heparin,aspirin,brilinta,statin and ace on hold due to grisel and hypotension   -plan heart cath once kidney function improved   05/24- Cardiology follow up , will optimize medical therapy .  Case was discussed extensively with cardiology -No need for acute LHC at this time.  Continue lasix, heparin drip, coreg,crestor.          Acute renal failure superimposed on stage 3 chronic kidney disease    - Likely cardiorenal syndrome.  - Diurese as above.  - Avoid nephrotoxic agents.  - Follow kidney function closely.  -nephrology on board     05/23-will monitor renal function closely while on lasix drip ,nephrology follow up .  05/24- serum creatine is stable at 2.2. Will continue to monitor very closely        Acute respiratory failure with hypoxia    - secondary to acute chf   - Currently on intubated           Acute on chronic combined systolic and diastolic heart failure    - iv diuresis on hold  - on vent   - on beta blocker   - ace on hold due to grisel and hypotension     05/23-Will restart lasix drip after discussion with cardiology .  Cardiac echo-  1 - Wall motion abnormalities.     2 - Mildly depressed left ventricular systolic function (EF 45-50%).     3 - Indeterminate LV diastolic function  Case was discussed extensively with cardiology .-no benefit for LHC at this time as risks will outweigh the benefit.    05/24- will continue lasix as tolerated . Cardiology input appreciated .        Acute pulmonary edema with congestive heart failure    - improved   -iv diuresis on hold .   - patient still on pressors         CAD (coronary artery disease)    - Recent DEWAYNE of LM due to critical in-stent stenosis on 5/2/18 per Dr. Wooten ([facility]).  -continue cardioprotective meds         Hyperlipidemia    - Continue statin therapy.  - FLP in AM.        Morbid obesity with BMI of 40.0-44.9, adult    -  Lifestyle modifications.  - Pulmonology consult, ? [facility].        Essential hypertension    - hypotensive hold on antihypertensives   - Resume home Coreg.  .        Hypothyroidism      05/23- will continue synthroid         Type 2 diabetes mellitus with hyperglycemia    - Accuchecks with SSI.  - Hold glipizide, will resume at DC.            VTE Risk Mitigation         Ordered     heparin 25,000 units in dextrose 5% 250 mL (100 units/mL) infusion; FEMALE  Continuous      05/19/18 0905     heparin 25,000 units in dextrose 5% 250 mL (100 units/mL) bolus from bag; PRN BOLUS  As needed (PRN)      05/19/18 0905     heparin 25,000 units in dextrose 5% 250 mL (100 units/mL) bolus from bag; PRN BOLUS  As needed (PRN)      05/19/18 0905     Place sequential compression device  Until discontinued      05/19/18 0628     IP VTE HIGH RISK PATIENT  Once      05/19/18 0248          Critical care time spent on the evaluation and treatment of severe organ dysfunction, review of pertinent labs and imaging studies, discussions with consulting providers and discussions with patient/family: 45 minutes.    Elian Cha MD  Department of Hospital Medicine   [facility]

## 2018-05-24 NOTE — NURSING
At bedside, pts HR suddenly in to the 150s, appears to be AFIB.  Placed a call to respiratory for an EKG.  Dr. Rod notified.  Also notified that potassium this am is 3.2.  Orders for potassium IV.  Noted.  Pt awake, and states she does not feel any different at present.      Dr. Rod at bedside.  Pt starting to have runs of VTACH associated with this apparent supraventricular tachyarrhythmia.  Stat pads applied.  Pt instructed to cough.  Done.  No changes.  Orders for amiodarone bolus and maintenance dose.  Pt continues to be awake and states her chest feels a little funny, but otherwise ok.  Dr. Matthews at bedside.  Pt now in VTACH in the 190s.  Pt states she is feeling weak.  Map in the low 50s.  Orders for adenosine 6 mg, given, no effect.  Orders for 12 mg adenosine, given, pts heart rate down to the 70s for approximately 5 seconds, then back to the 180s, VTACH.  Pt remains awake and following commands, MAP in the low 50s.  Orders for Synchronized Cardioversion from dr. Matthews.  1 mg versed given iv by MD.  Orders for sync cardioversion at 150J.  Done.  Pt quickly back in to VTACH in the 180s.  Orders for 200J, sync.  Done.  Pt quickly back up to VTACH in the 180s.  Pt remains awake, b/p 70s/40s.  Pt coughed several times, rhythm suddenly back to normal sinus.  Orders to continue amiodarone infusion.  Potassium and Magnesium continue to infuse as well.  MAP quickly up to the 80s.  Will continue to monitor.  pts daughter at bedside.

## 2018-05-24 NOTE — ASSESSMENT & PLAN NOTE
-Improving with Lasix gtt  -continue current mgmt  -ACEI/ARB held due to KEN/need for pressor support  -continue ASA, Brilinta and Statin  -continue Coreg on 3.125 mg daily

## 2018-05-24 NOTE — SUBJECTIVE & OBJECTIVE
Interval History: 05/24- She developed symptomatic Afib RVR and had cardioversion done, IV amiodarone was also given at bedside ,critical care team added Vanco and Azactam.         Review of Systems   Constitutional: Negative for activity change, chills, diaphoresis, fatigue, fever and unexpected weight change.   HENT: Negative for congestion, postnasal drip, rhinorrhea, sinus pressure, sore throat and trouble swallowing.    Eyes: Negative for photophobia and visual disturbance.   Respiratory: Positive for shortness of breath. Negative for apnea, cough, chest tightness and wheezing.         Orthopnea, PND.   Cardiovascular: Positive for palpitations. Negative for chest pain and leg swelling.   Gastrointestinal: Negative for abdominal distention, abdominal pain, blood in stool, constipation, diarrhea, nausea and vomiting.   Endocrine: Negative for polydipsia, polyphagia and polyuria.   Genitourinary: Negative for difficulty urinating, dysuria, frequency, hematuria and urgency.   Musculoskeletal: Negative for arthralgias, back pain, gait problem, joint swelling and myalgias.   Skin: Negative for pallor, rash and wound.   Neurological: Negative for dizziness, seizures, syncope, speech difficulty, weakness, light-headedness, numbness and headaches.   Psychiatric/Behavioral: Negative for confusion. The patient is not nervous/anxious.    All other systems reviewed and are negative.    Objective:     Vital Signs (Most Recent):  Temp: 98.9 °F (37.2 °C) (05/24/18 1508)  Pulse: 86 (05/24/18 1645)  Resp: (!) 26 (05/24/18 1645)  BP: (!) 79/57 (05/24/18 1645)  SpO2: 100 % (05/24/18 1645) Vital Signs (24h Range):  Temp:  [98.2 °F (36.8 °C)-99.2 °F (37.3 °C)] 98.9 °F (37.2 °C)  Pulse:  [] 86  Resp:  [6-41] 26  SpO2:  [89 %-100 %] 100 %  BP: ()/(51-80) 79/57     Weight: 95.8 kg (211 lb 3.2 oz)  Body mass index is 34.09 kg/m².    Intake/Output Summary (Last 24 hours) at 05/24/18 1718  Last data filed at 05/24/18 6058    Gross per 24 hour   Intake          2275.89 ml   Output             2230 ml   Net            45.89 ml      Physical Exam   Constitutional: She is oriented to person, place, and time. Vital signs are normal. She appears well-developed and well-nourished. She is sleeping. She appears ill. Nasal cannula in place.       HENT:   Head: Normocephalic and atraumatic.   Nose: Nose normal.   Eyes: Conjunctivae and EOM are normal. Pupils are equal, round, and reactive to light. Left eye exhibits no discharge. No scleral icterus.   Neck: Normal range of motion. Neck supple. No JVD present. No tracheal deviation present. No thyromegaly present.   Cardiovascular: Normal rate and intact distal pulses.    Murmur heard.  Pulmonary/Chest: Effort normal and breath sounds normal. No respiratory distress. She has no wheezes.   Abdominal: Soft. Bowel sounds are normal.   Genitourinary:   Genitourinary Comments: pham   Musculoskeletal: Normal range of motion. She exhibits edema.   Neurological: She is alert and oriented to person, place, and time. No cranial nerve deficit.   Skin: Skin is warm and dry. Capillary refill takes 2 to 3 seconds.   Psychiatric: She has a normal mood and affect.   Nursing note and vitals reviewed.      Significant Labs:   Blood Culture: No results for input(s): LABBLOO in the last 48 hours.  BMP:   Recent Labs  Lab 05/24/18  0400 05/24/18  1030   *  --    *  --    K 3.2* 3.4*   CL 84*  --    CO2 27  --    BUN 63*  --    CREATININE 2.2*  --    CALCIUM 9.1  --    MG 2.3 2.8*     CBC:   Recent Labs  Lab 05/23/18  0400 05/24/18  0400   WBC 12.27 13.97*   HGB 11.1* 11.5*   HCT 32.6* 32.9*    334     All pertinent labs within the past 24 hours have been reviewed.    Significant Imaging: I have reviewed and interpreted all pertinent imaging results/findings within the past 24 hours.

## 2018-05-24 NOTE — SUBJECTIVE & OBJECTIVE
Interval History:   Remains withdrawn, poor po intake    Review of Systems   Constitutional: Positive for malaise/fatigue.   HENT: Negative.    Eyes: Negative.    Respiratory: Positive for cough and shortness of breath.    Gastrointestinal: Negative.    Genitourinary: Negative.    Musculoskeletal: Negative.    Skin: Negative.    Neurological: Positive for weakness.   Endo/Heme/Allergies: Negative.    Psychiatric/Behavioral: Negative.        Objective:     Vital Signs (Most Recent):  Temp: 99.2 °F (37.3 °C) (05/24/18 1115)  Pulse: 97 (05/24/18 1508)  Resp: (!) 24 (05/24/18 1508)  BP: 101/63 (05/24/18 1430)  SpO2: 99 % (05/24/18 1508) Vital Signs (24h Range):  Temp:  [98.2 °F (36.8 °C)-99.2 °F (37.3 °C)] 99.2 °F (37.3 °C)  Pulse:  [] 97  Resp:  [6-41] 24  SpO2:  [89 %-100 %] 99 %  BP: ()/(51-80) 101/63     Weight: 95.8 kg (211 lb 3.2 oz)  Body mass index is 34.09 kg/m².      Intake/Output Summary (Last 24 hours) at 05/24/18 1553  Last data filed at 05/24/18 1400   Gross per 24 hour   Intake          1914.81 ml   Output             2275 ml   Net          -360.19 ml       Physical Exam   Constitutional: She is oriented to person, place, and time. Vital signs are normal. She appears well-developed and well-nourished. She is sleeping. She appears ill. Nasal cannula in place.       HENT:   Head: Normocephalic and atraumatic.   Nose: Nose normal.   Eyes: Conjunctivae and EOM are normal. Pupils are equal, round, and reactive to light. Left eye exhibits no discharge. No scleral icterus.   Neck: Normal range of motion. Neck supple. No JVD present. No tracheal deviation present. No thyromegaly present.   Cardiovascular: Normal rate and intact distal pulses.    Murmur heard.  Pulmonary/Chest: Effort normal and breath sounds normal. No respiratory distress. She has no wheezes.   Abdominal: Soft. Bowel sounds are normal.   Genitourinary:   Genitourinary Comments: pham   Musculoskeletal: Normal range of motion. She  exhibits edema.   Neurological: She is alert and oriented to person, place, and time. No cranial nerve deficit.   Skin: Skin is warm and dry. Capillary refill takes 2 to 3 seconds.   Psychiatric: She has a normal mood and affect.   Nursing note and vitals reviewed.      Vents:  Vent Mode: Spont (05/22/18 1445)  Ventilator Initiated: Yes (05/19/18 1539)  Set Rate: 0 bmp (05/22/18 1445)  Vt Set: 400 mL (05/22/18 1445)  Pressure Support: 12 cmH20 (05/22/18 1445)  PEEP/CPAP: 8 cmH20 (05/22/18 1445)  Oxygen Concentration (%): 30 (05/24/18 1508)  Peak Airway Pressure: 20 cmH2O (05/22/18 1445)  Plateau Pressure: 0 cmH20 (05/22/18 1445)  Total Ve: 5.78 mL (05/22/18 1445)  F/VT Ratio<105 (RSBI): (!) 63.25 (05/22/18 1445)    Lines/Drains/Airways     Central Venous Catheter Line                 Percutaneous Central Line Insertion/Assessment - triple lumen  05/19/18 1600 right internal jugular 4 days          Drain                 Urethral Catheter 05/19/18 0115 Latex 16 Fr. 5 days          Arterial Line                 Arterial Line 05/23/18 1800 Right Radial less than 1 day                Significant Labs:    CBC/Anemia Profile:    Recent Labs  Lab 05/23/18  0400 05/24/18  0400   WBC 12.27 13.97*   HGB 11.1* 11.5*   HCT 32.6* 32.9*    334   MCV 92 89   RDW 14.9* 15.0*        Chemistries:    Recent Labs  Lab 05/23/18  0400 05/23/18  1415 05/24/18  0400 05/24/18  1030   * 129* 129*  --    K 3.8 3.3* 3.2* 3.4*   CL 82* 83* 84*  --    CO2 32* 30* 27  --    BUN 53* 60* 63*  --    CREATININE 2.0* 2.2* 2.2*  --    CALCIUM 9.5 9.2 9.1  --    ALBUMIN 2.7*  --  2.7*  --    PROT 7.9  --  8.2  --    BILITOT 1.9*  --  2.1*  --    ALKPHOS 119  --  124  --    ALT 43  --  30  --    AST 67*  --  45*  --    MG  --   --  2.3 2.8*       ABGs:   Recent Labs  Lab 05/24/18  0435   PH 7.541*   PCO2 39.7   HCO3 34.0*   POCSATURATED 99   BE 12     All pertinent labs within the past 24 hours have been reviewed.    Significant Imaging:  I  have reviewed and interpreted all pertinent imaging results/findings within the past 24 hours.     CXR  The heart size remains enlarged with postoperative changes along the sternum.  Right-sided jugular line again noted.  Interval development of bilateral infiltrates/edematous changes, greatest in the right perihilar zone and right lung base.  This is somewhat confluent on the right.  This may all be related to congestion/edema but superimposed pneumonia, especially at the right base cannot be excluded.  Follow-up recommended

## 2018-05-24 NOTE — PROGRESS NOTES
APTT drawn at 1930 is 57.2, no change to current infusion rate of 14u/hr.  Will recheck APTT in am.

## 2018-05-24 NOTE — PLAN OF CARE
Problem: Patient Care Overview  Goal: Plan of Care Review  Outcome: Ongoing (interventions implemented as appropriate)  Pt on Bipap and is tolerating well. No distress noted at this time.

## 2018-05-24 NOTE — ASSESSMENT & PLAN NOTE
-continue iv heparin,aspirin,brilinta,statin and ace on hold due to grisel and hypotension   -plan heart cath once kidney function improved   05/24- Cardiology follow up , will optimize medical therapy .  Case was discussed extensively with cardiology -No need for acute LHC at this time.  Continue lasix, heparin drip, coreg,crestor.

## 2018-05-24 NOTE — ASSESSMENT & PLAN NOTE
- iv diuresis on hold  - on vent   - on beta blocker   - ace on hold due to grisel and hypotension     05/23-Will restart lasix drip after discussion with cardiology .  Cardiac echo-  1 - Wall motion abnormalities.     2 - Mildly depressed left ventricular systolic function (EF 45-50%).     3 - Indeterminate LV diastolic function  Case was discussed extensively with cardiology .-no benefit for LHC at this time as risks will outweigh the benefit.    05/24- will continue lasix as tolerated . Cardiology input appreciated .

## 2018-05-24 NOTE — SUBJECTIVE & OBJECTIVE
Interval History: 05/23-she remains on vasopressor support ,case was discussed extensively with cardiology-no benefit for [facility] at this time.  Lab data showed troponin of more than 50,creatinine of 2.2      Review of Systems   Constitutional: Negative for activity change, chills, diaphoresis, fatigue, fever and unexpected weight change.   HENT: Negative for congestion, postnasal drip, rhinorrhea, sinus pressure, sore throat and trouble swallowing.    Eyes: Negative for photophobia and visual disturbance.   Respiratory: Positive for shortness of breath. Negative for apnea, cough, chest tightness and wheezing.         Orthopnea, PND.   Cardiovascular: Positive for palpitations and leg swelling. Negative for chest pain.   Gastrointestinal: Negative for abdominal distention, abdominal pain, blood in stool, constipation, diarrhea, nausea and vomiting.   Endocrine: Negative for polydipsia, polyphagia and polyuria.   Genitourinary: Negative for difficulty urinating, dysuria, frequency, hematuria and urgency.   Musculoskeletal: Negative for arthralgias, back pain, gait problem, joint swelling and myalgias.   Skin: Negative for pallor, rash and wound.   Neurological: Negative for dizziness, seizures, syncope, speech difficulty, weakness, light-headedness, numbness and headaches.   Psychiatric/Behavioral: Negative for confusion. The patient is not nervous/anxious.    All other systems reviewed and are negative.    Objective:     Vital Signs (Most Recent):  Temp: 98.2 °F (36.8 °C) (05/23/18 1905)  Pulse: 100 (05/23/18 2105)  Resp: (!) 28 (05/23/18 2105)  BP: (!) 97/57 (05/23/18 2100)  SpO2: 96 % (05/23/18 2105) Vital Signs (24h Range):  Temp:  [98.1 °F (36.7 °C)-98.8 °F (37.1 °C)] 98.2 °F (36.8 °C)  Pulse:  [] 100  Resp:  [11-34] 28  SpO2:  [89 %-100 %] 96 %  BP: ()/(44-95) 97/57     Weight: 99.8 kg (220 lb 0.3 oz)  Body mass index is 35.51 kg/m².    Intake/Output Summary (Last 24 hours) at 05/23/18 2152  Last data  filed at 05/23/18 2102   Gross per 24 hour   Intake           979.89 ml   Output             1040 ml   Net           -60.11 ml      Physical Exam   Constitutional: She is oriented to person, place, and time. She appears well-developed and well-nourished. No distress.   HENT:   Head: Normocephalic and atraumatic.   Neck: JVD present.   Cardiovascular: Normal rate, regular rhythm and normal heart sounds.  Exam reveals no friction rub.    Pulmonary/Chest: No respiratory distress. She has rales.   Abdominal: Soft. She exhibits no distension. There is no tenderness.   Musculoskeletal: She exhibits edema.   Neurological: She is oriented to person, place, and time.   Skin: Skin is warm and dry.   Psychiatric: She has a normal mood and affect. Her behavior is normal.   Nursing note and vitals reviewed.      Significant Labs: Blood Culture: No results for input(s): LABBLOO in the last 48 hours.  BMP:   Recent Labs  Lab 05/22/18  0100  05/23/18  1415   *  < > 204*   *  < > 129*   K 3.4*  < > 3.3*   CL 80*  < > 83*   CO2 35*  < > 30*   BUN 37*  < > 60*   CREATININE 1.9*  < > 2.2*   CALCIUM 9.9  < > 9.2   MG 1.8  --   --    < > = values in this interval not displayed.  All pertinent labs within the past 24 hours have been reviewed.    Significant Imaging: I have reviewed and interpreted all pertinent imaging results/findings within the past 24 hours.

## 2018-05-24 NOTE — ASSESSMENT & PLAN NOTE
Cont Levophed infusion and has   tolerated weaning  ICU cardiac monitoring  Keep MAP > 65mmHG  Continue A Line

## 2018-05-24 NOTE — SUBJECTIVE & OBJECTIVE
Review of Systems   Constitution: Positive for malaise/fatigue. Negative for weakness.   HENT: Negative for hearing loss and hoarse voice.    Eyes: Negative for visual disturbance.   Cardiovascular: Positive for dyspnea on exertion. Negative for chest pain, claudication, irregular heartbeat, leg swelling, near-syncope, orthopnea, palpitations, paroxysmal nocturnal dyspnea and syncope.   Respiratory: Negative for cough, hemoptysis, shortness of breath, sleep disturbances due to breathing, snoring and wheezing.    Endocrine: Negative for cold intolerance.   Hematologic/Lymphatic: Does not bruise/bleed easily.   Skin: Negative for color change, dry skin and nail changes.   Musculoskeletal: Positive for back pain and myalgias. Negative for joint pain.   Gastrointestinal: Negative for bloating, abdominal pain, constipation, nausea and vomiting.   Genitourinary: Negative for dysuria, flank pain, hematuria and hesitancy.   Neurological: Negative for headaches, light-headedness, loss of balance, numbness and paresthesias.   Psychiatric/Behavioral: Negative for altered mental status.   Allergic/Immunologic: Negative for environmental allergies.     Objective:     Vital Signs (Most Recent):  Temp: 99.1 °F (37.3 °C) (05/24/18 0715)  Pulse: 91 (05/24/18 1130)  Resp: (!) 28 (05/24/18 1130)  BP: 115/74 (05/24/18 1000)  SpO2: 99 % (05/24/18 1130) Vital Signs (24h Range):  Temp:  [98.1 °F (36.7 °C)-99.1 °F (37.3 °C)] 99.1 °F (37.3 °C)  Pulse:  [] 91  Resp:  [6-41] 28  SpO2:  [89 %-100 %] 99 %  BP: ()/(50-95) 115/74     Weight: 95.8 kg (211 lb 3.2 oz)  Body mass index is 34.09 kg/m².     SpO2: 99 %  O2 Device (Oxygen Therapy): nasal cannula      Intake/Output Summary (Last 24 hours) at 05/24/18 1311  Last data filed at 05/24/18 1100   Gross per 24 hour   Intake          1664.81 ml   Output             2200 ml   Net          -535.19 ml       Lines/Drains/Airways     Central Venous Catheter Line                  Percutaneous Central Line Insertion/Assessment - triple lumen  05/19/18 1600 right internal jugular 4 days          Drain                 Urethral Catheter 05/19/18 0115 Latex 16 Fr. 5 days          Arterial Line                 Arterial Line 05/23/18 1800 Right Radial less than 1 day                Physical Exam   Constitutional: She is oriented to person, place, and time. She appears well-developed and well-nourished. She appears ill.   HENT:   Head: Normocephalic and atraumatic.   Eyes: Conjunctivae are normal. Pupils are equal, round, and reactive to light.   Neck: Full passive range of motion without pain. Neck supple. No JVD present.   Cardiovascular: Normal rate, regular rhythm, S1 normal, S2 normal and intact distal pulses.  PMI is not displaced.  Exam reveals distant heart sounds.    No murmur heard.  Pulses:       Radial pulses are 2+ on the right side, and 2+ on the left side.        Dorsalis pedis pulses are 2+ on the right side, and 2+ on the left side.   Pulmonary/Chest: Effort normal. No respiratory distress. She has decreased breath sounds in the right lower field and the left lower field. She has no wheezes.   Abdominal: Soft. Bowel sounds are normal. She exhibits no distension.   Obese   Genitourinary:   Genitourinary Comments: deferred   Musculoskeletal: Normal range of motion. She exhibits edema (trace BLE).        Right ankle: She exhibits swelling.        Left ankle: She exhibits swelling.   Neurological: She is alert and oriented to person, place, and time.   Skin: Skin is warm and dry. No cyanosis. Nails show no clubbing.   Psychiatric: She has a normal mood and affect. Her speech is normal. Judgment and thought content normal. Cognition and memory are impaired.   Intermittently confused   Nursing note and vitals reviewed.      Significant Labs:   ABG:   Recent Labs  Lab 05/23/18  0745 05/23/18  1038 05/24/18  0435   PH 7.614* 7.617* 7.541*   PCO2 30.7* 30.8* 39.7   HCO3 31.0* 31.5* 34.0*    POCSATURATED 96 97 99   BE 10 10 12   , Blood Culture: No results for input(s): LABBLOO in the last 48 hours., BMP:   Recent Labs  Lab 05/23/18 0400 05/23/18 1415 05/24/18 0400 05/24/18  1030   * 204* 197*  --    * 129* 129*  --    K 3.8 3.3* 3.2* 3.4*   CL 82* 83* 84*  --    CO2 32* 30* 27  --    BUN 53* 60* 63*  --    CREATININE 2.0* 2.2* 2.2*  --    CALCIUM 9.5 9.2 9.1  --    MG  --   --  2.3 2.8*   , CMP   Recent Labs  Lab 05/23/18 0400 05/23/18 1415 05/24/18  0400 05/24/18  1030   * 129* 129*  --    K 3.8 3.3* 3.2* 3.4*   CL 82* 83* 84*  --    CO2 32* 30* 27  --    * 204* 197*  --    BUN 53* 60* 63*  --    CREATININE 2.0* 2.2* 2.2*  --    CALCIUM 9.5 9.2 9.1  --    PROT 7.9  --  8.2  --    ALBUMIN 2.7*  --  2.7*  --    BILITOT 1.9*  --  2.1*  --    ALKPHOS 119  --  124  --    AST 67*  --  45*  --    ALT 43  --  30  --    ANIONGAP 15 16 18*  --    ESTGFRAFRICA 32* 28* 28*  --    EGFRNONAA 28* 25* 25*  --    , CBC   Recent Labs  Lab 05/23/18 0400 05/24/18  0400   WBC 12.27 13.97*   HGB 11.1* 11.5*   HCT 32.6* 32.9*    334   , INR No results for input(s): INR, PROTIME in the last 48 hours., Lipid Panel No results for input(s): CHOL, HDL, LDLCALC, TRIG, CHOLHDL in the last 48 hours. and Troponin   Recent Labs  Lab 05/23/18 0400   TROPONINI >50.000*       Significant Imaging:

## 2018-05-25 NOTE — ASSESSMENT & PLAN NOTE
Patient is in acute on chronic congestive heart failure with cardiogenic shock.  Case discussed in detail with Cardiology Dr. Matthews and Pulmonary-ICU dr. Sorin Rod.  After multiple discussions we decided to proceed with urgent dialysis.  Multiple organ failure including congestive heart failure, respiratory failure and kidney failure discussed in detail with the patient and her daughter.  As a final discussion we proceeded with the consulting vascular surgery  for the placement of urgent Vas-Cath.  High risk surgery and high risk of dialysis explained in detail to the patient's daughter.  All consent obtained for Vas-Cath and urgent dialysis.  Our plan would be to start with the ultrafiltration session with 500 cc an hour of ultrafiltration goal for at least 2-3 hours as tolerated.  Continue with pressor support and increase the pressor support if and when needed.  Expect some hypotension initially.  Once the ultrafiltration goal his achieved we will re-evaluate the cardiac situation and may consider starting the patient on CRRT    Critical care time spent today is about 40 minutes total in multiple visits.  Greater than 50% of the time was spent in counseling with the patient and the family and discussing the therapeutic options with all specialties.

## 2018-05-25 NOTE — ASSESSMENT & PLAN NOTE
- on levophed and iv diuresis on hold   - echo ef 45 with wall motion abnormalities   - continue with nstemi management   -plan for lhc/rhc once kidney function improves     05/23- will continue levophed and wean as tolerated.  05/24- will wean off levophed as tolerated.    05/25- will continue levophed/dobutamine ,cardiology follow up .

## 2018-05-25 NOTE — PROGRESS NOTES
Notified EICU, informed that pt's respiratory culture from 5/24/18 at 1616 came back positive for many gram positive cocci and many gram negative rods.

## 2018-05-25 NOTE — SUBJECTIVE & OBJECTIVE
Interval History:05/25- She is now on bipap -remains on multiple drips-amiodarone,heparin, levophed, dobutamine       Review of Systemsunable to evaluate as she is on BIPAP  Objective:     Vital Signs (Most Recent):  Temp: 97 °F (36.1 °C) (05/25/18 1515)  Pulse: 89 (05/25/18 1600)  Resp: (!) 35 (05/25/18 1600)  BP: (!) 97/34 (05/25/18 1600)  SpO2: 100 % (05/25/18 1600) Vital Signs (24h Range):  Temp:  [97 °F (36.1 °C)-98.4 °F (36.9 °C)] 97 °F (36.1 °C)  Pulse:  [] 89  Resp:  [20-45] 35  SpO2:  [14 %-100 %] 100 %  BP: ()/(12-71) 97/34     Weight: 95.6 kg (210 lb 12.2 oz)  Body mass index is 34.02 kg/m².    Intake/Output Summary (Last 24 hours) at 05/25/18 1612  Last data filed at 05/25/18 1600   Gross per 24 hour   Intake          2517.47 ml   Output             1710 ml   Net           807.47 ml      Physical Exam   Constitutional: She is oriented to person, place, and time. She appears well-developed and well-nourished. She appears distressed.   On BIPAP   HENT:   Head: Normocephalic and atraumatic.   Eyes: Conjunctivae and EOM are normal. Pupils are equal, round, and reactive to light.   Neck: Normal range of motion and full passive range of motion without pain. Neck supple. Carotid bruit is not present. No edema present. No thyroid mass and no thyromegaly present.   Cardiovascular: Normal rate, regular rhythm, S1 normal, S2 normal, intact distal pulses and normal pulses.   No extrasystoles are present. PMI is displaced.  Exam reveals no friction rub.    Murmur heard.   Systolic murmur is present with a grade of 2/6   +JVD RV heave loud P2    Pulmonary/Chest: Effort normal. No accessory muscle usage. No respiratory distress. She has no wheezes. She has no rales. She exhibits no tenderness.   ON BIPAP, coarse breath sounds bilaterally    Abdominal: Soft. Bowel sounds are normal. She exhibits no distension and no mass. There is no hepatosplenomegaly. There is no tenderness. There is no rebound and no CVA  tenderness. No hernia.   Musculoskeletal: Normal range of motion. She exhibits edema. She exhibits no tenderness.   PAD    Neurological: She is alert and oriented to person, place, and time. She has normal reflexes. No cranial nerve deficit or sensory deficit. She exhibits normal muscle tone. Coordination normal.   Skin: Skin is warm and dry. No bruising, no ecchymosis and no rash noted. No cyanosis or erythema. No pallor. Nails show no clubbing.   Psychiatric: She has a normal mood and affect. Her speech is normal and behavior is normal. Judgment and thought content normal.   Nursing note and vitals reviewed.      Significant Labs:   ABGs:   Recent Labs  Lab 05/25/18  1026   PH 7.496*   PCO2 35.8   HCO3 27.6   POCSATURATED 97   BE 4     Blood Culture: No results for input(s): LABBLOO in the last 48 hours.  BMP:   Recent Labs  Lab 05/25/18  1500   *   *   K 3.7   CL 89*   CO2 29   BUN 63*   CREATININE 2.1*   CALCIUM 8.2*   MG 2.2     Urine Studies: No results for input(s): COLORU, APPEARANCEUA, PHUR, SPECGRAV, PROTEINUA, GLUCUA, KETONESU, BILIRUBINUA, OCCULTUA, NITRITE, UROBILINOGEN, LEUKOCYTESUR, RBCUA, WBCUA, BACTERIA, SQUAMEPITHEL, HYALINECASTS in the last 48 hours.    Invalid input(s): WRIGHTSUR  All pertinent labs within the past 24 hours have been reviewed.    Significant Imaging: I have reviewed and interpreted all pertinent imaging results/findings within the past 24 hours.

## 2018-05-25 NOTE — ASSESSMENT & PLAN NOTE
- Likely cardiorenal syndrome.  - Diurese as above.  - Avoid nephrotoxic agents.  - Follow kidney function closely.  -nephrology on board     05/23-will monitor renal function closely while on lasix drip ,nephrology follow up .  05/24- serum creatine is stable at 2.2. Will continue to monitor very closely  05/25- will closely monitor serum creatinine. Urine out -1845mls last 24 hours ,nephrology follow up

## 2018-05-25 NOTE — ASSESSMENT & PLAN NOTE
05/24- will continue amiodarone ,on heparin drip, now better rate controlled.  05/25- continue heparin/amiodarone

## 2018-05-25 NOTE — ASSESSMENT & PLAN NOTE
New infiltrate in critically ill patient  RLL   Added Azactam and Vancomycin  Sputum culture final pending

## 2018-05-25 NOTE — PROGRESS NOTES
[facility] Hospital Medicine  Progress Note    Patient Name: Milli Montez  MRN: [id number]  Patient Class: IP- Inpatient   Admission Date: 5/19/2018  Length of Stay: 6 days  Attending Physician: Elian Cha MD  Primary Care Provider: Marito Amato MD        Subjective:     Principal Problem:Cardiogenic shock    HPI:  Ms. Montez is a 56yo female with  a PMHx of CAD with remote CABG (LIMA to Diagonal1 + distal LAD, and SVG-RPDA) and balloon PTCA of LM stent on 2/6/18 followed by repeat balloon PTCA + DEWAYNE of LM in-stent steosis 5/2/18, chronic diastolic HFpEF of 55-60%, HLD, HTN, DM II, and h/o TIA, who presented to the ED with c/o SOB that has progressively worsened over the past few days.  Associated orthopnea, PND, BLE edema, and palpitations.  Denies any CP, cough, weight gain, ABD pain or distention, N/V/D, dysuria, lightheadedness/dizziness, syncope, HA, AMS, focal deficits, diaphoresis, fever, or chills.  Upon arrival to ED, patient notably in severe respiratory distress and placed on BiPAP, and later AVAPS.  She received IV Lasix 40mg, followed by Lasix 60mg IV and IV Ativan with improvement in respiratory status.  Initial work-up resulted , troponin 0.006, cr. 1.5, lactic 5.1, ABG on AVAPS with pH 7.3, pCO2 41, pO2 525, HCO3 20.  CXR consistent with CHF.  EKG showed atrial tachycardia with LBBB (old), no acute ischemic ST-T changes from previous tracings.  Patient was admitted to ICU for acute decompensated HF under Hospital Medicine services.  Currently, patient appears comfortable in NAD.  Reports SOB greatly improved since IV Lasix and Ativan.  Patient is a Full Code.  Daughter Katherine Lewis is surrogate decision maker.    Hospital Course:  5/20/2018  Patient continue to be on vent and iv diuresis for acute cardiac pulmonary edema which shows improvement  . Continue treatment for nstemi with aspirin,brilinta ,statin. Echo finding and elevated trop noted continue with medical  management once stable patient will have LHC   5/21/2018  Patient remains intubated. Alert and follows commands. Low dose Levophed for pressor support.  Responding to Lasix gtt at 5mg/hr. Diuresing well since starting Lasix and Levophed. Troponin > 50.  2D echo showed EF of 45-50-%, +WMA.Plan for LHC and RHC   5/22/2018   Cardiology updated plan of care to wait on heart cath . Until her renal function improved . Diuresis on hold . Improved pulmonary edema . Continue to require pressors to support blood pressure   05/23-she remains on vasopressor support ,case was discussed extensively with cardiology-no benefit for [facility] at this time.  Lab data showed troponin of more than 50.  05/24- She developed symptomatic Afib RVR and had cardioversion done, IV amiodarone was also given at bedside ,critical care team added Vanco and Azactam.   05/25- She is now on bipap -remains on multiple drips-amiodarone,heparin, levophed, dobutamine     Interval History:05/25- She is now on bipap -remains on multiple drips-amiodarone,heparin, levophed, dobutamine       Review of Systemsunable to evaluate as she is on BIPAP  Objective:     Vital Signs (Most Recent):  Temp: 97 °F (36.1 °C) (05/25/18 1515)  Pulse: 89 (05/25/18 1600)  Resp: (!) 35 (05/25/18 1600)  BP: (!) 97/34 (05/25/18 1600)  SpO2: 100 % (05/25/18 1600) Vital Signs (24h Range):  Temp:  [97 °F (36.1 °C)-98.4 °F (36.9 °C)] 97 °F (36.1 °C)  Pulse:  [] 89  Resp:  [20-45] 35  SpO2:  [14 %-100 %] 100 %  BP: ()/(12-71) 97/34     Weight: 95.6 kg (210 lb 12.2 oz)  Body mass index is 34.02 kg/m².    Intake/Output Summary (Last 24 hours) at 05/25/18 1612  Last data filed at 05/25/18 1600   Gross per 24 hour   Intake          2517.47 ml   Output             1710 ml   Net           807.47 ml      Physical Exam   Constitutional: She is oriented to person, place, and time. She appears well-developed and well-nourished. She appears distressed.   On BIPAP   HENT:   Head:  Normocephalic and atraumatic.   Eyes: Conjunctivae and EOM are normal. Pupils are equal, round, and reactive to light.   Neck: Normal range of motion and full passive range of motion without pain. Neck supple. Carotid bruit is not present. No edema present. No thyroid mass and no thyromegaly present.   Cardiovascular: Normal rate, regular rhythm, S1 normal, S2 normal, intact distal pulses and normal pulses.   No extrasystoles are present. PMI is displaced.  Exam reveals no friction rub.    Murmur heard.   Systolic murmur is present with a grade of 2/6   +JVD RV heave loud P2    Pulmonary/Chest: Effort normal. No accessory muscle usage. No respiratory distress. She has no wheezes. She has no rales. She exhibits no tenderness.   ON BIPAP, coarse breath sounds bilaterally    Abdominal: Soft. Bowel sounds are normal. She exhibits no distension and no mass. There is no hepatosplenomegaly. There is no tenderness. There is no rebound and no CVA tenderness. No hernia.   Musculoskeletal: Normal range of motion. She exhibits edema. She exhibits no tenderness.   PAD    Neurological: She is alert and oriented to person, place, and time. She has normal reflexes. No cranial nerve deficit or sensory deficit. She exhibits normal muscle tone. Coordination normal.   Skin: Skin is warm and dry. No bruising, no ecchymosis and no rash noted. No cyanosis or erythema. No pallor. Nails show no clubbing.   Psychiatric: She has a normal mood and affect. Her speech is normal and behavior is normal. Judgment and thought content normal.   Nursing note and vitals reviewed.      Significant Labs:   ABGs:   Recent Labs  Lab 05/25/18  1026   PH 7.496*   PCO2 35.8   HCO3 27.6   POCSATURATED 97   BE 4     Blood Culture: No results for input(s): LABBLOO in the last 48 hours.  BMP:   Recent Labs  Lab 05/25/18  1500   *   *   K 3.7   CL 89*   CO2 29   BUN 63*   CREATININE 2.1*   CALCIUM 8.2*   MG 2.2     Urine Studies: No results for  input(s): COLORU, APPEARANCEUA, PHUR, SPECGRAV, PROTEINUA, GLUCUA, KETONESU, BILIRUBINUA, OCCULTUA, NITRITE, UROBILINOGEN, LEUKOCYTESUR, RBCUA, WBCUA, BACTERIA, SQUAMEPITHEL, HYALINECASTS in the last 48 hours.    Invalid input(s): MIA  All pertinent labs within the past 24 hours have been reviewed.    Significant Imaging: I have reviewed and interpreted all pertinent imaging results/findings within the past 24 hours.    Assessment/Plan:      * Cardiogenic shock    - on levophed and iv diuresis on hold   - echo ef 45 with wall motion abnormalities   - continue with nstemi management   -plan for lhc/rhc once kidney function improves     05/23- will continue levophed and wean as tolerated.  05/24- will wean off levophed as tolerated.    05/25- will continue levophed/dobutamine ,cardiology follow up .        Right lower lobe pneumonia      05/24- now started on empiric antibiotics-will follow X-ray in am .  This can be fluid versus pneumonia     05/25-will send serum procalcitonin ,will use cultures to guide therapy .Will need to deescalate therapy soon.        Atrial fibrillation with RVR      05/24- will continue amiodarone ,on heparin drip, now better rate controlled.  05/25- continue heparin/amiodarone        Respiratory alkalosis      05/23-cardiology follow up , serum troponin is more than 50 , will need LHC but optimal timing was discussed extensively with cardiology .    Dr Matthews recommends holding off on LHC at this time.  Will optimize medical therapy .  Continue heparin drip         KEN (acute kidney injury)    - cardiorenal   Improved from admission           Leukocytosis    Leukocytosis reactive will continue to monitor         Electrolyte imbalance              Diabetic nephropathy associated with type 2 diabetes mellitus      05/23- insulin sliding scale , closely monitor glucose.    Continue levemir 15 units        Inadequate dietary energy intake              NSTEMI (non-ST elevated myocardial  infarction)    -continue iv heparin,aspirin,brilinta,statin and ace on hold due to grisel and hypotension   -plan heart cath once kidney function improved   05/24- Cardiology follow up , will optimize medical therapy .  Case was discussed extensively with cardiology -No need for acute LHC at this time.  Continue lasix, heparin drip, coreg,crestor.    05/25- will optimize medical therapy .  Continue Lasix, coreg , on heparin drip .  Cardiology follow up         Acute renal failure superimposed on stage 3 chronic kidney disease    - Likely cardiorenal syndrome.  - Diurese as above.  - Avoid nephrotoxic agents.  - Follow kidney function closely.  -nephrology on board     05/23-will monitor renal function closely while on lasix drip ,nephrology follow up .  05/24- serum creatine is stable at 2.2. Will continue to monitor very closely  05/25- will closely monitor serum creatinine. Urine out -1845mls last 24 hours ,nephrology follow up           Acute respiratory failure with hypoxia    - secondary to acute chf   - Currently on intubated     05/25-now on BIPAP/Avaps,pulmonology follow up , will continue diuresis         Acute on chronic combined systolic and diastolic heart failure    - iv diuresis on hold  - on vent   - on beta blocker   - ace on hold due to grisel and hypotension     05/23-Will restart lasix drip after discussion with cardiology .  Cardiac echo-  1 - Wall motion abnormalities.     2 - Mildly depressed left ventricular systolic function (EF 45-50%).     3 - Indeterminate LV diastolic function  Case was discussed extensively with cardiology .-no benefit for LHC at this time as risks will outweigh the benefit.    05/24- will continue lasix as tolerated . Cardiology input appreciated .  05/25- Will defer therapy to cardiology .  Plan of care was discussed extensively with Dr Matthews and Dr Ha-will add dobutamine, increase dose of lasix to 5mg/hour        Acute pulmonary edema with congestive heart failure    -  improved   -iv diuresis on hold .   - patient still on pressors         CAD (coronary artery disease)    - Recent DEWAYNE of LM due to critical in-stent stenosis on 5/2/18 per Dr. Wooten ([facility]).  -continue cardioprotective meds         Hyperlipidemia    - Continue statin therapy.  - FLP in AM.        Morbid obesity with BMI of 40.0-44.9, adult    - Lifestyle modifications.  - Pulmonology consult, ? [facility].        Essential hypertension    - hypotensive hold on antihypertensives   - Resume home Coreg.  .        Hypothyroidism      05/23- will continue synthroid         Type 2 diabetes mellitus with hyperglycemia    - Accuchecks with SSI.  - Hold glipizide, will resume at DC.            VTE Risk Mitigation         Ordered     heparin (porcine) injection 2,000 Units  As needed (PRN)      05/25/18 1241     heparin 25,000 units in dextrose 5% 250 mL (100 units/mL) infusion; FEMALE  Continuous      05/19/18 0905     heparin 25,000 units in dextrose 5% 250 mL (100 units/mL) bolus from bag; PRN BOLUS  As needed (PRN)      05/19/18 0905     heparin 25,000 units in dextrose 5% 250 mL (100 units/mL) bolus from bag; PRN BOLUS  As needed (PRN)      05/19/18 0905     Place sequential compression device  Until discontinued      05/19/18 0628     IP VTE HIGH RISK PATIENT  Once      05/19/18 0248          Critical care time spent on the evaluation and treatment of severe organ dysfunction, review of pertinent labs and imaging studies, discussions with consulting providers and discussions with patient/family: 45 minutes.    Elian Cha MD  Department of Hospital Medicine   [facility]

## 2018-05-25 NOTE — PROGRESS NOTES
"[facility]  Nephrology  Progress Note    Patient Name: Milli Montez  MRN: [id number]  Admission Date: 5/19/2018  Hospital Length of Stay: 6 days  Attending Provider: Elian Cha MD   Primary Care Physician: Marito Amato MD  Principal Problem:Cardiogenic shock    Subjective:     HPI: Pt was seen and examined in ICU. H/o reviewed. Pt is a 56 y/o female with acute kidney injury, likely due to CHF exacerbation causing renal hypoperfusion. Pt has a baseline s Cr of 1.2 (1 month ago). s Cr is now 1.7. Pt was admitted with SOB that is "new", pt also has low exercise tolerance chronically due to SOB. Pt has a pertinent h/o of DM, HTN, CAD with past h/o of CABG and stents, and morbid obesity. Labs and meds in ER and ICU reviewed. Pt has received lasix IV injection, and remains fluid overloaded with LE swelling and continued SOB.    Interval History:  Patient is more short of breath on BiPAP, chest x-ray shows bilateral lung infiltrates consistent with acute on chronic pulmonary edema, Lasix drip at 2.5 mg an hour yielding a urine output of.  60 cc an hour after multiple discussions with cardiology Dr. Matthews, Dr. Rowell in the ICU patient will be placed on ultrafiltration session and then CRRT for further management of fluid overload and in order to try to avoid impending intubation for respiratory failure we will try to get as much fluid off as possible without having any hemodynamic compromise.  Patient continued on pressors and heparin    Review of patient's allergies indicates:   Allergen Reactions    Penicillins Swelling     Current Facility-Administered Medications   Medication Frequency    0.9%  NaCl infusion PRN    0.9%  NaCl infusion Once    acetaminophen tablet 650 mg Q6H PRN    amiodarone 360 mg/200 mL (1.8 mg/mL) infusion Continuous    aspirin chewable tablet 81 mg Daily    aztreonam injection 1,000 mg Q8H    bisacodyl suppository 10 mg Daily PRN    dextrose 50% injection 12.5 g " PRN    DOBUTamine 500mg in D5W 250mL infusion (premix) (NON-TITRATING) Continuous    docusate sodium capsule 100 mg Daily    furosemide (LASIX) 2 mg/mL in sodium chloride 0.9% 100 mL infusion (conc: 2 mg/mL) Continuous    glucagon (human recombinant) injection 1 mg PRN    heparin (porcine) injection 2,000 Units PRN    heparin 25,000 units in dextrose 5% 250 mL (100 units/mL) bolus from bag; PRN BOLUS PRN    heparin 25,000 units in dextrose 5% 250 mL (100 units/mL) bolus from bag; PRN BOLUS PRN    heparin 25,000 units in dextrose 5% 250 mL (100 units/mL) infusion; FEMALE Continuous    insulin aspart U-100 pen 1-10 Units 6 times per day    insulin detemir U-100 pen 15 Units QHS    levothyroxine tablet 75 mcg Daily    magnesium sulfate 2g in water 50mL IVPB (premix) PRN    nitroGLYCERIN SL tablet 0.4 mg Q5 Min PRN    norepinephrine 16 mg in dextrose 5 % 250 mL infusion Continuous    ondansetron injection 4 mg Q6H PRN    pantoprazole 40 mg in dextrose 5 % 100 mL IVPB (over 15 minutes) (ready to mix system) Daily    polyethylene glycol packet 17 g Daily    ramelteon tablet 8 mg Nightly PRN    rosuvastatin tablet 10 mg QHS    sodium phosphate 20.01 mmol in dextrose 5 % 250 mL IVPB PRN    sodium phosphate 30 mmol in dextrose 5 % 250 mL IVPB PRN    sodium phosphate 39.99 mmol in dextrose 5 % 250 mL IVPB PRN    ticagrelor tablet 90 mg BID    [START ON 5/26/2018] vancomycin (VANCOCIN) 1,000 mg in sodium chloride 0.9% 250 mL IVPB Q48H    vancomycin (VANCOCIN) 1,000 mg in sodium chloride 0.9% 250 mL IVPB Once       Objective:     Vital Signs (Most Recent):  Temp: 97 °F (36.1 °C) (05/25/18 1200)  Pulse: 90 (05/25/18 1400)  Resp: (!) 42 (05/25/18 1400)  BP: (!) 94/59 (05/25/18 1400)  SpO2: 98 % (05/25/18 1400)  O2 Device (Oxygen Therapy): BiPAP (05/25/18 0705) Vital Signs (24h Range):  Temp:  [97 °F (36.1 °C)-98.9 °F (37.2 °C)] 97 °F (36.1 °C)  Pulse:  [] 90  Resp:  [20-42] 42  SpO2:  [14 %-100 %]  98 %  BP: ()/(12-74) 94/59     Weight: 95.6 kg (210 lb 12.2 oz) (05/25/18 0505)  Body mass index is 34.02 kg/m².  Body surface area is 2.11 meters squared.    I/O last 3 completed shifts:  In: 3433.3 [P.O.:620; I.V.:2463.3; IV Piggyback:350]  Out: 3150 [Urine:3150]    Physical Exam   Constitutional: She is oriented to person, place, and time. She appears well-developed and well-nourished. She appears distressed.   On BiPEP    HENT:   Head: Normocephalic and atraumatic.   Eyes: Conjunctivae and EOM are normal. Pupils are equal, round, and reactive to light.   Neck: Normal range of motion and full passive range of motion without pain. Neck supple. Carotid bruit is not present. No edema present. No thyroid mass and no thyromegaly present.   Cardiovascular: Normal rate, regular rhythm, S1 normal, S2 normal, intact distal pulses and normal pulses.   No extrasystoles are present. PMI is displaced.  Exam reveals no friction rub.    Murmur heard.   Systolic murmur is present with a grade of 2/6   +JVD RV heave loud P2    Pulmonary/Chest: Effort normal. No accessory muscle usage. No respiratory distress. She has no wheezes. She has no rales. She exhibits no tenderness.   Abdominal: Soft. Bowel sounds are normal. She exhibits no distension and no mass. There is no hepatosplenomegaly. There is no tenderness. There is no rebound and no CVA tenderness. No hernia.   Musculoskeletal: Normal range of motion. She exhibits edema. She exhibits no tenderness.   PAD    Neurological: She is alert and oriented to person, place, and time. She has normal reflexes. No cranial nerve deficit or sensory deficit. She exhibits normal muscle tone. Coordination normal.   Skin: Skin is warm and dry. No bruising, no ecchymosis and no rash noted. No cyanosis or erythema. No pallor. Nails show no clubbing.   Psychiatric: She has a normal mood and affect. Her speech is normal and behavior is normal. Judgment and thought content normal.   Nursing  note and vitals reviewed.      Significant Labs:  All labs within the past 24 hours have been reviewed.     Significant Imaging:  Labs: Reviewed  X-Ray: Reviewed  Echo: Reviewed    Assessment/Plan:     * Cardiogenic shock    Patient is in acute on chronic congestive heart failure with cardiogenic shock.  Case discussed in detail with Cardiology Dr. Matthews and Pulmonary-ICU dr. Sorin Rod.  After multiple discussions we decided to proceed with urgent dialysis.  Multiple organ failure including congestive heart failure, respiratory failure and kidney failure discussed in detail with the patient and her daughter.  As a final discussion we proceeded with the consulting vascular surgery  for the placement of urgent Vas-Cath.  High risk surgery and high risk of dialysis explained in detail to the patient's daughter.  All consent obtained for Vas-Cath and urgent dialysis.  Our plan would be to start with the ultrafiltration session with 500 cc an hour of ultrafiltration goal for at least 2-3 hours as tolerated.  Continue with pressor support and increase the pressor support if and when needed.  Expect some hypotension initially.  Once the ultrafiltration goal his achieved we will re-evaluate the cardiac situation and may consider starting the patient on CRRT    Critical care time spent today is about 40 minutes total in multiple visits.  Greater than 50% of the time was spent in counseling with the patient and the family and discussing the therapeutic options with all specialties.            Thank you for your consult. I will sign off. Please contact us if you have any additional questions.    Kika Ha MD  Nephrology  [facility]

## 2018-05-25 NOTE — PROGRESS NOTES
[facility] -   Cardiology  Progress Note    Patient Name: Milli Montez  MRN: [id number]  Admission Date: 5/19/2018  Hospital Length of Stay: 6 days  Code Status: Full Code   Attending Physician: Elian Cha MD   Primary Care Physician: Marito Amato MD  Expected Discharge Date:   Principal Problem:Cardiogenic shock    Subjective:     Hospital Course:   5/20/18-Patient seen and examined today, now intubated. On Levophed for pressor support. Responding to Lasix gtt, appreciate nephrology rec's. Troponin > 50. Repeat later today. 2D echo showed EF of 45-50-%, +WMA. TSH 7.    5/21/18- Patient remains intubated. Alert and follows commands. Low dose Levophed for pressor support.  Responding to Lasix gtt at 5mg/hr. Diuresing well since starting Lasix and Levophed. Troponin > 50.  2D echo showed EF of 45-50-%, +WMA. Creatine improved on morning labs.      5/22/18--Patient seen and examined in ICU bed, family at bedside. Remains intubated. Alert and follows simple commands, communicated with pen and paper at this time. Low dose Levophed for pressor support to keep MAP >65. Lasix gtt stopped this AM. Heparin gtt in place. Fentanyl gtt for sedation. Labs reviewed, K+ 3.4 and Creatinine 1.9 today. Gentle IV hydration started this AM, repeat labs at noon.     5/23/18--Patient seen and examined in ICU bed, family at bedside. Extubated now. Alert and following commands. Low dose levophed infusion in place to keep MAP >65. IV Heparin gtt in place. Labs reviewed, Creatinine 2.0, BNP 2305, Troponin >50.   5/24. Developed afib with RVR in the AM.   to 140 bpm. Hypotensive, peaceful/alert, and no c/o chest pain, dyspnea. Amiodarone 300 mg bolus and epi given, and amio gtt started. Then pt developed nonsustained VT aound 10 seconds, switched to afib. adenosine IVP twice and the HR temporarily decreased to 90's and quickly back to 130 bpm. Had twice DCCV and no HR change. SBP at 60 to 80 mmHg. IVF and magnesium  given. Her HR gradually decreased and the rhythm is back to sinus. K 3.2 and Mg 2.3 repeat ekg showed sinus rhythm. MIHAI back to 100 mmHg. Stable.   05/25. Keep on sinus rhythm. Has SOB. CXR showed pulm. Edema. Elevated BNP. On Levophard gtt.      Review of Systems   Constitution: Positive for malaise/fatigue. Negative for weakness.   HENT: Negative for hearing loss and hoarse voice.    Eyes: Negative for visual disturbance.   Cardiovascular: Positive for dyspnea on exertion. Negative for chest pain, claudication, irregular heartbeat, leg swelling, near-syncope, orthopnea, palpitations, paroxysmal nocturnal dyspnea and syncope.   Respiratory: Negative for cough, hemoptysis, shortness of breath, sleep disturbances due to breathing, snoring and wheezing.    Endocrine: Negative for cold intolerance.   Hematologic/Lymphatic: Does not bruise/bleed easily.   Skin: Negative for color change, dry skin and nail changes.   Musculoskeletal: Positive for back pain and myalgias. Negative for joint pain.   Gastrointestinal: Negative for bloating, abdominal pain, constipation, nausea and vomiting.   Genitourinary: Negative for dysuria, flank pain, hematuria and hesitancy.   Neurological: Negative for headaches, light-headedness, loss of balance, numbness and paresthesias.   Psychiatric/Behavioral: Negative for altered mental status.   Allergic/Immunologic: Negative for environmental allergies.     Objective:     Vital Signs (Most Recent):  Temp: 97 °F (36.1 °C) (05/25/18 1200)  Pulse: 97 (05/25/18 1345)  Resp: (!) 32 (05/25/18 1345)  BP: (!) 83/59 (05/25/18 1345)  SpO2: 98 % (05/25/18 1345) Vital Signs (24h Range):  Temp:  [97 °F (36.1 °C)-98.9 °F (37.2 °C)] 97 °F (36.1 °C)  Pulse:  [] 97  Resp:  [20-39] 32  SpO2:  [14 %-100 %] 98 %  BP: ()/(12-75) 83/59     Weight: 95.6 kg (210 lb 12.2 oz)  Body mass index is 34.02 kg/m².     SpO2: 98 %  O2 Device (Oxygen Therapy): BiPAP      Intake/Output Summary (Last 24 hours) at  05/25/18 1400  Last data filed at 05/25/18 1400   Gross per 24 hour   Intake          2123.45 ml   Output             1680 ml   Net           443.45 ml       Lines/Drains/Airways     Central Venous Catheter Line                 Percutaneous Central Line Insertion/Assessment - triple lumen  05/19/18 1600 right internal jugular 5 days         Hemodialysis Catheter 05/25/18 1300 right femoral less than 1 day          Drain                 Urethral Catheter 05/19/18 0115 Latex 16 Fr. 6 days          Arterial Line                 Arterial Line 05/23/18 1800 Right Radial 1 day                Physical Exam   Constitutional: She is oriented to person, place, and time. She appears well-developed and well-nourished. She appears ill.   HENT:   Head: Normocephalic and atraumatic.   Eyes: Conjunctivae are normal. Pupils are equal, round, and reactive to light.   Neck: Full passive range of motion without pain. Neck supple. No JVD present.   Cardiovascular: Normal rate, regular rhythm, S1 normal, S2 normal and intact distal pulses.  PMI is not displaced.  Exam reveals distant heart sounds.    No murmur heard.  Pulses:       Radial pulses are 2+ on the right side, and 2+ on the left side.        Dorsalis pedis pulses are 2+ on the right side, and 2+ on the left side.   SM on apex   Pulmonary/Chest: Effort normal. No respiratory distress. She has decreased breath sounds in the right lower field and the left lower field. She has no wheezes.   Crackles on bases   Abdominal: Soft. Bowel sounds are normal. She exhibits no distension.   Obese   Genitourinary:   Genitourinary Comments: deferred   Musculoskeletal: Normal range of motion. She exhibits edema (trace BLE).        Right ankle: She exhibits swelling.        Left ankle: She exhibits swelling.   Neurological: She is alert and oriented to person, place, and time.   Skin: Skin is warm and dry. No cyanosis. Nails show no clubbing.   Psychiatric: She has a normal mood and affect. Her  speech is normal. Judgment and thought content normal. Cognition and memory are impaired.   Intermittently confused   Nursing note and vitals reviewed.      Significant Labs:   ABG:   Recent Labs  Lab 05/25/18  0508 05/25/18  1017 05/25/18  1026   PH 7.496* 7.402 7.496*   PCO2 36.7 48.2* 35.8   HCO3 28.3* 29.9* 27.6   POCSATURATED 92* 53* 97   BE 5 5 4   , Blood Culture: No results for input(s): LABBLOO in the last 48 hours., BMP:   Recent Labs  Lab 05/23/18  1415 05/24/18  0400 05/24/18  1030 05/25/18  0415 05/25/18  1152   * 197*  --  179*  --    * 129*  --  130*  --    K 3.3* 3.2* 3.4* 3.3* 3.8   CL 83* 84*  --  88*  --    CO2 30* 27  --  29  --    BUN 60* 63*  --  64*  --    CREATININE 2.2* 2.2*  --  2.2*  --    CALCIUM 9.2 9.1  --  8.3*  --    MG  --  2.3 2.8* 2.5  --    , CMP   Recent Labs  Lab 05/23/18  1415 05/24/18  0400 05/24/18  1030 05/25/18  0415 05/25/18  1152   * 129*  --  130*  --    K 3.3* 3.2* 3.4* 3.3* 3.8   CL 83* 84*  --  88*  --    CO2 30* 27  --  29  --    * 197*  --  179*  --    BUN 60* 63*  --  64*  --    CREATININE 2.2* 2.2*  --  2.2*  --    CALCIUM 9.2 9.1  --  8.3*  --    PROT  --  8.2  --  7.1  --    ALBUMIN  --  2.7*  --  2.2*  --    BILITOT  --  2.1*  --  1.7*  --    ALKPHOS  --  124  --  129  --    AST  --  45*  --  32  --    ALT  --  30  --  23  --    ANIONGAP 16 18*  --  13  --    ESTGFRAFRICA 28* 28*  --  28*  --    EGFRNONAA 25* 25*  --  25*  --    , CBC   Recent Labs  Lab 05/24/18  0400 05/25/18  0415   WBC 13.97* 15.34*   HGB 11.5* 9.9*   HCT 32.9* 29.4*    311   , INR No results for input(s): INR, PROTIME in the last 48 hours., Lipid Panel No results for input(s): CHOL, HDL, LDLCALC, TRIG, CHOLHDL in the last 48 hours. and Troponin   Recent Labs  Lab 05/25/18  0415   TROPONINI 46.541*       Significant Imaging:      Assessment and Plan:     * Cardiogenic shock    Uncontrolled cardiogenic shock. Had conversation with Belinda Rod and  Dilcia.  Discussed with pt and her daughter at bedside. Currently, no cath indication and pt is in critical condition due to cardiogenic shock, caused by recent NSTEMI and 2nd severe MR.  Good urine output and normal lactate.    -needs aggressive diuresis, increase lasix gtt to 10 mg /hr. Ok to start CRRT  -add Dobutamine  -continue Levophard  -keep I&O negative >1.5 liter today  -strict I&O  -avoid negative inotropics        Nonsustained paroxysmal ventricular tachycardia    See above note        PAF (paroxysmal atrial fibrillation)    Episode of PAF with RVR. Hypotensive. No c/o chest pain and dyspnea. With nonsustained VT.  Now back to sinus    -continue Amiodarone gtt now.  -keep K >4 and Mg>2  -continue heparin gtt            Acute on chronic congestive heart failure    See above        Electrolyte imbalance    -Keep Mag >2  -Keep K+ >4        NSTEMI (non-ST elevated myocardial infarction)    -Patient with significant history of CAD s/p recent PTCA/DEWAYNE of LM in-stent re-stenosis, presents with NSTEMI/cardiogenic shock  -Troponin remains  Was > 50  -Continue current medical management-ASA, Brilinta, heparin gtt  -Coreg held due to need for pressor support  -Statin held due to elevated liver enzymes  -2D echo showed EF of 45-50%, +WMA  -Cath images from Feb and May 2018 have been reviewed.  -Patient with recent inferior wall MI and severe MR on echo.   -Restart low dose statin today  -Continue IV heparin gtt, BB, Statin, ASA.   -had lengthy discussion with Dr. Boone, Dr. Turpin (pt's primary cardiologist at [facility]) and family. Pt does not need L[facility] now and will continue medical Rx for CAD, CHF and secondary MR, severe.  -repeat echo on Sat for EF and severity of MR        Acute renal failure superimposed on stage 3 chronic kidney disease    -Likely cardiorenal, creatinine 1.7  -Continue to monitor        Acute respiratory failure with hypoxia    -Secondary to NSTEMI and volume overload/acute on chronic  decompensated diastolic CHF  -Now intubated  -Continue Lasix gtt, assess response, nephrology on board              Acute on chronic combined systolic and diastolic heart failure    -Improving with Lasix gtt  -continue current mgmt  -ACEI/ARB held due to KEN/need for pressor support  -continue ASA, Brilinta and Statin  -continue Coreg on 3.125 mg daily          CAD (coronary artery disease)    -See above note        Hyperlipidemia    -Restart low dose statin tonight  -Monitor LFT's          Morbid obesity with BMI of 40.0-44.9, adult    -Encourage weight loss        Essential hypertension    -BP meds held due to need for pressor support  -Levophed gtt to keep MAP >65            VTE Risk Mitigation         Ordered     heparin (porcine) injection 2,000 Units  As needed (PRN)      05/25/18 1241     heparin 25,000 units in dextrose 5% 250 mL (100 units/mL) infusion; FEMALE  Continuous      05/19/18 0905     heparin 25,000 units in dextrose 5% 250 mL (100 units/mL) bolus from bag; PRN BOLUS  As needed (PRN)      05/19/18 0905     heparin 25,000 units in dextrose 5% 250 mL (100 units/mL) bolus from bag; PRN BOLUS  As needed (PRN)      05/19/18 0905     Place sequential compression device  Until discontinued      05/19/18 0628     IP VTE HIGH RISK PATIENT  Once      05/19/18 0248          Rogelio Matthews MD  Cardiology  [facility]

## 2018-05-25 NOTE — ASSESSMENT & PLAN NOTE
Refractory cardiogenic shock  Added Dobutamine 2.5 mcg  Lasix drip increased to 5/hr   CXR again in AM  CRRT planned

## 2018-05-25 NOTE — ASSESSMENT & PLAN NOTE
- iv diuresis on hold  - on vent   - on beta blocker   - ace on hold due to grisel and hypotension     05/23-Will restart lasix drip after discussion with cardiology .  Cardiac echo-  1 - Wall motion abnormalities.     2 - Mildly depressed left ventricular systolic function (EF 45-50%).     3 - Indeterminate LV diastolic function  Case was discussed extensively with cardiology .-no benefit for [facility] at this time as risks will outweigh the benefit.    05/24- will continue lasix as tolerated . Cardiology input appreciated .  05/25- Will defer therapy to cardiology .  Plan of care was discussed extensively with Dr Matthews and Dr Ha-will add dobutamine, increase dose of lasix to 5mg/hour

## 2018-05-25 NOTE — ASSESSMENT & PLAN NOTE
Cont Levophed infusion  ICU cardiac monitoring with A line  Keep MAP > 65mmHG  Added Dobutamine 2.5 mcg  Venous sat was 58%, Normal Lactate.  DW cardiology is advance heart failure interventions may help. Will monitor

## 2018-05-25 NOTE — ASSESSMENT & PLAN NOTE
Uncontrolled cardiogenic shock. Had conversation with Belinda Rod and Dilcia.  Discussed with pt and her daughter at bedside. Currently, no cath indication and pt is in critical condition due to cardiogenic shock, caused by recent NSTEMI and 2nd severe MR.  Good urine output and normal lactate.    -needs aggressive diuresis, increase lasix gtt to 10 mg /hr. Ok to start CRRT  -add Dobutamine  -continue Levophard  -keep I&O negative >1.5 liter today  -strict I&O  -avoid negative inotropics

## 2018-05-25 NOTE — ASSESSMENT & PLAN NOTE
Additional bolus lasix given  Double concentrate infusions  Pulm edema now worse  Monitor I/Os and daily weights  BMP in AM

## 2018-05-25 NOTE — PROGRESS NOTES
CRRT set up and connected to pt per P&P.  Alarms activated, dialysate concentration checked x3 and numbered, all lines secure, pressures within normal limits.  Pt orders reviewed with nurse attending.  We reviewed proper dialysate concentration check, hanging new dialysate bags and adding volume to machine, and emergency rinse back procedures.  Emergency disconnect supplies as well as dialysate left at bedside.  Please contact on-call dialysis nurse with any questions or concerns.

## 2018-05-25 NOTE — PROGRESS NOTES
[facility] -   Critical Care Medicine  Progress Note    Patient Name: Milli Montez  MRN: [id number]  Admission Date: 5/19/2018  Hospital Length of Stay: 6 days  Code Status: Full Code  Attending Provider: Elian Cha MD  Primary Care Provider: Marito Amato MD   Principal Problem: Cardiogenic shock    Subjective:     HPI:  57-year-old female patient with history of CHF, coronary artery disease status post stent placement by ambulance to the hospital for worsening shortnessof breath for a few days and the respiratory distress.  She was started on noninvasive ventilation in the ER and transferred to the ICU.      Hospital/ICU Course:  Continue to be on noninvasive ventilation.ABGs and chest x-ray reviewed.afebrile.  5/19 Remains in distress tachypneic respiratory rate in the 30s.receiving IV Lasix.  An on IV heparin drip.  5/20 Sp intubation, Rt TLC intubation. On levophed, heparin, lasix, fentanyl gtt. Chest x-ray and ABG reviewed.  5/21 - Fully awake and comfortable intubated on mech ventilation.  Still on Heparin, Lasix, Levophed and Fentanyl infusions  5/22 - Still intubated on mech ventilation and Fentanyl infusion fully awake and responsive with stimuli.  Still on Levophed infusion.  Lasix infusion stopped.  05/23: Levophed started at 0.04 now at 0.08, off vent, ABG respiratory alkalosis  05/24: Symptomatic Afib RVR, sedation with cardioversion at bedside, amiodarone loaded, K+ and Mag+, levo weaned down, Added Abx: Vanco and Azactam    05/25: worsened lung infiltrate, edema, CRRT will be added    Interval History:   Stayed on BIPAP most night  Still Levo dependent  Added Dobutamine Venous sat: 53%  CXR worse  Lactate Normal  BNP worse    Review of Systems   Unable to perform ROS: Critical illness   Respiratory: Positive for shortness of breath.    Cardiovascular: Positive for leg swelling.   Endo/Heme/Allergies: Bruises/bleeds easily.       Objective:     Vital Signs (Most Recent):  Temp: 97.8  °F (36.6 °C) (05/25/18 0712)  Pulse: 98 (05/25/18 1030)  Resp: (!) 31 (05/25/18 1030)  BP: (!) 92/56 (05/25/18 1015)  SpO2: 96 % (05/25/18 1030) Vital Signs (24h Range):  Temp:  [97.6 °F (36.4 °C)-98.9 °F (37.2 °C)] 97.8 °F (36.6 °C)  Pulse:  [] 98  Resp:  [20-38] 31  SpO2:  [92 %-100 %] 96 %  BP: ()/(14-75) 92/56     Weight: 95.6 kg (210 lb 12.2 oz)  Body mass index is 34.02 kg/m².      Intake/Output Summary (Last 24 hours) at 05/25/18 1238  Last data filed at 05/25/18 1000   Gross per 24 hour   Intake          2455.55 ml   Output             1505 ml   Net           950.55 ml       Physical Exam   Constitutional: She is oriented to person, place, and time. Vital signs are normal. She appears well-developed and well-nourished. She is sleeping. She appears ill. Nasal cannula in place.       HENT:   Head: Normocephalic and atraumatic.   Nose: Nose normal.   Eyes: Conjunctivae and EOM are normal. Pupils are equal, round, and reactive to light. Left eye exhibits no discharge. No scleral icterus.   Neck: Normal range of motion. Neck supple. No JVD present. No tracheal deviation present. No thyromegaly present.   Cardiovascular: Normal rate and intact distal pulses.    Murmur heard.  Pulmonary/Chest: Effort normal and breath sounds normal. No respiratory distress. She has no wheezes.   Abdominal: Soft. Bowel sounds are normal.   Genitourinary:   Genitourinary Comments: pham   Musculoskeletal: Normal range of motion. She exhibits edema.   Neurological: She is alert and oriented to person, place, and time. No cranial nerve deficit.   Skin: Skin is warm and dry. Capillary refill takes 2 to 3 seconds.   Psychiatric: She has a normal mood and affect.   Nursing note and vitals reviewed.      Vents:  Vent Mode: Spont (05/22/18 1445)  Ventilator Initiated: Yes (05/19/18 1539)  Set Rate: 0 bmp (05/22/18 1445)  Vt Set: 400 mL (05/22/18 1445)  Pressure Support: 12 cmH20 (05/22/18 1445)  PEEP/CPAP: 8 cmH20 (05/22/18  1445)  Oxygen Concentration (%): (S) 50 (05/25/18 0520)  Peak Airway Pressure: 20 cmH2O (05/22/18 1445)  Plateau Pressure: 0 cmH20 (05/22/18 1445)  Total Ve: 5.78 mL (05/22/18 1445)  F/VT Ratio<105 (RSBI): (!) 63.25 (05/22/18 1445)    Lines/Drains/Airways     Central Venous Catheter Line                 Percutaneous Central Line Insertion/Assessment - triple lumen  05/19/18 1600 right internal jugular 5 days          Drain                 Urethral Catheter 05/19/18 0115 Latex 16 Fr. 6 days          Arterial Line                 Arterial Line 05/23/18 1800 Right Radial 1 day                Significant Labs:    CBC/Anemia Profile:    Recent Labs  Lab 05/24/18  0400 05/25/18  0415   WBC 13.97* 15.34*   HGB 11.5* 9.9*   HCT 32.9* 29.4*    311   MCV 89 91   RDW 15.0* 15.2*        Chemistries:    Recent Labs  Lab 05/23/18  1415 05/24/18  0400 05/24/18  1030 05/25/18  0415 05/25/18  1152   * 129*  --  130*  --    K 3.3* 3.2* 3.4* 3.3* 3.8   CL 83* 84*  --  88*  --    CO2 30* 27  --  29  --    BUN 60* 63*  --  64*  --    CREATININE 2.2* 2.2*  --  2.2*  --    CALCIUM 9.2 9.1  --  8.3*  --    ALBUMIN  --  2.7*  --  2.2*  --    PROT  --  8.2  --  7.1  --    BILITOT  --  2.1*  --  1.7*  --    ALKPHOS  --  124  --  129  --    ALT  --  30  --  23  --    AST  --  45*  --  32  --    MG  --  2.3 2.8* 2.5  --        ABGs:   Recent Labs  Lab 05/25/18  1026   PH 7.496*   PCO2 35.8   HCO3 27.6   POCSATURATED 97   BE 4     Lactic Acid:   Recent Labs  Lab 05/25/18  0954   LACTATE 1.6     Respiratory Culture:   Recent Labs  Lab 05/24/18  1616   GSRESP <10 epithelial cells per low power field.  Rare WBC's  Many Gram positive cocci  Many Gram negative rods   RESPIRATORYC Insufficient incubation, culture in progress     All pertinent labs within the past 24 hours have been reviewed.    Significant Imaging:  I have reviewed and interpreted all pertinent imaging results/findings within the past 24 hours.     CXR reviewed        Assessment/Plan:     Neuro    Neuro check, No issues        Pulmonary   Right lower lobe pneumonia    New infiltrate in critically ill patient  RLL   Added Azactam and Vancomycin  Sputum culture final pending        Acute respiratory failure with hypoxia    Worsening edema, may need reintubation  Currently on BIPAP  Incentive spirometry  Deep breathing Aspiration precautions  Keep NPO        Cardiac/Vascular   * Cardiogenic shock    Cont Levophed infusion  ICU cardiac monitoring with A line  Keep MAP > 65mmHG  Added Dobutamine 2.5 mcg  Venous sat was 58%, Normal Lactate.  DW cardiology is advance heart failure interventions may help. Will monitor          Nonsustained paroxysmal ventricular tachycardia    SP intervention with Adenosine, Synch cardioversion x 2 05/24  No further ectopy        PAF (paroxysmal atrial fibrillation)    Amiodarone maintaince drip, will DW cardiology when switch to PO  Heparin gtt  Keep K+> 4 and Mag> 2        NSTEMI (non-ST elevated myocardial infarction)    Cards following  Cont Heparin infusion  LHC not indicated [facility] Cardiology        Acute on chronic combined systolic and diastolic heart failure    Additional bolus lasix given  Double concentrate infusions  Pulm edema now worse  Monitor I/Os and daily weights  BMP in AM        CAD (coronary artery disease)    Cards following  No LHC, medical management  Cont ASA, Coreg and Ticagrelor  Daily EKG        Essential hypertension    Hold BP meds for now: Norvasc and vasotec        Acute pulmonary edema with congestive heart failure    Refractory cardiogenic shock  Added Dobutamine 2.5 mcg  Lasix drip increased to 5/hr   CXR again in AM  CRRT planned        Renal/   Electrolyte imbalance    Replace K+ and Mg+        Diabetic nephropathy associated with type 2 diabetes mellitus    SSI, monitor for toxicity        Acute renal failure superimposed on stage 3 chronic kidney disease    Strict I's and O's.  Monitor BMP.  Renal following     Stable creatinine        Oncology   Leukocytosis    Continue abx        Endocrine   Inadequate dietary energy intake    Options: PPN or NG tube , PO not accessible due to continuous BIPAP         Morbid obesity with BMI of 40.0-44.9, adult    Encourage weight loss post extubation        Hypothyroidism    Cont Levothyroxine        Type 2 diabetes mellitus with hyperglycemia    Cont SSI           Critical Care Daily Checklist:    A: Awake: RASS Goal/Actual Goal: RASS Goal: 0-->alert and calm  Actual: Donato Agitation Sedation Scale (RASS): Alert and calm   B: Spontaneous Breathing Trial Performed?     C: SAT & SBT Coordinated?  NA                      D: Delirium: CAM-ICU Overall CAM-ICU: Negative   E: Early Mobility Performed? Yes   F: Feeding Goal: Goals: advancement energy intake within 48hrs  Status: Nutrition Goal Status: progressing towards goal   Current Diet Order   Procedures    Diet Cardiac      AS: Analgesia/Sedation NONE   T: Thromboembolic Prophylaxis HEPARIN GTT   H: HOB > 300 Yes   U: Stress Ulcer Prophylaxis (if needed) YES   G: Glucose Control YES   B: Bowel Function     I: Indwelling Catheter (Lines & Carlin) Necessity YES   D: De-escalation of Antimicrobials/Pharmacotherapies Await final cultures    Plan for the day/ETD CRRT after vascath    Code Status:  Family/Goals of Care: Full Code  Improvement of shock     Critical Care Time: 60 minutes  Critical secondary to Patient has a condition that poses threat to life and bodily function: CARDIOGENIC SHOCK      Critical care was time spent personally by me on the following activities: development of treatment plan with patient or surrogate and bedside caregivers, discussions with consultants, evaluation of patient's response to treatment, examination of patient, ordering and performing treatments and interventions, ordering and review of laboratory studies, ordering and review of radiographic studies, pulse oximetry, re-evaluation of patient's  condition. This critical care time did not overlap with that of any other provider or involve time for any procedures.     Sorin Rod MD  Critical Care Medicine  [facility]

## 2018-05-25 NOTE — SUBJECTIVE & OBJECTIVE
Interval History:   Stayed on BIPAP most night  Still Levo dependent  Added Dobutamine Venous sat: 53%  CXR worse  Lactate Normal  BNP worse    Review of Systems   Unable to perform ROS: Critical illness   Respiratory: Positive for shortness of breath.    Cardiovascular: Positive for leg swelling.   Endo/Heme/Allergies: Bruises/bleeds easily.       Objective:     Vital Signs (Most Recent):  Temp: 97.8 °F (36.6 °C) (05/25/18 0712)  Pulse: 98 (05/25/18 1030)  Resp: (!) 31 (05/25/18 1030)  BP: (!) 92/56 (05/25/18 1015)  SpO2: 96 % (05/25/18 1030) Vital Signs (24h Range):  Temp:  [97.6 °F (36.4 °C)-98.9 °F (37.2 °C)] 97.8 °F (36.6 °C)  Pulse:  [] 98  Resp:  [20-38] 31  SpO2:  [92 %-100 %] 96 %  BP: ()/(14-75) 92/56     Weight: 95.6 kg (210 lb 12.2 oz)  Body mass index is 34.02 kg/m².      Intake/Output Summary (Last 24 hours) at 05/25/18 1238  Last data filed at 05/25/18 1000   Gross per 24 hour   Intake          2455.55 ml   Output             1505 ml   Net           950.55 ml       Physical Exam   Constitutional: She is oriented to person, place, and time. Vital signs are normal. She appears well-developed and well-nourished. She is sleeping. She appears ill. Nasal cannula in place.       HENT:   Head: Normocephalic and atraumatic.   Nose: Nose normal.   Eyes: Conjunctivae and EOM are normal. Pupils are equal, round, and reactive to light. Left eye exhibits no discharge. No scleral icterus.   Neck: Normal range of motion. Neck supple. No JVD present. No tracheal deviation present. No thyromegaly present.   Cardiovascular: Normal rate and intact distal pulses.    Murmur heard.  Pulmonary/Chest: Effort normal and breath sounds normal. No respiratory distress. She has no wheezes.   Abdominal: Soft. Bowel sounds are normal.   Genitourinary:   Genitourinary Comments: pham   Musculoskeletal: Normal range of motion. She exhibits edema.   Neurological: She is alert and oriented to person, place, and time. No  cranial nerve deficit.   Skin: Skin is warm and dry. Capillary refill takes 2 to 3 seconds.   Psychiatric: She has a normal mood and affect.   Nursing note and vitals reviewed.      Vents:  Vent Mode: Spont (05/22/18 1445)  Ventilator Initiated: Yes (05/19/18 1539)  Set Rate: 0 bmp (05/22/18 1445)  Vt Set: 400 mL (05/22/18 1445)  Pressure Support: 12 cmH20 (05/22/18 1445)  PEEP/CPAP: 8 cmH20 (05/22/18 1445)  Oxygen Concentration (%): (S) 50 (05/25/18 0520)  Peak Airway Pressure: 20 cmH2O (05/22/18 1445)  Plateau Pressure: 0 cmH20 (05/22/18 1445)  Total Ve: 5.78 mL (05/22/18 1445)  F/VT Ratio<105 (RSBI): (!) 63.25 (05/22/18 1445)    Lines/Drains/Airways     Central Venous Catheter Line                 Percutaneous Central Line Insertion/Assessment - triple lumen  05/19/18 1600 right internal jugular 5 days          Drain                 Urethral Catheter 05/19/18 0115 Latex 16 Fr. 6 days          Arterial Line                 Arterial Line 05/23/18 1800 Right Radial 1 day                Significant Labs:    CBC/Anemia Profile:    Recent Labs  Lab 05/24/18  0400 05/25/18  0415   WBC 13.97* 15.34*   HGB 11.5* 9.9*   HCT 32.9* 29.4*    311   MCV 89 91   RDW 15.0* 15.2*        Chemistries:    Recent Labs  Lab 05/23/18  1415 05/24/18  0400 05/24/18  1030 05/25/18  0415 05/25/18  1152   * 129*  --  130*  --    K 3.3* 3.2* 3.4* 3.3* 3.8   CL 83* 84*  --  88*  --    CO2 30* 27  --  29  --    BUN 60* 63*  --  64*  --    CREATININE 2.2* 2.2*  --  2.2*  --    CALCIUM 9.2 9.1  --  8.3*  --    ALBUMIN  --  2.7*  --  2.2*  --    PROT  --  8.2  --  7.1  --    BILITOT  --  2.1*  --  1.7*  --    ALKPHOS  --  124  --  129  --    ALT  --  30  --  23  --    AST  --  45*  --  32  --    MG  --  2.3 2.8* 2.5  --        ABGs:   Recent Labs  Lab 05/25/18  1026   PH 7.496*   PCO2 35.8   HCO3 27.6   POCSATURATED 97   BE 4     Lactic Acid:   Recent Labs  Lab 05/25/18  0954   LACTATE 1.6     Respiratory Culture:   Recent Labs  Lab  05/24/18  1616   GSRESP <10 epithelial cells per low power field.  Rare WBC's  Many Gram positive cocci  Many Gram negative rods   RESPIRATORYC Insufficient incubation, culture in progress     All pertinent labs within the past 24 hours have been reviewed.    Significant Imaging:  I have reviewed and interpreted all pertinent imaging results/findings within the past 24 hours.     CXR reviewed

## 2018-05-25 NOTE — PROGRESS NOTES
Clinical Pharmacy: Vancomycin Progress Note    Vancomycin random level = 16.7 mcg/ml     WBC = 15.34 (up from 13.97)   Tmax = 98.9   SCr = 2.2    Resp Cx: Many gram (+) cocci, few gram (-) rods   Dx: Pneumonia       Goal trough: 15-20 mcg/ml     A Vancomycin 1gm dose will be ordered to be given now. Patient will be started on CRRT today, unsure of time. Will order a random level with AM labs tomorrow morning.   Random level due: 05/26 at 0430    Thank you for allowing us to participate in this patient's care.   Chel Richmond, PharmD 5/25/2018 2:25 PM

## 2018-05-25 NOTE — EICU
eICU Note :    Called by the [facility]:    Problem:  Respiratory culture positive for Gram positive Cocci and gram negative rods     Pertinent History and labs reviewed : 56 y/o female with h/o CHF with CKD , with CXR RLL Pneumonia     Treatment /Intervention given: On Vancomycin and Aztreonam        Skye Magaña M.D  eICU Physician  5:25 AM  ABG 7.49/36/57/20/+ 5/92 on BiPAP 12/6, FiO2 30%, increase EPAP to+7, FiO2 50%  5:47 AM  K 3.3 , KCL 40 meq x1 now

## 2018-05-25 NOTE — ASSESSMENT & PLAN NOTE
Amiodarone maintaince drip, will DW cardiology when switch to PO  Heparin gtt  Keep K+> 4 and Mag> 2

## 2018-05-25 NOTE — PLAN OF CARE
Problem: Patient Care Overview  Goal: Plan of Care Review  Outcome: Ongoing (interventions implemented as appropriate)  No acute changes over night, VSS, remains on levophed, heparin, amiodarone, and lasix gtts. Pt's PO2 was 57 this AM despite wearing the BiPAP most of the night and her FiO2 and EPAP were both increased this AM to 50% and 7 per MD. Pt remains free from CP, and in NSR in the 80-90's w/ no episodes of AFIB or sinus tach throughout the night. Pt's respiratory culture came back positive for gram positive cocci and gram negative rods. Pt remains afebrile and UOP at an average of 75 ml/hr on lasix gtt. Potassium 3.3 this AM and replacement was ordered. POC discussed w/ pt and pt's daughter.

## 2018-05-25 NOTE — ASSESSMENT & PLAN NOTE
05/24- now started on empiric antibiotics-will follow X-ray in am .  This can be fluid versus pneumonia     05/25-will send serum procalcitonin ,will use cultures to guide therapy .Will need to deescalate therapy soon.

## 2018-05-25 NOTE — ASSESSMENT & PLAN NOTE
-continue iv heparin,aspirin,brilinta,statin and ace on hold due to grisel and hypotension   -plan heart cath once kidney function improved   05/24- Cardiology follow up , will optimize medical therapy .  Case was discussed extensively with cardiology -No need for acute LHC at this time.  Continue lasix, heparin drip, coreg,crestor.    05/25- will optimize medical therapy .  Continue Lasix, coreg , on heparin drip .  Cardiology follow up

## 2018-05-25 NOTE — SUBJECTIVE & OBJECTIVE
Interval History:  Patient is more short of breath on BiPAP, chest x-ray shows bilateral lung infiltrates consistent with acute on chronic pulmonary edema, Lasix drip at 2.5 mg an hour yielding a urine output of.  60 cc an hour after multiple discussions with cardiology Dr. Matthews, Dr. Rowell in the ICU patient will be placed on ultrafiltration session and then CRRT for further management of fluid overload and in order to try to avoid impending intubation for respiratory failure we will try to get as much fluid off as possible without having any hemodynamic compromise.  Patient continued on pressors and heparin    Review of patient's allergies indicates:   Allergen Reactions    Penicillins Swelling     Current Facility-Administered Medications   Medication Frequency    0.9%  NaCl infusion PRN    0.9%  NaCl infusion Once    acetaminophen tablet 650 mg Q6H PRN    amiodarone 360 mg/200 mL (1.8 mg/mL) infusion Continuous    aspirin chewable tablet 81 mg Daily    aztreonam injection 1,000 mg Q8H    bisacodyl suppository 10 mg Daily PRN    dextrose 50% injection 12.5 g PRN    DOBUTamine 500mg in D5W 250mL infusion (premix) (NON-TITRATING) Continuous    docusate sodium capsule 100 mg Daily    furosemide (LASIX) 2 mg/mL in sodium chloride 0.9% 100 mL infusion (conc: 2 mg/mL) Continuous    glucagon (human recombinant) injection 1 mg PRN    heparin (porcine) injection 2,000 Units PRN    heparin 25,000 units in dextrose 5% 250 mL (100 units/mL) bolus from bag; PRN BOLUS PRN    heparin 25,000 units in dextrose 5% 250 mL (100 units/mL) bolus from bag; PRN BOLUS PRN    heparin 25,000 units in dextrose 5% 250 mL (100 units/mL) infusion; FEMALE Continuous    insulin aspart U-100 pen 1-10 Units 6 times per day    insulin detemir U-100 pen 15 Units QHS    levothyroxine tablet 75 mcg Daily    magnesium sulfate 2g in water 50mL IVPB (premix) PRN    nitroGLYCERIN SL tablet 0.4 mg Q5 Min PRN    norepinephrine 16  mg in dextrose 5 % 250 mL infusion Continuous    ondansetron injection 4 mg Q6H PRN    pantoprazole 40 mg in dextrose 5 % 100 mL IVPB (over 15 minutes) (ready to mix system) Daily    polyethylene glycol packet 17 g Daily    ramelteon tablet 8 mg Nightly PRN    rosuvastatin tablet 10 mg QHS    sodium phosphate 20.01 mmol in dextrose 5 % 250 mL IVPB PRN    sodium phosphate 30 mmol in dextrose 5 % 250 mL IVPB PRN    sodium phosphate 39.99 mmol in dextrose 5 % 250 mL IVPB PRN    ticagrelor tablet 90 mg BID    [START ON 5/26/2018] vancomycin (VANCOCIN) 1,000 mg in sodium chloride 0.9% 250 mL IVPB Q48H    vancomycin (VANCOCIN) 1,000 mg in sodium chloride 0.9% 250 mL IVPB Once       Objective:     Vital Signs (Most Recent):  Temp: 97 °F (36.1 °C) (05/25/18 1200)  Pulse: 90 (05/25/18 1400)  Resp: (!) 42 (05/25/18 1400)  BP: (!) 94/59 (05/25/18 1400)  SpO2: 98 % (05/25/18 1400)  O2 Device (Oxygen Therapy): BiPAP (05/25/18 0705) Vital Signs (24h Range):  Temp:  [97 °F (36.1 °C)-98.9 °F (37.2 °C)] 97 °F (36.1 °C)  Pulse:  [] 90  Resp:  [20-42] 42  SpO2:  [14 %-100 %] 98 %  BP: ()/(12-74) 94/59     Weight: 95.6 kg (210 lb 12.2 oz) (05/25/18 0505)  Body mass index is 34.02 kg/m².  Body surface area is 2.11 meters squared.    I/O last 3 completed shifts:  In: 3433.3 [P.O.:620; I.V.:2463.3; IV Piggyback:350]  Out: 3150 [Urine:3150]    Physical Exam   Constitutional: She is oriented to person, place, and time. She appears well-developed and well-nourished. She appears distressed.   On BiPEP    HENT:   Head: Normocephalic and atraumatic.   Eyes: Conjunctivae and EOM are normal. Pupils are equal, round, and reactive to light.   Neck: Normal range of motion and full passive range of motion without pain. Neck supple. Carotid bruit is not present. No edema present. No thyroid mass and no thyromegaly present.   Cardiovascular: Normal rate, regular rhythm, S1 normal, S2 normal, intact distal pulses and normal  pulses.   No extrasystoles are present. PMI is displaced.  Exam reveals no friction rub.    Murmur heard.   Systolic murmur is present with a grade of 2/6   +JVD RV heave loud P2    Pulmonary/Chest: Effort normal. No accessory muscle usage. No respiratory distress. She has no wheezes. She has no rales. She exhibits no tenderness.   Abdominal: Soft. Bowel sounds are normal. She exhibits no distension and no mass. There is no hepatosplenomegaly. There is no tenderness. There is no rebound and no CVA tenderness. No hernia.   Musculoskeletal: Normal range of motion. She exhibits edema. She exhibits no tenderness.   PAD    Neurological: She is alert and oriented to person, place, and time. She has normal reflexes. No cranial nerve deficit or sensory deficit. She exhibits normal muscle tone. Coordination normal.   Skin: Skin is warm and dry. No bruising, no ecchymosis and no rash noted. No cyanosis or erythema. No pallor. Nails show no clubbing.   Psychiatric: She has a normal mood and affect. Her speech is normal and behavior is normal. Judgment and thought content normal.   Nursing note and vitals reviewed.      Significant Labs:  All labs within the past 24 hours have been reviewed.     Significant Imaging:  Labs: Reviewed  X-Ray: Reviewed  Echo: Reviewed

## 2018-05-25 NOTE — PLAN OF CARE
Assessment     05/25/18 1400   Discharge Reassessment   Assessment Type Discharge Planning Reassessment   Provided patient/caregiver education on the expected discharge date and the discharge plan No   Do you have any problems affording any of your prescribed medications? No   Discharge Plan A Skilled Nursing Facility   Discharge Plan B Skilled Nursing Facility   Patient choice form signed by patient/caregiver N/A   Can the patient answer the patient profile reliably? Yes, cognitively intact   How does the patient rate their overall health at the present time? Fair   Describe the patient's ability to walk at the present time. Minor restrictions or changes   How often would a person be available to care for the patient? Whenever needed   Number of comorbid conditions (as recorded on the chart) Three   During the past month, has the patient often been bothered by feeling down, depressed or hopeless? No   During the past month, has the patient often been bothered by little interest or pleasure in doing things? No

## 2018-05-25 NOTE — ASSESSMENT & PLAN NOTE
" 05/02/18 0731   General Information   Type of Visit Initial OP Ortho PT Evaluation   Start of Care Date 05/02/18   Referring Physician Dr. Tk Acevedo   Patient/Family Goals Statement To get rid of hip pain to continue being active   Orders Evaluate and Treat   Orders Comment HEP with stretching   Date of Order 02/09/18   Insurance Type Private   Insurance Comments/Visits Authorized [employer]   Medical Diagnosis Tendinitis of L hip flexor (M76.892), Brachioradialis muscle tenderness (M79.1)   Surgical/Medical history reviewed Yes   Precautions/Limitations no known precautions/limitations   Weight-Bearing Status - LUE full weight-bearing   Weight-Bearing Status - RUE full weight-bearing   Weight-Bearing Status - LLE full weight-bearing   Weight-Bearing Status - RLE full weight-bearing   Body Part(s)   Body Part(s) Hip   Presentation and Etiology   Pertinent history of current problem (include personal factors and/or comorbidities that impact the POC) Pt states that he has had discomfort in his L hip flexor when flexing over over 90 degs. He states that it will occasionally feel weak with this as well. He notes that more recently his hip as started to have \"jolts\" of pain. He plays floor hockey a few times a week and has noticed if he moves wrong, it will become very painful and will almost feel like it gives out on him. He notes that he is able to walk it off and the pain decreases after about 2 mins. He denies pain with every day ambulation and with running. He states that in the past he has had intermittent low back pain and that he has also been completing stretches for this which have helped. He notes that this winter he did step wrong off of a step and kenn his hip some and is wondering if this may have been the cause of his more recent onset of sharp pain. He does note occasional pain in his L knee as well. Had hernia surgery 9 years ago. He states his arm pain has since resolved and has no issues with " Worsening edema, may need reintubation  Currently on BIPAP  Incentive spirometry  Deep breathing Aspiration precautions  Keep NPO   "this. Does note that he has a clunk in his hip when doing stretches.    Impairments A. Pain;B. Decreased WB tolerance;E. Decreased flexibility;F. Decreased strength and endurance;G. Impaired balance;H. Impaired gait;M. Locking or catching   Functional Limitations perform activities of daily living;perform desired leisure / sports activities   Symptom Location L hip anterior and lateral (more recently all around hip)   How/Where did it occur From insidious onset   Onset date of current episode/exacerbation 02/09/18   Chronicity Chronic   Pain rating (0-10 point scale) Best (/10);Worst (/10)   Best (/10) 0   Worst (/10) 8-9/10   Pain quality C. Aching;B. Dull  (can have sudden onsnet)   Frequency of pain/symptoms C. With activity   Pain/symptoms are: Other  (depends on the activity)   Pain/symptoms exacerbated by G. Certain positions   Pain/symptoms eased by E. Changing positions   Progression of symptoms since onset: Worsened   Current / Previous Interventions   Diagnostic Tests: X-ray   X-ray Results Results   X-ray results Aug 9, 2012 IMPRESSION: No fractures or dislocations.  No abnormality of the hips is seen.   Prior Level of Function   Prior Level of Function-Mobility IND   Prior Level of Function-ADLs IND   Current Level of Function   Patient role/employment history A. Employed   Employment Comments Desk job   [facility]   Home/community accessibility Stairs irritate L knee. Increases support on HR   Current equipment-Gait/Locomotion None   Current equipment-ADL None   Fall Risk Screen   Fall screen completed by PT   Have you fallen 2 or more times in the past year? Yes   Have you fallen and had an injury in the past year? Yes   Timed Up and Go score (seconds) 4.97   Is patient a fall risk? No   Fall screen comments Pt is young and fit and moves well overall. Did slip on ice during \"boot hockey\" and had an AC sprain. Pt states that he has had falls during floor hockey, but nothing in " normal day to day   System Outcome Measures   Outcome Measures (LEFS - 80%)   Hip Objective Findings   Side (if bilateral, select both right and left) Left   Observation Pt is pleasant male. He is fit and active in daily life   Integumentary  nothing noted   Posture good posture overall   Gait/Locomotion Nothing significant with gait abnormalities   Balance/Proprioception (Single Leg Stance) BLE: 60+ (on LLE had near LOB, but was able to regain balance through use of brief BUE in low guard)   Hip ROM Comments Overall hip motion is WFL, but notes painful at end range with some   Lumbar ROM WFL   Pelvic Screen Neg march, neg SI provocation, neg standing forward bend   Hip/Knee Strength Comments Slight decrease in strength on abduction and flexion with pain.    Straight Leg Raise Test neg   Scour Test No popping or clicking reproduced, Noted slight increased in hip pain with compression into flexion, abduciton and slight ER   AARON Test Notes deep pain in joint   FADIR Test Deep pain in joint   Palpation Slight tender point on GT, tender to PA near L2. Tender L piriformis, QL, and L lumbar paraspinals as compared to R.    Left Hip Flexion PROM WFL   Left Hip Adduction PROM Noted discomfort with hip adduction at 45 degs flexion.    Left Hip ER PROM Equal to RLE, but noted to have increased pain in hip. noted to be deep pain   Left Hip IR PROM equal to RLE, but slight increase in pain when completed at 90 degs in supine.    Left Hip Ext PROM Slight limitation as compared to RLE in prone positioning   Left Hip Flexion Strength 4 (pain anterior hip and some into L sided low back)   Left Hip Abduction Strength 4+    Left Hip Extension Strength 5   Left Knee Flexion Strength 5   Left Knee Extension Strength 5   Keshawn Flexibility Test NOted to have slight limitation at hip flexors likely iliopsoas as knee flexion did not alter height of femur   Obers/ITB Flexibility WFL   Left Hamstring Flexibility in 90/90 B lacking ~20  degs   Left Piriformis Flexibility unable to fully assess due to increaed hip pain with positioning   Left Prone Quad Flexibility WFL B   Planned Therapy Interventions   Planned Therapy Interventions balance training;joint mobilization;manual therapy;neuromuscular re-education;ROM;strengthening;stretching   Planned Therapy Interventions Comment as needed   Planned Modality Interventions   Planned Modality Interventions Electrical stimulation;Iontophoresis;TENS;Ultrasound;Cryotherapy   Planned Modality Interventions Comments as needed   Clinical Impression   Criteria for Skilled Therapeutic Interventions Met yes, treatment indicated   PT Diagnosis hip pain and dysfunction with signs and symptoms consistent with possible iliopsoas tendinopathy, possible GT bursitis, and possible labral involvement   Influenced by the following impairments decreased AROM, decreased strength, decreased flexibility, slight impairment in balance on LLE vs RLE   Functional limitations due to impairments Difficulty with playing hockey, difficulty with workouts, difficulty getting out of lower car.   Clinical Presentation Stable/Uncomplicated   Clinical Presentation Rationale Pt is fit with no additional co-morbidities.    Clinical Decision Making (Complexity) Low complexity   Therapy Frequency 1 time/week   Predicted Duration of Therapy Intervention (days/wks) 8 weeks   Risk & Benefits of therapy have been explained Yes   Patient, Family & other staff in agreement with plan of care Yes   Clinical Impression Comments pt will benefit from skilled PT to progress strength, ROM, and flexiblity for improved safety with functional mobility   Education Assessment   Preferred Learning Style Listening;Reading;Demonstration;Pictures/video   Barriers to Learning No barriers   ORTHO GOALS   PT Ortho Eval Goals 1;2;3   Ortho Goal 1   Goal Identifier 1   Goal Description Pt will report full participation in workouts and floor hockey games with pain levels  of 3/10 or less for improved safety with participation with sports   Target Date 06/27/18   Ortho Goal 2   Goal Identifier 2   Goal Description Pt will have 5/5 strength in hip flexion and abduction without pain for improved safety with participation in sports   Target Date 06/27/18   Ortho Goal 3   Goal Identifier 3   Goal Description Pt will have full (equal to opposing extremity) AROM of hip without increased pain for improved safety getting into and out of low car.   Target Date 06/27/18   Total Evaluation Time   Total Evaluation Time 45

## 2018-05-25 NOTE — ASSESSMENT & PLAN NOTE
-Patient with significant history of CAD s/p recent PTCA/DEWAYNE of LM in-stent re-stenosis, presents with NSTEMI/cardiogenic shock  -Troponin remains  Was > 50  -Continue current medical management-ASA, Brilinta, heparin gtt  -Coreg held due to need for pressor support  -Statin held due to elevated liver enzymes  -2D echo showed EF of 45-50%, +WMA  -Cath images from Feb and May 2018 have been reviewed.  -Patient with recent inferior wall MI and severe MR on echo.   -Restart low dose statin today  -Continue IV heparin gtt, BB, Statin, ASA.   -had lengthy discussion with Dr. Boone, Dr. Turpin (pt's primary cardiologist at [facility]) and family. Pt does not need LCH now and will continue medical Rx for CAD, CHF and secondary MR, severe.  -repeat echo on Sat for EF and severity of MR

## 2018-05-25 NOTE — PLAN OF CARE
Problem: Patient Care Overview  Goal: Plan of Care Review  Outcome: Ongoing (interventions implemented as appropriate)  Vitals signs closely monitored. Fall precautions in place. Patient turned every two hours. Pain assessed every two hours. Safety promoted. Infection risks reduced. R groin vas cath placed. HD preformed will remove 1 L then switch to crrt. Dobutamine added.

## 2018-05-25 NOTE — ASSESSMENT & PLAN NOTE
Episode of PAF with RVR. Hypotensive. No c/o chest pain and dyspnea. With nonsustained VT.  Now back to sinus    -continue Amiodarone gtt now.  -keep K >4 and Mg>2  -continue heparin gtt

## 2018-05-25 NOTE — PROGRESS NOTES
Pt completed 3 hours of HD tx with 1.5 L net UF.  Pt tolerated tx ok with increase in vasopressors due to hypotension. Lines reversed due to increased arterial pressures.  Dr. Ha rounded on pt at bedside during tx.  Post tx, blood rinsed back, cvc flushed with normal saline, locked with 1.9 ml of 1000 u/ml of heparin, clamped, and sterile caps applied.  Report to nurse attending.

## 2018-05-25 NOTE — SUBJECTIVE & OBJECTIVE
Review of Systems   Constitution: Positive for malaise/fatigue. Negative for weakness.   HENT: Negative for hearing loss and hoarse voice.    Eyes: Negative for visual disturbance.   Cardiovascular: Positive for dyspnea on exertion. Negative for chest pain, claudication, irregular heartbeat, leg swelling, near-syncope, orthopnea, palpitations, paroxysmal nocturnal dyspnea and syncope.   Respiratory: Negative for cough, hemoptysis, shortness of breath, sleep disturbances due to breathing, snoring and wheezing.    Endocrine: Negative for cold intolerance.   Hematologic/Lymphatic: Does not bruise/bleed easily.   Skin: Negative for color change, dry skin and nail changes.   Musculoskeletal: Positive for back pain and myalgias. Negative for joint pain.   Gastrointestinal: Negative for bloating, abdominal pain, constipation, nausea and vomiting.   Genitourinary: Negative for dysuria, flank pain, hematuria and hesitancy.   Neurological: Negative for headaches, light-headedness, loss of balance, numbness and paresthesias.   Psychiatric/Behavioral: Negative for altered mental status.   Allergic/Immunologic: Negative for environmental allergies.     Objective:     Vital Signs (Most Recent):  Temp: 97 °F (36.1 °C) (05/25/18 1200)  Pulse: 97 (05/25/18 1345)  Resp: (!) 32 (05/25/18 1345)  BP: (!) 83/59 (05/25/18 1345)  SpO2: 98 % (05/25/18 1345) Vital Signs (24h Range):  Temp:  [97 °F (36.1 °C)-98.9 °F (37.2 °C)] 97 °F (36.1 °C)  Pulse:  [] 97  Resp:  [20-39] 32  SpO2:  [14 %-100 %] 98 %  BP: ()/(12-75) 83/59     Weight: 95.6 kg (210 lb 12.2 oz)  Body mass index is 34.02 kg/m².     SpO2: 98 %  O2 Device (Oxygen Therapy): BiPAP      Intake/Output Summary (Last 24 hours) at 05/25/18 1400  Last data filed at 05/25/18 1400   Gross per 24 hour   Intake          2123.45 ml   Output             1680 ml   Net           443.45 ml       Lines/Drains/Airways     Central Venous Catheter Line                 Percutaneous  Central Line Insertion/Assessment - triple lumen  05/19/18 1600 right internal jugular 5 days         Hemodialysis Catheter 05/25/18 1300 right femoral less than 1 day          Drain                 Urethral Catheter 05/19/18 0115 Latex 16 Fr. 6 days          Arterial Line                 Arterial Line 05/23/18 1800 Right Radial 1 day                Physical Exam   Constitutional: She is oriented to person, place, and time. She appears well-developed and well-nourished. She appears ill.   HENT:   Head: Normocephalic and atraumatic.   Eyes: Conjunctivae are normal. Pupils are equal, round, and reactive to light.   Neck: Full passive range of motion without pain. Neck supple. No JVD present.   Cardiovascular: Normal rate, regular rhythm, S1 normal, S2 normal and intact distal pulses.  PMI is not displaced.  Exam reveals distant heart sounds.    No murmur heard.  Pulses:       Radial pulses are 2+ on the right side, and 2+ on the left side.        Dorsalis pedis pulses are 2+ on the right side, and 2+ on the left side.   SM on apex   Pulmonary/Chest: Effort normal. No respiratory distress. She has decreased breath sounds in the right lower field and the left lower field. She has no wheezes.   Crackles on bases   Abdominal: Soft. Bowel sounds are normal. She exhibits no distension.   Obese   Genitourinary:   Genitourinary Comments: deferred   Musculoskeletal: Normal range of motion. She exhibits edema (trace BLE).        Right ankle: She exhibits swelling.        Left ankle: She exhibits swelling.   Neurological: She is alert and oriented to person, place, and time.   Skin: Skin is warm and dry. No cyanosis. Nails show no clubbing.   Psychiatric: She has a normal mood and affect. Her speech is normal. Judgment and thought content normal. Cognition and memory are impaired.   Intermittently confused   Nursing note and vitals reviewed.      Significant Labs:   ABG:   Recent Labs  Lab 05/25/18  0508 05/25/18  1017  05/25/18  1026   PH 7.496* 7.402 7.496*   PCO2 36.7 48.2* 35.8   HCO3 28.3* 29.9* 27.6   POCSATURATED 92* 53* 97   BE 5 5 4   , Blood Culture: No results for input(s): LABBLOO in the last 48 hours., BMP:   Recent Labs  Lab 05/23/18 1415 05/24/18  0400 05/24/18  1030 05/25/18  0415 05/25/18  1152   * 197*  --  179*  --    * 129*  --  130*  --    K 3.3* 3.2* 3.4* 3.3* 3.8   CL 83* 84*  --  88*  --    CO2 30* 27  --  29  --    BUN 60* 63*  --  64*  --    CREATININE 2.2* 2.2*  --  2.2*  --    CALCIUM 9.2 9.1  --  8.3*  --    MG  --  2.3 2.8* 2.5  --    , CMP   Recent Labs  Lab 05/23/18  1415 05/24/18  0400 05/24/18  1030 05/25/18  0415 05/25/18  1152   * 129*  --  130*  --    K 3.3* 3.2* 3.4* 3.3* 3.8   CL 83* 84*  --  88*  --    CO2 30* 27  --  29  --    * 197*  --  179*  --    BUN 60* 63*  --  64*  --    CREATININE 2.2* 2.2*  --  2.2*  --    CALCIUM 9.2 9.1  --  8.3*  --    PROT  --  8.2  --  7.1  --    ALBUMIN  --  2.7*  --  2.2*  --    BILITOT  --  2.1*  --  1.7*  --    ALKPHOS  --  124  --  129  --    AST  --  45*  --  32  --    ALT  --  30  --  23  --    ANIONGAP 16 18*  --  13  --    ESTGFRAFRICA 28* 28*  --  28*  --    EGFRNONAA 25* 25*  --  25*  --    , CBC   Recent Labs  Lab 05/24/18  0400 05/25/18  0415   WBC 13.97* 15.34*   HGB 11.5* 9.9*   HCT 32.9* 29.4*    311   , INR No results for input(s): INR, PROTIME in the last 48 hours., Lipid Panel No results for input(s): CHOL, HDL, LDLCALC, TRIG, CHOLHDL in the last 48 hours. and Troponin   Recent Labs  Lab 05/25/18  0415   TROPONINI 46.541*       Significant Imaging:

## 2018-05-25 NOTE — PROGRESS NOTES
[facility] -   Vascular Surgery  Progress Note    Patient Name: Milli Montez  MRN: [id number]  Admission Date: 5/19/2018  Primary Care Provider: Marito Amato MD    Subjective:     Interval History: consulted for Vas Cath    Post-Op Info:  * No surgery found *          Medications:  Continuous Infusions:   amiodarone in dextrose 5% 0.5 mg/min (05/25/18 1212)    DOBUTamine 2 mcg/kg/min (05/25/18 1002)    furosemide (LASIX) 2 mg/mL infusion (non-titrating) 5 mg/hr (05/25/18 0943)    heparin (porcine) in D5W 13.953 Units/kg/hr (05/25/18 0800)    norepinephrine bitartrate-D5W 0.14 mcg/kg/min (05/25/18 1030)     Scheduled Meds:   aspirin  81 mg Oral Daily    aztreonam  1,000 mg Intravenous Q8H    carvedilol  3.125 mg Oral Daily    docusate sodium  100 mg Oral Daily    insulin aspart U-100  1-10 Units Subcutaneous 6 times per day    insulin detemir U-100  15 Units Subcutaneous QHS    levothyroxine  75 mcg Oral Daily    pantoprazole 40 mg in dextrose 5 % 100 mL IVPB (over 15 minutes) (ready to mix system)  40 mg Intravenous Daily    polyethylene glycol  17 g Oral Daily    rosuvastatin  10 mg Oral QHS    ticagrelor  90 mg Oral BID    [START ON 5/26/2018] vancomycin (VANCOCIN) IVPB  1,000 mg Intravenous Q48H     PRN Meds:acetaminophen, bisacodyl, dextrose 50%, glucagon (human recombinant), heparin (porcine), heparin (PORCINE), heparin (PORCINE), magnesium sulfate IVPB, nitroGLYCERIN, ondansetron, ramelteon, sodium phosphate IVPB, sodium phosphate IVPB, sodium phosphate IVPB     Objective:   No c/o  Vital Signs (Most Recent):  Temp: 97.8 °F (36.6 °C) (05/25/18 0712)  Pulse: 98 (05/25/18 1030)  Resp: (!) 31 (05/25/18 1030)  BP: (!) 92/56 (05/25/18 1015)  SpO2: 96 % (05/25/18 1030) Vital Signs (24h Range):  Temp:  [97.6 °F (36.4 °C)-98.9 °F (37.2 °C)] 97.8 °F (36.6 °C)  Pulse:  [] 98  Resp:  [20-38] 31  SpO2:  [92 %-100 %] 96 %  BP: ()/(14-75) 92/56       Date 05/25/18 0700 - 05/26/18  0659   Shift 2957-4785 8609-4934 2184-8281 24 Hour Total   I  N  T  A  K  E   I.V.  (mL/kg) 142.1  (1.5)   142.1  (1.5)    Shift Total  (mL/kg) 142.1  (1.5)   142.1  (1.5)   O  U  T  P  U  T   Urine  (mL/kg/hr) 240   240    Shift Total  (mL/kg) 240  (2.5)   240  (2.5)   Weight (kg) 95.6 95.6 95.6 95.6       Physical Exam  obese  Significant Labs:  All pertinent labs from the last 24 hours have been reviewed.    Significant Diagnostics:  I have reviewed all pertinent imaging results/findings within the past 24 hours.    Assessment/Plan:   Right femoral Vas Cath inserted without difficulty with U/S guidance  Active Diagnoses:    Diagnosis Date Noted POA    PRINCIPAL PROBLEM:  Cardiogenic shock [R57.0] 05/20/2018 Yes    PAF (paroxysmal atrial fibrillation) [I48.0] 05/24/2018 Unknown    Nonsustained paroxysmal ventricular tachycardia [I47.2] 05/24/2018 Unknown    Atrial fibrillation with RVR [I48.91] 05/24/2018 Unknown    Right lower lobe pneumonia [J18.1] 05/24/2018 Unknown    Respiratory alkalosis [E87.3] 05/23/2018 Unknown    Metabolic alkalosis [E87.3] 05/23/2018 Unknown    Stage 3 chronic kidney disease due to type 1 diabetes mellitus [E10.22, N18.3]  Unknown    Electrolyte imbalance [E87.8] 05/22/2018 No    Leukocytosis [D72.829] 05/22/2018 Yes    Acute on chronic congestive heart failure [I50.9]  Yes    KEN (acute kidney injury) [N17.9]  Unknown    Acute on chronic combined systolic and diastolic heart failure [I50.43] 05/19/2018 Yes    Acute respiratory failure with hypoxia [J96.01] 05/19/2018 Yes    Acute renal failure superimposed on stage 3 chronic kidney disease [N17.9, N18.3] 05/19/2018 Yes    NSTEMI (non-ST elevated myocardial infarction) [I21.4] 05/19/2018 Yes    Inadequate dietary energy intake [E63.9] 05/19/2018 Unknown    Diabetic nephropathy associated with type 2 diabetes mellitus [E11.21]  Unknown    Acute pulmonary edema with congestive heart failure [I50.1] 04/19/2018 Yes     CAD (coronary artery disease) [I25.10]  Yes     Chronic    Hyperlipidemia [E78.5]  Yes     Chronic    Morbid obesity with BMI of 40.0-44.9, adult [E66.01, Z68.41] 11/23/2016 Not Applicable     Chronic    Essential hypertension [I10] 09/17/2013 Yes     Chronic    Hypothyroidism [E03.9] 09/17/2013 Yes     Chronic    Type 2 diabetes mellitus with hyperglycemia [E11.65] 08/23/2013 Yes     Chronic      Problems Resolved During this Admission:    Diagnosis Date Noted Date Resolved POA    Lactic acidosis [E87.2] 05/19/2018 05/20/2018 Yes    Dyspnea [R06.00]  05/20/2018 Unknown       Robby Walton MD  Vascular Surgery  [facility]

## 2018-05-25 NOTE — PLAN OF CARE
Problem: Patient Care Overview  Goal: Plan of Care Review  Outcome: Ongoing (interventions implemented as appropriate)  Pt did not have any ectopy after converting to normal sinus this am.  Pt remains on amio drip, NSR, HR in the 80s-100s.  Potassium replaced today.  Pt remains on levophed to keep map > 65.  Going off of cuff pressures due to art line being very positional.  Able to wean levo down from 0.3mcg/kg/min to 0.14mcg/kg/min.  Pt remains on lasix drip at 2.5 mg/hr, continues to put out at least 40 cc of urine per hour.  Pt only on bipap for about 2 hours today.  Pt is tired, but is much more alert and oriented today.  Pt ate a little of her chicken noodle soup today.  Pt able to drink a full glucose controlled strawberry boost this afternoon.  If patient is not strong enough to eat tomorrow, or does not want to eat, possibly insert NG tube for more adequate nutrition.

## 2018-05-25 NOTE — ASSESSMENT & PLAN NOTE
- secondary to acute chf   - Currently on intubated     05/25-now on BIPAP/Avaps,pulmonology follow up , will continue diuresis

## 2018-05-26 PROBLEM — I47.29 NONSUSTAINED PAROXYSMAL VENTRICULAR TACHYCARDIA: Status: RESOLVED | Noted: 2018-01-01 | Resolved: 2018-01-01

## 2018-05-26 NOTE — PROGRESS NOTES
"[facility]  Nephrology  Progress Note    Patient Name: Milli Montez  MRN: [id number]  Admission Date: 5/19/2018  Hospital Length of Stay: 7 days  Attending Provider: Elian Cha MD   Primary Care Physician: Marito Amato MD  Principal Problem:Cardiogenic shock    Subjective:     HPI: Pt was seen and examined in ICU. H/o reviewed. Pt is a 58 y/o female with acute kidney injury, likely due to CHF exacerbation causing renal hypoperfusion. Pt has a baseline s Cr of 1.2 (1 month ago). s Cr is now 1.7. Pt was admitted with SOB that is "new", pt also has low exercise tolerance chronically due to SOB. Pt has a pertinent h/o of DM, HTN, CAD with past h/o of CABG and stents, and morbid obesity. Labs and meds in ER and ICU reviewed. Pt has received lasix IV injection, and remains fluid overloaded with LE swelling and continued SOB.    Interval History:  Patient was started on ultrafiltration only session yesterday which she tolerated the session with a net fluid removal of 1.5 kg in 3 hr.  It required the patient to need higher doses of pressors.  Patient was then switched to CRRT but the patient did not tolerate CRRT and CRRT had to be stopped.  Patient then assess sedated intubation for respiratory failure and cardiogenic shock causing acute pulmonary edema.  Overnight patient has developed lactic acidosis.  Patient seen and examined in the ICU bedside while on the ventilator.    Review of patient's allergies indicates:   Allergen Reactions    Penicillins Swelling     Current Facility-Administered Medications   Medication Frequency    0.9%  NaCl infusion PRN    0.9%  NaCl infusion Once    acetaminophen tablet 650 mg Q6H PRN    amiodarone 360 mg/200 mL (1.8 mg/mL) infusion Continuous    aspirin chewable tablet 81 mg Daily    aztreonam injection 1,000 mg Q8H    bisacodyl suppository 10 mg Daily PRN    chlorhexidine 0.12 % solution 15 mL BID    dextrose 50% injection 12.5 g PRN    DOBUTamine 500mg " in D5W 250mL infusion (premix) (NON-TITRATING) Continuous    docusate sodium capsule 100 mg Daily    fentaNYL 2500 mcg in D5W 250 mL infusion premix (titrating) (conc: 10 mcg/mL) Continuous    glucagon (human recombinant) injection 1 mg PRN    heparin (porcine) injection 2,000 Units PRN    heparin 25,000 units in dextrose 5% 250 mL (100 units/mL) bolus from bag; PRN BOLUS PRN    heparin 25,000 units in dextrose 5% 250 mL (100 units/mL) bolus from bag; PRN BOLUS PRN    heparin 25,000 units in dextrose 5% 250 mL (100 units/mL) infusion; FEMALE Continuous    insulin aspart U-100 pen 1-10 Units 6 times per day    insulin detemir U-100 pen 20 Units BID    levothyroxine tablet 75 mcg Daily    magnesium sulfate 2g in water 50mL IVPB (premix) PRN    nitroGLYCERIN SL tablet 0.4 mg Q5 Min PRN    norepinephrine 32 mg in dextrose 5 % 250 mL infusion Continuous    ondansetron injection 4 mg Q6H PRN    pantoprazole 40 mg in dextrose 5 % 100 mL IVPB (over 15 minutes) (ready to mix system) Daily    polyethylene glycol packet 17 g Daily    ramelteon tablet 8 mg Nightly PRN    rosuvastatin tablet 10 mg QHS    sodium phosphate 20.01 mmol in dextrose 5 % 250 mL IVPB PRN    sodium phosphate 30 mmol in dextrose 5 % 250 mL IVPB PRN    sodium phosphate 39.99 mmol in dextrose 5 % 250 mL IVPB PRN    ticagrelor tablet 90 mg BID    [START ON 5/27/2018] vancomycin (VANCOCIN) 1,000 mg in sodium chloride 0.9% 250 mL IVPB Q48H       Objective:     Vital Signs (Most Recent):  Temp: 98.2 °F (36.8 °C) (05/26/18 0305)  Pulse: 83 (05/26/18 0840)  Resp: 20 (05/26/18 0840)  BP: 96/62 (05/26/18 0720)  SpO2: 98 % (05/26/18 0840)  O2 Device (Oxygen Therapy): ventilator (05/26/18 0720) Vital Signs (24h Range):  Temp:  [97 °F (36.1 °C)-98.8 °F (37.1 °C)] 98.2 °F (36.8 °C)  Pulse:  [] 83  Resp:  [18-45] 20  SpO2:  [14 %-100 %] 98 %  BP: ()/(12-80) 96/62     Weight: 96.1 kg (211 lb 13.8 oz) (05/26/18 0800)  Body mass index is  34.2 kg/m².  Body surface area is 2.12 meters squared.    I/O last 3 completed shifts:  In: 2978.8 [P.O.:130; I.V.:2848.8]  Out: 1795 [Urine:1795]    Physical Exam   Constitutional: She is oriented to person, place, and time. She appears well-developed and well-nourished.   HENT:   Head: Normocephalic and atraumatic.   ETT in place    Eyes: Conjunctivae and EOM are normal. Pupils are equal, round, and reactive to light.   Neck: Normal range of motion and full passive range of motion without pain. Neck supple. Carotid bruit is not present. No edema present. No thyroid mass and no thyromegaly present.   Cardiovascular: Normal rate, regular rhythm, S1 normal, S2 normal, intact distal pulses and normal pulses.   No extrasystoles are present. PMI is displaced.  Exam reveals gallop. Exam reveals no friction rub.    Murmur heard.   Systolic murmur is present with a grade of 2/6   + JVD    loud P2 +PAD    Pulmonary/Chest: No accessory muscle usage. She is in respiratory distress. She has wheezes. She has rales. She exhibits no tenderness.   Abdominal: Soft. Bowel sounds are normal. She exhibits no distension and no mass. There is no hepatosplenomegaly. There is no tenderness. There is no rebound and no CVA tenderness. No hernia.   Musculoskeletal: Normal range of motion. She exhibits edema. She exhibits no tenderness.   Neurological: She is alert and oriented to person, place, and time. She has normal reflexes. No cranial nerve deficit or sensory deficit. She exhibits normal muscle tone. Coordination normal.   Skin: Skin is warm and dry. No bruising, no ecchymosis and no rash noted. No cyanosis or erythema. No pallor. Nails show no clubbing.   Psychiatric: She has a normal mood and affect. Her speech is normal and behavior is normal. Judgment and thought content normal.   Nursing note and vitals reviewed.      Significant Labs:  All labs within the past 24 hours have been reviewed.     Significant Imaging:  Labs:  Reviewed  X-Ray: Reviewed    Assessment/Plan:     KEN (acute kidney injury)    Patient is suffering from multiorgan failure including cardiogenic shock.  Did not tolerate CRRT.  I would wait for Cardiology and Critical Care rounds for making a decision for ultrafiltration goal and plans.  At this point we have limited options.  We could try ultrafiltration only.  Alternatively we could try increasing the Lasix drip.  Case discussed with FABIAN Beckford and the ICU staff.  Patient is critical and carries a very guarded prognosis at this point.            Thank you for your consult.     Kika Ha MD  Nephrology  [facility]

## 2018-05-26 NOTE — PROGRESS NOTES
CRRT terminated due to patient c/o chest pain 6/10 and low flow in circuit.  WOB increased  stat CXR bilateral infiltrates worse  DW family  Decision for intubation for worsening ARDS features  A line attempted in both wrist   Will attempt again later    Vitals:    05/25/18 1800 05/25/18 1815 05/25/18 1830 05/25/18 1845   BP: (!) 65/40 (!) 86/56 (!) 100/56 (!) 74/44   BP Location:       Patient Position:       Pulse: 87 90 88 62   Resp: (!) 32 (!) 41 (!) 29 19   Temp:       TempSrc:       SpO2: 100% 98% 100% (!) 90%   Weight:       Height:           Problem   Acute Pulmonary Edema With Congestive Heart Failure   Paf (Paroxysmal Atrial Fibrillation)   Nonsustained Paroxysmal Ventricular Tachycardia   Right Lower Lobe Pneumonia   Leukocytosis   Cardiogenic Shock   Acute On Chronic Combined Systolic and Diastolic Heart Failure   Acute Respiratory Failure With Hypoxia   Acute Renal Failure Superimposed On Stage 3 Chronic Kidney Disease   Nstemi (Non-St Elevated Myocardial Infarction)   Inadequate Dietary Energy Intake   Cad (Coronary Artery Disease)   Essential Hypertension       Additional CCT 30 mins

## 2018-05-26 NOTE — PLAN OF CARE
Problem: Patient Care Overview  Goal: Plan of Care Review  Outcome: Ongoing (interventions implemented as appropriate)  Recommendation/Intervention: 1. Continue current nutrition support as tolerated  Goals: Meet at least 90% of estimated needs from nutrition support while intubated  Nutrition Goal Status: new  Communication of RD Recs:  (POC, sticky note)

## 2018-05-26 NOTE — ASSESSMENT & PLAN NOTE
Strict I's and O's.  Monitor BMP.  Nephrology following, ?RRT with UF only vs resume lasix - goal maintain negative volume status -- renally dose medications and minimize fluid intake

## 2018-05-26 NOTE — CONSULTS
"  [facility] -   Adult Nutrition  Consult Note    SUMMARY     Recommendations    Recommendation/Intervention: 1. Continue current nutrition support as tolerated  Goals: Meet at least 90% of estimated needs from nutrition support while intubated  Nutrition Goal Status: new  Communication of RD Recs:  (POC, sticky note)    Reason for Assessment    Reason for Assessment: consult, RD follow-up (resp failure)  Diagnosis: cardiac disease, pulmonary disease (acute respiratory failure)  Relevant Medical History: CHF, HLD, HTN, Ulcer, TIA, Diabetes, CAD  Interdisciplinary Rounds: did not attend  General Information Comments: Patient is currently intubated, started on tubefeed for nutrition support per RD recommendations prior to intubation  Nutrition Discharge Planning: to be determined    Nutrition Risk Screen    Nutrition Risk Screen: no indicators present    Nutrition/Diet History    Patient Reported Diet/Restrictions/Preferences: general  Do you have any cultural, spiritual, Anabaptism conflicts, given your current situation?: none reported  Food Allergies: NKFA  Factors Affecting Nutritional Intake: on mechanical ventilation, NPO    Anthropometrics    Temp: 98.2 °F (36.8 °C)  Height Method: Estimated  Height: 5' 6" (167.6 cm)  Height (inches): 66 in  Weight Method: Bed Scale  Weight: 96.1 kg (211 lb 13.8 oz)  Weight (lb): 211.86 lb  Ideal Body Weight (IBW), Female: 130 lb  % Ideal Body Weight, Female (lb): 162.97 lb  BMI (Calculated): 34.3  BMI Grade: 30 - 34.9- obesity - grade I       Lab/Procedures/Meds    Pertinent Labs Reviewed: reviewed  BMP  Lab Results   Component Value Date     (L) 05/26/2018     (L) 05/26/2018    K 3.8 05/26/2018    K 3.8 05/26/2018    CL 85 (L) 05/26/2018    CL 85 (L) 05/26/2018    CO2 26 05/26/2018    CO2 26 05/26/2018    BUN 76 (H) 05/26/2018    BUN 76 (H) 05/26/2018    CREATININE 2.9 (H) 05/26/2018    CREATININE 2.9 (H) 05/26/2018    CALCIUM 8.6 (L) 05/26/2018    " CALCIUM 8.6 (L) 05/26/2018    ANIONGAP 15 05/26/2018    ANIONGAP 15 05/26/2018    ESTGFRAFRICA 20 (A) 05/26/2018    ESTGFRAFRICA 20 (A) 05/26/2018    EGFRNONAA 18 (A) 05/26/2018    EGFRNONAA 18 (A) 05/26/2018     Lab Results   Component Value Date    CALCIUM 8.6 (L) 05/26/2018    CALCIUM 8.6 (L) 05/26/2018    PHOS 5.8 (H) 05/26/2018     Lab Results   Component Value Date    ALBUMIN 2.1 (L) 05/26/2018    ALBUMIN 2.1 (L) 05/26/2018       Pertinent Medications Reviewed: reviewed    Physical Findings/Assessment    Overall Physical Appearance: obese, on ventilator support  Tubes: orogastric tube  Oral/Mouth Cavity:  (NAREN)  Skin:  (see nsg notes)    Estimated/Assessed Needs    Weight Used For Calorie Calculations: 96.1 kg (211 lb 13.8 oz)  Energy Calorie Requirements (kcal): 1774  Energy Need Method: [facility]  Protein Requirements: 118 g/day  Weight Used For Protein Calculations: 59 kg (130 lb) (IBW critical care obesity)  Fluid Requirements (mL): 1ml/kcal or per MD  Fluid Need Method: RDA Method (or per MD/NP)  RDA Method (mL): 1774  CHO Requirement: 50% EEN      Nutrition Prescription Ordered    Current Diet Order: NPO  Current Nutrition Support Formula Ordered: Novasource Renal (plus Beneprotin 1 pack BID)  Current Nutrition Support Rate Ordered: 40 (ml)  Current Nutrition Support Frequency Ordered: ml/hr    Evaluation of Received Nutrient/Fluid Intake    Enteral Calories (kcal): 1970  Enteral Protein (gm): 98  Enteral (Free Water) Fluid (mL): 748  Energy Calories Required: meeting needs  Protein Required: meeting needs  % Intake of Estimated Energy Needs: 75 - 100 %  % Meal Intake: NPO    Nutrition Risk    Level of Risk/Frequency of Follow-up: high (2x weekly)     Assessment and Plan    Inadequate dietary energy intake    Related to (etiology):   Inability to consume adequate intake    Signs and Symptoms (as evidenced by):   NPO, intubated and sedated    Interventions/Recommendations (treatment strategy):  See  above    Nutrition Diagnosis Status:   Continues               Monitor and Evaluation    Food and Nutrient Intake: energy intake, enteral nutrition intake  Food and Nutrient Adminstration: enteral and parenteral nutrition administration, diet order  Knowledge/Beliefs/Attitudes: food and nutrition knowledge/skill, beliefs and attitudes  Physical Activity and Function: nutrition-related ADLs and IADLs  Anthropometric Measurements: weight  Biochemical Data, Medical Tests and Procedures: electrolyte and renal panel, glucose/endocrine profile  Nutrition-Focused Physical Findings: overall appearance     Nutrition Follow-Up    RD Follow-up?: Yes

## 2018-05-26 NOTE — INTERVAL H&P NOTE
The patient has been examined and the H&P has been reviewed:    I concur with the findings and changes have been noted since the H&P was written: patient is in cardiogenic shock on 2 pressures will place iabp for support in transfer to higher level of care    Anesthesia/Surgery risks, benefits and alternative options discussed and understood by patient/family.  I have explained the risks, benefits , and alternatives of the procedure in detail.the patient voices understanding and all questions have been answered.the patient agrees to proceed as planned.          Active Hospital Problems    Diagnosis  POA    *Cardiogenic shock [R57.0]  Yes    PAF (paroxysmal atrial fibrillation) [I48.0]  Unknown    Atrial fibrillation with RVR [I48.91]  Unknown    Right lower lobe pneumonia [J18.1]  Unknown    Respiratory alkalosis [E87.3]  Unknown    Metabolic alkalosis [E87.3]  Unknown    Stage 3 chronic kidney disease due to type 1 diabetes mellitus [E10.22, N18.3]  Unknown    Electrolyte imbalance [E87.8]  No    Acute on chronic congestive heart failure [I50.9]  Yes    KEN (acute kidney injury) [N17.9]  Yes    Acute on chronic combined systolic and diastolic heart failure [I50.43]  Yes    Acute respiratory failure with hypoxia [J96.01]  Yes    Acute renal failure superimposed on stage 3 chronic kidney disease [N17.9, N18.3]  Yes    NSTEMI (non-ST elevated myocardial infarction) [I21.4]  Yes    Inadequate dietary energy intake with morbid obesity BMI > 40 [E63.9]  Yes    Diabetic nephropathy associated with type 2 diabetes mellitus [E11.21]  Yes    Acute pulmonary edema with congestive heart failure [I50.1]  Yes    CAD (coronary artery disease) [I25.10]  Yes     Chronic    Hyperlipidemia [E78.5]  Yes     Chronic    Morbid obesity with BMI of 40.0-44.9, adult [E66.01, Z68.41]  Not Applicable     Chronic    Essential hypertension [I10]  Yes     Chronic    Hypothyroidism [E03.9]  Yes     Chronic    Type 2  diabetes mellitus with hyperglycemia [E11.65]  Yes     Chronic      Resolved Hospital Problems    Diagnosis Date Resolved POA    Nonsustained paroxysmal ventricular tachycardia [I47.2] 05/26/2018 Unknown    Lactic acidosis [E87.2] 05/20/2018 Yes    Dyspnea [R06.00] 05/20/2018 Unknown

## 2018-05-26 NOTE — ASSESSMENT & PLAN NOTE
Escalate long acting insulin, continue SSI with monitoring for glucose control and prevention of insulin toxicity

## 2018-05-26 NOTE — NURSING
Report called to Gabino JENNINGS at [facility].  Awaiting transfer center to decide if pt will be transported by ground or air.

## 2018-05-26 NOTE — ASSESSMENT & PLAN NOTE
Refractory cardiogenic shock  continue Dobutamine 2.5 mcg  Failed CRRT trial, today resume lasix vs trial RRT with UF only

## 2018-05-26 NOTE — PROGRESS NOTES
[facility] Hospital Medicine  Progress Note    Patient Name: Milli Mnotez  MRN: [id number]  Patient Class: IP- Inpatient   Admission Date: 5/19/2018  Length of Stay: 7 days  Attending Physician: Elian Cha MD  Primary Care Provider: Marito Amato MD        Subjective:     Principal Problem:Cardiogenic shock    HPI:  Ms. Montez is a 56yo female with  a PMHx of CAD with remote CABG (LIMA to Diagonal1 + distal LAD, and SVG-RPDA) and balloon PTCA of LM stent on 2/6/18 followed by repeat balloon PTCA + DEWAYNE of LM in-stent steosis 5/2/18, chronic diastolic HFpEF of 55-60%, HLD, HTN, DM II, and h/o TIA, who presented to the ED with c/o SOB that has progressively worsened over the past few days.  Associated orthopnea, PND, BLE edema, and palpitations.  Denies any CP, cough, weight gain, ABD pain or distention, N/V/D, dysuria, lightheadedness/dizziness, syncope, HA, AMS, focal deficits, diaphoresis, fever, or chills.  Upon arrival to ED, patient notably in severe respiratory distress and placed on BiPAP, and later AVAPS.  She received IV Lasix 40mg, followed by Lasix 60mg IV and IV Ativan with improvement in respiratory status.  Initial work-up resulted , troponin 0.006, cr. 1.5, lactic 5.1, ABG on AVAPS with pH 7.3, pCO2 41, pO2 525, HCO3 20.  CXR consistent with CHF.  EKG showed atrial tachycardia with LBBB (old), no acute ischemic ST-T changes from previous tracings.  Patient was admitted to ICU for acute decompensated HF under Hospital Medicine services.  Currently, patient appears comfortable in NAD.  Reports SOB greatly improved since IV Lasix and Ativan.  Patient is a Full Code.  Daughter Katherine Lewis is surrogate decision maker.    Hospital Course:  5/20/2018  Patient continue to be on vent and iv diuresis for acute cardiac pulmonary edema which shows improvement  . Continue treatment for nstemi with aspirin,brilinta ,statin. Echo finding and elevated trop noted continue with medical  management once stable patient will have LHC   5/21/2018  Patient remains intubated. Alert and follows commands. Low dose Levophed for pressor support.  Responding to Lasix gtt at 5mg/hr. Diuresing well since starting Lasix and Levophed. Troponin > 50.  2D echo showed EF of 45-50-%, +WMA.Plan for LHC and RHC   5/22/2018   Cardiology updated plan of care to wait on heart cath . Until her renal function improved . Diuresis on hold . Improved pulmonary edema . Continue to require pressors to support blood pressure   05/23-she remains on vasopressor support ,case was discussed extensively with cardiology-no benefit for LHC at this time.  Lab data showed troponin of more than 50.  05/24- She developed symptomatic Afib RVR and had cardioversion done, IV amiodarone was also given at bedside ,critical care team added Vanco and Azactam.   05/25- She is now on bipap -remains on multiple drips-amiodarone,heparin, levophed, dobutamine   05/26- She was started on renal replacement therapy and had interval intubation.   The daughter who is the POA is concerned about overall prognosis and wants to know the options for meaningful cardiac recovery and advance directives was also discussed with her.  She wants to have a family conference with the cardiology so they can fully know their options before making DNR decision.      Interval History: 05/26- She was started on renal replacement therapy and had interval intubation.   The daughter who is the POA is concerned about overall prognosis and wants to know the options for meaningful cardiac recovery and advance directives was also discussed with her.  She wants to have a family conference with the cardiology so they can fully know their options before making DNR decision.        Review of Systems   Unable to perform ROS: Intubated     Objective:     Vital Signs (Most Recent):  Temp: 97.7 °F (36.5 °C) (05/26/18 0715)  Pulse: 80 (05/26/18 0930)  Resp: (!) 21 (05/26/18 0930)  BP: 94/60  (05/26/18 0930)  SpO2: 99 % (05/26/18 0930) Vital Signs (24h Range):  Temp:  [97 °F (36.1 °C)-98.8 °F (37.1 °C)] 97.7 °F (36.5 °C)  Pulse:  [] 80  Resp:  [18-45] 21  SpO2:  [14 %-100 %] 99 %  BP: ()/(12-80) 94/60     Weight: 96.1 kg (211 lb 13.8 oz)  Body mass index is 34.2 kg/m².    Intake/Output Summary (Last 24 hours) at 05/26/18 1019  Last data filed at 05/26/18 0815   Gross per 24 hour   Intake          1966.52 ml   Output              715 ml   Net          1251.52 ml      Physical Exam   Constitutional: She appears well-nourished. No distress. She is intubated.   HENT:   Head: Atraumatic.   Eyes: Conjunctivae are normal. Pupils are equal, round, and reactive to light.   Neck: No JVD present. No tracheal deviation present.   Cardiovascular: Normal rate and regular rhythm.    Murmur heard.  Pulses:       Radial pulses are 1+ on the right side, and 1+ on the left side.        Dorsalis pedis pulses are 1+ on the right side, and 1+ on the left side.   Pulmonary/Chest: She is intubated. She has decreased breath sounds. She has rales.   Abdominal: Soft. Bowel sounds are normal. She exhibits no distension.   Neurological: GCS eye subscore is 3. GCS verbal subscore is 1. GCS motor subscore is 6.   Skin: Skin is dry. Capillary refill takes 2 to 3 seconds. No cyanosis.        cool       Significant Labs:   Bilirubin:   Recent Labs  Lab 05/23/18  0400 05/24/18  0400 05/25/18  0415 05/25/18 2022 05/26/18  0410   BILITOT 1.9* 2.1* 1.7* 2.6* 2.1*     Blood Culture: No results for input(s): LABBLOO in the last 48 hours.  BMP:   Recent Labs  Lab 05/26/18  0410 05/26/18  0415   *  219*  --    *  126*  --    K 3.8  3.8  --    CL 85*  85*  --    CO2 26 26  --    BUN 76*  76*  --    CREATININE 2.9*  2.9*  --    CALCIUM 8.6*  8.6*  --    MG  --  2.2     CBC:   Recent Labs  Lab 05/25/18  0415 05/26/18  0410   WBC 15.34* 16.32*   HGB 9.9* 9.4*   HCT 29.4* 27.7*    310     Troponin:   Recent  Labs  Lab 05/25/18  0415 05/25/18 2022   TROPONINI 46.541* 49.014*     All pertinent labs within the past 24 hours have been reviewed.    Significant Imaging: I have reviewed and interpreted all pertinent imaging results/findings within the past 24 hours.    Assessment/Plan:      * Cardiogenic shock    - on levophed and iv diuresis on hold   - echo ef 45 with wall motion abnormalities   - continue with nstemi management   -plan for lhc/rhc once kidney function improves     05/23- will continue levophed and wean as tolerated.  05/24- will wean off levophed as tolerated.    05/25- will continue levophed/dobutamine ,cardiology follow up .  05/26-will continue levophed/dobutamine and wean as tolerated.  Discussed options for advanced CHF with Dr Matthews-she is not a candidate at this time.         Right lower lobe pneumonia      05/24- now started on empiric antibiotics-will follow X-ray in am .  This can be fluid versus pneumonia     05/25-will send serum procalcitonin ,will use cultures to guide therapy .Will need to deescalate therapy soon.        Atrial fibrillation with RVR      05/24- will continue amiodarone ,on heparin drip, now better rate controlled.  05/25- continue heparin/amiodarone  05/26 -now rate controlled, will continue amiodarone/ heparin        Respiratory alkalosis      05/23-cardiology follow up , serum troponin is more than 50 , will need LHC but optimal timing was discussed extensively with cardiology .    Dr Matthews recommends holding off on LHC at this time.  Will optimize medical therapy .  Continue heparin drip         KEN (acute kidney injury)    - cardiorenal   Improved from admission           Electrolyte imbalance      05/26-will replete KCL for hypokalemia         Diabetic nephropathy associated with type 2 diabetes mellitus      05/23- insulin sliding scale , closely monitor glucose.    Continue levemir 15 units        Inadequate dietary energy intake with morbid obesity BMI > 40               NSTEMI (non-ST elevated myocardial infarction)    -continue iv heparin,aspirin,brilinta,statin and ace on hold due to grisel and hypotension   -plan heart cath once kidney function improved   05/24- Cardiology follow up , will optimize medical therapy .  Case was discussed extensively with cardiology -No need for acute LHC at this time.  Continue lasix, heparin drip, coreg,crestor.    05/25- will optimize medical therapy .  Continue Lasix, coreg , on heparin drip .  Cardiology follow up   05/26-troponin is 49, cardiac echo was done today,remains on heparin drip, continue coreg ,aspirin, ticagrelor ,crestor .  Cardiology follow up .  Prognosis -guarded        Acute renal failure superimposed on stage 3 chronic kidney disease    - Likely cardiorenal syndrome.  - Diurese as above.  - Avoid nephrotoxic agents.  - Follow kidney function closely.  -nephrology on board     05/23-will monitor renal function closely while on lasix drip ,nephrology follow up .  05/24- serum creatine is stable at 2.2. Will continue to monitor very closely  05/25- will closely monitor serum creatinine. Urine out -1845mls last 24 hours ,nephrology follow up   05/26- serum creatinine is 2.9, nephrology follow up .Will avoid nephrotoxic meds.            Acute respiratory failure with hypoxia    - secondary to acute chf   - Currently on intubated     05/25-now on BIPAP/Avaps,pulmonology follow up , will continue diuresis         Acute on chronic combined systolic and diastolic heart failure    - iv diuresis on hold  - on vent   - on beta blocker   - ace on hold due to grisel and hypotension     05/23-Will restart lasix drip after discussion with cardiology .  Cardiac echo-  1 - Wall motion abnormalities.     2 - Mildly depressed left ventricular systolic function (EF 45-50%).     3 - Indeterminate LV diastolic function  Case was discussed extensively with cardiology .-no benefit for LHC at this time as risks will outweigh the benefit.    05/24- will  continue lasix as tolerated . Cardiology input appreciated .  05/25- Will defer therapy to cardiology .  Plan of care was discussed extensively with Dr Matthews and Dr Ha-will add dobutamine, increase dose of lasix to 5mg/hour  05/26- she remains critical-on dobutamine/levophed, amiodarone   Guarded prognosis   Cardiology input appreciated         Acute pulmonary edema with congestive heart failure    - improved   -iv diuresis on hold .   - patient still on pressors         CAD (coronary artery disease)    - Recent DEWAYNE of LM due to critical in-stent stenosis on 5/2/18 per Dr. Wooten ([facility] [place]).  -continue cardioprotective meds         Hyperlipidemia    - Continue statin therapy.  - FLP in AM.        Morbid obesity with BMI of 40.0-44.9, adult    - Lifestyle modifications.  - Pulmonology consult, ? [facility].        Essential hypertension    - hypotensive hold on antihypertensives   - Resume home Coreg.  .        Hypothyroidism      05/23- will continue synthroid         Type 2 diabetes mellitus with hyperglycemia    - Accuchecks with SSI.  - Hold glipizide, will resume at DC.            VTE Risk Mitigation         Ordered     heparin (porcine) injection 2,000 Units  As needed (PRN)      05/25/18 1241     heparin 25,000 units in dextrose 5% 250 mL (100 units/mL) infusion; FEMALE  Continuous      05/19/18 0905     heparin 25,000 units in dextrose 5% 250 mL (100 units/mL) bolus from bag; PRN BOLUS  As needed (PRN)      05/19/18 0905     heparin 25,000 units in dextrose 5% 250 mL (100 units/mL) bolus from bag; PRN BOLUS  As needed (PRN)      05/19/18 0905     Place sequential compression device  Until discontinued      05/19/18 0628     IP VTE HIGH RISK PATIENT  Once      05/19/18 0248          Critical care time spent on the evaluation and treatment of severe organ dysfunction, review of pertinent labs and imaging studies, discussions with consulting providers and discussions with patient/family: 50 minutes.    Elian GONZALES  MD Starla  Department of Hospital Medicine   [facility] -

## 2018-05-26 NOTE — ASSESSMENT & PLAN NOTE
Daily I/O  ?assistance from advanced heart failure center vs end of life discussion; would favor evaluation by advanced center prior to end of life care

## 2018-05-26 NOTE — ASSESSMENT & PLAN NOTE
Patient is suffering from multiorgan failure including cardiogenic shock.  Did not tolerate CRRT.  I would wait for Cardiology and Critical Care rounds for making a decision for ultrafiltration goal and plans.  At this point we have limited options.  We could try ultrafiltration only.  Alternatively we could try increasing the Lasix drip.  Case discussed with FABIAN Beckford and the ICU staff.  Patient is critical and carries a very guarded prognosis at this point.

## 2018-05-26 NOTE — ASSESSMENT & PLAN NOTE
- Likely cardiorenal syndrome.  - Diurese as above.  - Avoid nephrotoxic agents.  - Follow kidney function closely.  -nephrology on board     05/23-will monitor renal function closely while on lasix drip ,nephrology follow up .  05/24- serum creatine is stable at 2.2. Will continue to monitor very closely  05/25- will closely monitor serum creatinine. Urine out -1845mls last 24 hours ,nephrology follow up   05/26- serum creatinine is 2.9, nephrology follow up .Will avoid nephrotoxic meds.

## 2018-05-26 NOTE — PROGRESS NOTES
[facility] -   Critical Care Medicine  Progress Note    Patient Name: Milli Montez  MRN: [id number]  Admission Date: 5/19/2018  Hospital Length of Stay: 7 days  Code Status: Full Code  Attending Provider: Elian Cha MD  Primary Care Provider: Marito Amato MD   Principal Problem: Cardiogenic shock    Subjective:     HPI:  57-year-old female patient with history of CHF, coronary artery disease status post stent placement by ambulance to the hospital for worsening shortnessof breath for a few days and the respiratory distress.  She was started on noninvasive ventilation in the ER and transferred to the ICU.      Hospital/ICU Course:  Continue to be on noninvasive ventilation.ABGs and chest x-ray reviewed.afebrile.  5/19 Remains in distress tachypneic respiratory rate in the 30s.receiving IV Lasix.  An on IV heparin drip.  5/20 Sp intubation, Rt TLC intubation. On levophed, heparin, lasix, fentanyl gtt. Chest x-ray and ABG reviewed.  5/21 - Fully awake and comfortable intubated on mech ventilation.  Still on Heparin, Lasix, Levophed and Fentanyl infusions  5/22 - Still intubated on mech ventilation and Fentanyl infusion fully awake and responsive with stimuli.  Still on Levophed infusion.  Lasix infusion stopped.  05/23: Levophed started at 0.04 now at 0.08, off vent, ABG respiratory alkalosis  05/24: Symptomatic Afib RVR, sedation with cardioversion at bedside, amiodarone loaded, K+ and Mag+, levo weaned down, Added Abx: Vanco and Azactam    05/25: worsened lung infiltrate, edema, CRRT will be added  5/26 required intubation overnight with high PEEP requirement, CRRT not tolerated despite pressor and dobutrex support        Objective:     Vital Signs (Most Recent):  Temp: 98.2 °F (36.8 °C) (05/26/18 0305)  Pulse: 83 (05/26/18 0840)  Resp: 20 (05/26/18 0840)  BP: 96/62 (05/26/18 0720)  SpO2: 98 % (05/26/18 0840) Vital Signs (24h Range):  Temp:  [97 °F (36.1 °C)-98.8 °F (37.1 °C)] 98.2 °F (36.8  °C)  Pulse:  [] 83  Resp:  [18-45] 20  SpO2:  [14 %-100 %] 98 %  BP: ()/(12-80) 96/62     Weight: 96.1 kg (211 lb 13.8 oz)  Body mass index is 34.2 kg/m².      Intake/Output Summary (Last 24 hours) at 05/26/18 0928  Last data filed at 05/26/18 0815   Gross per 24 hour   Intake          2028.92 ml   Output              770 ml   Net          1258.92 ml       Physical Exam   Constitutional: She appears well-nourished. No distress. She is intubated.   HENT:   Head: Atraumatic.   Eyes: Conjunctivae are normal. Pupils are equal, round, and reactive to light.   Neck: No JVD present. No tracheal deviation present.   Cardiovascular: Normal rate and regular rhythm.    Murmur heard.  Pulses:       Radial pulses are 1+ on the right side, and 1+ on the left side.        Dorsalis pedis pulses are 1+ on the right side, and 1+ on the left side.   Pulmonary/Chest: She is intubated. She has decreased breath sounds. She has rales.   Abdominal: Soft. Bowel sounds are normal. She exhibits no distension.   Neurological: GCS eye subscore is 3. GCS verbal subscore is 1. GCS motor subscore is 6.   Skin: Skin is dry. Capillary refill takes 2 to 3 seconds. No cyanosis.        cool       Vents:  Vent Mode: A/C (05/26/18 0840)  Ventilator Initiated: Yes (05/25/18 2014)  Set Rate: 20 bmp (05/26/18 0840)  Vt Set: 420 mL (05/26/18 0840)  Pressure Support: 0 cmH20 (05/26/18 0840)  PEEP/CPAP: 10 cmH20 (05/26/18 0840)  Oxygen Concentration (%): 40 (05/26/18 0840)  Peak Airway Pressure: 35 cmH2O (05/26/18 0840)  Plateau Pressure: 0 cmH20 (05/26/18 0840)  Total Ve: 8.85 mL (05/26/18 0840)  F/VT Ratio<105 (RSBI): (!) 45.66 (05/26/18 0840)    Lines/Drains/Airways     Central Venous Catheter Line                 Percutaneous Central Line Insertion/Assessment - triple lumen  05/19/18 1600 right internal jugular 6 days         Hemodialysis Catheter 05/25/18 1300 right femoral less than 1 day          Drain                 Urethral Catheter  05/19/18 0115 Latex 16 Fr. 7 days         NG/OG Tube 05/25/18 2030 Pike sump 16 Fr. Center mouth less than 1 day          Airway                 Airway - Non-Surgical 05/25/18 Endotracheal Tube 1 day          Peripheral Intravenous Line                 Peripheral IV - Single Lumen 05/25/18 1000 Left Upper Arm less than 1 day                Significant Labs:    CBC/Anemia Profile:    Recent Labs  Lab 05/25/18 0415 05/26/18 0410   WBC 15.34* 16.32*   HGB 9.9* 9.4*   HCT 29.4* 27.7*    310   MCV 91 90   RDW 15.2* 15.6*        Chemistries:    Recent Labs  Lab 05/25/18 0415 05/25/18  1645 05/25/18 2022 05/26/18 0410 05/26/18 0415   *  < > 130* 128*  128* 126*  126*  --    K 3.3*  < > 3.8 4.1  4.1 3.8  3.8  --    CL 88*  < > 86* 87*  87* 85*  85*  --    CO2 29  < > 28 25 25 26 26  --    BUN 64*  < > 68* 69*  69* 76*  76*  --    CREATININE 2.2*  < > 2.3* 2.8*  2.8* 2.9*  2.9*  --    CALCIUM 8.3*  < > 8.9 8.5*  8.5* 8.6*  8.6*  --    ALBUMIN 2.2*  < > 2.4* 2.3*  2.3* 2.1*  2.1*  --    PROT 7.1  --   --  7.8 7.5  --    BILITOT 1.7*  --   --  2.6* 2.1*  --    ALKPHOS 129  --   --  164* 147*  --    ALT 23  --   --  28 25  --    AST 32  --   --  44* 37  --    MG 2.5  < > 2.2 2.3  --  2.2   PHOS  --   < > 5.6* 6.8* 5.8*  --    < > = values in this interval not displayed.    All pertinent labs within the past 24 hours have been reviewed.  ABG    Recent Labs  Lab 05/26/18  0454   PH 7.447   PO2 109*   PCO2 37.8   HCO3 26.1   BE 2       Significant Imaging:  CXR: I have reviewed all pertinent results/findings within the past 24 hours and my personal findings are:  cardiomegaly, bilateral diffuse opacity consistent with pulmonary edema    Assessment/Plan:     Neuro    Sedation for vent comfort, RASS goal -2, daily SAT      Pulmonary   Right lower lobe pneumonia    continue Azactam and Vancomycin  Prelim sputum culture reviewed, await final for antimicrobial adjustment      Acute respiratory  failure with hypoxia    Vent settings reviewed and adjusted for optimal gas exchange with lung protective ventilation with low tidal volume, high PEEP  VAP prophylaxis      Cardiac/Vascular   * Cardiogenic shock    Cont Levophed infusion with critical care cardiac monitoring  Keep MAP > 65mmHG  continue Dobutamine 2.5 mcg      PAF (paroxysmal atrial fibrillation)    Amiodarone maintaince drip, transition to po once tolerating TF  Heparin gtt      NSTEMI (non-ST elevated myocardial infarction)    Cards following  LHC not indicated DW Cardiology; continue heparin infusion      Acute on chronic combined systolic and diastolic heart failure    Daily I/O  ?assistance from advanced heart failure center vs end of life discussion; would favor evaluation by advanced center prior to end of life care      Acute pulmonary edema with congestive heart failure    Refractory cardiogenic shock  continue Dobutamine 2.5 mcg  Failed CRRT trial, today resume lasix vs trial RRT with UF only      CAD (coronary artery disease)    Cards following  No LHC, medical management  Cont ASA and Ticagrelor; resume coreg ASAP off pressor      Essential hypertension    Holding BP meds with cardiogenic shock      Renal/   Electrolyte imbalance    Replace K+      Diabetic nephropathy associated with type 2 diabetes mellitus    SSI, monitor for toxicity      Acute renal failure superimposed on stage 3 chronic kidney disease    Strict I's and O's.  Monitor BMP.  Nephrology following, ?RRT with UF only vs resume lasix - goal maintain negative volume status -- renally dose medications and minimize fluid intake      Endocrine   Inadequate dietary energy intake with morbid obesity BMI > 40    Tf per RD recs      Hypothyroidism    Cont Levothyroxine      Type 2 diabetes mellitus with hyperglycemia    Escalate long acting insulin, continue SSI with monitoring for glucose control and prevention of insulin toxicity         Critical Care Daily Checklist:    A:  Awake: RASS Goal/Actual Goal: RASS Goal: 0-->alert and calm  Actual: Donato Agitation Sedation Scale (RASS): Alert and calm   B: Spontaneous Breathing Trial Performed?     C: SAT & SBT Coordinated?  yes                      D: Delirium: CAM-ICU Overall CAM-ICU: Negative   E: Early Mobility Performed? Yes   F: Feeding Goal: Goals: Meet at least 90% of estimated needs from nutrition support while intubated  Status: Nutrition Goal Status: new   Current Diet Order   Procedures    Diet NPO Except for: Sips with Medication     Order Specific Question:   Except for     Answer:   Sips with Medication      AS: Analgesia/Sedation fentanyl   T: Thromboembolic Prophylaxis Heparin infusion   H: HOB > 300 Yes   U: Stress Ulcer Prophylaxis (if needed) PPI   G: Glucose Control Long acting plus SSI   B: Bowel Function     I: Indwelling Catheter (Lines & Carlin) Necessity reviewed   D: De-escalation of Antimicrobials/Pharmacotherapies reviewed    Plan for the day/ETD Supportive care as above    Code Status:  Family/Goals of Care: Full Code  Pending treatment options for refractory cardiogenic shock   I have discussed case and plan of care in detail with Dr Rod; Status and plan of care were discussed with team on multidisciplinary rounds.    Critical Care Time: 72 minutes  Critical secondary to Patient has a condition that poses threat to life and bodily function: multi organ failure s/t refractory cardiogenic shock   Critical care was time spent personally by me on the following activities: development of treatment plan with patient or surrogate and bedside caregivers, discussions with consultants, evaluation of patient's response to treatment, examination of patient, ordering and performing treatments and interventions, ordering and review of laboratory studies, ordering and review of radiographic studies, pulse oximetry, re-evaluation of patient's condition. This critical care time did not overlap with that of any other  provider or involve time for any procedures.     Joyce Mayberry, Acute Care NP  Critical Care Medicine  [facility] -

## 2018-05-26 NOTE — ASSESSMENT & PLAN NOTE
05/24- will continue amiodarone ,on heparin drip, now better rate controlled.  05/25- continue heparin/amiodarone  05/26 -now rate controlled, will continue amiodarone/ heparin

## 2018-05-26 NOTE — PROGRESS NOTES
[facility] -   Cardiology  Progress Note    Patient Name: Milli Montez  MRN: [id number]  Admission Date: 5/19/2018  Hospital Length of Stay: 7 days  Code Status: Full Code   Attending Physician: Elian Cha MD   Primary Care Physician: Marito Amato MD  Expected Discharge Date:   Principal Problem:Cardiogenic shock    Subjective:     Hospital Course:   5/20/18-Patient seen and examined today, now intubated. On Levophed for pressor support. Responding to Lasix gtt, appreciate nephrology rec's. Troponin > 50. Repeat later today. 2D echo showed EF of 45-50-%, +WMA. TSH 7.    5/21/18- Patient remains intubated. Alert and follows commands. Low dose Levophed for pressor support.  Responding to Lasix gtt at 5mg/hr. Diuresing well since starting Lasix and Levophed. Troponin > 50.  2D echo showed EF of 45-50-%, +WMA. Creatine improved on morning labs.      5/22/18--Patient seen and examined in ICU bed, family at bedside. Remains intubated. Alert and follows simple commands, communicated with pen and paper at this time. Low dose Levophed for pressor support to keep MAP >65. Lasix gtt stopped this AM. Heparin gtt in place. Fentanyl gtt for sedation. Labs reviewed, K+ 3.4 and Creatinine 1.9 today. Gentle IV hydration started this AM, repeat labs at noon.     5/23/18--Patient seen and examined in ICU bed, family at bedside. Extubated now. Alert and following commands. Low dose levophed infusion in place to keep MAP >65. IV Heparin gtt in place. Labs reviewed, Creatinine 2.0, BNP 2305, Troponin >50.   5/24. Developed afib with RVR in the AM.   to 140 bpm. Hypotensive, peaceful/alert, and no c/o chest pain, dyspnea. Amiodarone 300 mg bolus and epi given, and amio gtt started. Then pt developed nonsustained VT aound 10 seconds, switched to afib. adenosine IVP twice and the HR temporarily decreased to 90's and quickly back to 130 bpm. Had twice DCCV and no HR change. SBP at 60 to 80 mmHg. IVF and magnesium  given. Her HR gradually decreased and the rhythm is back to sinus. K 3.2 and Mg 2.3 repeat ekg showed sinus rhythm. MIHAI back to 100 mmHg. Stable.   . Keep on sinus rhythm. Has SOB. CXR showed pulm. Edema. Elevated BNP. On Levophard gtt.   Had HD yesterday, removed 1.5 liters and had angina when she had CRRT. Intubated. Now on  and levophed gtt. On amio gtt. Alert.        Review of Systems   Unable to perform ROS: intubated     Objective:     Vital Signs (Most Recent):  Temp: 98 °F (36.7 °C) (18 1130)  Pulse: 85 (18 1430)  Resp: (!) 24 (18 1430)  BP: 93/67 (18 1430)  SpO2: 99 % (18 1430) Vital Signs (24h Range):  Temp:  [97 °F (36.1 °C)-98.8 °F (37.1 °C)] 98 °F (36.7 °C)  Pulse:  [] 85  Resp:  [18-41] 24  SpO2:  [65 %-100 %] 99 %  BP: ()/(17-80) 93/67     Weight: 96.1 kg (211 lb 13.8 oz)  Body mass index is 34.2 kg/m².     SpO2: 99 %  O2 Device (Oxygen Therapy): ventilator      Intake/Output Summary (Last 24 hours) at 18 1459  Last data filed at 18 1400   Gross per 24 hour   Intake          2254.33 ml   Output              570 ml   Net          1684.33 ml       Lines/Drains/Airways     Central Venous Catheter Line                 Percutaneous Central Line Insertion/Assessment - triple lumen  18 1600 right internal jugular 6 days         Hemodialysis Catheter 18 1300 right femoral 1 day          Drain                 Urethral Catheter 18 0115 Latex 16 Fr. 7 days         NG/OG Tube 18 2030 [place]itt sump 16 Fr. Center mouth less than 1 day          Airway                 Airway - Non-Surgical 18 Endotracheal Tube 1 day          Peripheral Intravenous Line                 Peripheral IV - Single Lumen 18 1000 Left Upper Arm 1 day                Physical Exam   Constitutional: She is oriented to person, place, and time. She appears well-developed and well-nourished. She appears ill.   intubated   HENT:   Head: Normocephalic  and atraumatic.   Eyes: Conjunctivae are normal. Pupils are equal, round, and reactive to light.   Neck: Full passive range of motion without pain. Neck supple. No JVD present.   Cardiovascular: Normal rate, regular rhythm, S1 normal, S2 normal and intact distal pulses.  PMI is not displaced.  Exam reveals distant heart sounds.    No murmur heard.  Pulses:       Radial pulses are 2+ on the right side, and 2+ on the left side.        Dorsalis pedis pulses are 2+ on the right side, and 2+ on the left side.   SM on apex   Pulmonary/Chest: Effort normal. No respiratory distress. She has decreased breath sounds in the right lower field and the left lower field. She has no wheezes.   Crackles on bases   Abdominal: Soft. Bowel sounds are normal. She exhibits no distension.   Obese   Genitourinary:   Genitourinary Comments: deferred   Musculoskeletal: Normal range of motion. She exhibits edema (trace BLE).        Right ankle: She exhibits swelling.        Left ankle: She exhibits swelling.   Neurological: She is alert and oriented to person, place, and time.   Skin: Skin is warm and dry. No cyanosis. Nails show no clubbing.   Psychiatric: She has a normal mood and affect. Her speech is normal. Judgment and thought content normal. Cognition and memory are impaired.   Intermittently confused   Nursing note and vitals reviewed.      Significant Labs:   ABG:   Recent Labs  Lab 05/25/18  1017 05/25/18  1026 05/26/18  0454   PH 7.402 7.496* 7.447   PCO2 48.2* 35.8 37.8   HCO3 29.9* 27.6 26.1   POCSATURATED 53* 97 98   BE 5 4 2   , Blood Culture: No results for input(s): LABBLOO in the last 48 hours., BMP:   Recent Labs  Lab 05/25/18  2022 05/26/18  0410 05/26/18  0415 05/26/18  1245   *  311* 219*  219*  --  206*   *  128* 126*  126*  --  122*   K 4.1  4.1 3.8  3.8  --  3.5   CL 87*  87* 85*  85*  --  83*   CO2 25  25 26  26  --  25   BUN 69*  69* 76*  76*  --  77*   CREATININE 2.8*  2.8* 2.9*  2.9*  --   2.9*   CALCIUM 8.5*  8.5* 8.6*  8.6*  --  8.6*   MG 2.3  --  2.2 2.2   , CMP   Recent Labs  Lab 05/25/18 0415 05/25/18 2022 05/26/18  0410 05/26/18  1245   *  < > 128*  128* 126*  126* 122*   K 3.3*  < > 4.1  4.1 3.8  3.8 3.5   CL 88*  < > 87*  87* 85*  85* 83*   CO2 29  < > 25  25 26  26 25   *  < > 311*  311* 219*  219* 206*   BUN 64*  < > 69*  69* 76*  76* 77*   CREATININE 2.2*  < > 2.8*  2.8* 2.9*  2.9* 2.9*   CALCIUM 8.3*  < > 8.5*  8.5* 8.6*  8.6* 8.6*   PROT 7.1  --  7.8 7.5  --    ALBUMIN 2.2*  < > 2.3*  2.3* 2.1*  2.1* 1.9*   BILITOT 1.7*  --  2.6* 2.1*  --    ALKPHOS 129  --  164* 147*  --    AST 32  --  44* 37  --    ALT 23  --  28 25  --    ANIONGAP 13  < > 16  16 15  15 14   ESTGFRAFRICA 28*  < > 21*  21* 20*  20* 20*   EGFRNONAA 25*  < > 18*  18* 18*  18* 18*   < > = values in this interval not displayed., CBC   Recent Labs  Lab 05/25/18 0415 05/26/18 0410   WBC 15.34* 16.32*   HGB 9.9* 9.4*   HCT 29.4* 27.7*    310   , INR No results for input(s): INR, PROTIME in the last 48 hours., Lipid Panel No results for input(s): CHOL, HDL, LDLCALC, TRIG, CHOLHDL in the last 48 hours. and Troponin   Recent Labs  Lab 05/25/18 0415 05/25/18 2022   TROPONINI 46.541* 49.014*       Significant Imaging:      Assessment and Plan:       * Cardiogenic shock    Cardiogenic shock. Intubated yesterday. Had HD removal of 1.5 liters fluid . But not tolerate CRRT.  On DOBUtamin and Levapahrd gtt. On amio and heparin gtt gtt for PAF  Repeat echo today EF 40%, dilated LV and lateral HK. Severe MR. Compared to prior ECHO done on 05/19, still severe MR.  Critical care for 60 min    -had lengthy conversation with family and primary/ICU team.   -plan to have IABP and transfer to heart failure team at [facility]. Had conversation with Dr. Joseph at [facility] and pt is accepted  -high risk patient for IABP, explained the benefits and risks of the procedure and family is ok to proceed  the procedure. Interventional cardiologist, Paolo Dang notified and the cath team called.            Atrial fibrillation with RVR    Now sinus   On amio and heparin gtt        PAF (paroxysmal atrial fibrillation)    Episode of PAF with RVR. Hypotensive. No c/o chest pain and dyspnea. With nonsustained VT.  Now back to sinus    -continue Amiodarone gtt now.  -keep K >4 and Mg>2  -continue heparin gtt            Acute on chronic congestive heart failure    See above        Electrolyte imbalance    -Keep Mag >2  -Keep K+ >4        NSTEMI (non-ST elevated myocardial infarction)    -Patient with significant history of CAD s/p recent PTCA/DEWAYNE of LM in-stent re-stenosis, presents with NSTEMI/cardiogenic shock  -Troponin remains  Was > 50  -Continue current medical management-ASA, Brilinta, heparin gtt  -Coreg held due to need for pressor support  -Statin held due to elevated liver enzymes  -2D echo showed EF of 45-50%, +WMA  -Cath images from Feb and May 2018 have been reviewed.  -Patient with recent inferior wall MI and severe MR on echo.   -Restart low dose statin today  -Continue IV heparin gtt, Brilinta Statin, ASA.   -had lengthy discussion with our interventional cardiologist, Dr. Boone, and Dr. Turpin (pt's primary cardiologist at [place]) and family. No LCH now and will continue medical Rx for CAD, CHF and secondary MR, severe.  -repeat echo on Sat for EF and severity of MR        Acute renal failure superimposed on stage 3 chronic kidney disease    -Likely cardiorenal, creatinine 1.7  -Continue to monitor        Acute respiratory failure with hypoxia    -Secondary to NSTEMI and volume overload/acute on chronic decompensated diastolic CHF  -Now intubated  -Continue Lasix gtt, assess response, nephrology on board              Acute on chronic combined systolic and diastolic heart failure    See above note          CAD (coronary artery disease)    -See above note        Hyperlipidemia    -Restart low dose statin  tonight  -Monitor LFT's          Morbid obesity with BMI of 40.0-44.9, adult    -Encourage weight loss        Essential hypertension    -BP meds held due to need for pressor support  -Levophed gtt to keep MAP >65            VTE Risk Mitigation         Ordered     heparin (porcine) injection 2,000 Units  As needed (PRN)      05/25/18 1241     heparin 25,000 units in dextrose 5% 250 mL (100 units/mL) infusion; FEMALE  Continuous      05/19/18 0905     heparin 25,000 units in dextrose 5% 250 mL (100 units/mL) bolus from bag; PRN BOLUS  As needed (PRN)      05/19/18 0905     heparin 25,000 units in dextrose 5% 250 mL (100 units/mL) bolus from bag; PRN BOLUS  As needed (PRN)      05/19/18 0905     Place sequential compression device  Until discontinued      05/19/18 0628     IP VTE HIGH RISK PATIENT  Once      05/19/18 0248          Rogelio Matthews MD  Cardiology  [facility]

## 2018-05-26 NOTE — ASSESSMENT & PLAN NOTE
- iv diuresis on hold  - on vent   - on beta blocker   - ace on hold due to grisel and hypotension     05/23-Will restart lasix drip after discussion with cardiology .  Cardiac echo-  1 - Wall motion abnormalities.     2 - Mildly depressed left ventricular systolic function (EF 45-50%).     3 - Indeterminate LV diastolic function  Case was discussed extensively with cardiology .-no benefit for [facility] at this time as risks will outweigh the benefit.    05/24- will continue lasix as tolerated . Cardiology input appreciated .  05/25- Will defer therapy to cardiology .  Plan of care was discussed extensively with Dr Matthews and Dr Ha-will add dobutamine, increase dose of lasix to 5mg/hour  05/26- she remains critical-on dobutamine/levophed, amiodarone   Guarded prognosis   Cardiology input appreciated

## 2018-05-26 NOTE — ASSESSMENT & PLAN NOTE
-continue iv heparin,aspirin,brilinta,statin and ace on hold due to grisel and hypotension   -plan heart cath once kidney function improved   05/24- Cardiology follow up , will optimize medical therapy .  Case was discussed extensively with cardiology -No need for acute LHC at this time.  Continue lasix, heparin drip, coreg,crestor.    05/25- will optimize medical therapy .  Continue Lasix, coreg , on heparin drip .  Cardiology follow up   05/26-troponin is 49, cardiac echo was done today,remains on heparin drip, continue coreg ,aspirin, ticagrelor ,crestor .  Cardiology follow up .  Prognosis -guarded

## 2018-05-26 NOTE — PLAN OF CARE
Problem: Patient Care Overview  Goal: Plan of Care Review  Outcome: Ongoing (interventions implemented as appropriate)  Pt showed s/s of respiratory distress w/ increased SOB, dyspnea, and RR at start of shift and was intubated, then was started on fentanyl gtt for a RASS -2, BSWR's initiated and remains free from injury, and Levophed gtt changed to max concentration. Pt was unable to tolerate HD and CRRT and both were stopped d/t hypotension. UOP remains oliguric, creatinine continues to trend up, ABG's improved this AM, and remains afebrile. RT unable to get arterial line after multiple attempts by two different RT's. Pt remains on Levophed, Dobutamine, Amiodarone, Fentanyl, and Heparin gtts w/ a thereapeutic aPTT this AM. POC discussed w/ pt and pt's daughter, and pt's daughter asks that the physicians please be very blunt with her and her family on the condition and prognosis of her mother.

## 2018-05-26 NOTE — OP NOTE
DATE OF PROCEDURE:  05/25/2018    PREOPERATIVE DIAGNOSIS:  Acute renal failure.    POSTOPERATIVE DIAGNOSIS:  Acute renal failure.    PROCEDURE PERFORMED:  Insertion of right femoral Vas-Cath catheter with   ultrasound guidance.    SURGEON:  Robby Walton M.D.    ANESTHESIA:  Local.    DESCRIPTION OF PROCEDURE:  The patient was placed in supine position.  The right   groin prepped and draped in sterile fashion.  I then anesthetized the area in   the right groin with Xylocaine.  Using ultrasound guidance, I placed the needle   into the right femoral vein through which a wire was placed, I placed a dilator   wire, placed a Glidewire through the dilator and then placed a dilator over the   wire.  I then placed a peel-away sheath over the wire, removed introducer,   inserted the Vas-Cath catheter through the sheath.  I then peeled away the   sheath, placed the cuff beneath the skin.  There was good blood return in both   ports. I flushed with heparinized saline solution and secured to the skin with   2-0 Prolene suture.  The patient tolerated the procedure well.      BRITTNEY  dd: 05/25/2018 14:53:10 (CDT)  td: 05/25/2018 20:24:07 (CD)  Doc ID   #[id number]  Job ID #[id number]    CC:

## 2018-05-26 NOTE — SUBJECTIVE & OBJECTIVE
Interval History: 05/26- She was started on renal replacement therapy and had interval intubation.   The daughter who is the POA is concerned about overall prognosis and wants to know the options for meaningful cardiac recovery and advance directives was also discussed with her.  She wants to have a family conference with the cardiology so they can fully know their options before making DNR decision.        Review of Systems   Unable to perform ROS: Intubated     Objective:     Vital Signs (Most Recent):  Temp: 97.7 °F (36.5 °C) (05/26/18 0715)  Pulse: 80 (05/26/18 0930)  Resp: (!) 21 (05/26/18 0930)  BP: 94/60 (05/26/18 0930)  SpO2: 99 % (05/26/18 0930) Vital Signs (24h Range):  Temp:  [97 °F (36.1 °C)-98.8 °F (37.1 °C)] 97.7 °F (36.5 °C)  Pulse:  [] 80  Resp:  [18-45] 21  SpO2:  [14 %-100 %] 99 %  BP: ()/(12-80) 94/60     Weight: 96.1 kg (211 lb 13.8 oz)  Body mass index is 34.2 kg/m².    Intake/Output Summary (Last 24 hours) at 05/26/18 1019  Last data filed at 05/26/18 0815   Gross per 24 hour   Intake          1966.52 ml   Output              715 ml   Net          1251.52 ml      Physical Exam   Constitutional: She appears well-nourished. No distress. She is intubated.   HENT:   Head: Atraumatic.   Eyes: Conjunctivae are normal. Pupils are equal, round, and reactive to light.   Neck: No JVD present. No tracheal deviation present.   Cardiovascular: Normal rate and regular rhythm.    Murmur heard.  Pulses:       Radial pulses are 1+ on the right side, and 1+ on the left side.        Dorsalis pedis pulses are 1+ on the right side, and 1+ on the left side.   Pulmonary/Chest: She is intubated. She has decreased breath sounds. She has rales.   Abdominal: Soft. Bowel sounds are normal. She exhibits no distension.   Neurological: GCS eye subscore is 3. GCS verbal subscore is 1. GCS motor subscore is 6.   Skin: Skin is dry. Capillary refill takes 2 to 3 seconds. No cyanosis.        cool       Significant  Labs:   Bilirubin:   Recent Labs  Lab 05/23/18  0400 05/24/18  0400 05/25/18 0415 05/25/18 2022 05/26/18 0410   BILITOT 1.9* 2.1* 1.7* 2.6* 2.1*     Blood Culture: No results for input(s): LABBLOO in the last 48 hours.  BMP:   Recent Labs  Lab 05/26/18 0410 05/26/18 0415   *  219*  --    *  126*  --    K 3.8  3.8  --    CL 85*  85*  --    CO2 26 26  --    BUN 76*  76*  --    CREATININE 2.9*  2.9*  --    CALCIUM 8.6*  8.6*  --    MG  --  2.2     CBC:   Recent Labs  Lab 05/25/18 0415 05/26/18 0410   WBC 15.34* 16.32*   HGB 9.9* 9.4*   HCT 29.4* 27.7*    310     Troponin:   Recent Labs  Lab 05/25/18 0415 05/25/18 2022   TROPONINI 46.541* 49.014*     All pertinent labs within the past 24 hours have been reviewed.    Significant Imaging: I have reviewed and interpreted all pertinent imaging results/findings within the past 24 hours.

## 2018-05-26 NOTE — OP NOTE
INPATIENT Operative Note         SUMMARY     Surgery Date: 5/26/2018     Surgeon(s) and Role:     * Frannie Boone MD - Primary    ASSISTANT:none    Pre-op Diagnosis:  Cardiomyopathy, unspecified type [I42.9]  Cardiogenic shock    Post-op Diagnosis:  Cardiogenic shock    Procedure(s) (LRB):  INSERTION, INTRA-AORTIC BALLOON PUMP (N/A)    COMPLICATION:none    Anesthesia: RN IV Sedation    Findings/Key Components:  Successful; placement of IABP via l femoral approach.    Estimated Blood Loss: < 50 ML.         SPECIMEN: NONE    Devices/Prostetics: None    PLAN:  Routine post cath care  Transfer to [facility] for further care for advanced chf

## 2018-05-26 NOTE — ASSESSMENT & PLAN NOTE
Cardiogenic shock. Intubated yesterday. Had HD removal of 1.5 liters fluid . But not tolerate CRRT.  On DOBUtamin and Levapahrd gtt. On amio and heparin gtt gtt for PAF  Repeat echo today EF 40%, dilated LV and lateral HK. Severe MR. Compared to prior ECHO done on 05/19, still severe MR.  Critical care for 60 min    -had lengthy conversation with family and primary/ICU team.   -plan to have IABP and transfer to heart failure team at [facility]. Had conversation with Dr. Joseph at [facility] and pt is accepted  -high risk patient for IABP, explained the benefits and risks of the procedure and family is ok to proceed the procedure. Interventional cardiologist, Paolo Dang notified and the cath team called.

## 2018-05-26 NOTE — NURSING
Pt vomited a large amount while being turned. Am meds were given at 0800. No other intake. NP Jefe aware. Will not start tube feedings at this time

## 2018-05-26 NOTE — SUBJECTIVE & OBJECTIVE
Interval History:  Patient was started on ultrafiltration only session yesterday which she tolerated the session with a net fluid removal of 1.5 kg in 3 hr.  It required the patient to need higher doses of pressors.  Patient was then switched to CRRT but the patient did not tolerate CRRT and CRRT had to be stopped.  Patient then assess sedated intubation for respiratory failure and cardiogenic shock causing acute pulmonary edema.  Overnight patient has developed lactic acidosis.  Patient seen and examined in the ICU bedside while on the ventilator.    Review of patient's allergies indicates:   Allergen Reactions    Penicillins Swelling     Current Facility-Administered Medications   Medication Frequency    0.9%  NaCl infusion PRN    0.9%  NaCl infusion Once    acetaminophen tablet 650 mg Q6H PRN    amiodarone 360 mg/200 mL (1.8 mg/mL) infusion Continuous    aspirin chewable tablet 81 mg Daily    aztreonam injection 1,000 mg Q8H    bisacodyl suppository 10 mg Daily PRN    chlorhexidine 0.12 % solution 15 mL BID    dextrose 50% injection 12.5 g PRN    DOBUTamine 500mg in D5W 250mL infusion (premix) (NON-TITRATING) Continuous    docusate sodium capsule 100 mg Daily    fentaNYL 2500 mcg in D5W 250 mL infusion premix (titrating) (conc: 10 mcg/mL) Continuous    glucagon (human recombinant) injection 1 mg PRN    heparin (porcine) injection 2,000 Units PRN    heparin 25,000 units in dextrose 5% 250 mL (100 units/mL) bolus from bag; PRN BOLUS PRN    heparin 25,000 units in dextrose 5% 250 mL (100 units/mL) bolus from bag; PRN BOLUS PRN    heparin 25,000 units in dextrose 5% 250 mL (100 units/mL) infusion; FEMALE Continuous    insulin aspart U-100 pen 1-10 Units 6 times per day    insulin detemir U-100 pen 20 Units BID    levothyroxine tablet 75 mcg Daily    magnesium sulfate 2g in water 50mL IVPB (premix) PRN    nitroGLYCERIN SL tablet 0.4 mg Q5 Min PRN    norepinephrine 32 mg in dextrose 5 % 250 mL  infusion Continuous    ondansetron injection 4 mg Q6H PRN    pantoprazole 40 mg in dextrose 5 % 100 mL IVPB (over 15 minutes) (ready to mix system) Daily    polyethylene glycol packet 17 g Daily    ramelteon tablet 8 mg Nightly PRN    rosuvastatin tablet 10 mg QHS    sodium phosphate 20.01 mmol in dextrose 5 % 250 mL IVPB PRN    sodium phosphate 30 mmol in dextrose 5 % 250 mL IVPB PRN    sodium phosphate 39.99 mmol in dextrose 5 % 250 mL IVPB PRN    ticagrelor tablet 90 mg BID    [START ON 5/27/2018] vancomycin (VANCOCIN) 1,000 mg in sodium chloride 0.9% 250 mL IVPB Q48H       Objective:     Vital Signs (Most Recent):  Temp: 98.2 °F (36.8 °C) (05/26/18 0305)  Pulse: 83 (05/26/18 0840)  Resp: 20 (05/26/18 0840)  BP: 96/62 (05/26/18 0720)  SpO2: 98 % (05/26/18 0840)  O2 Device (Oxygen Therapy): ventilator (05/26/18 0720) Vital Signs (24h Range):  Temp:  [97 °F (36.1 °C)-98.8 °F (37.1 °C)] 98.2 °F (36.8 °C)  Pulse:  [] 83  Resp:  [18-45] 20  SpO2:  [14 %-100 %] 98 %  BP: ()/(12-80) 96/62     Weight: 96.1 kg (211 lb 13.8 oz) (05/26/18 0800)  Body mass index is 34.2 kg/m².  Body surface area is 2.12 meters squared.    I/O last 3 completed shifts:  In: 2978.8 [P.O.:130; I.V.:2848.8]  Out: 1795 [Urine:1795]    Physical Exam   Constitutional: She is oriented to person, place, and time. She appears well-developed and well-nourished.   HENT:   Head: Normocephalic and atraumatic.   ETT in place    Eyes: Conjunctivae and EOM are normal. Pupils are equal, round, and reactive to light.   Neck: Normal range of motion and full passive range of motion without pain. Neck supple. Carotid bruit is not present. No edema present. No thyroid mass and no thyromegaly present.   Cardiovascular: Normal rate, regular rhythm, S1 normal, S2 normal, intact distal pulses and normal pulses.   No extrasystoles are present. PMI is displaced.  Exam reveals gallop. Exam reveals no friction rub.    Murmur heard.   Systolic murmur  is present with a grade of 2/6   + JVD    loud P2 +PAD    Pulmonary/Chest: No accessory muscle usage. She is in respiratory distress. She has wheezes. She has rales. She exhibits no tenderness.   Abdominal: Soft. Bowel sounds are normal. She exhibits no distension and no mass. There is no hepatosplenomegaly. There is no tenderness. There is no rebound and no CVA tenderness. No hernia.   Musculoskeletal: Normal range of motion. She exhibits edema. She exhibits no tenderness.   Neurological: She is alert and oriented to person, place, and time. She has normal reflexes. No cranial nerve deficit or sensory deficit. She exhibits normal muscle tone. Coordination normal.   Skin: Skin is warm and dry. No bruising, no ecchymosis and no rash noted. No cyanosis or erythema. No pallor. Nails show no clubbing.   Psychiatric: She has a normal mood and affect. Her speech is normal and behavior is normal. Judgment and thought content normal.   Nursing note and vitals reviewed.      Significant Labs:  All labs within the past 24 hours have been reviewed.     Significant Imaging:  Labs: Reviewed  X-Ray: Reviewed

## 2018-05-26 NOTE — ASSESSMENT & PLAN NOTE
-Patient with significant history of CAD s/p recent PTCA/DEWAYNE of LM in-stent re-stenosis, presents with NSTEMI/cardiogenic shock  -Troponin remains  Was > 50  -Continue current medical management-ASA, Brilinta, heparin gtt  -Coreg held due to need for pressor support  -Statin held due to elevated liver enzymes  -2D echo showed EF of 45-50%, +WMA  -Cath images from Feb and May 2018 have been reviewed.  -Patient with recent inferior wall MI and severe MR on echo.   -Restart low dose statin today  -Continue IV heparin gtt, Brilinta Statin, ASA.   -had lengthy discussion with our interventional cardiologist, Dr. Boone, and Dr. Turpin (pt's primary cardiologist at [facility]) and family. No LCH now and will continue medical Rx for CAD, CHF and secondary MR, severe.  -repeat echo on Sat for EF and severity of MR

## 2018-05-26 NOTE — PROGRESS NOTES
Clinical Pharmacy Progress Note: Vancomycin Dosing     Vancomycin Day # 3  Estimated Creatinine Clearance: 25.6 mL/min (A) (based on SCr of 2.9 mg/dL (H)).   SCr trend: (2.9 stable x 2 days)   Lab Results   Component Value Date    WBC 16.32 (H) 05/26/2018     WBC trend: (16.32 trended up from 15.34 yesterday)         Tmax/24h: 98.8 F  Level: Random = 24.7   Goal trough: 15-20  Indication: -  Pneumonia  Cultures: Respiratory-Many gram (+) cocci, few gram (-) rods   Plan: Pharmacy will hold current vancomycin    Next level due:  Random due 4/27 @ 0430    Thank you for allowing us to participate in this patient's care.    Shiela Avila [facility] 5/26/2018 9:07 AM

## 2018-05-26 NOTE — EICU
Notified of lactic acid level 3.9.  She continues to require significant infusion rate of norepinephrine and was unable to tolerate volume removal with CRRT.  CXR shows bilateral infiltrates and respiratory status necessitated endotracheal intubation earlier this evening.  Endotracheal tube is in acceptable position; imaging does not show orogastric tube in place.  Abdomen is soft per report of bedside care team.  Hgb is down ~1.5 grams as compared to yesterday.  No overt bleeding is noted.    · Concentrate norepinephrine infusion to reduce extra volume administration  · Follow up Hgb/HCT and lactic acid  · Soft bilateral wrist restraints ordered to reduce the risk of self-harm

## 2018-05-26 NOTE — PROCEDURES
"Milli Montez is a 57 y.o. female patient.    Temp: 97 °F (36.1 °C) (05/25/18 1515)  Pulse: 62 (05/25/18 1845)  Resp: 19 (05/25/18 1845)  BP: (!) 74/44 (05/25/18 1845)  SpO2: (!) 90 % (05/25/18 1845)  Weight: 95.6 kg (210 lb 12.2 oz) (05/25/18 0505)  Height: 5' 6" (167.6 cm) (05/23/18 1244)       Intubation  Date/Time: 5/25/2018 8:07 PM  Performed by: SORIN SUBRAMANIAN  Authorized by: SORIN SUBRAMANIAN   Pre-operative diagnosis: respiratoru failure  Post-operative diagnosis: same  Consent Done: Emergent Situation  Indications: respiratory failure and  respiratory distress  Intubation method: video-assisted  Patient status: paralyzed (RSI) (ETOMIDATE)  Preoxygenation: bag valve mask (on AVAPS)  Sedatives: etomidate  Paralytic: none  Laryngoscope size: Mac 3  Tube size: 7.5 mm  Tube type: cuffed  Number of attempts: 1  Cricoid pressure: yes  Cords visualized: yes  Post-procedure assessment: CO2 detector  Breath sounds: diminished  Cuff inflated: yes  ETT to lip: 23 cm  Tube secured with: ETT rapp  Chest x-ray interpreted by me.  Chest x-ray findings: endotracheal tube in appropriate position  Patient tolerance: Patient tolerated the procedure well with no immediate complications  Complications: No  Specimens: No  Implants: No          Sorin Subramanian  5/25/2018  "

## 2018-05-26 NOTE — ASSESSMENT & PLAN NOTE
- on levophed and iv diuresis on hold   - echo ef 45 with wall motion abnormalities   - continue with nstemi management   -plan for lhc/rhc once kidney function improves     05/23- will continue levophed and wean as tolerated.  05/24- will wean off levophed as tolerated.    05/25- will continue levophed/dobutamine ,cardiology follow up .  05/26-will continue levophed/dobutamine and wean as tolerated.  Discussed options for advanced CHF with Dr Matthews-she is not a candidate at this time.

## 2018-05-26 NOTE — ASSESSMENT & PLAN NOTE
Cont Levophed infusion with critical care cardiac monitoring  Keep MAP > 65mmHG  continue Dobutamine 2.5 mcg

## 2018-05-26 NOTE — SUBJECTIVE & OBJECTIVE
Review of Systems   Unable to perform ROS: intubated     Objective:     Vital Signs (Most Recent):  Temp: 98 °F (36.7 °C) (05/26/18 1130)  Pulse: 85 (05/26/18 1430)  Resp: (!) 24 (05/26/18 1430)  BP: 93/67 (05/26/18 1430)  SpO2: 99 % (05/26/18 1430) Vital Signs (24h Range):  Temp:  [97 °F (36.1 °C)-98.8 °F (37.1 °C)] 98 °F (36.7 °C)  Pulse:  [] 85  Resp:  [18-41] 24  SpO2:  [65 %-100 %] 99 %  BP: ()/(17-80) 93/67     Weight: 96.1 kg (211 lb 13.8 oz)  Body mass index is 34.2 kg/m².     SpO2: 99 %  O2 Device (Oxygen Therapy): ventilator      Intake/Output Summary (Last 24 hours) at 05/26/18 1459  Last data filed at 05/26/18 1400   Gross per 24 hour   Intake          2254.33 ml   Output              570 ml   Net          1684.33 ml       Lines/Drains/Airways     Central Venous Catheter Line                 Percutaneous Central Line Insertion/Assessment - triple lumen  05/19/18 1600 right internal jugular 6 days         Hemodialysis Catheter 05/25/18 1300 right femoral 1 day          Drain                 Urethral Catheter 05/19/18 0115 Latex 16 Fr. 7 days         NG/OG Tube 05/25/18 2030 Proc[place] sump 16 Fr. Center mouth less than 1 day          Airway                 Airway - Non-Surgical 05/25/18 Endotracheal Tube 1 day          Peripheral Intravenous Line                 Peripheral IV - Single Lumen 05/25/18 1000 Left Upper Arm 1 day                Physical Exam   Constitutional: She is oriented to person, place, and time. She appears well-developed and well-nourished. She appears ill.   intubated   HENT:   Head: Normocephalic and atraumatic.   Eyes: Conjunctivae are normal. Pupils are equal, round, and reactive to light.   Neck: Full passive range of motion without pain. Neck supple. No JVD present.   Cardiovascular: Normal rate, regular rhythm, S1 normal, S2 normal and intact distal pulses.  PMI is not displaced.  Exam reveals distant heart sounds.    No murmur heard.  Pulses:       Radial pulses are  2+ on the right side, and 2+ on the left side.        Dorsalis pedis pulses are 2+ on the right side, and 2+ on the left side.   SM on apex   Pulmonary/Chest: Effort normal. No respiratory distress. She has decreased breath sounds in the right lower field and the left lower field. She has no wheezes.   Crackles on bases   Abdominal: Soft. Bowel sounds are normal. She exhibits no distension.   Obese   Genitourinary:   Genitourinary Comments: deferred   Musculoskeletal: Normal range of motion. She exhibits edema (trace BLE).        Right ankle: She exhibits swelling.        Left ankle: She exhibits swelling.   Neurological: She is alert and oriented to person, place, and time.   Skin: Skin is warm and dry. No cyanosis. Nails show no clubbing.   Psychiatric: She has a normal mood and affect. Her speech is normal. Judgment and thought content normal. Cognition and memory are impaired.   Intermittently confused   Nursing note and vitals reviewed.      Significant Labs:   ABG:   Recent Labs  Lab 05/25/18  1017 05/25/18  1026 05/26/18  0454   PH 7.402 7.496* 7.447   PCO2 48.2* 35.8 37.8   HCO3 29.9* 27.6 26.1   POCSATURATED 53* 97 98   BE 5 4 2   , Blood Culture: No results for input(s): LABBLOO in the last 48 hours., BMP:   Recent Labs  Lab 05/25/18 2022 05/26/18  0410 05/26/18 0415 05/26/18  1245   *  311* 219*  219*  --  206*   *  128* 126*  126*  --  122*   K 4.1  4.1 3.8  3.8  --  3.5   CL 87*  87* 85*  85*  --  83*   CO2 25 25 26  26  --  25   BUN 69*  69* 76*  76*  --  77*   CREATININE 2.8*  2.8* 2.9*  2.9*  --  2.9*   CALCIUM 8.5*  8.5* 8.6*  8.6*  --  8.6*   MG 2.3  --  2.2 2.2   , CMP   Recent Labs  Lab 05/25/18  0415  05/25/18 2022 05/26/18  0410 05/26/18  1245   *  < > 128*  128* 126*  126* 122*   K 3.3*  < > 4.1  4.1 3.8  3.8 3.5   CL 88*  < > 87*  87* 85*  85* 83*   CO2 29  < > 25  25 26  26 25   *  < > 311*  311* 219*  219* 206*   BUN 64*  < > 69*  69*  76*  76* 77*   CREATININE 2.2*  < > 2.8*  2.8* 2.9*  2.9* 2.9*   CALCIUM 8.3*  < > 8.5*  8.5* 8.6*  8.6* 8.6*   PROT 7.1  --  7.8 7.5  --    ALBUMIN 2.2*  < > 2.3*  2.3* 2.1*  2.1* 1.9*   BILITOT 1.7*  --  2.6* 2.1*  --    ALKPHOS 129  --  164* 147*  --    AST 32  --  44* 37  --    ALT 23  --  28 25  --    ANIONGAP 13  < > 16  16 15  15 14   ESTGFRAFRICA 28*  < > 21*  21* 20*  20* 20*   EGFRNONAA 25*  < > 18*  18* 18*  18* 18*   < > = values in this interval not displayed., CBC   Recent Labs  Lab 05/25/18 0415 05/26/18 0410   WBC 15.34* 16.32*   HGB 9.9* 9.4*   HCT 29.4* 27.7*    310   , INR No results for input(s): INR, PROTIME in the last 48 hours., Lipid Panel No results for input(s): CHOL, HDL, LDLCALC, TRIG, CHOLHDL in the last 48 hours. and Troponin   Recent Labs  Lab 05/25/18 0415 05/25/18 2022   TROPONINI 46.541* 49.014*       Significant Imaging:

## 2018-05-26 NOTE — SUBJECTIVE & OBJECTIVE
Objective:     Vital Signs (Most Recent):  Temp: 98.2 °F (36.8 °C) (05/26/18 0305)  Pulse: 83 (05/26/18 0840)  Resp: 20 (05/26/18 0840)  BP: 96/62 (05/26/18 0720)  SpO2: 98 % (05/26/18 0840) Vital Signs (24h Range):  Temp:  [97 °F (36.1 °C)-98.8 °F (37.1 °C)] 98.2 °F (36.8 °C)  Pulse:  [] 83  Resp:  [18-45] 20  SpO2:  [14 %-100 %] 98 %  BP: ()/(12-80) 96/62     Weight: 96.1 kg (211 lb 13.8 oz)  Body mass index is 34.2 kg/m².      Intake/Output Summary (Last 24 hours) at 05/26/18 0928  Last data filed at 05/26/18 0815   Gross per 24 hour   Intake          2028.92 ml   Output              770 ml   Net          1258.92 ml       Physical Exam   Constitutional: She appears well-nourished. No distress. She is intubated.   HENT:   Head: Atraumatic.   Eyes: Conjunctivae are normal. Pupils are equal, round, and reactive to light.   Neck: No JVD present. No tracheal deviation present.   Cardiovascular: Normal rate and regular rhythm.    Murmur heard.  Pulses:       Radial pulses are 1+ on the right side, and 1+ on the left side.        Dorsalis pedis pulses are 1+ on the right side, and 1+ on the left side.   Pulmonary/Chest: She is intubated. She has decreased breath sounds. She has rales.   Abdominal: Soft. Bowel sounds are normal. She exhibits no distension.   Neurological: GCS eye subscore is 3. GCS verbal subscore is 1. GCS motor subscore is 6.   Skin: Skin is dry. Capillary refill takes 2 to 3 seconds. No cyanosis.        cool       Vents:  Vent Mode: A/C (05/26/18 0840)  Ventilator Initiated: Yes (05/25/18 2014)  Set Rate: 20 bmp (05/26/18 0840)  Vt Set: 420 mL (05/26/18 0840)  Pressure Support: 0 cmH20 (05/26/18 0840)  PEEP/CPAP: 10 cmH20 (05/26/18 0840)  Oxygen Concentration (%): 40 (05/26/18 0840)  Peak Airway Pressure: 35 cmH2O (05/26/18 0840)  Plateau Pressure: 0 cmH20 (05/26/18 0840)  Total Ve: 8.85 mL (05/26/18 0840)  F/VT Ratio<105 (RSBI): (!) 45.66 (05/26/18 0840)    Lines/Drains/Airways      Central Venous Catheter Line                 Percutaneous Central Line Insertion/Assessment - triple lumen  05/19/18 1600 right internal jugular 6 days         Hemodialysis Catheter 05/25/18 1300 right femoral less than 1 day          Drain                 Urethral Catheter 05/19/18 0115 Latex 16 Fr. 7 days         NG/OG Tube 05/25/18 2030 [place] sump 16 Fr. Center mouth less than 1 day          Airway                 Airway - Non-Surgical 05/25/18 Endotracheal Tube 1 day          Peripheral Intravenous Line                 Peripheral IV - Single Lumen 05/25/18 1000 Left Upper Arm less than 1 day                Significant Labs:    CBC/Anemia Profile:    Recent Labs  Lab 05/25/18  0415 05/26/18  0410   WBC 15.34* 16.32*   HGB 9.9* 9.4*   HCT 29.4* 27.7*    310   MCV 91 90   RDW 15.2* 15.6*        Chemistries:    Recent Labs  Lab 05/25/18  0415  05/25/18  1645 05/25/18 2022 05/26/18  0410 05/26/18  0415   *  < > 130* 128*  128* 126*  126*  --    K 3.3*  < > 3.8 4.1  4.1 3.8  3.8  --    CL 88*  < > 86* 87*  87* 85*  85*  --    CO2 29  < > 28 25 25 26 26  --    BUN 64*  < > 68* 69*  69* 76*  76*  --    CREATININE 2.2*  < > 2.3* 2.8*  2.8* 2.9*  2.9*  --    CALCIUM 8.3*  < > 8.9 8.5*  8.5* 8.6*  8.6*  --    ALBUMIN 2.2*  < > 2.4* 2.3*  2.3* 2.1*  2.1*  --    PROT 7.1  --   --  7.8 7.5  --    BILITOT 1.7*  --   --  2.6* 2.1*  --    ALKPHOS 129  --   --  164* 147*  --    ALT 23  --   --  28 25  --    AST 32  --   --  44* 37  --    MG 2.5  < > 2.2 2.3  --  2.2   PHOS  --   < > 5.6* 6.8* 5.8*  --    < > = values in this interval not displayed.    All pertinent labs within the past 24 hours have been reviewed.  AB    Recent Labs  Lab 05/26/18  0454   PH 7.447   PO2 109*   PCO2 37.8   HCO3 26.1   BE 2       Significant Imaging:  CXR: I have reviewed all pertinent results/findings within the past 24 hours and my personal findings are:  cardiomegaly, bilateral diffuse opacity consistent with  pulmonary edema

## 2018-05-26 NOTE — PROGRESS NOTES
md morales at bedside.  Pt acutely hypotensive, requiring aggressive increase of levo infusion.  Pt still conscious and responsive, c/o chest pain 6/10 and sob.   crrt stopped, rashard arthur notified. Stat chest x ray and 12 lead ordered.

## 2018-05-26 NOTE — ASSESSMENT & PLAN NOTE
continue Azactam and Vancomycin  Prelim sputum culture reviewed, await final for antimicrobial adjustment

## 2018-05-26 NOTE — H&P (VIEW-ONLY)
[facility]  Cardiology  Consult Note    Patient Name: Milli Montez  MRN: [id number]  Admission Date: 5/19/2018  Hospital Length of Stay: 0 days  Code Status: Full Code   Attending Provider: Joseph Jackson MD   Consulting Provider: Katharine Sotelo PA-C  Primary Care Physician: Marito Amato MD  Principal Problem:Acute respiratory failure    Patient information was obtained from patient, past medical records    Inpatient consult to Cardiology  Consult performed by: KATHARINE SOTELO  Consult ordered by: JANICE WHITLOCK        Subjective:     Chief Complaint:  SOB     HPI:   HPI obtained primarily from chart as patient was on AVAPS    Ms. Montez is a 57 year old female patient with a PMHx of CAD s/p CABG, s/p PTCA of LM stent on 2/6/18, s/p repeat PTCA/DEWAYNE of LM in-stent restenosis on 5/2/18, chronic diastolic CHF, hyperlipidemia, HTN, DM type II, and TIA who presented to [facility] ED yesterday with a chief complaint of progressively worsening SOB over the past few days. Associated symptoms included orthopnea, PND, BLE edema, and palpitations. Patient denied any associated chest pain, near syncope, syncope, fever, or chills. While in ED, patient became progressively more dyspneic and tachypneic and was subsequently placed on Avaps. Initial workup in ED revealed BNP of 828, troponin of 0.006, creatinine of 1.5, lactic acidosis (%), and hypoxia with hypercapnia. Patient subsequently admitted to ICU for further evaluation and treatment. Patient seen and examined today while in ICU. Remains dyspneic on AVAPs. Complains of some back discomfort. Repeat troponin resulted at 1.762. Echo and repeat ABG pending.    Past Medical History:   Diagnosis Date    Allergy     Arthritis     Blood transfusion     CAD (coronary artery disease)     CHF (congestive heart failure)     Diabetes mellitus     Heart murmur     History of loop recorder     Hyperlipidemia     Hypertension     Hypothyroidism  2013    TIA (transient ischemic attack)     Ulcer        Past Surgical History:   Procedure Laterality Date    CARDIAC SURGERY      CAROTID STENT Right      SECTION      CORONARY STENT PLACEMENT      TUBAL LIGATION         Review of patient's allergies indicates:   Allergen Reactions    Penicillins Swelling       No current facility-administered medications on file prior to encounter.      Current Outpatient Prescriptions on File Prior to Encounter   Medication Sig    amLODIPine (NORVASC) 5 MG tablet Take 5 mg by mouth once daily.    blood sugar diagnostic Strp check BS fastin and  2hr after biggest meal    blood-glucose meter (FREESTYLE SYSTEM KIT) kit Use as instructed    carvedilol (COREG) 12.5 MG tablet Take 12.5 mg by mouth 2 (two) times daily with meals.    clopidogrel (PLAVIX) 75 mg tablet Take 75 mg by mouth.    enalapril (VASOTEC) 5 MG tablet Take 5 mg by mouth 2 (two) times daily.     furosemide (LASIX) 40 MG tablet Take 1 tablet (40 mg total) by mouth once daily.    lancets-blood glucose strips 30 gauge Cmpk 2 Devices by Misc.(Non-Drug; Combo Route) route 2 (two) times daily. check BS fastin and  2hr after biggest meal    levothyroxine (SYNTHROID) 75 MCG tablet TAKE 1 TABLET BY MOUTH DAILY    ondansetron (ZOFRAN) 4 MG tablet Take 1 tablet (4 mg total) by mouth every 6 (six) hours as needed for Nausea.    rosuvastatin (CRESTOR) 20 MG tablet Take 40 mg by mouth once daily.     Family History     Problem Relation (Age of Onset)    Cancer Father    Heart disease Mother, Father        Social History Main Topics    Smoking status: Never Smoker    Smokeless tobacco: Never Used    Alcohol use No    Drug use: No    Sexual activity: Yes     Partners: Male     Review of Systems   Unable to perform ROS: acuity of condition     Objective:     Vital Signs (Most Recent):  Temp: 98.1 °F (36.7 °C) (18 0700)  Pulse: 108 (18 0915)  Resp: (!) 28 (18 0915)  BP: 96/71  (05/19/18 0915)  SpO2: 97 % (05/19/18 0915) Vital Signs (24h Range):  Temp:  [97.7 °F (36.5 °C)-98.6 °F (37 °C)] 98.1 °F (36.7 °C)  Pulse:  [] 108  Resp:  [20-42] 28  SpO2:  [97 %-100 %] 97 %  BP: ()/(62-90) 96/71     Weight: 103.2 kg (227 lb 9.6 oz)  Body mass index is 36.74 kg/m².    SpO2: 97 %  O2 Device (Oxygen Therapy): Other (see comments) (NIV AVAPS mode)      Intake/Output Summary (Last 24 hours) at 05/19/18 0933  Last data filed at 05/19/18 0900   Gross per 24 hour   Intake               50 ml   Output              210 ml   Net             -160 ml       Lines/Drains/Airways     Drain                 Urethral Catheter 05/19/18 0115 Latex 16 Fr. less than 1 day          Peripheral Intravenous Line                 Peripheral IV - Single Lumen 05/19/18 0109 Right Antecubital less than 1 day         Peripheral IV - Single Lumen 05/19/18 Left Antecubital less than 1 day                Physical Exam   Constitutional: She is oriented to person, place, and time. She appears distressed.   Dyspneic on AVAPS   HENT:   Head: Normocephalic and atraumatic.   Eyes: Pupils are equal, round, and reactive to light. Right eye exhibits no discharge. Left eye exhibits no discharge.   Neck: Neck supple. JVD present.   Cardiovascular: Regular rhythm, S1 normal and S2 normal.  Tachycardia present.    Murmur heard.  High-pitched blowing holosystolic murmur is present  at the apex  Pulmonary/Chest: She is in respiratory distress. She has rales.   Abdominal: Soft. She exhibits no distension. There is no tenderness.   Musculoskeletal: She exhibits edema (BLE).   Neurological: She is alert and oriented to person, place, and time.   Skin: Skin is warm and dry. She is not diaphoretic.   Nursing note and vitals reviewed.      Significant Labs:   CMP   Recent Labs  Lab 05/19/18  0111 05/19/18  0504    138   K 4.5 5.1    107   CO2 17* 21*   * 248*   BUN 20 24*   CREATININE 1.5* 1.7*   CALCIUM 8.8 8.6*   PROT  7.9 7.6   ALBUMIN 3.2* 3.2*   BILITOT 0.5 0.5   ALKPHOS 104 101   AST 26 51*   ALT 26 32   ANIONGAP 15 10   ESTGFRAFRICA 45* 39*   EGFRNONAA 39* 33*   , CBC   Recent Labs  Lab 05/19/18  0111 05/19/18  0504 05/19/18  0824   WBC 12.33 13.27* 13.27*   HGB 13.8 13.9 13.5   HCT 42.6 41.0 40.8    290 280  280   , Troponin   Recent Labs  Lab 05/19/18  0111 05/19/18  0504   TROPONINI 0.006 1.762*    and All pertinent lab results from the last 24 hours have been reviewed.    Significant Imaging: Echocardiogram:   2D echo with color flow doppler:   Results for orders placed or performed during the hospital encounter of 04/19/18   2D echo with color flow doppler   Result Value Ref Range    EF 45 55 - 65    Mitral Valve Regurgitation MODERATE (A)     Diastolic Dysfunction Yes (A)     Est. PA Systolic Pressure 29.34    , EKG: Reviewed and X-Ray: CXR: X-Ray Chest 1 View (CXR):   Results for orders placed or performed during the hospital encounter of 05/19/18   X-Ray Chest 1 View    Narrative    EXAMINATION:  XR CHEST 1 VIEW    CLINICAL HISTORY:  Respiratory distress    COMPARISON:  Chest x-ray, 04/20/2018    FINDINGS:  There is marked pulmonary edema which has developed since the prior exam.  There is cardiomegaly.  There are surgical changes from prior CABG.  No pleural effusion or pneumothorax.      Impression    Cardiomegaly and marked pulmonary edema indicating congestive heart failure.      Electronically signed by: Sinan Johnson MD  Date:    05/19/2018  Time:    08:05    and X-Ray Chest PA and Lateral (CXR): No results found for this visit on 05/19/18.    Assessment and Plan:   Patient with significant history of CAD who presents with respiratory failure/decompensated diastolic CHF and NSTEMI. Continue current medical management. IV diuresis. Check 2D echo. Trend troponin. Will need C once more stable respiratory wise.     * Acute respiratory failure    -Secondary to volume overload/acute on chronic decompensated  diastolic CHF  -Continue IV Lasix  -Remains dyspneic on AVAPS, repeat ABG pending          NSTEMI (non-ST elevated myocardial infarction)    -Patient with significant history of CAD s/p recent PTCA/DEWAYNE of LM in-stent re-stenosis  -Initial troponin 0.006, repeat 1.762  -Continue to trend  -Continue current medical management-ASA, Coreg, statin, Brilinta  -Continue nitropaste  -Continue heparin gtt  -Check 2D echo  -Will need C once respiratory status is more stable        KEN (acute kidney injury)    -Likely cardiorenal, creatinine 1.7  -Continue to monitor        Acute on chronic diastolic congestive heart failure    -Continue aggressive diuresis  -Strict I's/O's  -Remains on AVAPS, may need to be intubated        CAD (coronary artery disease)    -See plan under NSTEMI        Hyperlipidemia    -Continue statin          Essential hypertension    -Continue Coreg  -ACEI held due to bumped creatinine            VTE Risk Mitigation         Ordered     heparin 25,000 units in dextrose 5% 250 mL (100 units/mL) infusion; FEMALE  Continuous      05/19/18 0905     heparin 25,000 units in dextrose 5% 250 mL (100 units/mL) bolus from bag; PRN BOLUS  As needed (PRN)      05/19/18 0905     heparin 25,000 units in dextrose 5% 250 mL (100 units/mL) bolus from bag; PRN BOLUS  As needed (PRN)      05/19/18 0905     Place sequential compression device  Until discontinued      05/19/18 0628     IP VTE HIGH RISK PATIENT  Once      05/19/18 0248          Thank you for your consult. I will follow-up with patient. Please contact us if you have any additional questions.    Katharine Bain PA-C  Cardiology   [facility]    Chart reviewed. Dr. Morris examined patient and agrees with plan as outlined above.

## 2018-05-27 NOTE — ASSESSMENT & PLAN NOTE
Presented with NSTEMI and ADHF  Tn >50, will trend  Recent LM PCI earlier this month, LHC deferred due to critical illness  IABP in place  Lactate normal  CXR with worsening pulmonary edema  CVP 9, SVO2 57 --> calculated Michael CO 8.02, CI 3.75,   Continue levophed, dobutamine at 3, heparin gtt  Lasix 80 IV + gtt at 10/hr  Will plan to stabilize then IC consult for LHC

## 2018-05-27 NOTE — HPI
57F with hx of CAD s/p remote CABG (LIMA-D1 + dLAD and SVG-RPDA), balloon PTCA LM stent 2/6/18, followed by repeat balloon PTCA/DEWAYNE LM ISR 5/2/18, chronic HFpEF EF 55-60%, HLD, HTN, DMII, hx of TIA who presented on 5/19 to [facility] with progressive shortness of breath and hypoxic respiratory failure. She was admitted with ADHF and NSTEMI 4/2018. She was admitted to the ICU with acue decompensated CHF and intubated. She had recently been admitted in 4 Initial work-up resulted , troponin 0.006, cr. 1.5, lactic 5.1, ABG on AVAPS with pH 7.3, pCO2 41, pO2 525, HCO3 20.  CXR consistent with CHF.  EKG showed atrial tachycardia with LBBB (old), no acute ischemic ST-T changes from previous tracings. She was started on IV diuresis. She developed cardiogenic shock and was started on NSTEMI protocol as troponin increased to >50 with ASA, brilinta, IV heparin, statin. An echo showed EF 45-50%, inferolateral HK, mild MR. Notes report new inferior MI and severe MR. LHC deferred until patient more stable. She was started on a lasix gtt, net negative 2.3L initially with improvement in KEN. She was on low dose levophed for pressor support. Lasix gtt stopped on 5/22 and gentle IV hydration started. On 5/24, course complicated by AF/rVR- she was started on amiodarone gtt, DCCV x2 unsuccessfully, converted back to sinus rhythm eventually. She was started on UF with Nephrology as well with 1.5L removed on 5/25; switched to CRRT but pt did not tolerate and CRRT stopped. Repeat TTE showed EF 40-45% with severe anterolateral and inferolateral HK, severe MR.  An IABP was then placed and pt transferred here.    She transfers here on levophed at 0.5, dobutamine at 3, heparin gtt, fentanyl gtt, amiodarone gtt. Intubated and sedated. IABP in place.

## 2018-05-27 NOTE — SUBJECTIVE & OBJECTIVE
Past Medical History:   Diagnosis Date    Allergy     Arthritis     Blood transfusion     CAD (coronary artery disease)     CHF (congestive heart failure)     Diabetes mellitus     Heart murmur     History of loop recorder     Hyperlipidemia     Hypertension     Hypothyroidism 2013    TIA (transient ischemic attack)     Ulcer        Past Surgical History:   Procedure Laterality Date    CARDIAC SURGERY      CAROTID STENT Right      SECTION      CORONARY STENT PLACEMENT      TUBAL LIGATION         Review of patient's allergies indicates:   Allergen Reactions    Penicillins Swelling       Current Facility-Administered Medications   Medication    amiodarone 360 mg/200 mL (1.8 mg/mL) infusion    aspirin EC tablet 81 mg    clopidogrel tablet 75 mg    DOBUTamine 500mg in D5W 250mL infusion (premix) (NON-TITRATING)    fentaNYL 2500 mcg in 0.9% sodium chloride 250 mL infusion premix (titrating)    furosemide (LASIX) 2 mg/mL in sodium chloride 0.9% 100 mL infusion (conc: 2 mg/mL)    heparin 25,000 units in dextrose 5% 250 mL (100 units/mL) infusion (heparin infusion)    levothyroxine tablet 75 mcg    norepinephrine 32 mg in dextrose 5 % 250 mL infusion    rosuvastatin tablet 40 mg    sodium chloride 0.9% flush 3 mL     Family History     Problem Relation (Age of Onset)    Cancer Father    Heart disease Mother, Father        Social History Main Topics    Smoking status: Never Smoker    Smokeless tobacco: Never Used    Alcohol use No    Drug use: No    Sexual activity: Yes     Partners: Male     Review of Systems   Unable to perform ROS: Intubated     Objective:     Vital Signs (Most Recent):  Temp: 97.8 °F (36.6 °C) (18 0400)  Pulse: 92 (18 0500)  Resp: (!) 25 (18 0500)  BP: (!) 94/55 (18 0500)  SpO2: (!) 91 % (18 0500) Vital Signs (24h Range):  Temp:  [97.7 °F (36.5 °C)-98.2 °F (36.8 °C)] 97.8 °F (36.6 °C)  Pulse:  [79-95] 92  Resp:  [18-46]  25  SpO2:  [91 %-100 %] 91 %  BP: ()/(41-74) 94/55     Patient Vitals for the past 72 hrs (Last 3 readings):   Weight   05/26/18 2125 98.2 kg (216 lb 7.9 oz)     Body mass index is 34.94 kg/m².      Intake/Output Summary (Last 24 hours) at 05/27/18 0530  Last data filed at 05/27/18 0500   Gross per 24 hour   Intake           711.54 ml   Output              445 ml   Net           266.54 ml       Physical Exam   Constitutional:   Intubated and sedated   Eyes: EOM are normal.   Neck: Neck supple. JVD present.   Cardiovascular: Normal rate and regular rhythm.    No murmur heard.  IABP in place   Pulmonary/Chest: She has no wheezes. She has no rales.   Mechanical breath sounds   Abdominal: Soft. Bowel sounds are normal. She exhibits no distension. There is no tenderness.   Musculoskeletal: She exhibits no edema.   Neurological: She is alert. No cranial nerve deficit.   Skin: Capillary refill takes 2 to 3 seconds. No erythema.   Warm and wet       Significant Labs:  CBC:    Recent Labs  Lab 05/25/18  0415 05/26/18  0410 05/26/18  2259   WBC 15.34* 16.32* 15.67*   RBC 3.22* 3.07* 2.59*   HGB 9.9* 9.4* 7.9*   HCT 29.4* 27.7* 24.0*    310 272   MCV 91 90 93   MCH 30.7 30.6 30.5   MCHC 33.7 33.9 32.9     BNP:    Recent Labs  Lab 05/20/18  0825 05/23/18  0400 05/25/18  0954   BNP 1,841* 2,305* 3,240*     CMP:    Recent Labs  Lab 05/25/18  2022 05/26/18  0410 05/26/18  1245 05/26/18  2259   *  311* 219*  219* 206* 163*   CALCIUM 8.5*  8.5* 8.6*  8.6* 8.6* 8.4*   ALBUMIN 2.3*  2.3* 2.1*  2.1* 1.9* 1.8*   PROT 7.8 7.5  --  6.9   *  128* 126*  126* 122* 123*   K 4.1  4.1 3.8  3.8 3.5 3.4*   CO2 25  25 26  26 25 25   CL 87*  87* 85*  85* 83* 83*   BUN 69*  69* 76*  76* 77* 77*   CREATININE 2.8*  2.8* 2.9*  2.9* 2.9* 3.0*   ALKPHOS 164* 147*  --  146*   ALT 28 25  --  17   AST 44* 37  --  35   BILITOT 2.6* 2.1*  --  1.9*      Coagulation:     Recent Labs  Lab 05/24/18  0400 05/25/18  0415  05/26/18  0410   APTT 48.5*  48.5* 43.9* 67.4*     LDH:  No results for input(s): LDH in the last 72 hours.  Microbiology:  Microbiology Results (last 7 days)     ** No results found for the last 168 hours. **          I have reviewed all pertinent labs within the past 24 hours.    Diagnostic Results:  CXR: X-ray Chest 1 View    Result Date: 5/26/2018  Stable chest x-ray. Electronically signed by: Alex Nino MD Date:    05/26/2018 Time:    09:01    X-ray Chest Ap Portable    Result Date: 5/26/2018  Worsening bilateral airspace opacification. Electronically signed by: Meera Kemp MD Date:    05/26/2018 Time:    22:55

## 2018-05-27 NOTE — PROGRESS NOTES
" [facility]  Adult Nutrition  Progress Note    SUMMARY       Recommendations    Recommendation/Intervention: Recommend: Nova source @ 40ml/hr +water flushes q6hrs; Provides 1970kcal, 98g/Pro, 748ml free fl. flushes per MD. Meets 100% EEN/EPN. Start w 10ml/hr and increase by 10 ml/hr q2hr as tolerated until goal rate achieved. Hold for residuals > 300ml  Goals: pt to consume nutrients >/= 85% EEN/EPN   Nutrition Goal Status: new  Communication of RD Recs:  (POC/ sticky note)    Reason for Assessment    Reason for Assessment: consult, new tube feeding  Diagnosis:  (cardiac disease, pulmonary disease )  Relevant Medical History: CHF, HLD, HTN, Ulcer, TIA, Diabetes, CAD  Interdisciplinary Rounds: did not attend  General Information Comments: Patient is currently intubated, started on tube feed for nutrition support . CRRT attempted but pt did not tolerate  Nutrition Discharge Planning: NAREN    Nutrition Risk Screen         Nutrition/Diet History    Patient Reported Diet/Restrictions/Preferences: general  Food Preferences: none noted  Do you have any cultural, spiritual, Holiness conflicts, given your current situation?: none noted  Food Allergies: NKFA  Factors Affecting Nutritional Intake: on mechanical ventilation, NPO    Anthropometrics    Temp: 98 °F (36.7 °C)  Height: 5' 6" (167.6 cm)  Height (inches): 66 in  Weight Method: Bed Scale  Weight: 98.2 kg (216 lb 7.9 oz)  Weight (lb): 216.49 lb  Ideal Body Weight (IBW), Female: 130 lb  % Ideal Body Weight, Female (lb): 166.53 lb  BMI (Calculated): 35  BMI Grade: 35 - 39.9 - obesity - grade II  Weight Loss: unintentional  Usual Body Weight (UBW), k.45 kg  % Usual Body Weight: 93.32  % Weight Change From Usual Weight: -6.88 %       Lab/Procedures/Meds    Pertinent Labs Comments: Na-121, CL-83, Glu-222, A1c-6.4, BUN-79, Crt-2.9, GFR-17.5, Ca-8.2, Phos-6.5 WBC-15.90, trop-49.02, BNP-3,240  Pertinent Medications Comments: Lasix, Heparin, Insulin, " KCl    Physical Findings/Assessment    Overall Physical Appearance: obese, on ventilator support  Tubes: orogastric tube  Oral/Mouth Cavity: WDL  Skin: intact    Estimated/Assessed Needs    Weight Used For Calorie Calculations: 98.2 kg (216 lb 7.9 oz)  Energy Calorie Requirements (kcal): 1694kcal  Energy Need Method: [place]  Protein Requirements:   Weight Used For Protein Calculations: 59 kg (130 lb)  Fluid Requirements (mL): per MD or 1ml/kcal  Fluid Need Method: RDA Method  RDA Method (mL): 1694  CHO Requirement: 45-50% Total intake      Nutrition Prescription Ordered    Current Diet Order: NPO    Evaluation of Received Nutrient/Fluid Intake    Energy Calories Required: not meeting needs  Protein Required: not meeting needs  Fluid Required: not meeting needs  Comments: LBM: 5/19/18  % Intake of Estimated Energy Needs: 0 - 25 %  % Meal Intake: NPO    Nutrition Risk    Level of Risk/Frequency of Follow-up: high     Assessment and Plan    Acute renal failure superimposed on stage 3 chronic kidney disease    Contributing Nutrition Diagnosis  Altered Nutritional labs    Related to (etiology):   KEN    Signs and Symptoms (as evidenced by):   BMP  Lab Results   Component Value Date     (L) 05/27/2018    BUN 79 (H) 05/27/2018    CREATININE 2.9 (H) 05/27/2018    CALCIUM 8.2 (L) 05/27/2018    EGFRNONAA 17.5 (A) 05/27/2018         Interventions/Recommendations (treatment strategy):  See recs above    Nutrition Diagnosis Status:   new                   Monitor and Evaluation    Food and Nutrient Intake: energy intake, enteral nutrition intake, food and beverage intake  Food and Nutrient Administration: enteral and parenteral nutrition administration, diet order  Physical Activity and Function: nutrition-related ADLs and IADLs  Anthropometric Measurements: weight, weight change  Biochemical Data, Medical Tests and Procedures:  (All labs)  Nutrition-Focused Physical Findings: overall appearance     Nutrition  Follow-Up    RD Follow-up?: Yes

## 2018-05-27 NOTE — NURSING
Pt. UO 75 cc over last two hours after receiving 250mg of diuril. CVP 8. Pt. sat's dropping into low 80's. FiO2 increased from 45% to 60%. Levo requirements increased from .6 to .8 over the last hour. Dr. Salmeron at bedside. STAT VBG, ABG and C-Xray ordered. VBG 56. Verbal order to titrate levo to map >60. Will continue to monitor.

## 2018-05-27 NOTE — PROGRESS NOTES
Pt resting comfortably on vent, VSS, NAD noted, IABP 1:1 w/ 100% augmentation, and in NSR. [facility] RN's at bs getting pt ready for transport [facility]. Family notified of transport and is aware.

## 2018-05-27 NOTE — ASSESSMENT & PLAN NOTE
-See cardiogenic shock  -TTE EF 40-45%, severe likely secondary MR, inferolateral and anterolateral severe HK  -No BB due to shock, no ACE/ARB due to KEN

## 2018-05-27 NOTE — PLAN OF CARE
Problem: Patient Care Overview  Goal: Plan of Care Review  Recommendations     Recommendation/Intervention: Recommend: Nova source @ 40ml/hr +water flushes q6hrs; Provides 1970kcal, 98g/Pro, 748ml free fl. flushes per MD. Meets 100% EEN/EPN. Start w 10ml/hr and increase by 10 ml/hr q2hr as tolerated until goal rate achieved. Hold for residuals > 300ml  Goals: pt to consume nutrients >/= 85% EEN/EPN   Nutrition Goal Status: new  Communication of RD Recs:  (POC/ sticky note)    Assessment and Plan         Acute renal failure superimposed on stage 3 chronic kidney disease     Contributing Nutrition Diagnosis  Altered Nutritional labs     Related to (etiology):   KEN     Signs and Symptoms (as evidenced by):   BMP          Lab Results   Component Value Date      (L) 05/27/2018     BUN 79 (H) 05/27/2018     CREATININE 2.9 (H) 05/27/2018     CALCIUM 8.2 (L) 05/27/2018     EGFRNONAA 17.5 (A) 05/27/2018            Interventions/Recommendations (treatment strategy):  See recs above     Nutrition Diagnosis Status:   new

## 2018-05-27 NOTE — PROGRESS NOTES
[facility] at bedside preparing patient for helicopter transport to [facility] [place].  Family aware of transport.

## 2018-05-27 NOTE — ASSESSMENT & PLAN NOTE
-Cr continues to uptrend, now 3  -Monitor with IV diuresis  -S/p one session UF  -If unable to diurese with lasix, will need Nephrology consult for volume removal

## 2018-05-27 NOTE — PLAN OF CARE
Problem: Patient Care Overview  Goal: Plan of Care Review  Outcome: Ongoing (interventions implemented as appropriate)  Alert.  Appears oriented - answers questions appropriately.  Follows commands.  Dobutamine/heparin/fentanyl/levo/amio/lasix per eMAR.  Right IJ TLC intact.  Right femoral dialysis line intact.  Carlin patent draining sharron urine.  CVP 9/5.  Generalized bruising - especially noted to right shin 2/2 a recent fall.  FiO2 45%, peep 10.  IABP to left groin - 1:1, ECG trigger, augmenting from 80-110s.  Augmentation drops with any stimulation/movement/suctioning.       Patient prefers fans blowing on her at all times.  She only likes a light sheet over her abdomen.      Daughter at bedside all night.      Problem: Pressure Ulcer Risk (Lauri Scale) (Adult,Obstetrics,Pediatric)  Intervention: Prevent/Minimize Sheer/Friction Injuries  Mepilex applied to bilateral heels and sacral area.  Heel boots in place.

## 2018-05-27 NOTE — H&P
[facility]  Heart Transplant  H&P    Patient Name: Milli Montez  MRN: [id number]  Admission Date: 5/26/2018  Attending Physician: Mariajose Joseph MD  Primary Care Provider: Marito Amato MD  Principal Problem:<principal problem not specified>    Subjective:     History of Present Illness:  57F with hx of CAD s/p remote CABG (LIMA-D1 + dLAD and SVG-RPDA), balloon PTCA LM stent 2/6/18, followed by repeat balloon PTCA/DEWAYNE LM ISR 5/2/18, chronic HFpEF EF 55-60%, HLD, HTN, DMII, hx of TIA who presented on 5/19 to [facility] with progressive shortness of breath and hypoxic respiratory failure. She was admitted with ADHF and NSTEMI 4/2018. She was admitted to the ICU with acue decompensated CHF and intubated. She had recently been admitted in 4 Initial work-up resulted , troponin 0.006, cr. 1.5, lactic 5.1, ABG on AVAPS with pH 7.3, pCO2 41, pO2 525, HCO3 20.  CXR consistent with CHF.  EKG showed atrial tachycardia with LBBB (old), no acute ischemic ST-T changes from previous tracings. She was started on IV diuresis. She developed cardiogenic shock and was started on NSTEMI protocol as troponin increased to >50 with ASA, brilinta, IV heparin, statin. An echo showed EF 45-50%, inferolateral HK, mild MR. Notes report new inferior MI and severe MR. LHC deferred until patient more stable. She was started on a lasix gtt, net negative 2.3L initially with improvement in KEN. She was on low dose levophed for pressor support. Lasix gtt stopped on 5/22 and gentle IV hydration started. On 5/24, course complicated by AF/rVR- she was started on amiodarone gtt, DCCV x2 unsuccessfully, converted back to sinus rhythm eventually. She was started on UF with Nephrology as well with 1.5L removed on 5/25; switched to CRRT but pt did not tolerate and CRRT stopped. Repeat TTE showed EF 40-45% with severe anterolateral and inferolateral HK, severe MR.  An IABP was then placed and pt transferred here.    She transfers here on  levophed at 0.5, dobutamine at 3, heparin gtt, fentanyl gtt, amiodarone gtt. Intubated and sedated. IABP in place.    Past Medical History:   Diagnosis Date    Allergy     Arthritis     Blood transfusion     CAD (coronary artery disease)     CHF (congestive heart failure)     Diabetes mellitus     Heart murmur     History of loop recorder     Hyperlipidemia     Hypertension     Hypothyroidism 2013    TIA (transient ischemic attack)     Ulcer        Past Surgical History:   Procedure Laterality Date    CARDIAC SURGERY      CAROTID STENT Right      SECTION      CORONARY STENT PLACEMENT      TUBAL LIGATION         Review of patient's allergies indicates:   Allergen Reactions    Penicillins Swelling       Current Facility-Administered Medications   Medication    amiodarone 360 mg/200 mL (1.8 mg/mL) infusion    aspirin EC tablet 81 mg    clopidogrel tablet 75 mg    DOBUTamine 500mg in D5W 250mL infusion (premix) (NON-TITRATING)    fentaNYL 2500 mcg in 0.9% sodium chloride 250 mL infusion premix (titrating)    furosemide (LASIX) 2 mg/mL in sodium chloride 0.9% 100 mL infusion (conc: 2 mg/mL)    heparin 25,000 units in dextrose 5% 250 mL (100 units/mL) infusion (heparin infusion)    levothyroxine tablet 75 mcg    norepinephrine 32 mg in dextrose 5 % 250 mL infusion    rosuvastatin tablet 40 mg    sodium chloride 0.9% flush 3 mL     Family History     Problem Relation (Age of Onset)    Cancer Father    Heart disease Mother, Father        Social History Main Topics    Smoking status: Never Smoker    Smokeless tobacco: Never Used    Alcohol use No    Drug use: No    Sexual activity: Yes     Partners: Male     Review of Systems   Unable to perform ROS: Intubated     Objective:     Vital Signs (Most Recent):  Temp: 97.8 °F (36.6 °C) (18 0400)  Pulse: 92 (18 0500)  Resp: (!) 25 (18 0500)  BP: (!) 94/55 (18 0500)  SpO2: (!) 91 % (18 0500) Vital Signs  (24h Range):  Temp:  [97.7 °F (36.5 °C)-98.2 °F (36.8 °C)] 97.8 °F (36.6 °C)  Pulse:  [79-95] 92  Resp:  [18-46] 25  SpO2:  [91 %-100 %] 91 %  BP: ()/(41-74) 94/55     Patient Vitals for the past 72 hrs (Last 3 readings):   Weight   05/26/18 2125 98.2 kg (216 lb 7.9 oz)     Body mass index is 34.94 kg/m².      Intake/Output Summary (Last 24 hours) at 05/27/18 0530  Last data filed at 05/27/18 0500   Gross per 24 hour   Intake           711.54 ml   Output              445 ml   Net           266.54 ml       Physical Exam   Constitutional:   Intubated and sedated   Eyes: EOM are normal.   Neck: Neck supple. JVD present.   Cardiovascular: Normal rate and regular rhythm.    No murmur heard.  IABP in place   Pulmonary/Chest: She has no wheezes. She has no rales.   Mechanical breath sounds   Abdominal: Soft. Bowel sounds are normal. She exhibits no distension. There is no tenderness.   Musculoskeletal: She exhibits no edema.   Neurological: She is alert. No cranial nerve deficit.   Skin: Capillary refill takes 2 to 3 seconds. No erythema.   Warm and wet       Significant Labs:  CBC:    Recent Labs  Lab 05/25/18 0415 05/26/18  0410 05/26/18  2259   WBC 15.34* 16.32* 15.67*   RBC 3.22* 3.07* 2.59*   HGB 9.9* 9.4* 7.9*   HCT 29.4* 27.7* 24.0*    310 272   MCV 91 90 93   MCH 30.7 30.6 30.5   MCHC 33.7 33.9 32.9     BNP:    Recent Labs  Lab 05/20/18  0825 05/23/18  0400 05/25/18  0954   BNP 1,841* 2,305* 3,240*     CMP:    Recent Labs  Lab 05/25/18 2022 05/26/18  0410 05/26/18  1245 05/26/18  2259   *  311* 219*  219* 206* 163*   CALCIUM 8.5*  8.5* 8.6*  8.6* 8.6* 8.4*   ALBUMIN 2.3*  2.3* 2.1*  2.1* 1.9* 1.8*   PROT 7.8 7.5  --  6.9   *  128* 126*  126* 122* 123*   K 4.1  4.1 3.8  3.8 3.5 3.4*   CO2 25  25 26  26 25 25   CL 87*  87* 85*  85* 83* 83*   BUN 69*  69* 76*  76* 77* 77*   CREATININE 2.8*  2.8* 2.9*  2.9* 2.9* 3.0*   ALKPHOS 164* 147*  --  146*   ALT 28 25  --  17   AST  44* 37  --  35   BILITOT 2.6* 2.1*  --  1.9*      Coagulation:     Recent Labs  Lab 05/24/18  0400 05/25/18  0415 05/26/18  0410   APTT 48.5*  48.5* 43.9* 67.4*     LDH:  No results for input(s): LDH in the last 72 hours.  Microbiology:  Microbiology Results (last 7 days)     ** No results found for the last 168 hours. **          I have reviewed all pertinent labs within the past 24 hours.    Diagnostic Results:  CXR: X-ray Chest 1 View    Result Date: 5/26/2018  Stable chest x-ray. Electronically signed by: Alex Nino MD Date:    05/26/2018 Time:    09:01    X-ray Chest Ap Portable    Result Date: 5/26/2018  Worsening bilateral airspace opacification. Electronically signed by: Meera Kemp MD Date:    05/26/2018 Time:    22:55    Assessment/Plan:   57F with hx of HFpEF, CAD s/p CABG and multiple revascularizations most recently LM ISR earlier this month presenting with NSTEMI and ADHF/cardiogenic shock complicated by multiorgan failure. Transferred here on multiple pressors and IABP for advanced options.    Cardiogenic shock    Presented with NSTEMI and ADHF  Tn >50, will trend  Recent LM PCI earlier this month, LHC deferred due to critical illness  IABP in place  Lactate normal  CXR with worsening pulmonary edema  CVP 9, SVO2 57 --> calculated Michael CO 8.02, CI 3.75,   Continue levophed, dobutamine at 3, heparin gtt  Lasix 80 IV + gtt at 10/hr  Will plan to stabilize then IC consult for C        Acute on chronic combined systolic and diastolic heart failure    -See cardiogenic shock  -TTE EF 40-45%, severe likely secondary MR, inferolateral and anterolateral severe HK  -No BB due to shock, no ACE/ARB due to KEN        Acute respiratory failure with hypoxia    Intubated on MV  AC/VC , f 20, FiO2 40%, PEEP 10  PaO2 82, PaCO2 37 on current settings        Acute renal failure superimposed on stage 3 chronic kidney disease    -Cr continues to uptrend, now 3  -Monitor with IV diuresis  -S/p one  session UF  -If unable to diurese with lasix, will need Nephrology consult for volume removal        Atrial fibrillation with RVR    -Paroxysmal, last episode per notes 5/24  -Amiodarone  -In sinus rhythm  -On heparin gtt        CAD (coronary artery disease)    -Continue ASA, statin        Diabetic nephropathy associated with type 2 diabetes mellitus    -SSI/accuchecks        Hypothyroidism    Continue home synthroid        Right lower lobe pneumonia    On Azactam and Vancomycin at OSH  Respiratory cx on 5/21 normal respiratory satish, 5/24 normal respiratory satish, 5/25 preliminary with GP cocci and GP rods  Will continue for now with aztreonam, check vanc level (last supratx 5/25)        NSTEMI (non-ST elevated myocardial infarction)    See cardiogenic shock            Papi Garrido MD  Heart Transplant  [facility]

## 2018-05-27 NOTE — PROGRESS NOTES
Received patient from emt transport intubated and mechanically ventilated. Patient placed on Hospital Vent with settings provided from Transport team. It=[id number], RR=20, FiO2=40%, peep=5 in Assist Control mode.

## 2018-05-27 NOTE — ASSESSMENT & PLAN NOTE
Contributing Nutrition Diagnosis  Altered Nutritional labs    Related to (etiology):   KEN    Signs and Symptoms (as evidenced by):   BMP  Lab Results   Component Value Date     (L) 05/27/2018    BUN 79 (H) 05/27/2018    CREATININE 2.9 (H) 05/27/2018    CALCIUM 8.2 (L) 05/27/2018    EGFRNONAA 17.5 (A) 05/27/2018         Interventions/Recommendations (treatment strategy):  See recs above    Nutrition Diagnosis Status:   new

## 2018-05-27 NOTE — SIGNIFICANT EVENT
CVP 10 at 7a; now CVP is 8 after IV Diruil 250 mg x1 at 1P (UOP now is 75 cc/2h whereas it was 50-75 cc/h in the 3h's before Diruil). On IV Lasix 20 mg/h. Levophed up to 0.8 from 0.6 mcg/kg/minute. FiO2 increased from 45% to 60% with ABG showing PaO2 69 with SpO2 92% on 60% FiO2. CO 6, . Discussed case with Dr. Joseph: goal mean pressure >60 (wean Levophed as tolerated); no change to diuretics or inotropes for now.

## 2018-05-27 NOTE — PLAN OF CARE
Problem: Patient Care Overview  Goal: Plan of Care Review  Outcome: Ongoing (interventions implemented as appropriate)  VS and assessment per flow sheets.   Vent setting increased this shift. FiO2 is now 60%. PEEP remains at 10, rate of 20.   IABP remains 1:1 auto ECG. Augmenting 70-80. Site is clean and dry with no signs of hematoma.   Heparin, Dobutamine, Lasix, Amio, and Fentanyl infusing at ordered rates. Levo requirements have gone up this shift. Unsuccessful attempt to titrate back down.   Trickle feeds started.   UO has tapered off to 10cc/hr. HTS notified.   Plan for CRRT later this shift.   Will continue to monitor.

## 2018-05-27 NOTE — ASSESSMENT & PLAN NOTE
On Azactam and Vancomycin at OSH  Respiratory cx on 5/21 normal respiratory satish, 5/24 normal respiratory satish, 5/25 preliminary with GP cocci and GP rods  Will continue for now with aztreonam, check vanc level (last supratx 5/25)

## 2018-05-28 PROBLEM — E87.70 HYPERVOLEMIA: Status: ACTIVE | Noted: 2018-01-01

## 2018-05-28 PROBLEM — E87.1 HYPONATREMIA: Status: ACTIVE | Noted: 2018-01-01

## 2018-05-28 PROBLEM — Z51.5 COMFORT MEASURES ONLY STATUS: Status: ACTIVE | Noted: 2018-01-01

## 2018-05-28 PROBLEM — R14.0 ABDOMINAL DISTENSION: Status: ACTIVE | Noted: 2018-01-01

## 2018-05-28 NOTE — SUBJECTIVE & OBJECTIVE
Past Medical History:   Diagnosis Date    Allergy     Arthritis     Blood transfusion     CAD (coronary artery disease)     CHF (congestive heart failure)     Diabetes mellitus     Heart murmur     History of loop recorder     Hyperlipidemia     Hypertension     Hypothyroidism 2013    TIA (transient ischemic attack)     Ulcer        Past Surgical History:   Procedure Laterality Date    CARDIAC SURGERY      CAROTID STENT Right      SECTION      CORONARY STENT PLACEMENT      TUBAL LIGATION         Review of patient's allergies indicates:   Allergen Reactions    Penicillins Swelling     Current Facility-Administered Medications   Medication Frequency    0.9%  NaCl infusion (CRRT USE ONLY) Continuous    amiodarone 360 mg/200 mL (1.8 mg/mL) infusion Continuous    aspirin EC tablet 81 mg Daily    aztreonam injection 1,000 mg Q8H    dextrose 50% injection 12.5 g PRN    DOBUTamine 500mg in D5W 250mL infusion (premix) (NON-TITRATING) Continuous    fentaNYL 2500 mcg in 0.9% sodium chloride 250 mL infusion premix (titrating) Continuous    furosemide (LASIX) 2 mg/mL in sodium chloride 0.9% 100 mL infusion (conc: 2 mg/mL) Continuous    glucagon (human recombinant) injection 1 mg PRN    heparin 25,000 units in dextrose 5% 250 mL (100 units/mL) infusion (heparin infusion) Continuous    insulin aspart U-100 pen 0-5 Units Q6H PRN    levothyroxine tablet 75 mcg Before breakfast    magnesium sulfate 2g in water 50mL IVPB (premix) PRN    metronidazole IVPB 500 mg Q8H    norepinephrine 32 mg in dextrose 5 % 250 mL infusion Continuous    propofol (DIPRIVAN) 10 mg/mL infusion Continuous    rosuvastatin tablet 10 mg Daily    sodium chloride 0.9% flush 3 mL Q8H    sodium chloride 3% solution 100 mL Continuous    sodium phosphate 20.01 mmol in dextrose 5 % 250 mL IVPB PRN    sodium phosphate 30 mmol in dextrose 5 % 250 mL IVPB PRN    sodium phosphate 39.99 mmol in dextrose 5 % 250 mL IVPB  PRN    ticagrelor tablet 90 mg BID    vasopressin (PITRESSIN) 1 Units/mL in dextrose 5 % 100 mL infusion Continuous     Family History     Problem Relation (Age of Onset)    Cancer Father    Heart disease Mother, Father        Social History Main Topics    Smoking status: Never Smoker    Smokeless tobacco: Never Used    Alcohol use No    Drug use: No    Sexual activity: Yes     Partners: Male     Review of Systems   Unable to perform ROS: Intubated     Objective:     Vital Signs (Most Recent):  Temp: (!) 95.9 °F (35.5 °C) (05/28/18 0900)  Pulse: 80 (05/28/18 1000)  Resp: (!) 21 (05/28/18 1000)  BP: (!) 93/51 (05/28/18 1000)  SpO2: 96 % (05/28/18 1000)  O2 Device (Oxygen Therapy): ventilator (05/28/18 1000) Vital Signs (24h Range):  Temp:  [94 °F (34.4 °C)-98.9 °F (37.2 °C)] 95.9 °F (35.5 °C)  Pulse:  [73-97] 80  Resp:  [12-34] 21  SpO2:  [89 %-100 %] 96 %  BP: ()/(46-62) 93/51     Weight: 98.2 kg (216 lb 7.9 oz) (05/27/18 1100)  Body mass index is 34.94 kg/m².  Body surface area is 2.14 meters squared.    I/O last 3 completed shifts:  In: 6136 [I.V.:5471; NG/GT:415; IV Piggyback:250]  Out: 5226 [Urine:1315; Other:3911]    Physical Exam   HENT:   Head: Atraumatic.   Neck: No JVD present.   Cardiovascular: Normal rate and regular rhythm.    Pulmonary/Chest: Effort normal and breath sounds normal.   Abdominal: Soft. She exhibits distension.   Musculoskeletal: She exhibits edema. She exhibits no tenderness.   Skin: Skin is warm.

## 2018-05-28 NOTE — ASSESSMENT & PLAN NOTE
Presented with NSTEMI and ADHF  Tn >50  Recent LM PCI earlier this month, LHC deferred due to critical illness  IABP in place  Lactate normal  CXR with worsening pulmonary edema  CVP 9, SVO2 53 --> calculated Michael CO 7.8  Continue levophed, dobutamine, vaso, lasix gtt, heparin gtt  Lasix gtt at 10/hr  Checking MOSES/spep/upep

## 2018-05-28 NOTE — HOSPITAL COURSE
Patient had a difficult night with increase need for inotropes and pressors with blood pressure decreasing and patient becoming more acidotic.  She progressively worsened, family noted all that was transpiring, and decided to withdraw care so that patient would be comfortable.  We did so.  TOD was 1:06 pm on 5/28/18.

## 2018-05-28 NOTE — ASSESSMENT & PLAN NOTE
-patient needs SLED, but would prefer an increase in sodium prior to starting, d/w primary team and at this time we're recommend 100ccs of hypertonic saline, repeat sodium and if we can get number up to around 126

## 2018-05-28 NOTE — ASSESSMENT & PLAN NOTE
Giving 100ml 3% saline now  Check na at noon  Hopefully HD this afternoon if able to tolerate to try and clear acidemia

## 2018-05-28 NOTE — HPI
57F with hx of CAD s/p remote CABG (LIMA-D1 + dLAD and SVG-RPDA), balloon PTCA LM stent 2/6/18, followed by repeat balloon PTCA/DEWAYNE LM ISR 5/2/18, chronic HFpEF EF 55-60%, HLD, HTN, DMII, hx of TIA who presented on 5/19 to [facility] with progressive shortness of breath and hypoxic respiratory failure. She was admitted with ADHF and NSTEMI 4/2018. She was admitted to the ICU with acue decompensated CHF and intubated. She had recently been admitted in 4 Initial work-up resulted , troponin 0.006, cr. 1.5, lactic 5.1, ABG on AVAPS with pH 7.3, pCO2 41, pO2 525, HCO3 20.  CXR consistent with CHF.  EKG showed atrial tachycardia with LBBB (old), no acute ischemic ST-T changes from previous tracings. She was started on IV diuresis. She developed cardiogenic shock and was started on NSTEMI protocol as troponin increased to >50 with ASA, brilinta, IV heparin, statin. An echo showed EF 45-50%, inferolateral HK, mild MR. Notes report new inferior MI and severe MR. LHC deferred until patient more stable. She was started on a lasix gtt, net negative 2.3L initially with improvement in KEN. She was on low dose levophed for pressor support. Lasix gtt stopped on 5/22 and gentle IV hydration started. On 5/24, course complicated by AF/rVR- she was started on amiodarone gtt, DCCV x2 unsuccessfully, converted back to sinus rhythm eventually. She was started on UF with Nephrology as well with 1.5L removed on 5/25; switched to CRRT but pt did not tolerate and CRRT stopped. Repeat TTE showed EF 40-45% with severe anterolateral and inferolateral HK, severe MR.  An IABP was then placed and pt transferred to [facility]. Nephrology service is consulted to assess need for RRT and help with volume removal through ultrafiltration.

## 2018-05-28 NOTE — SIGNIFICANT EVENT
Approximated CO 4.3, CI 2, SVR 1116 this morning;  increased to 5 mcg/kg/min from 3 mcg/kg/min. WBC 27: BCx x2, CXR and vancomycin ordered.

## 2018-05-28 NOTE — ASSESSMENT & PLAN NOTE
On Azactam and Vancomycin at OSH  Respiratory cx on 5/21 normal respiratory satish, 5/24 normal respiratory satish, 5/25 preliminary with GP cocci and GP rods  Will continue for now with aztreonam, check vanc level (last supratx 5/25)  Added flagyl  Worsening sepsis

## 2018-05-28 NOTE — PROGRESS NOTES
Pt deciding on WOC -  & JOSHUA notified. MD Roque and chaplain Jacob at bedside. Bereavement tray and emotional support provided. Belongings taken with pts daughter, Pooja. Per JOSHUA, pt suitable for eye donation. [place] [facility] [place]

## 2018-05-28 NOTE — ASSESSMENT & PLAN NOTE
-Cr continues to uptrend, now 3.9  -Monitor with IV diuresis -- poor to no UO  -on continuous UF  -louisa give hypertonic saline x 100cc to bring up na, recheck na at noon, then start hd with bath Na 130

## 2018-05-28 NOTE — PLAN OF CARE
Problem: Patient Care Overview  Goal: Plan of Care Review  Outcome: Ongoing (interventions implemented as appropriate)  POC reviewed with pt/brother/daughter at the bedside. Pt remains on vent settings R 26//PEEP 10/FiO2 60%. Sats 94-96%. CRRT started at beginning of shift, pt currently on SCUF with UF rate of 400. Plan is to switch pt to SLED, however, concerned about increasing pts Na too quickly with bath (Na 119). Pt remains on amio//fentanyl/levo/propofol/heparin/lasix/vaso gtts. Maintaining MAP > 60 with pressure support. IABP remains 1:1 auto EKG trigger. Site C/D/I. No alarms throughout the night. CVP 10-13 during shift. Latest SvO2 this morning 53 (after increasing dose of  from 3 to 5). Tube feeds held starting at 0200 due to increasing residuals (pt not absorbing nutrition). UO of 5ml overnight. Dr. Salmeron aware. Code status discussed with family at beginning of shift and have decided to make pt DNR. See flowsheet for full assessment. VSS. WCTM

## 2018-05-28 NOTE — SIGNIFICANT EVENT
Discussed increasing pressor requirement, worsening O2 status, and stable ScvO2 with Dr. Joseph: dialysis, add vasopressin 0.04 U/min. Discussed this with daughter (medical POA) who talked with her father: they have been having family conversations recently, agreed to DNR, and would like the code status to be officially changed to DNR. They want comfort to be a priority. DNR status discussed with Dr. Joseph.

## 2018-05-28 NOTE — CONSULTS
[facility]  Nephrology  Consult Note    Patient Name: Milli Montez  MRN: [id number]  Admission Date: 5/26/2018  Hospital Length of Stay: 2 days  Attending Provider: Mariajose Joseph MD   Primary Care Physician: Marito Amato MD  Principal Problem:Cardiogenic shock    Consults  Subjective:     HPI: 57F with hx of CAD s/p remote CABG (LIMA-D1 + dLAD and SVG-RPDA), balloon PTCA LM stent 2/6/18, followed by repeat balloon PTCA/DEWAYNE LM ISR 5/2/18, chronic HFpEF EF 55-60%, HLD, HTN, DMII, hx of TIA who presented on 5/19 to [facility] with progressive shortness of breath and hypoxic respiratory failure. She was admitted with ADHF and NSTEMI 4/2018. She was admitted to the ICU with acue decompensated CHF and intubated. She had recently been admitted in 4 Initial work-up resulted , troponin 0.006, cr. 1.5, lactic 5.1, ABG on AVAPS with pH 7.3, pCO2 41, pO2 525, HCO3 20.  CXR consistent with CHF.  EKG showed atrial tachycardia with LBBB (old), no acute ischemic ST-T changes from previous tracings. She was started on IV diuresis. She developed cardiogenic shock and was started on NSTEMI protocol as troponin increased to >50 with ASA, brilinta, IV heparin, statin. An echo showed EF 45-50%, inferolateral HK, mild MR. Notes report new inferior MI and severe MR. LHC deferred until patient more stable. She was started on a lasix gtt, net negative 2.3L initially with improvement in KEN. She was on low dose levophed for pressor support. Lasix gtt stopped on 5/22 and gentle IV hydration started. On 5/24, course complicated by AF/rVR- she was started on amiodarone gtt, DCCV x2 unsuccessfully, converted back to sinus rhythm eventually. She was started on UF with Nephrology as well with 1.5L removed on 5/25; switched to CRRT but pt did not tolerate and CRRT stopped. Repeat TTE showed EF 40-45% with severe anterolateral and inferolateral HK, severe MR.  An IABP was then placed and pt transferred to [facility]. Nephrology  service is consulted to assess need for RRT and help with volume removal through ultrafiltration.    Past Medical History:   Diagnosis Date    Allergy     Arthritis     Blood transfusion     CAD (coronary artery disease)     CHF (congestive heart failure)     Diabetes mellitus     Heart murmur     History of loop recorder     Hyperlipidemia     Hypertension     Hypothyroidism 2013    TIA (transient ischemic attack)     Ulcer        Past Surgical History:   Procedure Laterality Date    CARDIAC SURGERY      CAROTID STENT Right      SECTION      CORONARY STENT PLACEMENT      TUBAL LIGATION         Review of patient's allergies indicates:   Allergen Reactions    Penicillins Swelling     Current Facility-Administered Medications   Medication Frequency    0.9%  NaCl infusion (CRRT USE ONLY) Continuous    amiodarone 360 mg/200 mL (1.8 mg/mL) infusion Continuous    aspirin EC tablet 81 mg Daily    aztreonam injection 1,000 mg Q8H    dextrose 50% injection 12.5 g PRN    DOBUTamine 500mg in D5W 250mL infusion (premix) (NON-TITRATING) Continuous    fentaNYL 2500 mcg in 0.9% sodium chloride 250 mL infusion premix (titrating) Continuous    furosemide (LASIX) 2 mg/mL in sodium chloride 0.9% 100 mL infusion (conc: 2 mg/mL) Continuous    glucagon (human recombinant) injection 1 mg PRN    heparin 25,000 units in dextrose 5% 250 mL (100 units/mL) infusion (heparin infusion) Continuous    insulin aspart U-100 pen 0-5 Units Q6H PRN    levothyroxine tablet 75 mcg Before breakfast    magnesium sulfate 2g in water 50mL IVPB (premix) PRN    metronidazole IVPB 500 mg Q8H    norepinephrine 32 mg in dextrose 5 % 250 mL infusion Continuous    propofol (DIPRIVAN) 10 mg/mL infusion Continuous    rosuvastatin tablet 10 mg Daily    sodium chloride 0.9% flush 3 mL Q8H    sodium chloride 3% solution 100 mL Continuous    sodium phosphate 20.01 mmol in dextrose 5 % 250 mL IVPB PRN    sodium  phosphate 30 mmol in dextrose 5 % 250 mL IVPB PRN    sodium phosphate 39.99 mmol in dextrose 5 % 250 mL IVPB PRN    ticagrelor tablet 90 mg BID    vasopressin (PITRESSIN) 1 Units/mL in dextrose 5 % 100 mL infusion Continuous     Family History     Problem Relation (Age of Onset)    Cancer Father    Heart disease Mother, Father        Social History Main Topics    Smoking status: Never Smoker    Smokeless tobacco: Never Used    Alcohol use No    Drug use: No    Sexual activity: Yes     Partners: Male     Review of Systems   Unable to perform ROS: Intubated     Objective:     Vital Signs (Most Recent):  Temp: (!) 95.9 °F (35.5 °C) (05/28/18 0900)  Pulse: 80 (05/28/18 1000)  Resp: (!) 21 (05/28/18 1000)  BP: (!) 93/51 (05/28/18 1000)  SpO2: 96 % (05/28/18 1000)  O2 Device (Oxygen Therapy): ventilator (05/28/18 1000) Vital Signs (24h Range):  Temp:  [94 °F (34.4 °C)-98.9 °F (37.2 °C)] 95.9 °F (35.5 °C)  Pulse:  [73-97] 80  Resp:  [12-34] 21  SpO2:  [89 %-100 %] 96 %  BP: ()/(46-62) 93/51     Weight: 98.2 kg (216 lb 7.9 oz) (05/27/18 1100)  Body mass index is 34.94 kg/m².  Body surface area is 2.14 meters squared.    I/O last 3 completed shifts:  In: 6136 [I.V.:5471; NG/GT:415; IV Piggyback:250]  Out: 5226 [Urine:1315; Other:3911]    Physical Exam   HENT:   Head: Atraumatic.   Neck: No JVD present.   Cardiovascular: Normal rate and regular rhythm.    Pulmonary/Chest: Effort normal and breath sounds normal.   Abdominal: Soft. She exhibits distension.   Musculoskeletal: She exhibits edema. She exhibits no tenderness.   Skin: Skin is warm.           Assessment/Plan:     Hyponatremia    -patient needs SLED, but would prefer an increase in sodium prior to starting, d/w primary team and at this time we're recommend 100ccs of hypertonic saline, repeat sodium and if we can get number up to around 126        Acute renal failure superimposed on stage 3 chronic kidney disease    Likely significant cardiorenal  component, in addition to diuresis, at this time patient is on SCUF, but looks like dialysis will need to be added in soon, we're concerned about her hyponatremia (119) as lowest sodium bath on SLED is 130mEq/L        Acute on chronic combined systolic and diastolic heart failure    Managed by cardiology, nephrology helping with volume removal through SCUF            Thank you for your consult.     Elian Culver MD  Nephrology  [facility]

## 2018-05-28 NOTE — DISCHARGE SUMMARY
[facility]  Heart Transplant  Discharge Summary      Patient Name: Milli Montez  MRN: [id number]  Admission Date: 5/26/2018  Hospital Length of Stay: 2 days  Discharge Date and Time: 05/28/2018 3:39 PM  Attending Physician: Mariajose Joseph MD   Discharging Provider: Curt Roque MD  Primary Care Provider: Marito Amato MD     HPI: 57F with hx of CAD s/p remote CABG (LIMA-D1 + dLAD and SVG-RPDA), balloon PTCA LM stent 2/6/18, followed by repeat balloon PTCA/DEWAYNE LM ISR 5/2/18, chronic HFpEF EF 55-60%, HLD, HTN, DMII, hx of TIA who presented on 5/19 to [facility] with progressive shortness of breath and hypoxic respiratory failure. She was admitted with ADHF and NSTEMI 4/2018. She was admitted to the ICU with acue decompensated CHF and intubated. She had recently been admitted in 4 Initial work-up resulted , troponin 0.006, cr. 1.5, lactic 5.1, ABG on AVAPS with pH 7.3, pCO2 41, pO2 525, HCO3 20.  CXR consistent with CHF.  EKG showed atrial tachycardia with LBBB (old), no acute ischemic ST-T changes from previous tracings. She was started on IV diuresis. She developed cardiogenic shock and was started on NSTEMI protocol as troponin increased to >50 with ASA, brilinta, IV heparin, statin. An echo showed EF 45-50%, inferolateral HK, mild MR. Notes report new inferior MI and severe MR. LHC deferred until patient more stable. She was started on a lasix gtt, net negative 2.3L initially with improvement in KEN. She was on low dose levophed for pressor support. Lasix gtt stopped on 5/22 and gentle IV hydration started. On 5/24, course complicated by AF/rVR- she was started on amiodarone gtt, DCCV x2 unsuccessfully, converted back to sinus rhythm eventually. She was started on UF with Nephrology as well with 1.5L removed on 5/25; switched to CRRT but pt did not tolerate and CRRT stopped. Repeat TTE showed EF 40-45% with severe anterolateral and inferolateral HK, severe MR.  An IABP was then placed  and pt transferred here.    She transfers here on levophed at 0.5, dobutamine at 3, heparin gtt, fentanyl gtt, amiodarone gtt. Intubated and sedated. IABP in place.    * No surgery found *     Hospital Course: Patient had a difficult night with increase need for inotropes and pressors with blood pressure decreasing and patient becoming more acidotic.  She progressively worsened, family noted all that was transpiring, and decided to withdraw care so that patient would be comfortable.  We did so.  TOD was 1:06 pm on 5/28/18.    Consults         Status Ordering Provider     Inpatient consult to Registered Dietitian/Nutritionist  Once     Provider:  (Not yet assigned)    Completed JIM HOPKINS     Inpatient consult to Registered Dietitian/Nutritionist  Once     Provider:  (Not yet assigned)    Completed MICHEAL CRANE          Significant Diagnostic Studies: Labs:   CMP   Recent Labs  Lab 05/27/18  0400  05/27/18  1257 05/27/18  2136 05/28/18  0327   *  < > 119* 120* 119*  119*   K 4.0  < > 4.0 4.5 4.5  4.5   CL 84*  < > 81* 85* 85*  85*   CO2 24  < > 24 21* 18*  18*   *  < > 225* 180* 210*  210*   BUN 79*  < > 77* 81* 77*  77*   CREATININE 3.0*  < > 3.0* 3.5* 3.9*  3.9*   CALCIUM 8.2*  < > 8.0* 8.1* 7.9*  7.9*   PROT 6.7  --  6.7  --  7.1   ALBUMIN 1.8*  --  1.7* 1.7* 1.7*  1.7*   BILITOT 2.1*  --  2.1*  --  2.5*   ALKPHOS 149*  --  143*  --  162*   AST 31  --  31  --  44*   ALT 17  --  15  --  17   ANIONGAP 14  < > 14 14 16  16   ESTGFRAFRICA 19.4*  < > 19.4* 16.1* 14.1*  14.1*   EGFRNONAA 16.8*  < > 16.8* 14.0* 12.3*  12.3*   < > = values in this interval not displayed., CBC   Recent Labs  Lab 05/27/18  0400 05/27/18  1257 05/28/18  0330   WBC 15.90* 17.54* 26.56*   HGB 7.8* 7.7* 7.5*   HCT 23.5* 22.8* 23.7*    267 286   , INR   Lab Results   Component Value Date    INR 1.1 05/28/2018    INR 1.1 05/27/2018    INR 1.0 05/19/2018    and Troponin   Recent Labs  Lab 05/25/18 2022    TROPONINI 49.014*       Pending Diagnostic Studies:     Procedure Component Value Units Date/Time    MOSES [[id number]] Collected:  18    Order Status:  Sent Lab Status:  In process Updated:  18    Specimen:  Blood from Blood         Final Active Diagnoses:    Diagnosis Date Noted POA    PRINCIPAL PROBLEM:  Cardiogenic shock [R57.0] 2018 Yes    Abdominal distension [R14.0] 2018 Unknown    Hyponatremia [E87.1] 2018 Unknown    Hypervolemia [E87.70] 2018 Yes    Comfort measures only status [Z51.5] 2018 Not Applicable    Atrial fibrillation with RVR [I48.91] 2018 Yes    Right lower lobe pneumonia [J18.1] 2018 Yes    Acute on chronic combined systolic and diastolic heart failure [I50.43] 2018 Yes    Acute respiratory failure with hypoxia [J96.01] 2018 Yes    Acute renal failure superimposed on stage 3 chronic kidney disease [N17.9, N18.3] 2018 Yes    NSTEMI (non-ST elevated myocardial infarction) [I21.4] 2018 Yes    Diabetic nephropathy associated with type 2 diabetes mellitus [E11.21]  Yes    CAD (coronary artery disease) [I25.10]  Yes     Chronic    Hypothyroidism [E03.9] 2013 Yes     Chronic      Problems Resolved During this Admission:    Diagnosis Date Noted Date Resolved POA      Discharged Condition:     Disposition:     Follow Up: none    Patient Instructions:   No discharge procedures on file.  Medications:  None (patient  at medical facility)    Curt Roque MD  Heart Transplant  [facility]

## 2018-05-28 NOTE — SIGNIFICANT EVENT
Discussed AM labs (ABG and chemistry panel) with the nephrology fellow on call in order to adjust dialysis appropriately.

## 2018-05-28 NOTE — SUBJECTIVE & OBJECTIVE
Interval History: required uptitration of levo/dobut/added vaso  UF since 8pm yesterday  SVO2 53 this am  CVP 9  CO 7+    Continuous Infusions:   sodium chloride 0.9% 200 mL/hr at 05/28/18 0900    amiodarone in dextrose 5% 0.5 mg/min (05/28/18 0900)    DOBUTamine 5 mcg/kg/min (05/28/18 0900)    fentanyl 20 mL/hr at 05/28/18 0900    furosemide (LASIX) 2 mg/mL infusion (non-titrating) 20 mg/hr (05/28/18 0900)    heparin (porcine) in 5 % dex 12 Units/kg/hr (05/28/18 0900)    norepinephrine bitartrate-D5W 1 mcg/kg/min (05/28/18 0908)    propofol 10.014 mcg/kg/min (05/28/18 0900)    sodium chloride 3%      vasopressin (PITRESSIN) infusion 0.04 Units/min (05/28/18 0900)     Scheduled Meds:   aspirin  81 mg Oral Daily    aztreonam  1,000 mg Intravenous Q8H    levothyroxine  75 mcg Oral Before breakfast    metronidazole  500 mg Intravenous Q8H    rosuvastatin  10 mg Oral Daily    sodium chloride 0.9%  3 mL Intravenous Q8H    ticagrelor  90 mg Oral BID     PRN Meds:dextrose 50%, glucagon (human recombinant), insulin aspart U-100, magnesium sulfate IVPB, sodium phosphate IVPB, sodium phosphate IVPB, sodium phosphate IVPB    Review of patient's allergies indicates:   Allergen Reactions    Penicillins Swelling     Objective:     Vital Signs (Most Recent):  Temp: (!) 95.9 °F (35.5 °C) (05/28/18 0900)  Pulse: 78 (05/28/18 0907)  Resp: 16 (05/28/18 0907)  BP: (!) 108/46 (05/28/18 0900)  SpO2: 95 % (05/28/18 0907) Vital Signs (24h Range):  Temp:  [94 °F (34.4 °C)-98.9 °F (37.2 °C)] 95.9 °F (35.5 °C)  Pulse:  [73-97] 78  Resp:  [12-34] 16  SpO2:  [89 %-100 %] 95 %  BP: ()/(46-62) 108/46     Patient Vitals for the past 72 hrs (Last 3 readings):   Weight   05/27/18 1100 98.2 kg (216 lb 7.9 oz)   05/26/18 2125 98.2 kg (216 lb 7.9 oz)     Body mass index is 34.94 kg/m².      Intake/Output Summary (Last 24 hours) at 05/28/18 0931  Last data filed at 05/28/18 0900   Gross per 24 hour   Intake          5544.73 ml    Output             5130 ml   Net           414.73 ml       Hemodynamic Parameters:       Telemetry: no events    Physical Exam   Constitutional: She appears well-developed and well-nourished.   Intubated/sedated   HENT:   Head: Normocephalic and atraumatic.   Cardiovascular: Normal rate, regular rhythm, normal heart sounds and intact distal pulses.  Exam reveals no gallop and no friction rub.    No murmur heard.  Audible iabp     Pulmonary/Chest: Effort normal and breath sounds normal. No respiratory distress. She has no wheezes. She has no rales. She exhibits no tenderness.   Abdominal: Soft. She exhibits distension.   Musculoskeletal: She exhibits no edema.   Skin: Skin is warm and dry. Capillary refill takes more than 3 seconds.       Significant Labs:  CBC:    Recent Labs  Lab 05/27/18  0400 05/27/18  1257 05/28/18  0330   WBC 15.90* 17.54* 26.56*   RBC 2.54* 2.48* 2.43*   HGB 7.8* 7.7* 7.5*   HCT 23.5* 22.8* 23.7*    267 286   MCV 93 92 98   MCH 30.7 31.0 30.9   MCHC 33.2 33.8 31.6*     BNP:    Recent Labs  Lab 05/23/18  0400 05/25/18  0954   BNP 2,305* 3,240*     CMP:    Recent Labs  Lab 05/27/18  0400  05/27/18  1257 05/27/18  2136 05/28/18  0327   *  < > 225* 180* 210*  210*   CALCIUM 8.2*  < > 8.0* 8.1* 7.9*  7.9*   ALBUMIN 1.8*  --  1.7* 1.7* 1.7*  1.7*   PROT 6.7  --  6.7  --  7.1   *  < > 119* 120* 119*  119*   K 4.0  < > 4.0 4.5 4.5  4.5   CO2 24  < > 24 21* 18*  18*   CL 84*  < > 81* 85* 85*  85*   BUN 79*  < > 77* 81* 77*  77*   CREATININE 3.0*  < > 3.0* 3.5* 3.9*  3.9*   ALKPHOS 149*  --  143*  --  162*   ALT 17  --  15  --  17   AST 31  --  31  --  44*   BILITOT 2.1*  --  2.1*  --  2.5*   < > = values in this interval not displayed.   Coagulation:     Recent Labs  Lab 05/24/18  0400 05/25/18  0415 05/26/18  0410 05/27/18  0400 05/28/18  0327   INR  --   --   --  1.1 1.1   APTT 48.5*  48.5* 43.9* 67.4*  --   --      LDH:  No results for input(s): LDH in the last 72  hours.  Microbiology:  Microbiology Results (last 7 days)     Procedure Component Value Units Date/Time    Blood culture [[id number]] Collected:  05/28/18 0625    Order Status:  Sent Specimen:  Blood from Peripheral, Antecubital, Left Updated:  05/28/18 0807    Blood culture [[id number]] Collected:  05/28/18 0633    Order Status:  Sent Specimen:  Blood from Peripheral, Antecubital, Right Updated:  05/28/18 0652          I have reviewed all pertinent labs within the past 24 hours.    Estimated Creatinine Clearance: 19.3 mL/min (A) (based on SCr of 3.9 mg/dL (H)).    Diagnostic Results:  I have reviewed all pertinent imaging results/findings within the past 24 hours.

## 2018-05-28 NOTE — PROGRESS NOTES
Per dialysis RN, ok to run 3% hypertonic saline through port filter port on CRRT. Will continue to monitor.

## 2018-05-28 NOTE — SIGNIFICANT EVENT
Called for Na 120. Discussed with renal fellow on call: Na was in 120s by 5/25 and is now up from 119. He recommended max concentrating drips as much as possible. Vasopressin max concentrated (ordered). He will inquire about augmentating dialysis.

## 2018-05-28 NOTE — PROGRESS NOTES
[facility]  Heart Transplant  Progress Note    Patient Name: Milli Montez  MRN: [id number]  Admission Date: 5/26/2018  Hospital Length of Stay: 2 days  Attending Physician: Mariajose Joseph MD  Primary Care Provider: Marito Amato MD  Principal Problem:Cardiogenic shock    Subjective:     Interval History: required uptitration of levo/dobut/added vaso  UF since 8pm yesterday  SVO2 53 this am  CVP 9  CO 7+    Continuous Infusions:   sodium chloride 0.9% 200 mL/hr at 05/28/18 0900    amiodarone in dextrose 5% 0.5 mg/min (05/28/18 0900)    DOBUTamine 5 mcg/kg/min (05/28/18 0900)    fentanyl 20 mL/hr at 05/28/18 0900    furosemide (LASIX) 2 mg/mL infusion (non-titrating) 20 mg/hr (05/28/18 0900)    heparin (porcine) in 5 % dex 12 Units/kg/hr (05/28/18 0900)    norepinephrine bitartrate-D5W 1 mcg/kg/min (05/28/18 0908)    propofol 10.014 mcg/kg/min (05/28/18 0900)    sodium chloride 3%      vasopressin (PITRESSIN) infusion 0.04 Units/min (05/28/18 0900)     Scheduled Meds:   aspirin  81 mg Oral Daily    aztreonam  1,000 mg Intravenous Q8H    levothyroxine  75 mcg Oral Before breakfast    metronidazole  500 mg Intravenous Q8H    rosuvastatin  10 mg Oral Daily    sodium chloride 0.9%  3 mL Intravenous Q8H    ticagrelor  90 mg Oral BID     PRN Meds:dextrose 50%, glucagon (human recombinant), insulin aspart U-100, magnesium sulfate IVPB, sodium phosphate IVPB, sodium phosphate IVPB, sodium phosphate IVPB    Review of patient's allergies indicates:   Allergen Reactions    Penicillins Swelling     Objective:     Vital Signs (Most Recent):  Temp: (!) 95.9 °F (35.5 °C) (05/28/18 0900)  Pulse: 78 (05/28/18 0907)  Resp: 16 (05/28/18 0907)  BP: (!) 108/46 (05/28/18 0900)  SpO2: 95 % (05/28/18 0907) Vital Signs (24h Range):  Temp:  [94 °F (34.4 °C)-98.9 °F (37.2 °C)] 95.9 °F (35.5 °C)  Pulse:  [73-97] 78  Resp:  [12-34] 16  SpO2:  [89 %-100 %] 95 %  BP: ()/(46-62) 108/46     Patient  Vitals for the past 72 hrs (Last 3 readings):   Weight   05/27/18 1100 98.2 kg (216 lb 7.9 oz)   05/26/18 2125 98.2 kg (216 lb 7.9 oz)     Body mass index is 34.94 kg/m².      Intake/Output Summary (Last 24 hours) at 05/28/18 0931  Last data filed at 05/28/18 0900   Gross per 24 hour   Intake          5544.73 ml   Output             5130 ml   Net           414.73 ml       Hemodynamic Parameters:       Telemetry: no events    Physical Exam   Constitutional: She appears well-developed and well-nourished.   Intubated/sedated   HENT:   Head: Normocephalic and atraumatic.   Cardiovascular: Normal rate, regular rhythm, normal heart sounds and intact distal pulses.  Exam reveals no gallop and no friction rub.    No murmur heard.  Audible iabp     Pulmonary/Chest: Effort normal and breath sounds normal. No respiratory distress. She has no wheezes. She has no rales. She exhibits no tenderness.   Abdominal: Soft. She exhibits distension.   Musculoskeletal: She exhibits no edema.   Skin: Skin is warm and dry. Capillary refill takes more than 3 seconds.       Significant Labs:  CBC:    Recent Labs  Lab 05/27/18  0400 05/27/18  1257 05/28/18  0330   WBC 15.90* 17.54* 26.56*   RBC 2.54* 2.48* 2.43*   HGB 7.8* 7.7* 7.5*   HCT 23.5* 22.8* 23.7*    267 286   MCV 93 92 98   MCH 30.7 31.0 30.9   MCHC 33.2 33.8 31.6*     BNP:    Recent Labs  Lab 05/23/18  0400 05/25/18  0954   BNP 2,305* 3,240*     CMP:    Recent Labs  Lab 05/27/18  0400  05/27/18  1257 05/27/18  2136 05/28/18  0327   *  < > 225* 180* 210*  210*   CALCIUM 8.2*  < > 8.0* 8.1* 7.9*  7.9*   ALBUMIN 1.8*  --  1.7* 1.7* 1.7*  1.7*   PROT 6.7  --  6.7  --  7.1   *  < > 119* 120* 119*  119*   K 4.0  < > 4.0 4.5 4.5  4.5   CO2 24  < > 24 21* 18*  18*   CL 84*  < > 81* 85* 85*  85*   BUN 79*  < > 77* 81* 77*  77*   CREATININE 3.0*  < > 3.0* 3.5* 3.9*  3.9*   ALKPHOS 149*  --  143*  --  162*   ALT 17  --  15  --  17   AST 31  --  31  --  44*    BILITOT 2.1*  --  2.1*  --  2.5*   < > = values in this interval not displayed.   Coagulation:     Recent Labs  Lab 05/24/18  0400 05/25/18  0415 05/26/18  0410 05/27/18  0400 05/28/18  0327   INR  --   --   --  1.1 1.1   APTT 48.5*  48.5* 43.9* 67.4*  --   --      LDH:  No results for input(s): LDH in the last 72 hours.  Microbiology:  Microbiology Results (last 7 days)     Procedure Component Value Units Date/Time    Blood culture [[id number]] Collected:  05/28/18 0625    Order Status:  Sent Specimen:  Blood from Peripheral, Antecubital, Left Updated:  05/28/18 0807    Blood culture [[id number]] Collected:  05/28/18 0633    Order Status:  Sent Specimen:  Blood from Peripheral, Antecubital, Right Updated:  05/28/18 0652          I have reviewed all pertinent labs within the past 24 hours.    Estimated Creatinine Clearance: 19.3 mL/min (A) (based on SCr of 3.9 mg/dL (H)).    Diagnostic Results:  I have reviewed all pertinent imaging results/findings within the past 24 hours.    Assessment and Plan:     57F with hx of CAD s/p remote CABG (LIMA-D1 + dLAD and SVG-RPDA), balloon PTCA LM stent 2/6/18, followed by repeat balloon PTCA/DEWAYNE LM ISR 5/2/18, chronic HFpEF EF 55-60%, HLD, HTN, DMII, hx of TIA who presented on 5/19 to [facility] with progressive shortness of breath and hypoxic respiratory failure. She was admitted with ADHF and NSTEMI 4/2018. She was admitted to the ICU with acue decompensated CHF and intubated. She had recently been admitted in 4 Initial work-up resulted , troponin 0.006, cr. 1.5, lactic 5.1, ABG on AVAPS with pH 7.3, pCO2 41, pO2 525, HCO3 20.  CXR consistent with CHF.  EKG showed atrial tachycardia with LBBB (old), no acute ischemic ST-T changes from previous tracings. She was started on IV diuresis. She developed cardiogenic shock and was started on NSTEMI protocol as troponin increased to >50 with ASA, brilinta, IV heparin, statin. An echo showed EF 45-50%, inferolateral HK, mild MR. Notes  report new inferior MI and severe MR. LHC deferred until patient more stable. She was started on a lasix gtt, net negative 2.3L initially with improvement in KEN. She was on low dose levophed for pressor support. Lasix gtt stopped on 5/22 and gentle IV hydration started. On 5/24, course complicated by AF/rVR- she was started on amiodarone gtt, DCCV x2 unsuccessfully, converted back to sinus rhythm eventually. She was started on UF with Nephrology as well with 1.5L removed on 5/25; switched to CRRT but pt did not tolerate and CRRT stopped. Repeat TTE showed EF 40-45% with severe anterolateral and inferolateral HK, severe MR.  An IABP was then placed and pt transferred here.    She transfers here on levophed at 0.5, dobutamine at 3, heparin gtt, fentanyl gtt, amiodarone gtt. Intubated and sedated. IABP in place.    * Cardiogenic shock    Presented with NSTEMI and ADHF  Tn >50  Recent LM PCI earlier this month, LHC deferred due to critical illness  IABP in place  Lactate normal  CXR with worsening pulmonary edema  CVP 9, SVO2 53 --> calculated Michael CO 7.8  Continue levophed, dobutamine, vaso, lasix gtt, heparin gtt  Lasix gtt at 10/hr  Checking MOSES/spep/upep        Hyponatremia    Giving 100ml 3% saline now  Check na at noon  Hopefully HD this afternoon if able to tolerate to try and clear acidemia         Abdominal distension    Check abd xr lateral and ap flat for free air  No bm in some time  If no obstruction or free air will give laxative  Too sick to ct at this time          Right lower lobe pneumonia    On Azactam and Vancomycin at OSH  Respiratory cx on 5/21 normal respiratory satish, 5/24 normal respiratory satish, 5/25 preliminary with GP cocci and GP rods  Will continue for now with aztreonam, check vanc level (last supratx 5/25)  Added flagyl  Worsening sepsis        Atrial fibrillation with RVR    -Paroxysmal, last episode per notes 5/24  -Amiodarone  -In sinus rhythm  -On heparin gtt        Diabetic  nephropathy associated with type 2 diabetes mellitus    -SSI/accuchecks        NSTEMI (non-ST elevated myocardial infarction)    See cardiogenic shock        Acute renal failure superimposed on stage 3 chronic kidney disease    -Cr continues to uptrend, now 3.9  -Monitor with IV diuresis -- poor to no UO  -on continuous UF  -louisa give hypertonic saline x 100cc to bring up na, recheck na at noon, then start hd with bath Na 130        Acute respiratory failure with hypoxia    Intubated with increasing vent requirements        Acute on chronic combined systolic and diastolic heart failure    -See cardiogenic shock  -TTE EF 40-45%, severe likely secondary MR, inferolateral and anterolateral severe HK  -No BB due to shock, no ACE/ARB due to KEN        CAD (coronary artery disease)    -Continue ASA, statin        Hypothyroidism    Continue home synthroid            Curt Roque MD  Heart Transplant  [facility]

## 2018-05-28 NOTE — PLAN OF CARE
Problem: Spiritual Distress, Risk/Actual (Adult,Obstetrics,Pediatric)  Intervention: Facilitate Personal Exploration/Expression of Spirituality  Provided pastoral support during and after w/d of life support. Offered grief care with pt's brother and daughter. Reviewed decedent care information with them both. Family will use [facility] [place]. Family also aware of 's contact info for follow-up as needed.

## 2018-05-28 NOTE — ASSESSMENT & PLAN NOTE
Likely significant cardiorenal component, in addition to diuresis, at this time patient is on SCUF, but looks like dialysis will need to be added in soon, we're concerned about her hyponatremia (119) as lowest sodium bath on SLED is 130mEq/L

## 2018-05-28 NOTE — ASSESSMENT & PLAN NOTE
Check abd xr lateral and ap flat for free air  No bm in some time  If no obstruction or free air will give laxative  Too sick to ct at this time

## 2018-05-29 ENCOUNTER — TELEPHONE (OUTPATIENT)
Dept: FAMILY MEDICINE | Facility: CLINIC | Age: 58
End: 2018-05-29

## 2018-05-29 LAB
ALBUMIN SERPL ELPH-MCNC: 2.04 G/DL
ALPHA1 GLOB SERPL ELPH-MCNC: 0.92 G/DL
ALPHA2 GLOB SERPL ELPH-MCNC: 0.8 G/DL
ANA SER QL IF: NORMAL
B-GLOBULIN SERPL ELPH-MCNC: 1.15 G/DL
GAMMA GLOB SERPL ELPH-MCNC: 1.3 G/DL
PATHOLOGIST INTERPRETATION SPE: NORMAL
PROT SERPL-MCNC: 6.2 G/DL

## 2018-05-29 NOTE — LETTER
May 29, 2018    Family of Mrs.Peggy KYLER Montez  [address] [address] [address]             [facility]  [address] [address]  Phone: [phone]  Fax: [phone]           To the family of Mrs. Montez:      Please accept our deepest condolences in regards to the recent death of your family member, Mrs. Montez. She was a most remarkable person, and it was always a pleasure to see her. I hope that we were able to provide her with some relief during the time that she was under our care.     On behalf of myself and my staff, please also extend our condolences to all of your family. If there is anything else that we can do for you, please do not hesitate to contact us.      Sincerely,            Usha Joshi MD and staff

## 2018-05-29 NOTE — PHYSICIAN QUERY
PT Name: Milli Montez  MR #: [id number]    Physician Query Form -Systemic Infectious Process Clarification     CDS/: Meredith Schmidt RN, CCDS               Contact information: [email or web address]  This form is a permanent document in the medical record.     Query Date: May 29, 2018     By submitting this query, we are merely seeking further clarification of documentation. Please utilize your independent clinical judgment when addressing the question(s) below.    The Medical record contains the following:     Indicators   Supporting Clinical Findings   Location in Medical Record   X HR RR BP Temp 97.8, hr-92, rr-25, b/p-94/55 5/27 h/p   X Lactic Acid             Procalcitonin Lactate 1.4 5/26 lab   X WBC                Bands                     CRP Wbc 15.67->15.90->17.54->  26.56 Lab 5/26- 5/28   X Culture(s) Respiratory cx on 5/21 normal respiratory satish, 5/24 normal respiratory satish, 5/25 preliminary with GP cocci and GP rods  Will continue for now with aztreonam, check vanc level (last supratx 5/25) 5/28 prog note    AMS, Confusion, LOC, etc.     X Organ Dysfunction / Failure Acute renal failure  Acute respiratory failure with hypoxia  Cardiogenic shock 5/27 h/p   X Bacteremia or Sepsis / Septic Worsening Sepsis 5/28 prog note    Known or Suspected Source of Infection documented      (Failed) Outpatient Treatment     X Medication currently on IABP 1:1, on Levophed at 1.0mc/kg/min, Vasopressin 0.04,  On Azactam and Vancomycin at OSH, will continue for now with aztreonam check vanc level, aded Flagyl 5/28 prog note    Treatment     X Other has severe hyponatremia, leukocytosis despite abx, hypotermia (94 overnight) despite warming blanket, and persistent acidemia.    RLL Pneumonia 5/28 prog note            5/27 h/p, 5/28 prog note     Provider, please specify diagnosis or diagnoses associated with above clinical findings.      [X ] Severe Sepsis with Acute Organ Dysfunction/Failure (please specify          organ dysfunction/failure): ____Renal failure, Acute respiratory failure_______________  [ X ] Sepsis with Septic Shock  [  ] Other Infectious Disease (please specify): _________________________________  [  ] Other: __________________________________  [  ] Clinically Undetermined    Please document in your progress notes daily for the duration of treatment until resolved and include in your discharge summary.

## 2018-05-29 NOTE — PHYSICIAN QUERY
PT Name: Milli Montez  MR #: [id number]    Physician Query Form - Cause and Effect Relationship Clarification      CDS/: Meredith Schmidt  RN, CCDS             Contact information: [email or web address]    This form is a permanent document in the medical record.     Query Date: May 29, 2018    By submitting this query, we are merely seeking further clarification of documentation. Please utilize your independent clinical judgment when addressing the question(s) below.    The Medical record contains the following:  Supporting Clinical Findings   Location in record           Right Lower Lobe Pneumonia                                                                                                                                                                                    5/26 h/p,  5/28 prog note, 5/28 d/c summary     On Azactam and Vancomycin at OSH  Respiratory cx on 5/21 normal respiratory satish, 5/24 normal respiratory satish, 5/25 preliminary with GP cocci and GP rods  Will continue for now with aztreonam, check vanc level (last supratx 5/25)      resp culture: Streptococcus Group C                                                                                                                                                                                         5/28 prog notes                  5/25 lab         Provider, please clarify if there is any correlation between __Pneumonia____ and __Streptococcus _.           Are the conditions:     [  ] Due to or associated with each other     [  ] Unrelated to each other     [  ] Other (Please Specify): _________________________     [ X ] Clinically Undetermined

## 2018-05-30 LAB
HEP. B SURF AB, QUAL: NEGATIVE
HEP. B SURF AB, QUANT.: 4 MIU/ML

## 2018-05-31 LAB
BACTERIA BLD CULT: NORMAL

## 2018-06-02 LAB — BACTERIA BLD CULT: NORMAL

## 2018-06-04 NOTE — DISCHARGE SUMMARY
[facility] Hospital Medicine  Discharge Summary      Patient Name: Milli Montez  MRN: [id number]  Admission Date: 5/19/2018  Hospital Length of Stay: 7 days  Discharge Date and Time:  06/04/2018 7:19 AM  Attending Physician: No att. providers found   Discharging Provider: Elian Cha MD  Primary Care Provider: Marito Amato MD      HPI:   Ms. Montez is a 56yo female with  a PMHx of CAD with remote CABG (LIMA to Diagonal1 + distal LAD, and SVG-RPDA) and balloon PTCA of LM stent on 2/6/18 followed by repeat balloon PTCA + DEWAYNE of LM in-stent steosis 5/2/18, chronic diastolic HFpEF of 55-60%, HLD, HTN, DM II, and h/o TIA, who presented to the ED with c/o SOB that has progressively worsened over the past few days.  Associated orthopnea, PND, BLE edema, and palpitations.  Denies any CP, cough, weight gain, ABD pain or distention, N/V/D, dysuria, lightheadedness/dizziness, syncope, HA, AMS, focal deficits, diaphoresis, fever, or chills.  Upon arrival to ED, patient notably in severe respiratory distress and placed on BiPAP, and later AVAPS.  She received IV Lasix 40mg, followed by Lasix 60mg IV and IV Ativan with improvement in respiratory status.  Initial work-up resulted , troponin 0.006, cr. 1.5, lactic 5.1, ABG on AVAPS with pH 7.3, pCO2 41, pO2 525, HCO3 20.  CXR consistent with CHF.  EKG showed atrial tachycardia with LBBB (old), no acute ischemic ST-T changes from previous tracings.  Patient was admitted to ICU for acute decompensated HF under Hospital Medicine services.  Currently, patient appears comfortable in NAD.  Reports SOB greatly improved since IV Lasix and Ativan.  Patient is a Full Code.  Daughter Katherine Lewis is surrogate decision maker.    Procedure(s) (LRB):  INSERTION, INTRA-AORTIC BALLOON PUMP (N/A)      Hospital Course:   5/20/2018  Patient continue to be on vent and iv diuresis for acute cardiac pulmonary edema which shows improvement  . Continue treatment for  nstemi with aspirin,brilinta ,statin. Echo finding and elevated trop noted continue with medical management once stable patient will have LHC   5/21/2018  Patient remains intubated. Alert and follows commands. Low dose Levophed for pressor support.  Responding to Lasix gtt at 5mg/hr. Diuresing well since starting Lasix and Levophed. Troponin > 50.  2D echo showed EF of 45-50-%, +WMA.Plan for LHC and C   5/22/2018   Cardiology updated plan of care to wait on heart cath . Until her renal function improved . Diuresis on hold . Improved pulmonary edema . Continue to require pressors to support blood pressure   05/23-she remains on vasopressor support ,case was discussed extensively with cardiology-no benefit for [facility] at this time.  Lab data showed troponin of more than 50.  05/24- She developed symptomatic Afib RVR and had cardioversion done, IV amiodarone was also given at bedside ,critical care team added Vanco and Azactam.   05/25- She is now on bipap -remains on multiple drips-amiodarone,heparin, levophed, dobutamine   05/26- She was started on renal replacement therapy and had interval intubation.   The daughter who is the POA is concerned about overall prognosis and wants to know the options for meaningful cardiac recovery and advance directives was also discussed with her.  She wants to have a family conference with the cardiology so they can fully know their options before making DNR decision.    05/27-   DISCAHRGE SUMMARY -  57 year old   female patient with a PMHx of CAD s/p CABG, s/p PTCA of LM stent on 2/6/18, s/p repeat PTCA/DEWAYNE of LM in-stent restenosis on 5/2/18, chronic diastolic CHF, hyperlipidemia, HTN, DM type II, and TIA who presented to [facility] ED with  progressively worsening SOB over the past few days.  She was admitted and managed in the ICU . She was managed for cardiogenic shock and was started on vasopressors with Levophed and Dobutamine.  Her troponin was elevated at more than 50 but she  developed renela failure and was felt to have passed the therapeutic window for [facility].She developed an episode of V tach and was started on amiodarone infusion and cardioversion.  She was continued on vasopressors,heparin drip, amiodarone drip and had intraaortic balloon pump insertion .She has been accepted to the advanced CHF team in [place] .  Plan of care was discussed with family extensively .                  Consults:   Consults         Status Ordering Provider     Inpatient consult to Cardiology  Once     Provider:  Kashif Morris MD    Completed JANICE WHITLOCK     Inpatient consult to Pulmonology  Once     Provider:  Marquez Booth MD    Completed ROSALIA HOUSTON     Inpatient consult to Registered Dietitian/Nutritionist  Once     Provider:  (Not yet assigned)    Completed ROSALIA HOUSTON     Inpatient consult to Registered Dietitian/Nutritionist  Once     Provider:  (Not yet assigned)    Completed MARQUEZ BOOTH     Inpatient consult to Registered Dietitian/Nutritionist  Once     Provider:  (Not yet assigned)    Completed MIRIAM SUBRAMANIAN          No new Assessment & Plan notes have been filed under this hospital service since the last note was generated.  Service: Hospital Medicine    Final Active Diagnoses:    Diagnosis Date Noted POA    PRINCIPAL PROBLEM:  Cardiogenic shock [R57.0] 05/20/2018 Yes    PAF (paroxysmal atrial fibrillation) [I48.0] 05/24/2018 Unknown    Atrial fibrillation with RVR [I48.91] 05/24/2018 Unknown    Right lower lobe pneumonia [J18.1] 05/24/2018 Unknown    Respiratory alkalosis [E87.3] 05/23/2018 Unknown    Metabolic alkalosis [E87.3] 05/23/2018 Unknown    Stage 3 chronic kidney disease due to type 1 diabetes mellitus [E10.22, N18.3]  Unknown    Electrolyte imbalance [E87.8] 05/22/2018 No    Acute on chronic congestive heart failure [I50.9]  Yes    KEN (acute kidney injury) [N17.9]  Yes    Acute on chronic combined systolic and diastolic heart failure  [I50.43] 05/19/2018 Yes    Acute respiratory failure with hypoxia [J96.01] 05/19/2018 Yes    Acute renal failure superimposed on stage 3 chronic kidney disease [N17.9, N18.3] 05/19/2018 Yes    NSTEMI (non-ST elevated myocardial infarction) [I21.4] 05/19/2018 Yes    Inadequate dietary energy intake with morbid obesity BMI > 40 [E63.9] 05/19/2018 Yes    Diabetic nephropathy associated with type 2 diabetes mellitus [E11.21]  Yes    Acute pulmonary edema with congestive heart failure [I50.1] 04/19/2018 Yes    CAD (coronary artery disease) [I25.10]  Yes     Chronic    Hyperlipidemia [E78.5]  Yes     Chronic    Morbid obesity with BMI of 40.0-44.9, adult [E66.01, Z68.41] 11/23/2016 Not Applicable     Chronic    Essential hypertension [I10] 09/17/2013 Yes     Chronic    Hypothyroidism [E03.9] 09/17/2013 Yes     Chronic    Type 2 diabetes mellitus with hyperglycemia [E11.65] 08/23/2013 Yes     Chronic      Problems Resolved During this Admission:    Diagnosis Date Noted Date Resolved POA    Nonsustained paroxysmal ventricular tachycardia [I47.2] 05/26/2018 05/26/2018 Yes    Lactic acidosis [E87.2] 05/19/2018 05/20/2018 Yes    Dyspnea [R06.00]  05/20/2018 Unknown       Discharged Condition: stable    Disposition: Discharged to Other Faci*    Follow Up:    Patient Instructions:   No discharge procedures on file.    Significant Diagnostic Studies: Labs: BMP: No results for input(s): GLU, NA, K, CL, CO2, BUN, CREATININE, CALCIUM, MG in the last 48 hours.    Pending Diagnostic Studies:     None         Medications:  Reconciled Home Medications:      Medication List      ASK your doctor about these medications    amLODIPine 5 MG tablet  Commonly known as:  NORVASC  Take 5 mg by mouth once daily.     blood sugar diagnostic Strp  check BS fastin and  2hr after biggest meal     blood-glucose meter kit  Commonly known as:  FREESTYLE SYSTEM KIT  Use as instructed     carvedilol 12.5 MG tablet  Commonly known as:   COREG  Take 12.5 mg by mouth 2 (two) times daily with meals.     enalapril 5 MG tablet  Commonly known as:  VASOTEC  Take 5 mg by mouth 2 (two) times daily.     furosemide 40 MG tablet  Commonly known as:  LASIX  Take 1 tablet (40 mg total) by mouth once daily.     lancets-blood glucose strips 30 gauge Cmpk  2 Devices by Misc.(Non-Drug; Combo Route) route 2 (two) times daily. check BS fastin and  2hr after biggest meal     levothyroxine 75 MCG tablet  Commonly known as:  SYNTHROID  TAKE 1 TABLET BY MOUTH DAILY     ondansetron 4 MG tablet  Commonly known as:  ZOFRAN  Take 1 tablet (4 mg total) by mouth every 6 (six) hours as needed for Nausea.     PLAVIX 75 mg tablet  Generic drug:  clopidogrel  Take 75 mg by mouth.     rosuvastatin 20 MG tablet  Commonly known as:  CRESTOR  Take 40 mg by mouth once daily.            Indwelling Lines/Drains at time of discharge:   Lines/Drains/Airways     Airway                 Airway - Non-Surgical 05/25/18 Endotracheal Tube 10 days          Line                 IABP 05/26/18 1630 8 days                Time spent on the discharge of patient: 35 minutes  Patient was seen and examined on the date of discharge and determined to be suitable for discharge.  Elian Cha MD  Department of Hospital Medicine  [facility]

## 2018-06-05 NOTE — PHYSICIAN QUERY
"PT Name: Milli Montez  MR #: [id number]    Physician Query Form - Pneumonia Clarification     CDS/: Moon Garcia               Contact information: [email or web address]    This form is a permanent document in the medical record.    Query Date:  June 5, 2018    By submitting this query, we are merely seeking further clarification of documentation. Please utilize your independent clinical judgment when addressing the question(s) below.    The Medical record contains the following:   Indicators   Supporting Clinical Findings Location in Medical Record   x "Pneumonia" documented   Right lower lobe pneumonia       05/24- now started on empiric antibiotics-will follow X-ray in am .  This can be fluid versus pneumonia      05/25-will send serum procalcitonin ,will use cultures to guide therapy .Will need to deescalate therapy soon.     Progress note 5/26     Chest X-Ray:      PaO2    PaCO2     O2 sat     x Cultures      Respiratory Culture STREPTOCOCCUS GROUP C   Many   Beta-hemolytic streptococci are routinely susceptible to   penicillins,cephalosporins and carbapenems.   Susceptibility testing not routinely performed   Normal respiratory satish also present           Labs 5/25     Treatment       Supplemental O2      Other         Provider, please specify type of pneumonia.    [ x ] Bacterial Pneumonia (Specify organism): ______________________  [  ] Other type of pneumonia (please specify): ______________________________________  [  ] Clinically undetermined    Please document in your progress notes daily for the duration of treatment, until resolved, and include in your discharge summary.    .                                                                                    "

## 2020-08-19 NOTE — ASSESSMENT & PLAN NOTE
Vent settings reviewed and adjusted for optimal gas exchange with lung protective ventilation with low tidal volume, high PEEP  VAP prophylaxis   room air

## 2020-12-11 NOTE — SUBJECTIVE & OBJECTIVE
Review of Systems   Unable to perform ROS: Intubated     Objective:     Vital Signs (Most Recent):  Temp: 97.9 °F (36.6 °C) (05/21/18 1515)  Pulse: 78 (05/21/18 1545)  Resp: 20 (05/21/18 1545)  BP: 91/67 (05/21/18 1545)  SpO2: 99 % (05/21/18 1545) Vital Signs (24h Range):  Temp:  [97.6 °F (36.4 °C)-98.1 °F (36.7 °C)] 97.9 °F (36.6 °C)  Pulse:  [72-96] 78  Resp:  [13-39] 20  SpO2:  [97 %-100 %] 99 %  BP: ()/(44-82) 91/67     Weight: 103.3 kg (227 lb 11.8 oz)  Body mass index is 36.76 kg/m².    Intake/Output Summary (Last 24 hours) at 05/21/18 1559  Last data filed at 05/21/18 1500   Gross per 24 hour   Intake           374.78 ml   Output             3475 ml   Net         -3100.22 ml      Physical Exam   Constitutional: She is oriented to person, place, and time. She appears well-developed and well-nourished. No distress.   HENT:   Head: Normocephalic and atraumatic.   Neck: No JVD present.   Cardiovascular: Normal rate, regular rhythm and normal heart sounds.  Exam reveals no friction rub.    Pulmonary/Chest: No respiratory distress. She has rales.   Abdominal: Soft. She exhibits no distension. There is no tenderness.   Musculoskeletal: She exhibits no edema.   Neurological: She is oriented to person, place, and time.   Skin: Skin is warm and dry.   Psychiatric: She has a normal mood and affect. Her behavior is normal.   Nursing note and vitals reviewed.      Significant Labs: All pertinent labs within the past 24 hours have been reviewed.    Significant Imaging: I have reviewed all pertinent imaging results/findings within the past 24 hours.   detailed exam

## 2023-06-13 NOTE — PROGRESS NOTES
[facility]  Cardiology  Progress Note    Patient Name: Milli Montez  MRN: [id number]  Admission Date: 5/19/2018  Hospital Length of Stay: 2 days  Code Status: Full Code   Attending Physician: Joseph Jackson MD   Primary Care Physician: Marito Amato MD  Expected Discharge Date:   Principal Problem:NSTEMI (non-ST elevated myocardial infarction)    Subjective:   Brief HPI:  HPI obtained from chart as patient was on AVAPS    Ms. Montez is a 57 year old female patient with a PMHx of CAD s/p CABG, s/p PTCA of LM stent on 2/6/18, s/p repeat PTCA/DEWAYNE of LM in-stent restenosis on 5/2/18, chronic diastolic CHF, hyperlipidemia, HTN, DM type II, and TIA who presented to [facility] ED yesterday with a chief complaint of progressively worsening SOB over the past few days. Associated symptoms included orthopnea, PND, BLE edema, and palpitations. Patient denied any associated chest pain, near syncope, syncope, fever, or chills. While in ED, patient became progressively more dyspneic and tachypneic and was subsequently placed on Avaps. Initial workup in ED revealed BNP of 828, troponin of 0.006, creatinine of 1.5, lactic acidosis (%), and hypoxia with hypercapnia. Patient subsequently admitted to ICU for further evaluation and treatment. Patient seen and examined today while in ICU. Remains dyspneic on AVAPs. Complains of some back discomfort. Repeat troponin resulted at 1.762. Echo and repeat ABG pending.        Hospital Course:   5/20/18-Patient seen and examined today, now intubated. On Levophed for pressor support. Responding to Lasix gtt, appreciate nephrology rec's. Troponin > 50. Repeat later today. 2D echo showed EF of 45-50-%, +WMA. TSH 7.    5/21/18- Patient remains intubated. Alert and follows commands. Low dose Levophed for pressor support.  Responding to Lasix gtt at 5mg/hr. Diuresing well since starting Lasix and Levophed. Troponin > 50.  2D echo showed EF of 45-50-%, +WMA. Creatine improved on  morning labs.              Review of Systems   Reason unable to perform ROS: intubated      Objective:     Vital Signs (Most Recent):  Temp: 97.9 °F (36.6 °C) (05/21/18 0305)  Pulse: 83 (05/21/18 0910)  Resp: 16 (05/21/18 0910)  BP: (!) 91/51 (05/21/18 0800)  SpO2: 99 % (05/21/18 0910) Vital Signs (24h Range):  Temp:  [96.1 °F (35.6 °C)-98.1 °F (36.7 °C)] 97.9 °F (36.6 °C)  Pulse:  [] 83  Resp:  [15-27] 16  SpO2:  [97 %-100 %] 99 %  BP: ()/(45-81) 91/51     Weight: 103.3 kg (227 lb 11.8 oz)  Body mass index is 36.76 kg/m².     SpO2: 99 %  O2 Device (Oxygen Therapy): ventilator      Intake/Output Summary (Last 24 hours) at 05/21/18 0916  Last data filed at 05/21/18 0600   Gross per 24 hour   Intake           498.13 ml   Output             2815 ml   Net         -2316.87 ml       Lines/Drains/Airways     Central Venous Catheter Line                 Percutaneous Central Line Insertion/Assessment - triple lumen  05/19/18 1600 right internal jugular 1 day          Drain                 Urethral Catheter 05/19/18 0115 Latex 16 Fr. 2 days         NG/OG Tube 05/19/18 1522 1 day          Airway                 Airway - Non-Surgical 05/19/18 1516 Endotracheal Tube 1 day          Peripheral Intravenous Line                 Peripheral IV - Single Lumen 05/19/18 Left Antecubital 2 days                Physical Exam   Constitutional: She is oriented to person, place, and time. She appears well-developed and well-nourished. She is intubated.   Neck: Neck supple. No JVD present.   Cardiovascular: Normal rate, regular rhythm and normal heart sounds.  Exam reveals decreased pulses (distally ). Exam reveals no friction rub.    No murmur heard.  Extremities cool to touch    Pulmonary/Chest: Effort normal and breath sounds normal. She is intubated. No respiratory distress. She has no wheezes. She has no rales.   Abdominal: Soft. Bowel sounds are normal. She exhibits no distension.   Musculoskeletal: She exhibits no edema or  tenderness.   Neurological: She is alert and oriented to person, place, and time.   Skin: Skin is warm and dry. No rash noted.   Bruise to RLE   Psychiatric: She has a normal mood and affect. Her behavior is normal.   Nursing note and vitals reviewed.      Significant Labs:   All pertinent lab results from the last 24 hours have been reviewed. and   Recent Lab Results       05/21/18  0822 05/21/18  0535 05/21/18  0441 05/21/18  0433 05/20/18  2355      Albumin   2.9(L)       Alkaline Phosphatase   98       Allens Test    Pass      ALT   75(H)       Anion Gap   11       aPTT   43.8  Comment:  aPTT therapeutic range = 39-69 seconds(H)       AST   317(H)       Baso #   0.02       Basophil%   0.1       Total Bilirubin   1.4  Comment:  For infants and newborns, interpretation of results should be based  on gestational age, weight and in agreement with clinical  observations.  Premature Infant recommended reference ranges:  Up to 24 hours.............<8.0 mg/dL  Up to 48 hours............<12.0 mg/dL  3-5 days..................<15.0 mg/dL  6-29 days.................<15.0 mg/dL  (H)       Site    RR      BUN, Bld   31(H)       Calcium   9.0       Chloride   90(L)       CO2   31(H)       Creatinine   1.8(H)       Dels    Adult Vent      Differential Method   Automated       eGFR if    36(A)       eGFR if non    31  Comment:  Calculation used to obtain the estimated glomerular filtration  rate (eGFR) is the CKD-EPI equation.   (A)       Eos #   0.0       Eosinophil%   0.2       FiO2    40      Glucose   238(H)       Gran # (ANC)   14.2(H)       Gran%   85.8(H)       Hematocrit   35.5(L)       Hemoglobin   12.0       Lymph #   1.5       Lymph%   9.3(L)       MCH   31.7(H)       MCHC   33.8       MCV   94       Mode    AC/PRVC      Mono #   0.8       Mono%   4.6       MPV   9.7       PEEP    5      Platelets   264       POC BE    9      POC HCO3    33.1(H)      POC PCO2    45.6(H)      POC PH     7.469(H)      POC PO2    134(H)      POC SATURATED O2    99      POCT Glucose 221(H) 207(H)   201(H)     Potassium   3.7       Total Protein   7.7       Rate    16      RBC   3.79(L)       RDW   14.9(H)       Sample    ARTERIAL      Sodium   132(L)       Troponin I          Vt    400      WBC   16.54(H)                   05/20/18 2013 05/20/18  1823 05/20/18  1340 05/20/18  1207      Albumin         Alkaline Phosphatase         Allens Test         ALT         Anion Gap         aPTT         AST         Baso #         Basophil%         Total Bilirubin         Site         BUN, Bld         Calcium         Chloride         CO2         Creatinine         DelSys         Differential Method         eGFR if          eGFR if non          Eos #         Eosinophil%         FiO2         Glucose         Gran # (ANC)         Gran%         Hematocrit         Hemoglobin         Lymph #         Lymph%         MCH         MCHC         MCV         Mode         Mono #         Mono%         MPV         PEEP         Platelets         POC BE         POC HCO3         POC PCO2         POC PH         POC PO2         POC SATURATED O2         POCT Glucose  194(H)  245(H)     Potassium         Total Protein         Rate         RBC         RDW         Sample         Sodium         Troponin I >50.000  Comment:  The reference interval for Troponin I represents the 99th percentile   cutoff   for our facility and is consistent with 3rd generation assay   performance.  (H)  >50.000  Comment:  The reference interval for Troponin I represents the 99th percentile   cutoff   for our facility and is consistent with 3rd generation assay   performance.  (H)      Vt         WBC               Significant Imaging: Echocardiogram:   2D echo with color flow doppler:   Results for orders placed or performed during the hospital encounter of 05/19/18   2D echo with color flow doppler   Result Value Ref Range    EF 45 55 - 65     Mitral Valve Regurgitation MILD     Diastolic Dysfunction Yes (A)     Est. PA Systolic Pressure 33.18     Tricuspid Valve Regurgitation MILD     and X-Ray: CXR: X-Ray Chest 1 View (CXR):   Results for orders placed or performed during the hospital encounter of 05/19/18   X-Ray Chest 1 View    Narrative    EXAMINATION:  XR CHEST 1 VIEW    CLINICAL HISTORY:  Respiratory distress., Resp failure; OG Tube; ET Tube;    COMPARISON:  05/20/2018.    FINDINGS:  Portable chest x-ray.  Multiple wire leads overlie the chest.  Right central line, endotracheal tube and nasogastric tube remain.      Impression    Stable chest x-ray.      Electronically signed by: Get Loco MD  Date:    05/21/2018  Time:    08:13    and X-Ray Chest PA and Lateral (CXR): No results found for this visit on 05/19/18.    Assessment and Plan:       * NSTEMI (non-ST elevated myocardial infarction)    -Patient with significant history of CAD s/p recent PTCA/DEWAYNE of LM in-stent re-stenosis, presents with NSTEMI/cardiogenic shock  -Continue Levophed for pressor support  -Troponin remains  > 50  -Continue current medical management-ASA, Brilinta, heparin gtt  -Coreg held due to need for pressor support  -Statin held due to elevated liver enzymes  -2D echo showed EF of 45-50%, +WMA  -Cath images from Feb and May 2018 have been requested for Dr. Boone to review. Will make plans for cath based on Dr. Boone's review of images.   -Dr. Boone has requested arterial studies to BLE to assess for PAD if high risk intervention to LM or LAD needed   -Patient with new inferior wall MI and severe MR on echo. Will plan for left and right heart cath tomorrow with Dr. Boone.   -morning labs to reassess renal function         Acute renal failure superimposed on stage 3 chronic kidney disease    -Likely cardiorenal, creatinine 1.7  -Continue to monitor        Acute respiratory failure    -Secondary to NSTEMI and volume overload/acute on chronic decompensated diastolic  CHF  -Now intubated  -Continue Lasix gtt, assess response, nephrology on board              Acute on chronic combined systolic and diastolic heart failure    -Improving with Lasix gtt  -continue current mgmt  -ACEI/ARB held due to KEN/need for pressor support          CAD (coronary artery disease)    -See plan under NSTEMI        Hyperlipidemia    -Hold statin given elevated liver enzymes          Essential hypertension    -BP meds held due to need for pressor support            VTE Risk Mitigation         Ordered     heparin 25,000 units in dextrose 5% 250 mL (100 units/mL) infusion; FEMALE  Continuous      05/19/18 0905     heparin 25,000 units in dextrose 5% 250 mL (100 units/mL) bolus from bag; PRN BOLUS  As needed (PRN)      05/19/18 0905     heparin 25,000 units in dextrose 5% 250 mL (100 units/mL) bolus from bag; PRN BOLUS  As needed (PRN)      05/19/18 0905     Place sequential compression device  Until discontinued      05/19/18 0628     IP VTE HIGH RISK PATIENT  Once      05/19/18 0248        Chart reviewed. Patient examined by Dr. Matthews and agrees with plan that has been outlined.       GUSTAVO Lin  Cardiology  [facility]   Quinolones Pregnancy And Lactation Text: This medication is Pregnancy Category C and it isn't know if it is safe during pregnancy. It is also excreted in breast milk.

## 2024-07-26 NOTE — PLAN OF CARE
01/16/18 1027   Final Note   Assessment Type Final Discharge Note   Discharge Disposition Home     
Initial assessment completed. Met with patient. Patient denies any post hospital needs or services at this time.Transitional Care Folder, Discharge Planning Begins on Admission pamphlet, [facility] Delivery pamphlet, Advance Directive information given to patient along with the contact information. . Instructed patient  to call with any questions or concerns.       01/15/18 1227   Discharge Assessment   Assessment Type Discharge Planning Assessment   Confirmed/corrected address and phone number on facesheet? Yes   Assessment information obtained from? Patient;Medical Record   Expected Length of Stay (days) (TBD)   Communicated expected length of stay with patient/caregiver yes   Prior to hospitilization cognitive status: Alert/Oriented   Prior to hospitalization functional status: Independent   Current cognitive status: Alert/Oriented   Current Functional Status: Independent   Facility Arrived From: home   Lives With spouse;child(yulisa), adult   Able to Return to Prior Arrangements yes   Is patient able to care for self after discharge? Yes   Who are your caregiver(s) and their phone number(s)? Sin Montez ( spouse ) [phone]   Patient's perception of discharge disposition home or selfcare   Readmission Within The Last 30 Days no previous admission in last 30 days   Patient currently being followed by outpatient case management? No   Patient currently receives any other outside agency services? No   Equipment Currently Used at Home none   Do you have any problems affording any of your prescribed medications? No   Is the patient taking medications as prescribed? yes   Does the patient have transportation home? Yes   Transportation Available family or friend will provide   Does the patient receive services at the Coumadin Clinic? No   Discharge Plan A Home;Home with family   Discharge Plan B Home;Home with family   Patient/Family In Agreement With Plan yes     
Problem: Patient Care Overview  Goal: Plan of Care Review  Outcome: Ongoing (interventions implemented as appropriate)    Reviewed plan of care, including indications and possible side effects of administered medications. Remains free from injury at this time. Respirations even and unlabored. Bed locked and in lowest position. Call light within reach. Side rails up x2.  Patient sleeping between care.     12 hour chart check complete.             
Problem: Patient Care Overview  Goal: Plan of Care Review  Outcome: Ongoing (interventions implemented as appropriate)  POC reviewed, verbalized understanding. Pt remained free from falls, fall precautions in place. Pt is SR on monitor, 60-70s. O2 NC. VSS. Pt asks for tonic water to relieve leg cramps. No other c/o at this time. PIV intact. Carlin intact. Call bell and personal belongings within reach. Hourly rounding complete. Reminded to call for assistance. Will continue to monitor.      
Problem: Patient Care Overview  Goal: Plan of Care Review  Outcome: Ongoing (interventions implemented as appropriate)  Patient on O2 tolerating well. No distress noted at this time.       
Problem: Patient Care Overview  Goal: Plan of Care Review  Outcome: Outcome(s) achieved Date Met: 01/15/18  Discharge. Remained free from injury. Discharge instructions given. Verbalized understanding. IV removed. cath tip intact. Off floor via wheelchair accompanied by staff.         
SW met with patient in ED. Patient lives with her spouse Hamilton [phone].  Patient independent with ADL's at home.  Patient voiced no d/c concerns.  No anticipated needs at present. Case mgt to follow up with d/c needs.  Pooja Rapp ([facility]) [phone].     01/15/18 1228   Discharge Assessment   Assessment Type Discharge Planning Assessment   Confirmed/corrected address and phone number on facesheet? Yes   Assessment information obtained from? Patient   Communicated expected length of stay with patient/caregiver no   Current cognitive status: Alert/Oriented;No Deficits   Current Functional Status: Independent   Lives With spouse   Able to Return to Prior Arrangements yes   Is patient able to care for self after discharge? Yes   Who are your caregiver(s) and their phone number(s)? hamilton rapp (spouse) [phone]  or Pooja Rapp (freda) [phone]   Patient's perception of discharge disposition home or selfcare   Readmission Within The Last 30 Days no previous admission in last 30 days   Patient currently being followed by outpatient case management? No   Patient currently receives any other outside agency services? No   Equipment Currently Used at Home none   Do you have any problems affording any of your prescribed medications? No   Is the patient taking medications as prescribed? yes   Does the patient have transportation home? Yes  (family will provide)   Does the patient receive services at the Coumadin Clinic? No   Discharge Plan A Home with family       
English